# Patient Record
Sex: FEMALE | Race: WHITE | NOT HISPANIC OR LATINO | Employment: OTHER | URBAN - METROPOLITAN AREA
[De-identification: names, ages, dates, MRNs, and addresses within clinical notes are randomized per-mention and may not be internally consistent; named-entity substitution may affect disease eponyms.]

---

## 2017-04-07 ENCOUNTER — TELEPHONE (OUTPATIENT)
Dept: VASCULAR LAB | Facility: MEDICAL CENTER | Age: 62
End: 2017-04-07

## 2017-05-15 ENCOUNTER — TELEPHONE (OUTPATIENT)
Dept: VASCULAR LAB | Facility: MEDICAL CENTER | Age: 62
End: 2017-05-15

## 2017-05-16 NOTE — TELEPHONE ENCOUNTER
Patient overdue for vascular care followup visit.  We have been unable to reach patient by phone despite multiple attempts and patient has not returned our calls  Pending any further contact from patient will discontinue from vascular care.  Followup with PCP    Michael J. Bloch, MD  Vascular Care    Cc:  R Co

## 2018-03-08 ENCOUNTER — OFFICE VISIT (OUTPATIENT)
Dept: VASCULAR LAB | Facility: MEDICAL CENTER | Age: 63
End: 2018-03-08
Attending: INTERNAL MEDICINE
Payer: COMMERCIAL

## 2018-03-08 VITALS
DIASTOLIC BLOOD PRESSURE: 87 MMHG | WEIGHT: 251 LBS | HEART RATE: 95 BPM | BODY MASS INDEX: 41.82 KG/M2 | HEIGHT: 65 IN | SYSTOLIC BLOOD PRESSURE: 159 MMHG

## 2018-03-08 DIAGNOSIS — E78.5 DYSLIPIDEMIA: ICD-10-CM

## 2018-03-08 DIAGNOSIS — E11.9 TYPE 2 DIABETES MELLITUS WITHOUT COMPLICATION, WITHOUT LONG-TERM CURRENT USE OF INSULIN (HCC): ICD-10-CM

## 2018-03-08 DIAGNOSIS — I10 ESSENTIAL HYPERTENSION: ICD-10-CM

## 2018-03-08 PROCEDURE — 99213 OFFICE O/P EST LOW 20 MIN: CPT | Performed by: INTERNAL MEDICINE

## 2018-03-08 RX ORDER — METOPROLOL SUCCINATE 50 MG/1
50 TABLET, EXTENDED RELEASE ORAL DAILY
Qty: 30 TAB | Refills: 11 | Status: SHIPPED | OUTPATIENT
Start: 2018-03-08 | End: 2019-03-26 | Stop reason: SDUPTHER

## 2018-03-08 RX ORDER — MAGNESIUM OXIDE 400 MG/1
400 TABLET ORAL DAILY
COMMUNITY
End: 2019-11-19

## 2018-03-08 RX ORDER — IRBESARTAN AND HYDROCHLOROTHIAZIDE 300; 12.5 MG/1; MG/1
1 TABLET, FILM COATED ORAL DAILY
Qty: 30 TAB | Refills: 11 | Status: SHIPPED | OUTPATIENT
Start: 2018-03-08 | End: 2019-02-27 | Stop reason: SDUPTHER

## 2018-03-08 RX ORDER — ATORVASTATIN CALCIUM 40 MG/1
40 TABLET, FILM COATED ORAL DAILY
Qty: 30 TAB | Refills: 11 | Status: SHIPPED | OUTPATIENT
Start: 2018-03-08 | End: 2018-04-30

## 2018-03-08 RX ORDER — M-VIT,TX,IRON,MINS/CALC/FOLIC 27MG-0.4MG
1 TABLET ORAL DAILY
COMMUNITY
End: 2019-11-19

## 2018-03-08 ASSESSMENT — ENCOUNTER SYMPTOMS
COUGH: 0
DEPRESSION: 0
FOCAL WEAKNESS: 0
WEIGHT LOSS: 1
CLAUDICATION: 0
MYALGIAS: 0
SHORTNESS OF BREATH: 1
HEADACHES: 1
DIZZINESS: 0
SPEECH CHANGE: 0
BRUISES/BLEEDS EASILY: 0
LOSS OF CONSCIOUSNESS: 0
PALPITATIONS: 0
SENSORY CHANGE: 0
NERVOUS/ANXIOUS: 0

## 2018-03-08 NOTE — PATIENT INSTRUCTIONS
Start or Restart  - Irbestartan 300/12.5 mg once daily  - Metformin 500 mg 2x daily  - Atorvastatin 40 mg daily  - Metoprolol ER 50 mg daily    Continue aspirin.    Start home BP monitoring    Blood work one week before follow up visit.    Follow Up:  April 30 at 840a    Michael Bloch, MD  [phone]

## 2018-03-08 NOTE — PROGRESS NOTES
"  Follow Up VASCULAR VISIT  Subjective:   Shahana Nj is a 60 y.o. female who presents today 3-8-18 for   Chief Complaint   Patient presents with   • Amb Vascular Reason For Visit     HPI:  Patient here for f/u of htn, dyslipidemia and diabetes  No angina  Still not checking BP at home  Off metformin  Not checking fs regularly  No new tia or cva symptoms  On cpap.  No hypoglycemic episodes  No myalgias on statin  On asa   No bleeding  No recent blood work  Has been out of most of her medications    Social History   Substance Use Topics   • Smoking status: Never Smoker   • Smokeless tobacco: Never Used   • Alcohol use No     Allergies   Allergen Reactions   • Pcn [Penicillins] Swelling   • Tape Rash     rash     DIET AND EXERCISE:  Weight Change: down about 20 pounds   Diet: decent diabetic diet - simplified  Exercise: intermittent exercise - moderate    Review of Systems   Constitutional: Positive for weight loss. Negative for malaise/fatigue.   Respiratory: Positive for shortness of breath. Negative for cough.    Cardiovascular: Negative for chest pain, palpitations, claudication and leg swelling.   Musculoskeletal: Positive for joint pain. Negative for myalgias.   Neurological: Positive for headaches. Negative for dizziness, sensory change, speech change, focal weakness and loss of consciousness.   Endo/Heme/Allergies: Does not bruise/bleed easily.   Psychiatric/Behavioral: Negative for depression. The patient is not nervous/anxious.       Objective:     Vitals:    03/08/18 1149 03/08/18 1206   BP: 157/76 159/87   Pulse: 84 95   Weight: 113.9 kg (251 lb)    Height: 1.651 m (5' 5\")       Body mass index is 41.77 kg/m².  Physical Exam   Constitutional: She is oriented to person, place, and time. No distress.   Cardiovascular: Normal rate, regular rhythm, normal heart sounds and intact distal pulses.    No murmur heard.  Pulmonary/Chest: Effort normal and breath sounds normal. No respiratory distress. She has no " wheezes. She has no rales.   Musculoskeletal: She exhibits edema.   Tr edema bilat   Neurological: She is alert and oriented to person, place, and time. No cranial nerve deficit. Coordination normal.   Skin: She is not diaphoretic.   Psychiatric: She has a normal mood and affect.   Vitals reviewed.    DATA REVIEW:    Blood work 1-4-16:  BNP 8.5; d-dimer 0.27    Echocardiogram 1-13-16:    Normal EF 60%  No noted regional wall motion abnormalities  Trace MR, AI, TR    Blood work from 6-21-16:  Glucose 60, gfr 58, , nonHDL 126, urine for ma normal, a1c-5.7, tsh 2.2                   Medical Decision Making:  Today's Assessment / Status / Plan:     1. Type 2 diabetes mellitus without complication, without long-term current use of insulin (CMS-HCC)  metFORMIN (GLUCOPHAGE) 500 MG Tab    HEMOGLOBIN A1C    MICROALBUMIN CREAT RATIO URINE   2. Essential hypertension  irbesartan-hydrochlorothiazide (AVALIDE) 300-12.5 MG per tablet    metoprolol SR (TOPROL XL) 50 MG TABLET SR 24 HR    COMP METABOLIC PANEL    MICROALBUMIN CREAT RATIO URINE   3. Dyslipidemia  atorvastatin (LIPITOR) 40 MG Tab    COMP METABOLIC PANEL    LIPID PROFILE    TSH     Patient Type: Primary Prevention    Etiology of Established CVD if Present: Possible silent MI, normal echo    Lipid Management: Qualifies for Statin Therapy Based on 2013 ACC/AHA Guidelines: yes  Calculated 10-Year Risk of ASCVD: N/A  Currently on Statin: Yes  Patient with indication for high intensity statin  Goal LDL <70 and nonHDL <100  Plan:  - restart atorvastatin 40 mg daily  - Intensify TLC per below  - Check fasting lipid panel prior to next visit    Blood Pressure Management:  Acc/aha goal <130/80  BP under poor control at least in office  Off most of her meds  No home BP readings available.  Leg swelling improved since amlodipine d/c'd   Plan:  - restart metoprolol  - change to irbesartan 300/12.5  - Try and obtain a home blood pressure monitor  - Consider abpm in future  -  consider addition of peripheral alpha blocker or spironolactone if not controlled in future    Glycemic Status: Diabetic  Unknown level of control  Plan:  - Restart metformin  - restart FS  - Recheck A1c prior to next visit  - Yearly flu shot, yearly eye exam, and good foot hygiene    Anti-Platelet/Anti-Coagulant Tx: probably indicated given possible h/o silent MI  - continue aspirin 81 mg daily    Smoking: continue complete avoidance      Physical Activity: discussed some short burst exercise daily    Weight Management and Nutrition: Dietary plan was discussed with patient at this visit including diabetic diet - including continued slow steady weight loss    Other:     1.  AALIYAH - continue CPAP.  Otherwise defer to pulmonary    2.  Possible CAD with possible h/o silent MI by patient report.  Echo without wall motion abnormalities.   Medical management as above.     Instructed to follow-up with PCP for remainder of adult medical needs: yes  We will partner with other providers in the management of established vascular disease and cardiometabolic risk factors.    Studies to Be Obtained: None  Labs to Be Obtained: As above prior to next visit    Follow up in: 4 weeks    Michael J Bloch, M.D.    Cc:    Yodry Woody

## 2018-04-23 ENCOUNTER — TELEPHONE (OUTPATIENT)
Dept: VASCULAR LAB | Facility: MEDICAL CENTER | Age: 63
End: 2018-04-23

## 2018-04-23 ENCOUNTER — HOSPITAL ENCOUNTER (OUTPATIENT)
Dept: LAB | Facility: MEDICAL CENTER | Age: 63
End: 2018-04-23
Attending: INTERNAL MEDICINE
Payer: COMMERCIAL

## 2018-04-23 DIAGNOSIS — I10 ESSENTIAL HYPERTENSION: ICD-10-CM

## 2018-04-23 DIAGNOSIS — E78.5 DYSLIPIDEMIA: ICD-10-CM

## 2018-04-23 DIAGNOSIS — E11.9 TYPE 2 DIABETES MELLITUS WITHOUT COMPLICATION, WITHOUT LONG-TERM CURRENT USE OF INSULIN (HCC): ICD-10-CM

## 2018-04-23 LAB
ALBUMIN SERPL BCP-MCNC: 3.8 G/DL (ref 3.2–4.9)
ALBUMIN/GLOB SERPL: 1 G/DL
ALP SERPL-CCNC: 181 U/L (ref 30–99)
ALT SERPL-CCNC: 148 U/L (ref 2–50)
ANION GAP SERPL CALC-SCNC: 9 MMOL/L (ref 0–11.9)
AST SERPL-CCNC: 136 U/L (ref 12–45)
BILIRUB SERPL-MCNC: 0.6 MG/DL (ref 0.1–1.5)
BUN SERPL-MCNC: 28 MG/DL (ref 8–22)
CALCIUM SERPL-MCNC: 10.6 MG/DL (ref 8.5–10.5)
CHLORIDE SERPL-SCNC: 104 MMOL/L (ref 96–112)
CHOLEST SERPL-MCNC: 132 MG/DL (ref 100–199)
CO2 SERPL-SCNC: 22 MMOL/L (ref 20–33)
CREAT SERPL-MCNC: 0.88 MG/DL (ref 0.5–1.4)
CREAT UR-MCNC: 101.8 MG/DL
EST. AVERAGE GLUCOSE BLD GHB EST-MCNC: 151 MG/DL
GLOBULIN SER CALC-MCNC: 3.8 G/DL (ref 1.9–3.5)
GLUCOSE SERPL-MCNC: 132 MG/DL (ref 65–99)
HBA1C MFR BLD: 6.9 % (ref 0–5.6)
HDLC SERPL-MCNC: 39 MG/DL
LDLC SERPL CALC-MCNC: 73 MG/DL
MICROALBUMIN UR-MCNC: 1.4 MG/DL
MICROALBUMIN/CREAT UR: 14 MG/G (ref 0–30)
POTASSIUM SERPL-SCNC: 4.2 MMOL/L (ref 3.6–5.5)
PROT SERPL-MCNC: 7.6 G/DL (ref 6–8.2)
SODIUM SERPL-SCNC: 135 MMOL/L (ref 135–145)
TRIGL SERPL-MCNC: 100 MG/DL (ref 0–149)
TSH SERPL DL<=0.005 MIU/L-ACNC: 2.33 UIU/ML (ref 0.38–5.33)

## 2018-04-23 PROCEDURE — 82043 UR ALBUMIN QUANTITATIVE: CPT

## 2018-04-23 PROCEDURE — 82570 ASSAY OF URINE CREATININE: CPT

## 2018-04-23 PROCEDURE — 36415 COLL VENOUS BLD VENIPUNCTURE: CPT

## 2018-04-23 PROCEDURE — 80061 LIPID PANEL: CPT

## 2018-04-23 PROCEDURE — 83036 HEMOGLOBIN GLYCOSYLATED A1C: CPT

## 2018-04-23 PROCEDURE — 84443 ASSAY THYROID STIM HORMONE: CPT

## 2018-04-23 PROCEDURE — 80053 COMPREHEN METABOLIC PANEL: CPT

## 2018-04-24 ENCOUNTER — TELEPHONE (OUTPATIENT)
Dept: VASCULAR LAB | Facility: MEDICAL CENTER | Age: 63
End: 2018-04-24

## 2018-04-24 NOTE — TELEPHONE ENCOUNTER
Lab work reviewed. AST//148. LM on VM to stop the atorvastatin for now aqnd we will see her at her appt in the clinic. ANGELO Sutton.

## 2018-04-24 NOTE — TELEPHONE ENCOUNTER
LFTs elevated approx 3x ULN.  Will have patient stop statin.  MA to inform  Repeat CMP in 3 week.  May also want to get viral hepatitis panel and abdominal u/s  Will need to follow up with PCP for further management    Michael Bloch, MD  Vascular Care    Cc:  Yordy oWody

## 2018-04-24 NOTE — TELEPHONE ENCOUNTER
high LFTs - stop statin   Received: Yesterday   Message Contents   Michael J Bloch, M.D.  Tani Vasquez Ass't             Please call patient and let her know that her liver function tests are elevated.  She should stop her atorvastatin.     We will discuss further at her follow up visit.   Please make sure to document all attempts      Shahana called me back, but Mirella had already spoke to her. Patient is aware to stop Atorvastatin.

## 2018-04-24 NOTE — TELEPHONE ENCOUNTER
----- Message from Michael J Bloch, M.D. sent at 4/23/2018  6:49 PM PDT -----  Regarding: high LFTs - stop statin  Please call patient and let her know that her liver function tests are elevated.  She should stop her atorvastatin.    We will discuss further at her follow up visit.  Please make sure to document all attempts

## 2018-04-30 ENCOUNTER — OFFICE VISIT (OUTPATIENT)
Dept: VASCULAR LAB | Facility: MEDICAL CENTER | Age: 63
End: 2018-04-30
Attending: NURSE PRACTITIONER
Payer: COMMERCIAL

## 2018-04-30 VITALS
WEIGHT: 250 LBS | DIASTOLIC BLOOD PRESSURE: 78 MMHG | HEART RATE: 86 BPM | HEIGHT: 65 IN | BODY MASS INDEX: 41.65 KG/M2 | SYSTOLIC BLOOD PRESSURE: 155 MMHG

## 2018-04-30 DIAGNOSIS — H90.6 MIXED CONDUCTIVE AND SENSORINEURAL HEARING LOSS OF BOTH EARS: ICD-10-CM

## 2018-04-30 DIAGNOSIS — Z12.11 ENCOUNTER FOR SCREENING COLONOSCOPY FOR NON-HIGH-RISK PATIENT: ICD-10-CM

## 2018-04-30 DIAGNOSIS — E11.9 TYPE 2 DIABETES MELLITUS TREATED WITHOUT INSULIN (HCC): ICD-10-CM

## 2018-04-30 DIAGNOSIS — R03.0 ELEVATED BP WITHOUT DIAGNOSIS OF HYPERTENSION: ICD-10-CM

## 2018-04-30 PROBLEM — H91.93 BILATERAL HEARING LOSS: Status: ACTIVE | Noted: 2018-04-30

## 2018-04-30 PROBLEM — Q17.9 EAR ANOMALY: Status: ACTIVE | Noted: 2018-04-30

## 2018-04-30 PROCEDURE — 99212 OFFICE O/P EST SF 10 MIN: CPT | Performed by: NURSE PRACTITIONER

## 2018-04-30 PROCEDURE — 99214 OFFICE O/P EST MOD 30 MIN: CPT | Performed by: NURSE PRACTITIONER

## 2018-04-30 RX ORDER — SPIRONOLACTONE 25 MG/1
25 TABLET ORAL DAILY
Qty: 30 TAB | Refills: 3 | Status: SHIPPED | OUTPATIENT
Start: 2018-04-30 | End: 2018-08-29 | Stop reason: SDUPTHER

## 2018-04-30 ASSESSMENT — ENCOUNTER SYMPTOMS
LOSS OF CONSCIOUSNESS: 0
DEPRESSION: 0
BRUISES/BLEEDS EASILY: 0
SPEECH CHANGE: 0
NERVOUS/ANXIOUS: 0
MYALGIAS: 0
DIZZINESS: 0
COUGH: 0
CLAUDICATION: 0
FOCAL WEAKNESS: 0
SHORTNESS OF BREATH: 1
HEADACHES: 1
SENSORY CHANGE: 0
PALPITATIONS: 0
WEIGHT LOSS: 1

## 2018-04-30 NOTE — PROGRESS NOTES
"FOLLOW UP VASCULAR VISIT  Subjective:   Shahana Nj is a 60 y.o. female who presents today 4/30/18 for   Chief Complaint   Patient presents with   • Follow-Up     HPI:  Patient here for f/u of htn, dyslipidemia and diabetes  Has not gotten home BP monitor yet   FS running 118-150, no hypoglycemia episodes  No TIA or stroke type symptoms  Denies chest pain, lightheadedness, or palpitations   SOB with exertion only  Tolerating Metformin  Tolerating changes from last visit  No problems with CPAP mask  On Atorvastatin had elevated AST/ALT no symptoms of myalgia  Stopped Atorvastatin on 4/24  On ASA, no bleeding issues  Leg swelling minor Lt > Rt  Request ENT referral-Has had multiple surgeries on her ears and would like to have them evaluated again    Social History   Substance Use Topics   • Smoking status: Never Smoker   • Smokeless tobacco: Never Used   • Alcohol use No     Allergies   Allergen Reactions   • Pcn [Penicillins] Swelling   • Tape Rash     rash     DIET AND EXERCISE:  Weight Change: down about 20 pounds   Diet: decent diabetic diet - simplified  Exercise: intermittent exercise - moderate    Review of Systems   Constitutional: Positive for weight loss. Negative for malaise/fatigue.   Respiratory: Positive for shortness of breath. Negative for cough.    Cardiovascular: Positive for leg swelling. Negative for chest pain, palpitations and claudication.   Musculoskeletal: Positive for joint pain. Negative for myalgias.   Neurological: Positive for headaches. Negative for dizziness, sensory change, speech change, focal weakness and loss of consciousness.   Endo/Heme/Allergies: Does not bruise/bleed easily.   Psychiatric/Behavioral: Negative for depression. The patient is not nervous/anxious.       Objective:     Vitals:    04/30/18 0839 04/30/18 0845   BP: 157/79 155/78   Pulse: 87 86   Weight: 113.4 kg (250 lb)    Height: 1.651 m (5' 5\")       Body mass index is 41.6 kg/m².  Physical Exam   Constitutional: " She is oriented to person, place, and time. No distress.   Cardiovascular: Normal rate, normal heart sounds and intact distal pulses.    No murmur heard.  Occ irregular beat   Pulmonary/Chest: Effort normal and breath sounds normal. No respiratory distress. She has no wheezes. She has no rales.   Musculoskeletal: She exhibits edema.   Tr edema bilat Lt > rt   Neurological: She is alert and oriented to person, place, and time.   Skin: She is not diaphoretic.   Psychiatric: She has a normal mood and affect.   Vitals reviewed.    DATA REVIEW:    Blood work 1-4-16:  BNP 8.5; d-dimer 0.27    Echocardiogram 1-13-16:    Normal EF 60%  No noted regional wall motion abnormalities  Trace MR, AI, TR    Blood work from 6-21-16:  Glucose 60, gfr 58, , nonHDL 126, urine for ma normal, a1c-5.7, tsh 2.2  Lab Results   Component Value Date    CHOLSTRLTOT 132 04/23/2018    LDL 73 04/23/2018    HDL 39 (A) 04/23/2018    TRIGLYCERIDE 100 04/23/2018         Lab Results   Component Value Date    HBA1C 6.9 (H) 04/23/2018      Lab Results   Component Value Date    SODIUM 135 04/23/2018    POTASSIUM 4.2 04/23/2018    CHLORIDE 104 04/23/2018    CO2 22 04/23/2018    GLUCOSE 132 (H) 04/23/2018    BUN 28 (H) 04/23/2018    CREATININE 0.88 04/23/2018    IF[place] >60 04/23/2018    IFNOTAFR >60 04/23/2018          Medical Decision Making:  Today's Assessment / Status / Plan:     1. Type 2 diabetes mellitus treated without insulin (HCC)  COMP METABOLIC PANEL    metformin (GLUCOPHAGE) 1000 MG tablet   2. Elevated BP without diagnosis of hypertension  COMP METABOLIC PANEL    spironolactone (ALDACTONE) 25 MG Tab   3. Encounter for screening colonoscopy for non-high-risk patient  REFERRAL TO GASTROENTEROLOGY   4. Mixed conductive and sensorineural hearing loss of both ears       Patient Type: Primary Prevention    Etiology of Established CVD if Present: Possible silent MI, normal echo    Lipid Management: Qualifies for Statin Therapy Based on 2013  ACC/AHA Guidelines: yes  Calculated 10-Year Risk of ASCVD: N/A  Currently on Statin: Yes  Patient with indication for high intensity statin  Goal LDL <70 and nonHDL <100  Atorvastatin caused elevated /148  Plan:  - Stopped Atorvastatin on 4/24 due to elevated LFT  - CMP in 3 weeks  - Consider starting Rosuvastatin 20 mg after CMP results if LFT have come down  - Intensify TLC per below  - Check fasting lipid panel in 3 to 6 months    Blood Pressure Management:  Acc/aha goal <130/80  BP under poor control at least in office  Taking Irbesartan-HCTZ and metoprolol without problems  No home BP readings available.  Minor leg swelling trace Lt>Rt   Plan:  - Continue metoprolol  - Continue irbesartan-HCTZ 300/12.5  - Start Spironolactone 25 mg daily in AM   - Try and obtain a home blood pressure monitor  - Consider abpm in future  - consider addition of peripheral alpha blocker in future if still not in control    Glycemic Status: Diabetic  Most recent A1c 6.9% after one month of Metformin- Tolerating medication without GI symptoms  Plan:  - Increase metformin to 1,000 mg BID  - Continue FS call if hypoglycemia episodes  - Recheck A1c  in 3 months  - Yearly flu shot, yearly eye exam, and good foot hygiene    Anti-Platelet/Anti-Coagulant Tx: probably indicated given possible h/o silent MI  - continue aspirin 81 mg daily  - Report bleeding issues immediatly     Smoking: continue complete avoidance      Physical Activity: discussed some short burst exercise daily    Weight Management and Nutrition: Dietary plan was discussed with patient at this visit including diabetic diet - including continued slow steady weight loss    Other:     1.  AALIYAH - continue CPAP.  Otherwise defer to pulmonary    2.  Possible CAD with possible h/o silent MI by patient report.  Echo without wall motion abnormalities.   Medical management as above.     3. Due for colonoscopy- Referral sent to GI    4. Wanted an ENT referral due to having  several ear surgeries - Referral to ENT       Instructed to follow-up with PCP for remainder of adult medical needs: yes  We will partner with other providers in the management of established vascular disease and cardiometabolic risk factors.    Studies to Be Obtained: None  Labs to Be Obtained: CMP  in 3 weeks     Follow up in: 6 weeks    ANGELO Sutton.     Will need further workup for elevated LFTs they don't normalize off statin therapy. Can defer to PCP for this workup    Michael J. Bloch, MD  Vascular Care    Cc:    Yordy Woody

## 2018-05-23 ENCOUNTER — HOSPITAL ENCOUNTER (OUTPATIENT)
Dept: LAB | Facility: MEDICAL CENTER | Age: 63
End: 2018-05-23
Attending: NURSE PRACTITIONER
Payer: COMMERCIAL

## 2018-05-23 DIAGNOSIS — E11.9 TYPE 2 DIABETES MELLITUS TREATED WITHOUT INSULIN (HCC): ICD-10-CM

## 2018-05-23 DIAGNOSIS — R03.0 ELEVATED BP WITHOUT DIAGNOSIS OF HYPERTENSION: ICD-10-CM

## 2018-05-23 LAB
ALBUMIN SERPL BCP-MCNC: 4.2 G/DL (ref 3.2–4.9)
ALBUMIN/GLOB SERPL: 1.2 G/DL
ALP SERPL-CCNC: 88 U/L (ref 30–99)
ALT SERPL-CCNC: 51 U/L (ref 2–50)
ANION GAP SERPL CALC-SCNC: 10 MMOL/L (ref 0–11.9)
AST SERPL-CCNC: 34 U/L (ref 12–45)
BILIRUB SERPL-MCNC: 0.5 MG/DL (ref 0.1–1.5)
BUN SERPL-MCNC: 32 MG/DL (ref 8–22)
CALCIUM SERPL-MCNC: 10.8 MG/DL (ref 8.5–10.5)
CHLORIDE SERPL-SCNC: 104 MMOL/L (ref 96–112)
CO2 SERPL-SCNC: 23 MMOL/L (ref 20–33)
CREAT SERPL-MCNC: 0.97 MG/DL (ref 0.5–1.4)
GLOBULIN SER CALC-MCNC: 3.5 G/DL (ref 1.9–3.5)
GLUCOSE SERPL-MCNC: 101 MG/DL (ref 65–99)
POTASSIUM SERPL-SCNC: 4.7 MMOL/L (ref 3.6–5.5)
PROT SERPL-MCNC: 7.7 G/DL (ref 6–8.2)
SODIUM SERPL-SCNC: 137 MMOL/L (ref 135–145)

## 2018-05-23 PROCEDURE — 80053 COMPREHEN METABOLIC PANEL: CPT

## 2018-05-23 PROCEDURE — 36415 COLL VENOUS BLD VENIPUNCTURE: CPT

## 2018-06-04 ENCOUNTER — OFFICE VISIT (OUTPATIENT)
Dept: VASCULAR LAB | Facility: MEDICAL CENTER | Age: 63
End: 2018-06-04
Attending: INTERNAL MEDICINE
Payer: COMMERCIAL

## 2018-06-04 VITALS
SYSTOLIC BLOOD PRESSURE: 140 MMHG | BODY MASS INDEX: 41.45 KG/M2 | HEART RATE: 89 BPM | DIASTOLIC BLOOD PRESSURE: 64 MMHG | HEIGHT: 65 IN | WEIGHT: 248.8 LBS

## 2018-06-04 DIAGNOSIS — E78.5 ELEVATED FASTING LIPID PROFILE: ICD-10-CM

## 2018-06-04 DIAGNOSIS — E11.9 TYPE 2 DIABETES MELLITUS WITHOUT COMPLICATION, WITHOUT LONG-TERM CURRENT USE OF INSULIN (HCC): ICD-10-CM

## 2018-06-04 DIAGNOSIS — Z13.29 THYROID DISORDER SCREENING: ICD-10-CM

## 2018-06-04 DIAGNOSIS — R03.0 ELEVATED BP WITHOUT DIAGNOSIS OF HYPERTENSION: ICD-10-CM

## 2018-06-04 PROCEDURE — 99214 OFFICE O/P EST MOD 30 MIN: CPT | Performed by: NURSE PRACTITIONER

## 2018-06-04 PROCEDURE — 99212 OFFICE O/P EST SF 10 MIN: CPT | Performed by: NURSE PRACTITIONER

## 2018-06-04 RX ORDER — ROSUVASTATIN CALCIUM 20 MG/1
20 TABLET, COATED ORAL EVERY EVENING
Qty: 30 TAB | Refills: 11 | Status: SHIPPED | OUTPATIENT
Start: 2018-06-04 | End: 2018-07-18

## 2018-06-04 ASSESSMENT — ENCOUNTER SYMPTOMS
BLOOD IN STOOL: 0
CLAUDICATION: 0
DEPRESSION: 0
WEAKNESS: 0
DOUBLE VISION: 0
LOSS OF CONSCIOUSNESS: 0
PALPITATIONS: 0
BLURRED VISION: 0
WHEEZING: 0
HEADACHES: 0
FALLS: 0
BRUISES/BLEEDS EASILY: 0
SEIZURES: 0
SHORTNESS OF BREATH: 0
DIZZINESS: 0
WEIGHT LOSS: 0

## 2018-06-04 NOTE — PROGRESS NOTES
FOLLOW UP VASCULAR VISIT  Subjective:   Shahana Nj is a 60 y.o. female who presents today 06/05/18 for   No chief complaint on file.    HPI:  Patient here for f/u of htn, dyslipidemia and diabetes  Home 's/70-80's but did not bring in  -606's, better since increase metformin dose last visit  No Chest pain, lightheadedness or plapitations  Tolerating CPAP mask without problems  Off Atorvastatin since 4/25 LFT decreased on last test  On ASA, no bleeding   Leg swelling LT>RT  Denies TIA or stroke type symptoms    Social History   Substance Use Topics   • Smoking status: Never Smoker   • Smokeless tobacco: Never Used   • Alcohol use No     Allergies   Allergen Reactions   • Pcn [Penicillins] Swelling   • Tape Rash     rash     DIET AND EXERCISE:  Weight Change: down about 20 pounds   Diet: decent diabetic diet - simplified  Exercise: intermittent exercise - moderate    Review of Systems   Constitutional: Negative for malaise/fatigue and weight loss.   Eyes: Negative for blurred vision and double vision.   Respiratory: Negative for shortness of breath and wheezing.    Cardiovascular: Positive for leg swelling. Negative for chest pain, palpitations and claudication.   Gastrointestinal: Negative for blood in stool.   Genitourinary: Negative for hematuria.   Musculoskeletal: Positive for joint pain. Negative for falls.   Skin: Negative for rash.   Neurological: Negative for dizziness, seizures, loss of consciousness, weakness and headaches.   Endo/Heme/Allergies: Does not bruise/bleed easily.   Psychiatric/Behavioral: Negative for depression.      Objective:     There were no vitals filed for this visit.   There is no height or weight on file to calculate BMI.  Physical Exam   Constitutional: She is oriented to person, place, and time. She appears well-developed and well-nourished. No distress.   Cardiovascular: Normal rate, regular rhythm, normal heart sounds and intact distal pulses.  Exam reveals no  friction rub.    No murmur heard.  Pulmonary/Chest: No respiratory distress. She has no wheezes. She has no rales.   Musculoskeletal: She exhibits edema.   Neurological: She is alert and oriented to person, place, and time.   Skin: Skin is warm and dry. She is not diaphoretic.   Psychiatric: She has a normal mood and affect. Her behavior is normal.     DATA REVIEW:    Blood work 1-4-16:  BNP 8.5; d-dimer 0.27    Echocardiogram 1-13-16:    Normal EF 60%  No noted regional wall motion abnormalities  Trace MR, AI, TR    Blood work from 6-21-16:  Glucose 60, gfr 58, , nonHDL 126, urine for ma normal, a1c-5.7, tsh 2.2  Lab Results   Component Value Date    CHOLSTRLTOT 132 04/23/2018    LDL 73 04/23/2018    HDL 39 (A) 04/23/2018    TRIGLYCERIDE 100 04/23/2018         Lab Results   Component Value Date    HBA1C 6.9 (H) 04/23/2018      Lab Results   Component Value Date    SODIUM 137 05/23/2018    POTASSIUM 4.7 05/23/2018    CHLORIDE 104 05/23/2018    CO2 23 05/23/2018    GLUCOSE 101 (H) 05/23/2018    BUN 32 (H) 05/23/2018    CREATININE 0.97 05/23/2018    IF[place] >60 05/23/2018    IFNOTAFR 58 (A) 05/23/2018          Medical Decision Making:  Today's Assessment / Status / Plan:     No diagnosis found.  Patient Type: Primary Prevention    Etiology of Established CVD if Present: Possible silent MI, normal echo    Lipid Management: Qualifies for Statin Therapy Based on 2013 ACC/AHA Guidelines: yes  Calculated 10-Year Risk of ASCVD: N/A  Currently on Statin: Yes  Patient with indication for high intensity statin  Goal LDL <70 and nonHDL <100  Atorvastatin caused elevated /148  Plan:  - Trial of Crestor 20 mg  - CMP in 4 weeks  - Intensify TLC per below  - Check fasting lipid panel in 4 weeks    Blood Pressure Management:  Acc/aha goal <130/80  BP under poor control at least in office, but home readings in range  Taking Irbesartan-HCTZ and metoprolol without problems  Minor leg swelling trace Lt>Rt   Plan:  -  Continue metoprolol  - Continue irbesartan-HCTZ 300/12.5  -  Continue Spironolactone 25 mg daily in AM   - Try and obtain a home blood pressure monitor  - Consider abpm in future  - consider addition of peripheral alpha blocker in future if still not in control  -Screen for thyroid function before next visit    Glycemic Status: Diabetic  Most recent A1c 6.9% after one month of Metformin- Tolerating medication without GI symptoms  Plan:  - Continue metformin to 1,000 mg BID  - Continue FS call if hypoglycemia episodes  - Recheck A1c prior to next visit, monitor creatinine closely  - Yearly flu shot, yearly eye exam, and good foot hygiene    Anti-Platelet/Anti-Coagulant Tx: probably indicated given possible h/o silent MI  - continue aspirin 81 mg daily  - Report bleeding issues immediatly     Smoking: continue complete avoidance      Physical Activity: discussed some short burst exercise daily    Weight Management and Nutrition: Dietary plan was discussed with patient at this visit including diabetic diet - including continued slow steady weight loss    Other:     1.  AALIYAH - continue CPAP.  Otherwise defer to pulmonary    2.  Possible CAD with possible h/o silent MI by patient report.  Echo without wall motion abnormalities.   Medical management as above.     3. Due for colonoscopy- Referral sent to GI    4. Elevated LFT- Back to baseline -If LFT continue to be elevated will need further workup. Start trial of Crestor as above.     5. Mod decreased GFR-3a - Monitor GFR at next visit, Urine for MA, May need to decrease dose of Spironolactone with next visit. Will keep a close watch on kidney function    Instructed to follow-up with PCP for remainder of adult medical needs: yes  We will partner with other providers in the management of established vascular disease and cardiometabolic risk factors.    Studies to Be Obtained: None  Labs to Be Obtained: CMP, Lipids, A1c, TSH,  Urine for MA    Follow up in: 5 weeks    Mirella  ERASMO Hauser     Cc:    Yordy Woody

## 2018-07-11 ENCOUNTER — HOSPITAL ENCOUNTER (OUTPATIENT)
Dept: LAB | Facility: MEDICAL CENTER | Age: 63
End: 2018-07-11
Attending: NURSE PRACTITIONER
Payer: COMMERCIAL

## 2018-07-11 DIAGNOSIS — Z13.29 THYROID DISORDER SCREENING: ICD-10-CM

## 2018-07-11 DIAGNOSIS — R03.0 ELEVATED BP WITHOUT DIAGNOSIS OF HYPERTENSION: ICD-10-CM

## 2018-07-11 DIAGNOSIS — E78.5 ELEVATED FASTING LIPID PROFILE: ICD-10-CM

## 2018-07-11 DIAGNOSIS — E11.9 TYPE 2 DIABETES MELLITUS WITHOUT COMPLICATION, WITHOUT LONG-TERM CURRENT USE OF INSULIN (HCC): ICD-10-CM

## 2018-07-11 LAB
ALBUMIN SERPL BCP-MCNC: 4.6 G/DL (ref 3.2–4.9)
ALBUMIN/GLOB SERPL: 1.5 G/DL
ALP SERPL-CCNC: 73 U/L (ref 30–99)
ALT SERPL-CCNC: 23 U/L (ref 2–50)
ANION GAP SERPL CALC-SCNC: 10 MMOL/L (ref 0–11.9)
AST SERPL-CCNC: 18 U/L (ref 12–45)
BILIRUB SERPL-MCNC: 0.4 MG/DL (ref 0.1–1.5)
BUN SERPL-MCNC: 42 MG/DL (ref 8–22)
CALCIUM SERPL-MCNC: 11.5 MG/DL (ref 8.5–10.5)
CHLORIDE SERPL-SCNC: 103 MMOL/L (ref 96–112)
CHOLEST SERPL-MCNC: 151 MG/DL (ref 100–199)
CO2 SERPL-SCNC: 22 MMOL/L (ref 20–33)
CREAT SERPL-MCNC: 1.23 MG/DL (ref 0.5–1.4)
CREAT UR-MCNC: 71 MG/DL
EST. AVERAGE GLUCOSE BLD GHB EST-MCNC: 140 MG/DL
GLOBULIN SER CALC-MCNC: 3 G/DL (ref 1.9–3.5)
GLUCOSE SERPL-MCNC: 110 MG/DL (ref 65–99)
HBA1C MFR BLD: 6.5 % (ref 0–5.6)
HDLC SERPL-MCNC: 42 MG/DL
LDLC SERPL CALC-MCNC: 82 MG/DL
MICROALBUMIN UR-MCNC: <0.7 MG/DL
MICROALBUMIN/CREAT UR: NORMAL MG/G (ref 0–30)
POTASSIUM SERPL-SCNC: 4.7 MMOL/L (ref 3.6–5.5)
PROT SERPL-MCNC: 7.6 G/DL (ref 6–8.2)
SODIUM SERPL-SCNC: 135 MMOL/L (ref 135–145)
TRIGL SERPL-MCNC: 135 MG/DL (ref 0–149)
TSH SERPL DL<=0.005 MIU/L-ACNC: 2.53 UIU/ML (ref 0.38–5.33)

## 2018-07-11 PROCEDURE — 80061 LIPID PANEL: CPT

## 2018-07-11 PROCEDURE — 83036 HEMOGLOBIN GLYCOSYLATED A1C: CPT

## 2018-07-11 PROCEDURE — 82043 UR ALBUMIN QUANTITATIVE: CPT

## 2018-07-11 PROCEDURE — 80053 COMPREHEN METABOLIC PANEL: CPT

## 2018-07-11 PROCEDURE — 84443 ASSAY THYROID STIM HORMONE: CPT

## 2018-07-11 PROCEDURE — 36415 COLL VENOUS BLD VENIPUNCTURE: CPT

## 2018-07-11 PROCEDURE — 82570 ASSAY OF URINE CREATININE: CPT

## 2018-07-18 ENCOUNTER — OFFICE VISIT (OUTPATIENT)
Dept: VASCULAR LAB | Facility: MEDICAL CENTER | Age: 63
End: 2018-07-18
Attending: INTERNAL MEDICINE
Payer: COMMERCIAL

## 2018-07-18 VITALS
WEIGHT: 252.7 LBS | DIASTOLIC BLOOD PRESSURE: 68 MMHG | HEIGHT: 65 IN | BODY MASS INDEX: 42.1 KG/M2 | HEART RATE: 84 BPM | SYSTOLIC BLOOD PRESSURE: 141 MMHG

## 2018-07-18 DIAGNOSIS — E78.5 ELEVATED FASTING LIPID PROFILE: ICD-10-CM

## 2018-07-18 DIAGNOSIS — R03.0 ELEVATED BP WITHOUT DIAGNOSIS OF HYPERTENSION: ICD-10-CM

## 2018-07-18 PROCEDURE — 99212 OFFICE O/P EST SF 10 MIN: CPT | Performed by: NURSE PRACTITIONER

## 2018-07-18 PROCEDURE — 99213 OFFICE O/P EST LOW 20 MIN: CPT | Performed by: NURSE PRACTITIONER

## 2018-07-18 RX ORDER — ROSUVASTATIN CALCIUM 40 MG/1
40 TABLET, COATED ORAL DAILY
Qty: 30 TAB | Refills: 3 | Status: SHIPPED | OUTPATIENT
Start: 2018-07-18 | End: 2018-11-12 | Stop reason: SDUPTHER

## 2018-07-18 ASSESSMENT — ENCOUNTER SYMPTOMS
BRUISES/BLEEDS EASILY: 0
DIZZINESS: 0
SHORTNESS OF BREATH: 0
CLAUDICATION: 0
WEIGHT LOSS: 0
BLURRED VISION: 0
COUGH: 0
DEPRESSION: 0
WHEEZING: 0
SEIZURES: 0
PALPITATIONS: 0
FEVER: 0
HEADACHES: 1
BLOOD IN STOOL: 0

## 2018-07-18 NOTE — PROGRESS NOTES
"FOLLOW UP VASCULAR VISIT  Subjective:   Shahana Nj is a 63 y.o. female who presents today 07/18/18 for   Chief Complaint   Patient presents with   • Follow-Up     HPI:  Patient here for f/u of htn, dyslipidemia and diabetes  Not taking Home bp  FS-110-102  Chest pain, lightheadedness or plapitations  Tolerating CPAP mask without problems  On Crestor, no increase in LFT   On ASA, no bleeding issues  Leg swelling LT>RT  Denies TIA or stroke type symptoms    Social History   Substance Use Topics   • Smoking status: Never Smoker   • Smokeless tobacco: Never Used   • Alcohol use No     Allergies   Allergen Reactions   • Pcn [Penicillins] Swelling   • Tape Rash     rash     DIET AND EXERCISE:  Weight Change: down about 20 pounds   Diet: decent diabetic diet - simplified  Exercise: intermittent exercise - moderate    Review of Systems   Constitutional: Negative for fever and weight loss.   HENT: Negative for nosebleeds.    Eyes: Negative for blurred vision.   Respiratory: Negative for cough, shortness of breath and wheezing.    Cardiovascular: Positive for leg swelling. Negative for chest pain, palpitations and claudication.   Gastrointestinal: Negative for blood in stool.   Genitourinary: Negative for hematuria.   Musculoskeletal: Positive for joint pain.   Neurological: Positive for headaches. Negative for dizziness and seizures.   Endo/Heme/Allergies: Does not bruise/bleed easily.   Psychiatric/Behavioral: Negative for depression.      Objective:     Vitals:    07/18/18 1002 07/18/18 1009   BP: 149/73 141/68   Pulse: 89 84   Weight: 114.6 kg (252 lb 11.2 oz)    Height: 1.651 m (5' 5\")       Body mass index is 42.05 kg/m².  Physical Exam   Constitutional: She is oriented to person, place, and time. She appears well-developed and well-nourished. No distress.   Cardiovascular: Normal rate, regular rhythm and intact distal pulses.  Exam reveals no friction rub.    No murmur heard.  Pulmonary/Chest: No respiratory " distress. She has no wheezes. She has no rales.   Musculoskeletal: She exhibits edema.   Lt>RT   Neurological: She is alert and oriented to person, place, and time.   Skin: Skin is warm and dry. She is not diaphoretic.   Psychiatric: She has a normal mood and affect. Her behavior is normal.     DATA REVIEW:    Blood work 1-4-16:  BNP 8.5; d-dimer 0.27    Echocardiogram 1-13-16:    Normal EF 60%  No noted regional wall motion abnormalities  Trace MR, AI, TR    Blood work from 6-21-16:  Glucose 60, gfr 58, , nonHDL 126, urine for ma normal, a1c-5.7, tsh 2.2  Lab Results   Component Value Date    CHOLSTRLTOT 151 07/11/2018    LDL 82 07/11/2018    HDL 42 07/11/2018    TRIGLYCERIDE 135 07/11/2018         Lab Results   Component Value Date    HBA1C 6.5 (H) 07/11/2018      Lab Results   Component Value Date    SODIUM 135 07/11/2018    POTASSIUM 4.7 07/11/2018    CHLORIDE 103 07/11/2018    CO2 22 07/11/2018    GLUCOSE 110 (H) 07/11/2018    BUN 42 (H) 07/11/2018    CREATININE 1.23 07/11/2018    IFAFRICA 53 (A) 07/11/2018    IFNOTAFR 44 (A) 07/11/2018          Medical Decision Making:  Today's Assessment / Status / Plan:     1. Elevated BP without diagnosis of hypertension     2. Elevated fasting lipid profile  rosuvastatin (CRESTOR) 40 MG tablet     Patient Type: Primary Prevention    Etiology of Established CVD if Present: Possible silent MI, normal echo    Lipid Management: Qualifies for Statin Therapy Based on 2013 ACC/AHA Guidelines: yes  Calculated 10-Year Risk of ASCVD: N/A  Currently on Statin: Yes  Patient with indication for high intensity statin  Goal LDL <70 and nonHDL <100. Above goal with most recent lab work.   Atorvastatin caused elevated /148  Plan:  - Increase Crestor to 40 mg  - CMP in 4 weeks  - Intensify TLC per below  - Check fasting lipid panel and LFT in 6 weeks    Blood Pressure Management:  Acc/aha goal <130/80  BP under poor control at least in office, but no home readings, was in  range on prior home readings  Taking Irbesartan-HCTZ and metoprolol without problems  Minor leg swelling trace Lt>Rt  Thyroid diease ruled out with most recent lab work    Plan:  - Continue metoprolol  - Continue irbesartan-HCTZ 300/12.5  -  Decrease Spironolactone to 12.5 mg daily in AM, due to drop in GFR   - Try and obtain a home blood pressure monitor at least 3 to 4 per week.  - Consider abpm in future  - consider addition of peripheral alpha blocker in future if still not in control at home opr start with next visit even if no readings.     Glycemic Status: Diabetic  Most recent A1c 6.5% improving with increase in  Metformin- Increase in creatinine will need to monitor closely -Tolerating medication without GI symptoms  Plan:  - Continue metformin to 1,000 mg BID  - Continue FS call if hypoglycemia episodes  - Recheck A1c in about 3 months  - Monitor creatinine closely  - Yearly flu shot, yearly eye exam, and good foot hygiene    Anti-Platelet/Anti-Coagulant Tx: probably indicated given possible h/o silent MI  - continue aspirin 81 mg daily  - Report bleeding issues immediatly     Smoking: continue complete avoidance      Physical Activity: discussed some short burst exercise daily    Weight Management and Nutrition: Dietary plan was discussed with patient at this visit including diabetic diet - including continued slow steady weight loss    Other:     1.  AALIYAH - continue CPAP.  Otherwise defer to pulmonary    2.  Possible CAD with possible h/o silent MI by patient report.  Echo without wall motion abnormalities.   Medical management as above.     3. Elevated LFT- Back to baseline -If LFT increase in the future will need further workup.  Increase Crestor as above.     4. Mod decreased GFR-3a - Monitor GFR at next visit. Decrease dose of Spironolactone as above. Will keep a close watch on kidney function    Instructed to follow-up with PCP for remainder of adult medical needs: yes  We will partner with other  providers in the management of established vascular disease and cardiometabolic risk factors.    Studies to Be Obtained: None  Labs to Be Obtained: CMP, Lipids,     Follow up in: 5-6 weeks    ANGELO Sutton.     Cc:    Yordy Woody

## 2018-08-27 ENCOUNTER — TELEPHONE (OUTPATIENT)
Dept: VASCULAR LAB | Facility: MEDICAL CENTER | Age: 63
End: 2018-08-27

## 2018-08-27 DIAGNOSIS — E11.9 TYPE 2 DIABETES MELLITUS WITHOUT COMPLICATION, WITHOUT LONG-TERM CURRENT USE OF INSULIN (HCC): ICD-10-CM

## 2018-08-27 DIAGNOSIS — E78.5 ELEVATED FASTING LIPID PROFILE: ICD-10-CM

## 2018-08-27 DIAGNOSIS — R03.0 ELEVATED BP WITHOUT DIAGNOSIS OF HYPERTENSION: ICD-10-CM

## 2018-08-28 ENCOUNTER — HOSPITAL ENCOUNTER (OUTPATIENT)
Dept: LAB | Facility: MEDICAL CENTER | Age: 63
End: 2018-08-28
Attending: NURSE PRACTITIONER
Payer: COMMERCIAL

## 2018-08-28 DIAGNOSIS — E11.9 TYPE 2 DIABETES MELLITUS WITHOUT COMPLICATION, WITHOUT LONG-TERM CURRENT USE OF INSULIN (HCC): ICD-10-CM

## 2018-08-28 DIAGNOSIS — E78.5 ELEVATED FASTING LIPID PROFILE: ICD-10-CM

## 2018-08-28 DIAGNOSIS — R03.0 ELEVATED BP WITHOUT DIAGNOSIS OF HYPERTENSION: ICD-10-CM

## 2018-08-28 LAB
ALBUMIN SERPL BCP-MCNC: 4.3 G/DL (ref 3.2–4.9)
ALBUMIN/GLOB SERPL: 1.5 G/DL
ALP SERPL-CCNC: 74 U/L (ref 30–99)
ALT SERPL-CCNC: 22 U/L (ref 2–50)
ANION GAP SERPL CALC-SCNC: 11 MMOL/L (ref 0–11.9)
AST SERPL-CCNC: 20 U/L (ref 12–45)
BILIRUB SERPL-MCNC: 0.5 MG/DL (ref 0.1–1.5)
BUN SERPL-MCNC: 28 MG/DL (ref 8–22)
CALCIUM SERPL-MCNC: 10.9 MG/DL (ref 8.5–10.5)
CHLORIDE SERPL-SCNC: 102 MMOL/L (ref 96–112)
CHOLEST SERPL-MCNC: 133 MG/DL (ref 100–199)
CO2 SERPL-SCNC: 23 MMOL/L (ref 20–33)
CREAT SERPL-MCNC: 1.27 MG/DL (ref 0.5–1.4)
GLOBULIN SER CALC-MCNC: 2.9 G/DL (ref 1.9–3.5)
GLUCOSE SERPL-MCNC: 118 MG/DL (ref 65–99)
HDLC SERPL-MCNC: 40 MG/DL
LDLC SERPL CALC-MCNC: 73 MG/DL
POTASSIUM SERPL-SCNC: 4.4 MMOL/L (ref 3.6–5.5)
PROT SERPL-MCNC: 7.2 G/DL (ref 6–8.2)
SODIUM SERPL-SCNC: 136 MMOL/L (ref 135–145)
TRIGL SERPL-MCNC: 102 MG/DL (ref 0–149)

## 2018-08-28 PROCEDURE — 80053 COMPREHEN METABOLIC PANEL: CPT

## 2018-08-28 PROCEDURE — 80061 LIPID PANEL: CPT

## 2018-08-28 PROCEDURE — 36415 COLL VENOUS BLD VENIPUNCTURE: CPT

## 2018-08-29 ENCOUNTER — OFFICE VISIT (OUTPATIENT)
Dept: VASCULAR LAB | Facility: MEDICAL CENTER | Age: 63
End: 2018-08-29
Attending: NURSE PRACTITIONER
Payer: COMMERCIAL

## 2018-08-29 VITALS
WEIGHT: 255 LBS | HEART RATE: 85 BPM | DIASTOLIC BLOOD PRESSURE: 70 MMHG | HEIGHT: 65 IN | SYSTOLIC BLOOD PRESSURE: 126 MMHG | BODY MASS INDEX: 42.49 KG/M2

## 2018-08-29 DIAGNOSIS — E78.5 ELEVATED FASTING LIPID PROFILE: ICD-10-CM

## 2018-08-29 DIAGNOSIS — R03.0 ELEVATED BP WITHOUT DIAGNOSIS OF HYPERTENSION: ICD-10-CM

## 2018-08-29 DIAGNOSIS — E11.9 TYPE 2 DIABETES MELLITUS TREATED WITHOUT INSULIN (HCC): ICD-10-CM

## 2018-08-29 DIAGNOSIS — E11.9 TYPE 2 DIABETES MELLITUS WITHOUT COMPLICATION, WITHOUT LONG-TERM CURRENT USE OF INSULIN (HCC): ICD-10-CM

## 2018-08-29 DIAGNOSIS — Z13.29 THYROID DISORDER SCREENING: ICD-10-CM

## 2018-08-29 DIAGNOSIS — I10 ESSENTIAL HYPERTENSION: ICD-10-CM

## 2018-08-29 PROCEDURE — 99212 OFFICE O/P EST SF 10 MIN: CPT | Performed by: NURSE PRACTITIONER

## 2018-08-29 PROCEDURE — 99214 OFFICE O/P EST MOD 30 MIN: CPT | Performed by: NURSE PRACTITIONER

## 2018-08-29 RX ORDER — SPIRONOLACTONE 25 MG/1
25 TABLET ORAL DAILY
Qty: 90 TAB | Refills: 3 | Status: SHIPPED | OUTPATIENT
Start: 2018-08-29 | End: 2020-03-09 | Stop reason: SDUPTHER

## 2018-08-29 ASSESSMENT — ENCOUNTER SYMPTOMS
CLAUDICATION: 0
BRUISES/BLEEDS EASILY: 0
SEIZURES: 0
WHEEZING: 0
HEADACHES: 0
SHORTNESS OF BREATH: 0
BLOOD IN STOOL: 0
WEAKNESS: 0
DEPRESSION: 0
DIZZINESS: 1
COUGH: 0
LOSS OF CONSCIOUSNESS: 0
FALLS: 0
PALPITATIONS: 0

## 2018-08-29 NOTE — PROGRESS NOTES
"FOLLOW UP VASCULAR VISIT  Subjective:   Shahana Nj is a 63 y.o. female who presents today 08/29/18 for   Chief Complaint   Patient presents with   • Follow-Up     HPI:  Patient here for f/u of htn, dyslipidemia and diabetes  Not taking Home bp  FS-105-120  Using CPAP mask  On Crestor without myalgias  On ASA no bleeding issues  Leg swelling continued alayna  Denies SOB, CP, Palpitations  Dizziness with rising quickly no falls  Minor fatigue due to  being ill for a year      Social History   Substance Use Topics   • Smoking status: Never Smoker   • Smokeless tobacco: Never Used   • Alcohol use No     Allergies   Allergen Reactions   • Pcn [Penicillins] Swelling   • Tape Rash     rash     DIET AND EXERCISE:  Weight Change: down about 20 pounds   Diet: decent diabetic diet - simplified  Exercise: intermittent exercise - moderate    Review of Systems   Constitutional: Positive for malaise/fatigue.   Respiratory: Negative for cough, shortness of breath and wheezing.    Cardiovascular: Positive for leg swelling. Negative for chest pain, palpitations and claudication.   Gastrointestinal: Negative for blood in stool.   Genitourinary: Negative for hematuria.   Musculoskeletal: Positive for joint pain. Negative for falls.   Neurological: Positive for dizziness. Negative for seizures, loss of consciousness, weakness and headaches.   Endo/Heme/Allergies: Does not bruise/bleed easily.   Psychiatric/Behavioral: Negative for depression.      Objective:     Vitals:    08/29/18 0925 08/29/18 0933   BP: 144/68 126/70   Pulse: 88 85   Weight: 115.7 kg (255 lb)    Height: 1.651 m (5' 5\")       Body mass index is 42.43 kg/m².  Physical Exam   Constitutional: She is oriented to person, place, and time. She appears well-developed and well-nourished. No distress.   Cardiovascular: Normal rate, regular rhythm and normal heart sounds.  Exam reveals no gallop and no friction rub.    No murmur heard.  Pulmonary/Chest: Effort normal and " breath sounds normal. No respiratory distress. She has no wheezes. She has no rales.   Musculoskeletal: She exhibits edema.   Neurological: She is alert and oriented to person, place, and time.   Skin: Skin is warm and dry. She is not diaphoretic.   Psychiatric: She has a normal mood and affect. Her behavior is normal.     DATA REVIEW:    Blood work 1-4-16:  BNP 8.5; d-dimer 0.27    Echocardiogram 1-13-16:    Normal EF 60%  No noted regional wall motion abnormalities  Trace MR, AI, TR    Blood work from 6-21-16:  Glucose 60, gfr 58, , nonHDL 126, urine for ma normal, a1c-5.7, tsh 2.2  Lab Results   Component Value Date    CHOLSTRLTOT 133 08/28/2018    LDL 73 08/28/2018    HDL 40 08/28/2018    TRIGLYCERIDE 102 08/28/2018         Lab Results   Component Value Date    HBA1C 6.5 (H) 07/11/2018      Lab Results   Component Value Date    SODIUM 136 08/28/2018    POTASSIUM 4.4 08/28/2018    CHLORIDE 102 08/28/2018    CO2 23 08/28/2018    GLUCOSE 118 (H) 08/28/2018    BUN 28 (H) 08/28/2018    CREATININE 1.27 08/28/2018    IFAFRICA 51 (A) 08/28/2018    IFNOTAFR 42 (A) 08/28/2018          Medical Decision Making:  Today's Assessment / Status / Plan:     1. Elevated fasting lipid profile  LIPID PANEL   2. Thyroid disorder screening     3. Type 2 diabetes mellitus without complication, without long-term current use of insulin (HCC)  COMP METABOLIC PANEL    HEMOGLOBIN A1C   4. Elevated BP without diagnosis of hypertension  spironolactone (ALDACTONE) 25 MG Tab    COMP METABOLIC PANEL   5. Essential hypertension  CBC WITH DIFFERENTIAL   6. Type 2 diabetes mellitus treated without insulin (HCC)  metformin (GLUCOPHAGE) 1000 MG tablet    CBC WITH DIFFERENTIAL     Patient Type: Primary Prevention    Etiology of Established CVD if Present: Possible silent MI, normal echo    Lipid Management: Qualifies for Statin Therapy Based on 2013 ACC/AHA Guidelines: yes  Calculated 10-Year Risk of ASCVD: N/A  Currently on Statin:  Yes  Patient with indication for high intensity statin  Goal LDL <70 and nonHDL <100. Above goal with most recent lab work.   Atorvastatin caused elevated /148  Plan:  - Continue Crestor 40 mg  - Intensify TLC per below  - Consider Zetia with next visit if not in range   -Ceck fasting lipid panel and LFT in 3 months    Blood Pressure Management:  Acc/aha goal <130/80  BP under good control at least in office, but no home readings  Taking Irbesartan-HCTZ and metoprolol without problems  Thyroid disease ruled out with most recent lab work    Plan:  - Continue metoprolol  - Continue irbesartan-HCTZ 300/12.5  - Change Spironolactone to 12.5 mg daily in AM, due to drop in GFR-Pt instructed last visit but did not do    - Check blood pressure at least 3 to 4 per week.  - Consider abpm in future  - consider addition of peripheral alpha blocker in future if still not in control at home or office.     Glycemic Status: Diabetic  Most recent A1c 6.5% improving with increase in  Metformin- Increase in creatinine will need to monitor closely -Tolerating medication without GI symptoms  Plan:  - Continue metformin to 1,000 mg BID  - Continue FS call if hypoglycemia episodes  - Recheck A1c in about 3 months  - Monitor creatinine closely  - Yearly flu shot, yearly eye exam, and good foot hygiene    Anti-Platelet/Anti-Coagulant Tx: probably indicated given possible h/o silent MI  - continue aspirin 81 mg daily  - Report bleeding issues immediatly     Smoking: continue complete avoidance      Physical Activity: discussed some short burst exercise daily    Weight Management and Nutrition: Dietary plan was discussed with patient at this visit including diabetic diet - including continued slow steady weight loss    Other:     1.  AALIYAH - continue CPAP.  Otherwise defer to pulmonary    2.  Possible CAD with possible h/o silent MI by patient report.  Echo without wall motion abnormalities.   Medical management as above.     3. Elevated  LFT- Back to baseline -If LFT increase in the future will need further workup. Continue Crestor as above.     4. Mod decreased GFR-3a - Monitor GFR at next visit. Continue Spironolactone as above. Will keep a close watch on kidney function    Instructed to follow-up with PCP for remainder of adult medical needs: yes  We will partner with other providers in the management of established vascular disease and cardiometabolic risk factors.    Studies to Be Obtained: None  Labs to Be Obtained: CMP, Lipids, CBC, A1c    Follow up in: 5-6 weeks    ANGELO Sutton.     Cc:    Yordy Woody

## 2018-11-27 ENCOUNTER — PATIENT OUTREACH (OUTPATIENT)
Dept: HEALTH INFORMATION MANAGEMENT | Facility: OTHER | Age: 63
End: 2018-11-27

## 2018-11-27 NOTE — PROGRESS NOTES
SCHEDULED PT WITH DR. MUELLER AS A NEW ACCESS TO YOU . PT WANTED TO DISCUSS ALL OF HER HEALTH MAINTENANCE AND RECIEVE THE VACCINES SHE IS DUE FOR.

## 2018-11-27 NOTE — PROGRESS NOTES
Outcome: Left Message    Please transfer to Patient Outreach Team at [phone] when patient returns call.    Web[employer] Checked & Epic Updated:  yes    [facility] Verified: yes    Attempt # 1

## 2018-12-13 ENCOUNTER — OFFICE VISIT (OUTPATIENT)
Dept: MEDICAL GROUP | Facility: PHYSICIAN GROUP | Age: 63
End: 2018-12-13
Payer: COMMERCIAL

## 2018-12-13 VITALS
DIASTOLIC BLOOD PRESSURE: 60 MMHG | TEMPERATURE: 97.4 F | OXYGEN SATURATION: 94 % | RESPIRATION RATE: 16 BRPM | BODY MASS INDEX: 40.98 KG/M2 | WEIGHT: 246 LBS | HEIGHT: 65 IN | SYSTOLIC BLOOD PRESSURE: 122 MMHG | HEART RATE: 78 BPM

## 2018-12-13 DIAGNOSIS — Z00.00 WELL ADULT EXAM: ICD-10-CM

## 2018-12-13 DIAGNOSIS — E78.2 MIXED HYPERLIPIDEMIA: ICD-10-CM

## 2018-12-13 DIAGNOSIS — I10 BENIGN ESSENTIAL HTN: ICD-10-CM

## 2018-12-13 DIAGNOSIS — G89.29 CHRONIC PAIN OF LEFT KNEE: ICD-10-CM

## 2018-12-13 DIAGNOSIS — N18.30 CRI (CHRONIC RENAL INSUFFICIENCY), STAGE 3 (MODERATE) (HCC): ICD-10-CM

## 2018-12-13 DIAGNOSIS — E11.9 TYPE 2 DIABETES MELLITUS WITHOUT COMPLICATION, WITHOUT LONG-TERM CURRENT USE OF INSULIN (HCC): ICD-10-CM

## 2018-12-13 DIAGNOSIS — Z12.31 ENCOUNTER FOR SCREENING MAMMOGRAM FOR HIGH-RISK PATIENT: ICD-10-CM

## 2018-12-13 DIAGNOSIS — M25.562 CHRONIC PAIN OF LEFT KNEE: ICD-10-CM

## 2018-12-13 PROBLEM — Z13.29 THYROID DISORDER SCREENING: Status: RESOLVED | Noted: 2018-06-04 | Resolved: 2018-12-13

## 2018-12-13 PROBLEM — E78.5 ELEVATED FASTING LIPID PROFILE: Status: RESOLVED | Noted: 2018-06-04 | Resolved: 2018-12-13

## 2018-12-13 PROBLEM — R03.0 ELEVATED BP WITHOUT DIAGNOSIS OF HYPERTENSION: Status: RESOLVED | Noted: 2018-04-30 | Resolved: 2018-12-13

## 2018-12-13 PROCEDURE — 99204 OFFICE O/P NEW MOD 45 MIN: CPT | Performed by: FAMILY MEDICINE

## 2018-12-13 ASSESSMENT — ENCOUNTER SYMPTOMS
DEPRESSION: 0
BRUISES/BLEEDS EASILY: 0
PSYCHIATRIC NEGATIVE: 1
CONSTITUTIONAL NEGATIVE: 1
DIABETIC ASSOCIATED SYMPTOMS: 0
GASTROINTESTINAL NEGATIVE: 1
HEARTBURN: 0
NAUSEA: 0
MYALGIAS: 0
CARDIOVASCULAR NEGATIVE: 1
ABDOMINAL PAIN: 0
EYES NEGATIVE: 1
DOUBLE VISION: 0
BLURRED VISION: 0
ARTHRALGIAS: 1
TINGLING: 0
CHILLS: 0
HEADACHES: 0
NEUROLOGICAL NEGATIVE: 1
ANOREXIA: 0
PALPITATIONS: 0
FEVER: 0
COUGH: 0
HEMOPTYSIS: 0
RESPIRATORY NEGATIVE: 1
DIZZINESS: 0

## 2018-12-13 ASSESSMENT — PATIENT HEALTH QUESTIONNAIRE - PHQ9: CLINICAL INTERPRETATION OF PHQ2 SCORE: 0

## 2018-12-13 NOTE — PROGRESS NOTES
Subjective:      Shahana Nj is a 63 y.o. female who presents with Arthritis (left knee); Otalgia; and Difficulty Breathing            1. Encounter for screening mammogram for high-risk patient    - [id number]; Future  - REFERRAL TO GASTROENTEROLOGY  - COMP METABOLIC PANEL; Future  - HEMOGLOBIN A1C; Future  - Lipid Profile; Future  - MICROALBUMIN CREAT RATIO URINE (LAB COLLECT); Future  - REFERRAL TO OPHTHALMOLOGY    2. Well adult exam    - [id number]; Future  - REFERRAL TO GASTROENTEROLOGY  - COMP METABOLIC PANEL; Future  - HEMOGLOBIN A1C; Future  - Lipid Profile; Future  - MICROALBUMIN CREAT RATIO URINE (LAB COLLECT); Future  - REFERRAL TO OPHTHALMOLOGY    3. Type 2 diabetes mellitus without complication, without long-term current use of insulin (HCC)  Currently treated for DM, taking meds and checking bs at home, trying to do DM diet.controlled    - [id number]; Future  - REFERRAL TO GASTROENTEROLOGY  - COMP METABOLIC PANEL; Future  - HEMOGLOBIN A1C; Future  - Lipid Profile; Future  - MICROALBUMIN CREAT RATIO URINE (LAB COLLECT); Future  - REFERRAL TO OPHTHALMOLOGY    4. Benign essential HTN  Currently treated for HTN, taking meds with no CP or sob, monitors bp at home periodically. controlled    - [id number]; Future  - REFERRAL TO GASTROENTEROLOGY  - COMP METABOLIC PANEL; Future  - HEMOGLOBIN A1C; Future  - Lipid Profile; Future  - MICROALBUMIN CREAT RATIO URINE (LAB COLLECT); Future  - REFERRAL TO OPHTHALMOLOGY    5. Mixed hyperlipidemia  Currently treated for HLD, taking meds with no new myalgias or joint pain, watching fats in diet  controlled    - [id number]; Future  - REFERRAL TO GASTROENTEROLOGY  - COMP METABOLIC PANEL; Future  - HEMOGLOBIN A1C; Future  - Lipid Profile; Future  - MICROALBUMIN CREAT RATIO URINE (LAB COLLECT); Future  - REFERRAL TO OPHTHALMOLOGY    6. Chronic pain of left knee  Pain in the left knee for several years, no  trauma, will get xrays for eval, due to  Cri cannot use high dose nsaids  - DX-KNEE COMPLETE 4+ LEFT; Future    7. CRI (chronic renal insufficiency), stage 3 (moderate) (HCC)  Patient is currently being followed for chronic renal insufficiency. They are avoiding nsaids and maintaining hydration and following renal diet. Taking all appropriate meds. No new change in urine frequency or volume.      Past Medical History:  No date: Dyslipidemia  No date: Essential hypertension  No date: AALIYAH (obstructive sleep apnea)  No date: Type 2 diabetes mellitus (HCC)  Past Surgical History:  No date: ABDOMINAL HYSTERECTOMY TOTAL  No date: EAR MIDDLE EXPLORATION  No date: KNEE ARTHROSCOPY; Right  Smoking status: Never Smoker                                                              Smokeless tobacco: Never Used                      Alcohol use: Yes           0.0 oz/week     Comment: occ    Review of patient's family history indicates:  Problem: Heart Disease      Relation: Mother       Age of Onset: 45       Comment: cabg  Problem: Heart Disease      Relation: Father       Age of Onset: (Not Specified)       Comment: cabg and valve replacement  Problem: Diabetes      Relation: Sister       Age of Onset: (Not Specified)   Problem: Diabetes      Relation: Unknown       Age of Onset: (Not Specified)       Current Outpatient Prescriptions: •  rosuvastatin (CRESTOR) 40 MG tablet, TAKE 1 TABLET BY MOUTH ONCE DAILY, Disp: 90 Tab, Rfl: 1•  spironolactone (ALDACTONE) 25 MG Tab, Take 1 Tab by mouth every day. (Patient taking differently: Take 25 mg by mouth every day. 1/2 per day), Disp: 90 Tab, Rfl: 3•  metformin (GLUCOPHAGE) 1000 MG tablet, Take 1 Tab by mouth 2 times a day, with meals., Disp: 180 Tab, Rfl: 3•  ascorbic acid (VITAMIN C) 500 MG/5ML syrup, Take  by mouth every day., Disp: , Rfl: •  Alpha-Lipoic Acid 100 MG Cap, Take  by mouth., Disp: , Rfl: •  irbesartan-hydrochlorothiazide (AVALIDE) 300-12.5 MG per tablet, Take 1 Tab by  mouth every day., Disp: 30 Tab, Rfl: 11•  metoprolol SR (TOPROL XL) 50 MG TABLET SR 24 HR, Take 1 Tab by mouth every day., Disp: 30 Tab, Rfl: 11•  aspirin (ASA) 81 MG Chew Tab chewable tablet, Take 1 Tab by mouth every day., Disp: 100 Tab, Rfl: 11•  omeprazole (PRILOSEC) 10 MG CAPSULE DELAYED RELEASE, Take 10 mg by mouth every day., Disp: , Rfl: •  vitamin D (CHOLECALCIFEROL) 1000 UNIT Tab, Take 1,000 Units by mouth every day., Disp: , Rfl: •  therapeutic multivitamin-minerals (THERAGRAN-M) Tab, Take 1 Tab by mouth every day., Disp: , Rfl: •  magnesium oxide (MAG-OX) 400 MG Tab, Take 400 mg by mouth every day., Disp: , Rfl: •  Chromium 1 MG Cap, Take  by mouth., Disp: , Rfl: •  ranitidine (ZANTAC) 300 MG tablet, Take 300 mg by mouth every day., Disp: , Rfl:     Patient was instructed on the use of medications, either prescriptions or OTC and informed on when the appropriate follow up time period should be. In addition, patient was also instructed that should any acute worsening occur that they should notify this clinic asap or call 911.          Knee Pain   This is a chronic problem. The current episode started more than 1 year ago. The problem occurs intermittently. The problem has been waxing and waning. Associated symptoms include arthralgias. Pertinent negatives include no abdominal pain, anorexia, chest pain, chills, coughing, fever, headaches, myalgias, nausea or rash. The symptoms are aggravated by bending, exertion, standing and walking. She has tried rest and acetaminophen for the symptoms. The treatment provided mild relief.   Diabetes Mellitus   She presents for her initial diabetic visit. She has type 2 diabetes mellitus. No MedicAlert identification noted. Her disease course has been stable. There are no hypoglycemic associated symptoms. Pertinent negatives for hypoglycemia include no dizziness or headaches. There are no diabetic associated symptoms. Pertinent negatives for diabetes include no blurred  "vision and no chest pain. There are no hypoglycemic complications. Symptoms are stable. There are no diabetic complications. Risk factors for coronary artery disease include obesity, dyslipidemia, diabetes mellitus and family history. Current diabetic treatment includes oral agent (monotherapy). She is compliant with treatment all of the time. Her weight is stable. She is following a diabetic diet. Meal planning includes avoidance of concentrated sweets. There is no change in her home blood glucose trend.       Review of Systems   Constitutional: Negative.  Negative for chills and fever.   HENT: Positive for ear pain. Negative for hearing loss.    Eyes: Negative.  Negative for blurred vision and double vision.   Respiratory: Negative.  Negative for cough and hemoptysis.    Cardiovascular: Negative.  Negative for chest pain and palpitations.   Gastrointestinal: Negative.  Negative for abdominal pain, anorexia, heartburn and nausea.   Genitourinary: Negative.  Negative for dysuria.   Musculoskeletal: Positive for arthralgias and joint pain. Negative for myalgias.   Skin: Negative.  Negative for rash.   Neurological: Negative.  Negative for dizziness, tingling and headaches.   Endo/Heme/Allergies: Negative.  Does not bruise/bleed easily.   Psychiatric/Behavioral: Negative.  Negative for depression and suicidal ideas.   All other systems reviewed and are negative.         Objective:     /60 (BP Location: Left arm, Patient Position: Sitting)   Pulse 78   Temp 36.3 °C (97.4 °F)   Resp 16   Ht 1.651 m (5' 5\")   Wt 111.6 kg (246 lb)   SpO2 94%   BMI 40.94 kg/m²      Physical Exam   Constitutional: She is oriented to person, place, and time. She appears well-developed and well-nourished. No distress.   HENT:   Head: Normocephalic and atraumatic.   Right Ear: External ear normal.   Left Ear: External ear normal.   Nose: Nose normal.   Mouth/Throat: Oropharynx is clear and moist. No oropharyngeal exudate.   Post " operative changes of both middle ears   Eyes: Pupils are equal, round, and reactive to light. Right eye exhibits no discharge. Left eye exhibits no discharge. No scleral icterus.   Neck: Normal range of motion. Neck supple. No JVD present. No tracheal deviation present. No thyromegaly present.   Cardiovascular: Normal rate, regular rhythm, normal heart sounds and intact distal pulses.  Exam reveals no gallop and no friction rub.    No murmur heard.  Pulmonary/Chest: Effort normal and breath sounds normal. No stridor. No respiratory distress. She has no wheezes. She has no rales. She exhibits no tenderness.   Abdominal: Soft. She exhibits no distension. There is no tenderness.   Musculoskeletal:        Left knee: Tenderness found. Lateral joint line and patellar tendon tenderness noted.   Lymphadenopathy:     She has no cervical adenopathy.   Neurological: She is alert and oriented to person, place, and time. No cranial nerve deficit.   Skin: She is not diaphoretic.   Psychiatric: She has a normal mood and affect. Her behavior is normal. Judgment and thought content normal.   Nursing note and vitals reviewed.              Assessment/Plan:     1. Encounter for screening mammogram for high-risk patient    - [id number]; Future  - REFERRAL TO GASTROENTEROLOGY  - COMP METABOLIC PANEL; Future  - HEMOGLOBIN A1C; Future  - Lipid Profile; Future  - MICROALBUMIN CREAT RATIO URINE (LAB COLLECT); Future  - REFERRAL TO OPHTHALMOLOGY    2. Well adult exam    - [id number]; Future  - REFERRAL TO GASTROENTEROLOGY  - COMP METABOLIC PANEL; Future  - HEMOGLOBIN A1C; Future  - Lipid Profile; Future  - MICROALBUMIN CREAT RATIO URINE (LAB COLLECT); Future  - REFERRAL TO OPHTHALMOLOGY    3. Type 2 diabetes mellitus without complication, without long-term current use of insulin ([facility])    - [id number]; Future  - REFERRAL TO GASTROENTEROLOGY  - COMP METABOLIC PANEL; Future  - HEMOGLOBIN A1C; Future  - Lipid  Profile; Future  - MICROALBUMIN CREAT RATIO URINE (LAB COLLECT); Future  - REFERRAL TO OPHTHALMOLOGY    4. Benign essential HTN    - [id number]; Future  - REFERRAL TO GASTROENTEROLOGY  - COMP METABOLIC PANEL; Future  - HEMOGLOBIN A1C; Future  - Lipid Profile; Future  - MICROALBUMIN CREAT RATIO URINE (LAB COLLECT); Future  - REFERRAL TO OPHTHALMOLOGY    5. Mixed hyperlipidemia    - [id number]; Future  - REFERRAL TO GASTROENTEROLOGY  - COMP METABOLIC PANEL; Future  - HEMOGLOBIN A1C; Future  - Lipid Profile; Future  - MICROALBUMIN CREAT RATIO URINE (LAB COLLECT); Future  - REFERRAL TO OPHTHALMOLOGY    6. Chronic pain of left knee    - DX-KNEE COMPLETE 4+ LEFT; Future    7. CRI (chronic renal insufficiency), stage 3 (moderate) (HCC)

## 2019-01-24 ENCOUNTER — TELEPHONE (OUTPATIENT)
Dept: VASCULAR LAB | Facility: MEDICAL CENTER | Age: 64
End: 2019-01-24

## 2019-02-07 ENCOUNTER — TELEPHONE (OUTPATIENT)
Dept: VASCULAR LAB | Facility: MEDICAL CENTER | Age: 64
End: 2019-02-07

## 2019-02-08 NOTE — TELEPHONE ENCOUNTER
Patient overdue for follow-up appt with vascular care.  Medical assistant has been unable to reach and reschedule  Multiple messages have been left  Await further patient contact.  Pending further contact, will defer all vascular care, including management of cardiac vascular risk factors, to PCP    Michael J. Bloch, MD  Vascular Care    Cc:  ZACH Arias

## 2019-03-26 DIAGNOSIS — I10 ESSENTIAL HYPERTENSION: ICD-10-CM

## 2019-03-28 RX ORDER — METOPROLOL SUCCINATE 50 MG/1
TABLET, EXTENDED RELEASE ORAL
Qty: 30 TAB | Refills: 11 | Status: SHIPPED | OUTPATIENT
Start: 2019-03-28 | End: 2020-03-09 | Stop reason: SDUPTHER

## 2019-08-30 ENCOUNTER — HOSPITAL ENCOUNTER (OUTPATIENT)
Dept: LAB | Facility: MEDICAL CENTER | Age: 64
End: 2019-08-30
Attending: FAMILY MEDICINE
Payer: MEDICARE

## 2019-08-30 DIAGNOSIS — E11.9 TYPE 2 DIABETES MELLITUS WITHOUT COMPLICATION, WITHOUT LONG-TERM CURRENT USE OF INSULIN (HCC): ICD-10-CM

## 2019-08-30 DIAGNOSIS — I10 BENIGN ESSENTIAL HTN: ICD-10-CM

## 2019-08-30 DIAGNOSIS — Z00.00 WELL ADULT EXAM: ICD-10-CM

## 2019-08-30 DIAGNOSIS — Z12.31 ENCOUNTER FOR SCREENING MAMMOGRAM FOR HIGH-RISK PATIENT: ICD-10-CM

## 2019-08-30 DIAGNOSIS — E78.2 MIXED HYPERLIPIDEMIA: ICD-10-CM

## 2019-08-30 LAB
ALBUMIN SERPL BCP-MCNC: 4.3 G/DL (ref 3.2–4.9)
ALBUMIN/GLOB SERPL: 1.6 G/DL
ALP SERPL-CCNC: 61 U/L (ref 30–99)
ALT SERPL-CCNC: 60 U/L (ref 2–50)
ANION GAP SERPL CALC-SCNC: 13 MMOL/L (ref 0–11.9)
AST SERPL-CCNC: 45 U/L (ref 12–45)
BILIRUB SERPL-MCNC: 0.6 MG/DL (ref 0.1–1.5)
BUN SERPL-MCNC: 31 MG/DL (ref 8–22)
CALCIUM SERPL-MCNC: 10.5 MG/DL (ref 8.5–10.5)
CHLORIDE SERPL-SCNC: 104 MMOL/L (ref 96–112)
CHOLEST SERPL-MCNC: 152 MG/DL (ref 100–199)
CO2 SERPL-SCNC: 21 MMOL/L (ref 20–33)
CREAT SERPL-MCNC: 1.11 MG/DL (ref 0.5–1.4)
CREAT UR-MCNC: 90.7 MG/DL
EST. AVERAGE GLUCOSE BLD GHB EST-MCNC: 123 MG/DL
FASTING STATUS PATIENT QL REPORTED: NORMAL
GLOBULIN SER CALC-MCNC: 2.7 G/DL (ref 1.9–3.5)
GLUCOSE SERPL-MCNC: 93 MG/DL (ref 65–99)
HBA1C MFR BLD: 5.9 % (ref 0–5.6)
HDLC SERPL-MCNC: 35 MG/DL
LDLC SERPL CALC-MCNC: 85 MG/DL
MICROALBUMIN UR-MCNC: <0.7 MG/DL
MICROALBUMIN/CREAT UR: NORMAL MG/G (ref 0–30)
POTASSIUM SERPL-SCNC: 4.2 MMOL/L (ref 3.6–5.5)
PROT SERPL-MCNC: 7 G/DL (ref 6–8.2)
SODIUM SERPL-SCNC: 138 MMOL/L (ref 135–145)
TRIGL SERPL-MCNC: 161 MG/DL (ref 0–149)

## 2019-08-30 PROCEDURE — 80061 LIPID PANEL: CPT

## 2019-08-30 PROCEDURE — 36415 COLL VENOUS BLD VENIPUNCTURE: CPT

## 2019-08-30 PROCEDURE — 82043 UR ALBUMIN QUANTITATIVE: CPT

## 2019-08-30 PROCEDURE — 80053 COMPREHEN METABOLIC PANEL: CPT

## 2019-08-30 PROCEDURE — 82570 ASSAY OF URINE CREATININE: CPT

## 2019-08-30 PROCEDURE — 83036 HEMOGLOBIN GLYCOSYLATED A1C: CPT | Mod: GA

## 2019-09-09 ENCOUNTER — OFFICE VISIT (OUTPATIENT)
Dept: MEDICAL GROUP | Facility: PHYSICIAN GROUP | Age: 64
End: 2019-09-09
Payer: MEDICARE

## 2019-09-09 VITALS
DIASTOLIC BLOOD PRESSURE: 72 MMHG | OXYGEN SATURATION: 96 % | HEART RATE: 86 BPM | RESPIRATION RATE: 20 BRPM | TEMPERATURE: 97 F | SYSTOLIC BLOOD PRESSURE: 110 MMHG | WEIGHT: 250.4 LBS | BODY MASS INDEX: 41.67 KG/M2

## 2019-09-09 DIAGNOSIS — M17.12 LOCALIZED OSTEOARTHRITIS OF LEFT KNEE: ICD-10-CM

## 2019-09-09 DIAGNOSIS — E78.5 ELEVATED FASTING LIPID PROFILE: ICD-10-CM

## 2019-09-09 DIAGNOSIS — N95.1 MENOPAUSAL STATE: ICD-10-CM

## 2019-09-09 DIAGNOSIS — Z12.12 SCREENING FOR COLORECTAL CANCER: ICD-10-CM

## 2019-09-09 DIAGNOSIS — Z12.11 SCREENING FOR COLORECTAL CANCER: ICD-10-CM

## 2019-09-09 DIAGNOSIS — E11.9 TYPE 2 DIABETES MELLITUS TREATED WITHOUT INSULIN (HCC): ICD-10-CM

## 2019-09-09 DIAGNOSIS — H91.93 BILATERAL HEARING LOSS, UNSPECIFIED HEARING LOSS TYPE: ICD-10-CM

## 2019-09-09 DIAGNOSIS — Z78.0 POSTMENOPAUSAL STATUS (AGE-RELATED) (NATURAL): ICD-10-CM

## 2019-09-09 PROCEDURE — 99214 OFFICE O/P EST MOD 30 MIN: CPT | Performed by: FAMILY MEDICINE

## 2019-09-09 RX ORDER — ROSUVASTATIN CALCIUM 40 MG/1
TABLET, COATED ORAL
Qty: 90 TAB | Refills: 1 | Status: SHIPPED | OUTPATIENT
Start: 2019-09-09 | End: 2020-03-09 | Stop reason: SDUPTHER

## 2019-09-09 ASSESSMENT — ENCOUNTER SYMPTOMS
EYES NEGATIVE: 1
CARDIOVASCULAR NEGATIVE: 1
PALPITATIONS: 0
FEVER: 0
DOUBLE VISION: 0
RESPIRATORY NEGATIVE: 1
PSYCHIATRIC NEGATIVE: 1
HEARTBURN: 0
BRUISES/BLEEDS EASILY: 0
COUGH: 0
DEPRESSION: 0
NEUROLOGICAL NEGATIVE: 1
DIZZINESS: 0
CONSTITUTIONAL NEGATIVE: 1
CHILLS: 0
MYALGIAS: 0
NAUSEA: 0
BLURRED VISION: 0
TINGLING: 0
HEADACHES: 0
HEMOPTYSIS: 0
GASTROINTESTINAL NEGATIVE: 1

## 2019-09-09 ASSESSMENT — PATIENT HEALTH QUESTIONNAIRE - PHQ9: CLINICAL INTERPRETATION OF PHQ2 SCORE: 0

## 2019-09-09 NOTE — PROGRESS NOTES
Subjective:      Shahana Nj is a 64 y.o. female who presents with Pain (knee pain); Other (lab review); and Other (hearing problems)            1. Type 2 diabetes mellitus treated without insulin (HCC)  Currently treated for DM, taking meds and checking bs at home, trying to do DM diet.controlled  a1c at target  - metformin (GLUCOPHAGE) 1000 MG tablet; Take 1 Tab by mouth 2 times a day, with meals.  Dispense: 180 Tab; Refill: 1    2. Elevated fasting lipid profile  Currently treated for HLD, taking meds with no new myalgias or joint pain, watching fats in diet  controlled    - rosuvastatin (CRESTOR) 40 MG tablet; TAKE 1 TABLET BY MOUTH ONCE DAILY  Dispense: 90 Tab; Refill: 1    3. Screening for colorectal cancer    - REFERRAL TO GI FOR COLONOSCOPY    4. Postmenopausal status (age-related) (natural)    - DS-BONE DENSITY STUDY (DEXA)    5. Menopausal state    - DS-BONE DENSITY STUDY (DEXA)    6. Bilateral hearing loss, unspecified hearing loss type  [place] for many years and many surgeries on inner ear would like eval for if hearing aides could help her  - REFERRAL TO AUDIOLOGY    7. Localized osteoarthritis of left knee  Pain with use, stiff in morning and better with day goes one, will send to ortho for possible synvisc. Unable to take nsaids due to cri  - REFERRAL TO ORTHOPEDICS    Past Medical History:  No date: Dyslipidemia  No date: Essential hypertension  No date: AALIYAH (obstructive sleep apnea)  No date: Type 2 diabetes mellitus (HCC)  Past Surgical History:  No date: ABDOMINAL HYSTERECTOMY TOTAL  No date: EAR MIDDLE EXPLORATION  No date: KNEE ARTHROSCOPY; Right  Social History    Tobacco Use      Smoking status: Never Smoker      Smokeless tobacco: Never Used    Alcohol use: Yes      Alcohol/week: 0.0 oz      Comment: occ    Drug use: No    Review of patient's family history indicates:  Problem: Heart Disease      Relation: Mother          Age of Onset: 45          Comment: cabg  Problem: Heart Disease       Relation: Father          Age of Onset: (Not Specified)          Comment: cabg and valve replacement  Problem: Diabetes      Relation: Sister          Age of Onset: (Not Specified)  Problem: Diabetes      Relation: Unknown          Age of Onset: (Not Specified)      Current Outpatient Medications: •  metformin (GLUCOPHAGE) 1000 MG tablet, Take 1 Tab by mouth 2 times a day, with meals., Disp: 180 Tab, Rfl: 1•  rosuvastatin (CRESTOR) 40 MG tablet, TAKE 1 TABLET BY MOUTH ONCE DAILY, Disp: 90 Tab, Rfl: 1•  metoprolol SR (TOPROL XL) 50 MG TABLET SR 24 HR, TAKE 1 TABLET BY MOUTH ONCE DAILY, Disp: 30 Tab, Rfl: 11•  irbesartan-hydrochlorothiazide (AVALIDE) 300-12.5 MG per tablet, TAKE 1 TABLET BY MOUTH ONCE DAILY, Disp: 90 Tab, Rfl: 3•  spironolactone (ALDACTONE) 25 MG Tab, Take 1 Tab by mouth every day. (Patient taking differently: Take 25 mg by mouth every day. 1/2 per day), Disp: 90 Tab, Rfl: 3•  therapeutic multivitamin-minerals (THERAGRAN-M) Tab, Take 1 Tab by mouth every day., Disp: , Rfl: •  magnesium oxide (MAG-OX) 400 MG Tab, Take 400 mg by mouth every day., Disp: , Rfl: •  ascorbic acid (VITAMIN C) 500 MG/5ML syrup, Take  by mouth every day., Disp: , Rfl: •  Alpha-Lipoic Acid 100 MG Cap, Take  by mouth., Disp: , Rfl: •  Chromium 1 MG Cap, Take  by mouth., Disp: , Rfl: •  aspirin (ASA) 81 MG Chew Tab chewable tablet, Take 1 Tab by mouth every day., Disp: 100 Tab, Rfl: 11•  ranitidine (ZANTAC) 300 MG tablet, Take 300 mg by mouth every day., Disp: , Rfl: •  omeprazole (PRILOSEC) 10 MG CAPSULE DELAYED RELEASE, Take 10 mg by mouth every day., Disp: , Rfl: •  vitamin D (CHOLECALCIFEROL) 1000 UNIT Tab, Take 1,000 Units by mouth every day., Disp: , Rfl:     Patient was instructed on the use of medications, either prescriptions or OTC and informed on when the appropriate follow up time period should be. In addition, patient was also instructed that should any acute worsening occur that they should notify this clinic  asap or call 911.          Review of Systems   Constitutional: Negative.  Negative for chills and fever.   HENT: Positive for hearing loss.    Eyes: Negative.  Negative for blurred vision and double vision.   Respiratory: Negative.  Negative for cough and hemoptysis.    Cardiovascular: Negative.  Negative for chest pain and palpitations.   Gastrointestinal: Negative.  Negative for heartburn and nausea.   Genitourinary: Negative.  Negative for dysuria.   Musculoskeletal: Positive for joint pain. Negative for myalgias.   Skin: Negative.  Negative for rash.   Neurological: Negative.  Negative for dizziness, tingling and headaches.   Endo/Heme/Allergies: Negative.  Does not bruise/bleed easily.   Psychiatric/Behavioral: Negative.  Negative for depression and suicidal ideas.   All other systems reviewed and are negative.         Objective:     /72   Pulse 86   Temp 36.1 °C (97 °F)   Resp 20   Wt 113.6 kg (250 lb 6.4 oz)   SpO2 96%   BMI 41.67 kg/m²      Physical Exam   Constitutional: She is oriented to person, place, and time. She appears well-developed and well-nourished. No distress.   HENT:   Head: Normocephalic and atraumatic.   Mouth/Throat: Oropharynx is clear and moist. No oropharyngeal exudate.   Eyes: Pupils are equal, round, and reactive to light.   Cardiovascular: Normal rate, regular rhythm, normal heart sounds and intact distal pulses. Exam reveals no gallop and no friction rub.   No murmur heard.  Pulmonary/Chest: Effort normal and breath sounds normal. No respiratory distress. She has no wheezes. She has no rales. She exhibits no tenderness.   Musculoskeletal:        Left knee: Tenderness found. Medial joint line and lateral joint line tenderness noted.   Neurological: She is alert and oriented to person, place, and time.   Skin: She is not diaphoretic.   Psychiatric: She has a normal mood and affect. Her behavior is normal. Judgment and thought content normal.   Nursing note and vitals  reviewed.              Assessment/Plan:     1. Typ 2 diabetes mellitus treated without insulin (HCC)    - metformin (GLUCOPHAGE) 1000 MG tablet; Take 1 Tab by mouth 2 times a day, with meals.  Dispense: 180 Tab; Refill: 1    2. Elevated fasting lipid profile    - rosuvastatin (CRESTOR) 40 MG tablet; TAKE 1 TABLET BY MOUTH ONCE DAILY  Dispense: 90 Tab; Refill: 1    3. Screening for colorectal cancer    - REFERRAL TO GI FOR COLONOSCOPY    4. Postmenopausal status (age-related) (natural)    - DS-BONE DENSITY STUDY (DEXA)    5. Menopausal state    - DS-BONE DENSITY STUDY (DEXA)    6. Bilateral hearing loss, unspecified hearing loss type    - REFERRAL TO AUDIOLOGY    7. Localized osteoarthritis of left knee    - REFERRAL TO ORTHOPEDICS

## 2019-09-23 ENCOUNTER — APPOINTMENT (OUTPATIENT)
Dept: URGENT CARE | Facility: CLINIC | Age: 64
End: 2019-09-23
Payer: OTHER MISCELLANEOUS

## 2019-09-23 ENCOUNTER — OFFICE VISIT (OUTPATIENT)
Dept: MEDICAL GROUP | Facility: PHYSICIAN GROUP | Age: 64
End: 2019-09-23
Payer: MEDICARE

## 2019-09-23 ENCOUNTER — HOSPITAL ENCOUNTER (OUTPATIENT)
Dept: LAB | Facility: MEDICAL CENTER | Age: 64
End: 2019-09-23
Attending: FAMILY MEDICINE
Payer: MEDICARE

## 2019-09-23 ENCOUNTER — APPOINTMENT (OUTPATIENT)
Dept: RADIOLOGY | Facility: IMAGING CENTER | Age: 64
End: 2019-09-23
Attending: FAMILY MEDICINE
Payer: MEDICARE

## 2019-09-23 VITALS
OXYGEN SATURATION: 97 % | DIASTOLIC BLOOD PRESSURE: 70 MMHG | RESPIRATION RATE: 20 BRPM | HEIGHT: 65 IN | HEART RATE: 82 BPM | SYSTOLIC BLOOD PRESSURE: 120 MMHG | TEMPERATURE: 97.4 F | WEIGHT: 252 LBS | BODY MASS INDEX: 41.99 KG/M2

## 2019-09-23 DIAGNOSIS — M17.12 LOCALIZED OSTEOARTHRITIS OF LEFT KNEE: ICD-10-CM

## 2019-09-23 DIAGNOSIS — E11.9 TYPE 2 DIABETES MELLITUS TREATED WITHOUT INSULIN (HCC): ICD-10-CM

## 2019-09-23 DIAGNOSIS — Z01.810 PREOP CARDIOVASCULAR EXAM: ICD-10-CM

## 2019-09-23 DIAGNOSIS — E66.01 MORBID OBESITY WITH BMI OF 40.0-44.9, ADULT (HCC): ICD-10-CM

## 2019-09-23 LAB
ALBUMIN SERPL BCP-MCNC: 4.6 G/DL (ref 3.2–4.9)
ALBUMIN/GLOB SERPL: 2.1 G/DL
ALP SERPL-CCNC: 72 U/L (ref 30–99)
ALT SERPL-CCNC: 56 U/L (ref 2–50)
ANION GAP SERPL CALC-SCNC: 12 MMOL/L (ref 0–11.9)
AST SERPL-CCNC: 38 U/L (ref 12–45)
BASOPHILS # BLD AUTO: 1.2 % (ref 0–1.8)
BASOPHILS # BLD: 0.09 K/UL (ref 0–0.12)
BILIRUB SERPL-MCNC: 0.5 MG/DL (ref 0.1–1.5)
BUN SERPL-MCNC: 33 MG/DL (ref 8–22)
CALCIUM SERPL-MCNC: 11.3 MG/DL (ref 8.5–10.5)
CHLORIDE SERPL-SCNC: 103 MMOL/L (ref 96–112)
CO2 SERPL-SCNC: 22 MMOL/L (ref 20–33)
CREAT SERPL-MCNC: 1.22 MG/DL (ref 0.5–1.4)
EOSINOPHIL # BLD AUTO: 0.43 K/UL (ref 0–0.51)
EOSINOPHIL NFR BLD: 5.6 % (ref 0–6.9)
ERYTHROCYTE [DISTWIDTH] IN BLOOD BY AUTOMATED COUNT: 46.2 FL (ref 35.9–50)
GLOBULIN SER CALC-MCNC: 2.2 G/DL (ref 1.9–3.5)
GLUCOSE SERPL-MCNC: 110 MG/DL (ref 65–99)
HCT VFR BLD AUTO: 47.8 % (ref 37–47)
HGB BLD-MCNC: 14.5 G/DL (ref 12–16)
IMM GRANULOCYTES # BLD AUTO: 0.02 K/UL (ref 0–0.11)
IMM GRANULOCYTES NFR BLD AUTO: 0.3 % (ref 0–0.9)
LYMPHOCYTES # BLD AUTO: 1.78 K/UL (ref 1–4.8)
LYMPHOCYTES NFR BLD: 23.2 % (ref 22–41)
MCH RBC QN AUTO: 24.7 PG (ref 27–33)
MCHC RBC AUTO-ENTMCNC: 30.3 G/DL (ref 33.6–35)
MCV RBC AUTO: 81.4 FL (ref 81.4–97.8)
MONOCYTES # BLD AUTO: 0.74 K/UL (ref 0–0.85)
MONOCYTES NFR BLD AUTO: 9.6 % (ref 0–13.4)
NEUTROPHILS # BLD AUTO: 4.62 K/UL (ref 2–7.15)
NEUTROPHILS NFR BLD: 60.1 % (ref 44–72)
NRBC # BLD AUTO: 0 K/UL
NRBC BLD-RTO: 0 /100 WBC
PLATELET # BLD AUTO: 224 K/UL (ref 164–446)
PMV BLD AUTO: 11.5 FL (ref 9–12.9)
POTASSIUM SERPL-SCNC: 4.3 MMOL/L (ref 3.6–5.5)
PROT SERPL-MCNC: 6.8 G/DL (ref 6–8.2)
RBC # BLD AUTO: 5.87 M/UL (ref 4.2–5.4)
SODIUM SERPL-SCNC: 137 MMOL/L (ref 135–145)
WBC # BLD AUTO: 7.7 K/UL (ref 4.8–10.8)

## 2019-09-23 PROCEDURE — 80053 COMPREHEN METABOLIC PANEL: CPT

## 2019-09-23 PROCEDURE — 99214 OFFICE O/P EST MOD 30 MIN: CPT | Performed by: FAMILY MEDICINE

## 2019-09-23 PROCEDURE — 81003 URINALYSIS AUTO W/O SCOPE: CPT

## 2019-09-23 PROCEDURE — 71046 X-RAY EXAM CHEST 2 VIEWS: CPT | Mod: TC,GZ | Performed by: PHYSICIAN ASSISTANT

## 2019-09-23 PROCEDURE — 36415 COLL VENOUS BLD VENIPUNCTURE: CPT

## 2019-09-23 PROCEDURE — 85025 COMPLETE CBC W/AUTO DIFF WBC: CPT

## 2019-09-23 ASSESSMENT — ENCOUNTER SYMPTOMS
TINGLING: 0
NAUSEA: 0
CARDIOVASCULAR NEGATIVE: 1
PALPITATIONS: 0
DIZZINESS: 0
BRUISES/BLEEDS EASILY: 0
HEADACHES: 0
COUGH: 0
DOUBLE VISION: 0
EYES NEGATIVE: 1
RESPIRATORY NEGATIVE: 1
DEPRESSION: 0
CONSTITUTIONAL NEGATIVE: 1
BLURRED VISION: 0
GASTROINTESTINAL NEGATIVE: 1
FEVER: 0
PSYCHIATRIC NEGATIVE: 1
CHILLS: 0
NEUROLOGICAL NEGATIVE: 1
HEARTBURN: 0
HEMOPTYSIS: 0
MYALGIAS: 0

## 2019-09-23 NOTE — PROGRESS NOTES
Subjective:      Shahana Nj is a 64 y.o. female who presents with Pre-op Exam (knee surgery)            1. Preop cardiovascular exam  Severe OA in left knee causing pain and requiring her to use a cane  Ortho would like for her to have replacement  No cp nor sob no prior anesthetic issues  Last surgery 10 years ago on ears  - Comp Metabolic Panel; Future  - CBC WITH DIFFERENTIAL; Future  - URINALYSIS; Future  - DX-CHEST-2 VIEWS; Future    2. Localized osteoarthritis of left knee    - Comp Metabolic Panel; Future  - CBC WITH DIFFERENTIAL; Future  - URINALYSIS; Future  - DX-CHEST-2 VIEWS; Future    3. Type 2 diabetes mellitus treated without insulin (HCC)  Currently treated for DM, taking meds and checking bs at home, trying to do DM diet.controlled    - Comp Metabolic Panel; Future  - CBC WITH DIFFERENTIAL; Future  - URINALYSIS; Future  - DX-CHEST-2 VIEWS; Future    4. Morbid obesity with BMI of 40.0-44.9, adult (HCC)  Would like to maybe get gastric sleeve once knee fixed, given her dm and oa this would be a good idea  - REFERRAL TO BARIATRIC SURGERY    Past Medical History:  No date: Dyslipidemia  No date: Essential hypertension  No date: AALIYAH (obstructive sleep apnea)  No date: Type 2 diabetes mellitus (HCC)  Past Surgical History:  No date: ABDOMINAL HYSTERECTOMY TOTAL  No date: EAR MIDDLE EXPLORATION  No date: KNEE ARTHROSCOPY; Right  Social History    Tobacco Use      Smoking status: Never Smoker      Smokeless tobacco: Never Used    Alcohol use: Yes      Alcohol/week: 0.0 oz      Comment: occ    Drug use: No    Review of patient's family history indicates:  Problem: Heart Disease      Relation: Mother          Age of Onset: 45          Comment: cabg  Problem: Heart Disease      Relation: Father          Age of Onset: (Not Specified)          Comment: cabg and valve replacement  Problem: Diabetes      Relation: Sister          Age of Onset: (Not Specified)  Problem: Diabetes      Relation: Unknown           Age of Onset: (Not Specified)      Current Outpatient Medications: •  metformin (GLUCOPHAGE) 1000 MG tablet, Take 1 Tab by mouth 2 times a day, with meals., Disp: 180 Tab, Rfl: 1•  rosuvastatin (CRESTOR) 40 MG tablet, TAKE 1 TABLET BY MOUTH ONCE DAILY, Disp: 90 Tab, Rfl: 1•  metoprolol SR (TOPROL XL) 50 MG TABLET SR 24 HR, TAKE 1 TABLET BY MOUTH ONCE DAILY, Disp: 30 Tab, Rfl: 11•  irbesartan-hydrochlorothiazide (AVALIDE) 300-12.5 MG per tablet, TAKE 1 TABLET BY MOUTH ONCE DAILY, Disp: 90 Tab, Rfl: 3•  spironolactone (ALDACTONE) 25 MG Tab, Take 1 Tab by mouth every day. (Patient taking differently: Take 25 mg by mouth every day. 1/2 per day), Disp: 90 Tab, Rfl: 3•  therapeutic multivitamin-minerals (THERAGRAN-M) Tab, Take 1 Tab by mouth every day., Disp: , Rfl: •  magnesium oxide (MAG-OX) 400 MG Tab, Take 400 mg by mouth every day., Disp: , Rfl: •  ascorbic acid (VITAMIN C) 500 MG/5ML syrup, Take  by mouth every day., Disp: , Rfl: •  Alpha-Lipoic Acid 100 MG Cap, Take  by mouth., Disp: , Rfl: •  Chromium 1 MG Cap, Take  by mouth., Disp: , Rfl: •  aspirin (ASA) 81 MG Chew Tab chewable tablet, Take 1 Tab by mouth every day., Disp: 100 Tab, Rfl: 11•  ranitidine (ZANTAC) 300 MG tablet, Take 300 mg by mouth every day., Disp: , Rfl: •  omeprazole (PRILOSEC) 10 MG CAPSULE DELAYED RELEASE, Take 10 mg by mouth every day., Disp: , Rfl: •  vitamin D (CHOLECALCIFEROL) 1000 UNIT Tab, Take 1,000 Units by mouth every day., Disp: , Rfl:     Patient was instructed on the use of medications, either prescriptions or OTC and informed on when the appropriate follow up time period should be. In addition, patient was also instructed that should any acute worsening occur that they should notify this clinic asap or call 911.          Review of Systems   Constitutional: Negative.  Negative for chills and fever.   HENT: Negative.  Negative for hearing loss.    Eyes: Negative.  Negative for blurred vision and double vision.   Respiratory:  "Negative.  Negative for cough and hemoptysis.    Cardiovascular: Negative.  Negative for chest pain and palpitations.   Gastrointestinal: Negative.  Negative for heartburn and nausea.   Genitourinary: Negative.  Negative for dysuria.   Musculoskeletal: Positive for joint pain. Negative for myalgias.   Skin: Negative.  Negative for rash.   Neurological: Negative.  Negative for dizziness, tingling and headaches.   Endo/Heme/Allergies: Negative.  Does not bruise/bleed easily.   Psychiatric/Behavioral: Negative.  Negative for depression and suicidal ideas.   All other systems reviewed and are negative.         Objective:     /70   Pulse 82   Temp 36.3 °C (97.4 °F)   Resp 20   Ht 1.651 m (5' 5\")   Wt 114.3 kg (252 lb)   SpO2 97%   BMI 41.93 kg/m²      Physical Exam   Constitutional: She is oriented to person, place, and time. She appears well-developed and well-nourished. No distress.   HENT:   Head: Normocephalic and atraumatic.   Mouth/Throat: Oropharynx is clear and moist. No oropharyngeal exudate.   Eyes: Pupils are equal, round, and reactive to light.   Cardiovascular: Normal rate, regular rhythm, normal heart sounds and intact distal pulses. Exam reveals no gallop and no friction rub.   No murmur heard.  Pulmonary/Chest: Effort normal and breath sounds normal. No respiratory distress. She has no wheezes. She has no rales. She exhibits no tenderness.   Neurological: She is alert and oriented to person, place, and time.   Skin: She is not diaphoretic.   Psychiatric: She has a normal mood and affect. Her behavior is normal. Judgment and thought content normal.   Nursing note and vitals reviewed.              Assessment/Plan:     1. Preop cardiovascular exam    - Comp Metabolic Panel; Future  - CBC WITH DIFFERENTIAL; Future  - URINALYSIS; Future  - DX-CHEST-2 VIEWS; Future    2. Localized osteoarthritis of left knee    - Comp Metabolic Panel; Future  - CBC WITH DIFFERENTIAL; Future  - URINALYSIS; Future  - " DX-CHEST-2 VIEWS; Future    3. Type 2 diabetes mellitus treated without insulin (HCC)    - Comp Metabolic Panel; Future  - CBC WITH DIFFERENTIAL; Future  - URINALYSIS; Future  - DX-CHEST-2 VIEWS; Future    4. Morbid obesity with BMI of 40.0-44.9, adult (HCC)  - REFERRAL TO BARIATRIC SURGERY

## 2019-09-24 LAB
APPEARANCE UR: CLEAR
BILIRUB UR QL STRIP.AUTO: NEGATIVE
COLOR UR: YELLOW
GLUCOSE UR STRIP.AUTO-MCNC: NEGATIVE MG/DL
KETONES UR STRIP.AUTO-MCNC: NEGATIVE MG/DL
LEUKOCYTE ESTERASE UR QL STRIP.AUTO: NEGATIVE
MICRO URNS: NORMAL
NITRITE UR QL STRIP.AUTO: NEGATIVE
PH UR STRIP.AUTO: 5.5 [PH] (ref 5–8)
PROT UR QL STRIP: NEGATIVE MG/DL
RBC UR QL AUTO: NEGATIVE
SP GR UR STRIP.AUTO: 1.01
UROBILINOGEN UR STRIP.AUTO-MCNC: 0.2 MG/DL

## 2019-09-30 ENCOUNTER — OFFICE VISIT (OUTPATIENT)
Dept: MEDICAL GROUP | Facility: PHYSICIAN GROUP | Age: 64
End: 2019-09-30
Payer: MEDICARE

## 2019-09-30 VITALS
OXYGEN SATURATION: 95 % | HEART RATE: 88 BPM | RESPIRATION RATE: 20 BRPM | TEMPERATURE: 97.3 F | DIASTOLIC BLOOD PRESSURE: 76 MMHG | HEIGHT: 65 IN | BODY MASS INDEX: 41.82 KG/M2 | WEIGHT: 251 LBS | SYSTOLIC BLOOD PRESSURE: 110 MMHG

## 2019-09-30 DIAGNOSIS — Z23 NEED FOR VACCINATION: ICD-10-CM

## 2019-09-30 DIAGNOSIS — E11.9 TYPE 2 DIABETES MELLITUS TREATED WITHOUT INSULIN (HCC): ICD-10-CM

## 2019-09-30 DIAGNOSIS — M17.12 LOCALIZED OSTEOARTHRITIS OF LEFT KNEE: ICD-10-CM

## 2019-09-30 DIAGNOSIS — I10 BENIGN ESSENTIAL HTN: ICD-10-CM

## 2019-09-30 DIAGNOSIS — Z01.810 PREOP CARDIOVASCULAR EXAM: ICD-10-CM

## 2019-09-30 PROCEDURE — G0008 ADMIN INFLUENZA VIRUS VAC: HCPCS | Performed by: FAMILY MEDICINE

## 2019-09-30 PROCEDURE — 99214 OFFICE O/P EST MOD 30 MIN: CPT | Mod: 25 | Performed by: FAMILY MEDICINE

## 2019-09-30 PROCEDURE — 90662 IIV NO PRSV INCREASED AG IM: CPT | Performed by: FAMILY MEDICINE

## 2019-09-30 PROCEDURE — 93000 ELECTROCARDIOGRAM COMPLETE: CPT | Performed by: FAMILY MEDICINE

## 2019-09-30 ASSESSMENT — ENCOUNTER SYMPTOMS
BRUISES/BLEEDS EASILY: 0
NAUSEA: 0
PSYCHIATRIC NEGATIVE: 1
DEPRESSION: 0
PALPITATIONS: 0
NEUROLOGICAL NEGATIVE: 1
DIZZINESS: 0
CONSTITUTIONAL NEGATIVE: 1
CHILLS: 0
MYALGIAS: 0
RESPIRATORY NEGATIVE: 1
DOUBLE VISION: 0
HEARTBURN: 0
BLURRED VISION: 0
FEVER: 0
EYES NEGATIVE: 1
TINGLING: 0
HEMOPTYSIS: 0
CARDIOVASCULAR NEGATIVE: 1
GASTROINTESTINAL NEGATIVE: 1
HEADACHES: 0
COUGH: 0

## 2019-09-30 NOTE — PROGRESS NOTES
Subjective:      Shahana Nj is a 64 y.o. female who presents with Follow-Up (lab review)            1. Need for vaccination    - Influenza Vaccine, High Dose (65+ Only)    2. Preop cardiovascular exam  Had labs and cxr and urinalysis all ok for surgery  Today ekg with no new findings will clear for surgery  - EKG - Clinic Performed  - NON SPECIFIED; Cleared for knee replacement  Dispense: 1 Each; Refill: 0    3. Type 2 diabetes mellitus treated without insulin (HCC)  Currently treated for DM, taking meds and checking bs at home, trying to do DM diet.controlled    - EKG - Clinic Performed    4. Benign essential HTN  Currently treated for HTN, taking meds with no CP or sob, monitors bp at home periodically. controlled    - EKG - Clinic Performed    5. Localized osteoarthritis of left knee  To get replacement    Past Medical History:  No date: Dyslipidemia  No date: Essential hypertension  No date: AALIYAH (obstructive sleep apnea)  No date: Type 2 diabetes mellitus (HCC)  Past Surgical History:  No date: ABDOMINAL HYSTERECTOMY TOTAL  No date: EAR MIDDLE EXPLORATION  No date: KNEE ARTHROSCOPY; Right  Social History    Tobacco Use      Smoking status: Never Smoker      Smokeless tobacco: Never Used    Alcohol use: Yes      Alcohol/week: 0.0 oz      Comment: occ    Drug use: No    Review of patient's family history indicates:  Problem: Heart Disease      Relation: Mother          Age of Onset: 45          Comment: cabg  Problem: Heart Disease      Relation: Father          Age of Onset: (Not Specified)          Comment: cabg and valve replacement  Problem: Diabetes      Relation: Sister          Age of Onset: (Not Specified)  Problem: Diabetes      Relation: Unknown          Age of Onset: (Not Specified)      Current Outpatient Medications: •  Zoster Vac Recomb Adjuvanted (SHINGRIX) 50 MCG/0.5ML Recon Susp, 0.5 mL by Intramuscular route Once for 1 dose., Disp: 0.5 mL, Rfl: 0•  NON SPECIFIED, Cleared for knee  replacement, Disp: 1 Each, Rfl: 0•  metformin (GLUCOPHAGE) 1000 MG tablet, Take 1 Tab by mouth 2 times a day, with meals., Disp: 180 Tab, Rfl: 1•  rosuvastatin (CRESTOR) 40 MG tablet, TAKE 1 TABLET BY MOUTH ONCE DAILY, Disp: 90 Tab, Rfl: 1•  metoprolol SR (TOPROL XL) 50 MG TABLET SR 24 HR, TAKE 1 TABLET BY MOUTH ONCE DAILY, Disp: 30 Tab, Rfl: 11•  irbesartan-hydrochlorothiazide (AVALIDE) 300-12.5 MG per tablet, TAKE 1 TABLET BY MOUTH ONCE DAILY, Disp: 90 Tab, Rfl: 3•  spironolactone (ALDACTONE) 25 MG Tab, Take 1 Tab by mouth every day. (Patient taking differently: Take 12.5 mg by mouth every day.), Disp: 90 Tab, Rfl: 3•  therapeutic multivitamin-minerals (THERAGRAN-M) Tab, Take 1 Tab by mouth every day., Disp: , Rfl: •  magnesium oxide (MAG-OX) 400 MG Tab, Take 400 mg by mouth every day., Disp: , Rfl: •  ascorbic acid (VITAMIN C) 500 MG/5ML syrup, Take  by mouth every day., Disp: , Rfl: •  Alpha-Lipoic Acid 100 MG Cap, Take  by mouth., Disp: , Rfl: •  Chromium 1 MG Cap, Take  by mouth., Disp: , Rfl: •  aspirin (ASA) 81 MG Chew Tab chewable tablet, Take 1 Tab by mouth every day., Disp: 100 Tab, Rfl: 11•  ranitidine (ZANTAC) 300 MG tablet, Take 300 mg by mouth every day., Disp: , Rfl: •  omeprazole (PRILOSEC) 10 MG CAPSULE DELAYED RELEASE, Take 10 mg by mouth every day., Disp: , Rfl: •  vitamin D (CHOLECALCIFEROL) 1000 UNIT Tab, Take 1,000 Units by mouth every day., Disp: , Rfl:     Patient was instructed on the use of medications, either prescriptions or OTC and informed on when the appropriate follow up time period should be. In addition, patient was also instructed that should any acute worsening occur that they should notify this clinic asap or call 911.          Review of Systems   Constitutional: Negative.  Negative for chills and fever.   HENT: Negative.  Negative for hearing loss.    Eyes: Negative.  Negative for blurred vision and double vision.   Respiratory: Negative.  Negative for cough and hemoptysis.   "  Cardiovascular: Negative.  Negative for chest pain and palpitations.   Gastrointestinal: Negative.  Negative for heartburn and nausea.   Genitourinary: Negative.  Negative for dysuria.   Musculoskeletal: Positive for joint pain. Negative for myalgias.   Skin: Negative.  Negative for rash.   Neurological: Negative.  Negative for dizziness, tingling and headaches.   Endo/Heme/Allergies: Negative.  Does not bruise/bleed easily.   Psychiatric/Behavioral: Negative.  Negative for depression and suicidal ideas.   All other systems reviewed and are negative.         Objective:     /76   Pulse 88   Temp 36.3 °C (97.3 °F)   Resp 20   Ht 1.657 m (5' 5.25\")   Wt 113.9 kg (251 lb)   SpO2 95%   BMI 41.45 kg/m²      Physical Exam   Constitutional: She is oriented to person, place, and time. She appears well-developed and well-nourished. No distress.   HENT:   Head: Normocephalic and atraumatic.   Mouth/Throat: Oropharynx is clear and moist. No oropharyngeal exudate.   Eyes: Pupils are equal, round, and reactive to light.   Cardiovascular: Normal rate, regular rhythm, normal heart sounds and intact distal pulses. Exam reveals no gallop and no friction rub.   No murmur heard.  Pulmonary/Chest: Effort normal and breath sounds normal. No respiratory distress. She has no wheezes. She has no rales. She exhibits no tenderness.   Musculoskeletal:        Left knee: Tenderness found. Medial joint line and lateral joint line tenderness noted.   Neurological: She is alert and oriented to person, place, and time.   Skin: She is not diaphoretic.   Psychiatric: She has a normal mood and affect. Her behavior is normal. Judgment and thought content normal.   Nursing note and vitals reviewed.              Assessment/Plan:     1. Need for vaccination    - Influenza Vaccine, High Dose (65+ Only)    2. Preop cardiovascular exam    - EKG - Clinic Performed  - NON SPECIFIED; Cleared for knee replacement  Dispense: 1 Each; Refill: 0    3. " Type 2 diabetes mellitus treated without insulin (HCC)    - EKG - Clinic Performed    4. Benign essential HTN    - EKG - Clinic Performed    5. Localized osteoarthritis of left knee

## 2019-11-19 DIAGNOSIS — Z01.812 PRE-OPERATIVE LABORATORY EXAMINATION: ICD-10-CM

## 2019-11-19 LAB
ANION GAP SERPL CALC-SCNC: 10 MMOL/L (ref 0–11.9)
APTT PPP: 31.1 SEC (ref 24.7–36)
BUN SERPL-MCNC: 28 MG/DL (ref 8–22)
CALCIUM SERPL-MCNC: 10.7 MG/DL (ref 8.5–10.5)
CHLORIDE SERPL-SCNC: 105 MMOL/L (ref 96–112)
CO2 SERPL-SCNC: 22 MMOL/L (ref 20–33)
CREAT SERPL-MCNC: 0.97 MG/DL (ref 0.5–1.4)
ERYTHROCYTE [DISTWIDTH] IN BLOOD BY AUTOMATED COUNT: 47.4 FL (ref 35.9–50)
GLUCOSE SERPL-MCNC: 111 MG/DL (ref 65–99)
HCT VFR BLD AUTO: 45.3 % (ref 37–47)
HGB BLD-MCNC: 14 G/DL (ref 12–16)
INR PPP: 0.97 (ref 0.87–1.13)
MCH RBC QN AUTO: 25.7 PG (ref 27–33)
MCHC RBC AUTO-ENTMCNC: 30.9 G/DL (ref 33.6–35)
MCV RBC AUTO: 83.3 FL (ref 81.4–97.8)
PLATELET # BLD AUTO: 233 K/UL (ref 164–446)
PMV BLD AUTO: 10.9 FL (ref 9–12.9)
POTASSIUM SERPL-SCNC: 4.3 MMOL/L (ref 3.6–5.5)
PROTHROMBIN TIME: 13.1 SEC (ref 12–14.6)
RBC # BLD AUTO: 5.44 M/UL (ref 4.2–5.4)
SCCMEC + MECA PNL NOSE NAA+PROBE: NEGATIVE
SCCMEC + MECA PNL NOSE NAA+PROBE: NEGATIVE
SODIUM SERPL-SCNC: 137 MMOL/L (ref 135–145)
WBC # BLD AUTO: 8.3 K/UL (ref 4.8–10.8)

## 2019-11-19 PROCEDURE — 85610 PROTHROMBIN TIME: CPT

## 2019-11-19 PROCEDURE — 36415 COLL VENOUS BLD VENIPUNCTURE: CPT

## 2019-11-19 PROCEDURE — 87641 MR-STAPH DNA AMP PROBE: CPT

## 2019-11-19 PROCEDURE — 85730 THROMBOPLASTIN TIME PARTIAL: CPT

## 2019-11-19 PROCEDURE — 87640 STAPH A DNA AMP PROBE: CPT | Mod: XU

## 2019-11-19 PROCEDURE — 80048 BASIC METABOLIC PNL TOTAL CA: CPT

## 2019-11-19 PROCEDURE — 85027 COMPLETE CBC AUTOMATED: CPT

## 2019-11-19 RX ORDER — CETIRIZINE HYDROCHLORIDE 10 MG/1
10 TABLET ORAL DAILY
COMMUNITY
End: 2021-05-11

## 2019-11-19 RX ORDER — ACETAMINOPHEN 500 MG
1000 TABLET ORAL EVERY 6 HOURS PRN
COMMUNITY

## 2019-11-19 RX ORDER — DOCUSATE SODIUM 100 MG/1
100 CAPSULE, LIQUID FILLED ORAL DAILY
COMMUNITY
End: 2021-08-11

## 2019-11-19 RX ORDER — OMEPRAZOLE 20 MG/1
20 CAPSULE, DELAYED RELEASE ORAL DAILY
COMMUNITY
End: 2024-02-20

## 2019-11-19 RX ORDER — ELECTROLYTES/DEXTROSE
1 SOLUTION, ORAL ORAL DAILY
Status: ON HOLD | COMMUNITY
End: 2019-12-03

## 2019-11-19 RX ORDER — ACETAMINOPHEN 160 MG
1 TABLET,DISINTEGRATING ORAL DAILY
Status: ON HOLD | COMMUNITY
End: 2019-12-03

## 2019-11-19 RX ORDER — AZELASTINE 1 MG/ML
2 SPRAY, METERED NASAL 2 TIMES DAILY
COMMUNITY
End: 2023-01-26

## 2019-11-19 SDOH — HEALTH STABILITY: MENTAL HEALTH: HOW MANY STANDARD DRINKS CONTAINING ALCOHOL DO YOU HAVE ON A TYPICAL DAY?: 1 OR 2

## 2019-11-19 SDOH — HEALTH STABILITY: MENTAL HEALTH: HOW OFTEN DO YOU HAVE 6 OR MORE DRINKS ON ONE OCCASION?: NEVER

## 2019-11-19 SDOH — HEALTH STABILITY: MENTAL HEALTH: HOW OFTEN DO YOU HAVE A DRINK CONTAINING ALCOHOL?: MONTHLY OR LESS

## 2019-11-19 NOTE — OR NURSING
"Pre-admit appointment completed. \"Preparing for your procedure\" sheet given to pt along with verbal and written instructions. Pt instructed to continue regularly prescribed medications through the day before surgery. Pt instructed to take the following medications the day of surgery with a sip of water, per anesthesia protocol; if surgery scheduled closed to noon then pt will take her lunch dose of metoprolol, and if needed-tylenol.    Pt to bring CPAP DOS.    Pt states her PCP gave clearance for surgery. Called Nicci at Dr Keen office and she will fax the clearance for surgery from PCP.    Dr Herrera notified via email of procedure due to BMI=41.24, AALIYAH, DM, Hx MI x2, and other co-morbidities.  "

## 2019-11-19 NOTE — OR NURSING
"TOTAL JOINT REPLACEMENT SURGERY   PreAdmit Appointment  Pre-Operative Education Note       1) Did you take a Total Joint Replacement Pre-Operative Education class?  Where?  Answer: not for herself, was not offered or instructed, however, pt had attended the class when her significant other needed hip replacement approx 6 months ago.     2) If you did not take a class, did you receive pre-op education in the form of a book or through an online class?    Answer: no    3) Have you had the same joint replacement procedure within the last 3 years?   Answer: see above    For patients who answered \"No\" to the above questions:     4) Was patient given information on [facility] Pre-Op Education class through the Pre-Op Education Class flyer or the Alternative Pre-op Education flyer?   Answer: yes    5) Was a [facility] Total Joint Replacement Patient Guide binder handed out?   Answer:yes    6) Did the patient refuse preoperative education?  Answer: no     DISCHARGE PLANNING NOTE - TOTAL JOINT    Procedure: TKA  Procedure Date: 12/3/19  Insurance:    Equipment currently available at home? Walker rollater  Steps into the home? 0  Steps within the home? 0  Toilet height? elevated  Type of shower? Walk in and has a shower chair  Who will be with you during your recovery? spouse  Is Outpatient Physical Therapy set up after surgery? No   Did you take the Total Joint Class and where? Yes  Planning same day discharge?Yes     Plan: Pt given home safety checklist and equipment resource guide. Reviewed with pt.  "

## 2019-12-03 ENCOUNTER — ANESTHESIA EVENT (OUTPATIENT)
Dept: SURGERY | Facility: MEDICAL CENTER | Age: 64
End: 2019-12-03
Payer: MEDICARE

## 2019-12-03 ENCOUNTER — ANESTHESIA (OUTPATIENT)
Dept: SURGERY | Facility: MEDICAL CENTER | Age: 64
End: 2019-12-03
Payer: MEDICARE

## 2019-12-03 ENCOUNTER — HOSPITAL ENCOUNTER (OUTPATIENT)
Facility: MEDICAL CENTER | Age: 64
End: 2019-12-04
Attending: ORTHOPAEDIC SURGERY | Admitting: ORTHOPAEDIC SURGERY
Payer: MEDICARE

## 2019-12-03 DIAGNOSIS — Z96.652 S/P TKR (TOTAL KNEE REPLACEMENT) USING CEMENT, LEFT: ICD-10-CM

## 2019-12-03 LAB
GLUCOSE BLD-MCNC: 110 MG/DL (ref 65–99)
GLUCOSE BLD-MCNC: 164 MG/DL (ref 65–99)
GLUCOSE BLD-MCNC: 195 MG/DL (ref 65–99)

## 2019-12-03 PROCEDURE — 96375 TX/PRO/DX INJ NEW DRUG ADDON: CPT

## 2019-12-03 PROCEDURE — 96365 THER/PROPH/DIAG IV INF INIT: CPT

## 2019-12-03 PROCEDURE — A9270 NON-COVERED ITEM OR SERVICE: HCPCS | Performed by: ANESTHESIOLOGY

## 2019-12-03 PROCEDURE — 160036 HCHG PACU - EA ADDL 30 MINS PHASE I: Performed by: ORTHOPAEDIC SURGERY

## 2019-12-03 PROCEDURE — 160022 HCHG BLOCK: Performed by: ORTHOPAEDIC SURGERY

## 2019-12-03 PROCEDURE — 502579 HCHG PACK, TOTAL KNEE: Performed by: ORTHOPAEDIC SURGERY

## 2019-12-03 PROCEDURE — 160048 HCHG OR STATISTICAL LEVEL 1-5: Performed by: ORTHOPAEDIC SURGERY

## 2019-12-03 PROCEDURE — 160002 HCHG RECOVERY MINUTES (STAT): Performed by: ORTHOPAEDIC SURGERY

## 2019-12-03 PROCEDURE — 500002 HCHG ADHESIVE, DERMABOND: Performed by: ORTHOPAEDIC SURGERY

## 2019-12-03 PROCEDURE — 160009 HCHG ANES TIME/MIN: Performed by: ORTHOPAEDIC SURGERY

## 2019-12-03 PROCEDURE — 94660 CPAP INITIATION&MGMT: CPT

## 2019-12-03 PROCEDURE — 700112 HCHG RX REV CODE 229: Performed by: ORTHOPAEDIC SURGERY

## 2019-12-03 PROCEDURE — L8699 PROSTHETIC IMPLANT NOS: HCPCS | Performed by: ORTHOPAEDIC SURGERY

## 2019-12-03 PROCEDURE — 700102 HCHG RX REV CODE 250 W/ 637 OVERRIDE(OP): Performed by: ORTHOPAEDIC SURGERY

## 2019-12-03 PROCEDURE — A9270 NON-COVERED ITEM OR SERVICE: HCPCS | Performed by: ORTHOPAEDIC SURGERY

## 2019-12-03 PROCEDURE — 160029 HCHG SURGERY MINUTES - 1ST 30 MINS LEVEL 4: Performed by: ORTHOPAEDIC SURGERY

## 2019-12-03 PROCEDURE — 502000 HCHG MISC OR IMPLANTS RC 0278: Performed by: ORTHOPAEDIC SURGERY

## 2019-12-03 PROCEDURE — G0378 HOSPITAL OBSERVATION PER HR: HCPCS

## 2019-12-03 PROCEDURE — 700105 HCHG RX REV CODE 258: Performed by: ORTHOPAEDIC SURGERY

## 2019-12-03 PROCEDURE — 160041 HCHG SURGERY MINUTES - EA ADDL 1 MIN LEVEL 4: Performed by: ORTHOPAEDIC SURGERY

## 2019-12-03 PROCEDURE — 700101 HCHG RX REV CODE 250: Performed by: ORTHOPAEDIC SURGERY

## 2019-12-03 PROCEDURE — 160035 HCHG PACU - 1ST 60 MINS PHASE I: Performed by: ORTHOPAEDIC SURGERY

## 2019-12-03 PROCEDURE — 94760 N-INVAS EAR/PLS OXIMETRY 1: CPT

## 2019-12-03 PROCEDURE — 700101 HCHG RX REV CODE 250: Performed by: ANESTHESIOLOGY

## 2019-12-03 PROCEDURE — 302135 SEQUENTIAL COMPRESSION MACHINE: Performed by: ORTHOPAEDIC SURGERY

## 2019-12-03 PROCEDURE — 82962 GLUCOSE BLOOD TEST: CPT

## 2019-12-03 PROCEDURE — 700111 HCHG RX REV CODE 636 W/ 250 OVERRIDE (IP): Performed by: ORTHOPAEDIC SURGERY

## 2019-12-03 PROCEDURE — 700102 HCHG RX REV CODE 250 W/ 637 OVERRIDE(OP): Performed by: ANESTHESIOLOGY

## 2019-12-03 PROCEDURE — 96372 THER/PROPH/DIAG INJ SC/IM: CPT

## 2019-12-03 PROCEDURE — 700111 HCHG RX REV CODE 636 W/ 250 OVERRIDE (IP): Performed by: ANESTHESIOLOGY

## 2019-12-03 DEVICE — BASE TIBIAL NONPOROUS JOURNEY II LEFT SIZE 3 (1EA): Type: IMPLANTABLE DEVICE | Site: KNEE | Status: FUNCTIONAL

## 2019-12-03 DEVICE — CEMENT ORTHOPEDIC HV US  (10/PK): Type: IMPLANTABLE DEVICE | Site: KNEE | Status: FUNCTIONAL

## 2019-12-03 DEVICE — IMPLANTABLE DEVICE: Type: IMPLANTABLE DEVICE | Site: KNEE | Status: FUNCTIONAL

## 2019-12-03 DEVICE — IMPLANT GII OVAL RESURFACING PAT 32MM (1EA): Type: IMPLANTABLE DEVICE | Site: KNEE | Status: FUNCTIONAL

## 2019-12-03 RX ORDER — MAGNESIUM SULFATE HEPTAHYDRATE 500 MG/ML
INJECTION, SOLUTION INTRAMUSCULAR; INTRAVENOUS PRN
Status: DISCONTINUED | OUTPATIENT
Start: 2019-12-03 | End: 2019-12-03 | Stop reason: SURG

## 2019-12-03 RX ORDER — PHENYLEPHRINE HCL IN 0.9% NACL 0.5 MG/5ML
SYRINGE (ML) INTRAVENOUS PRN
Status: DISCONTINUED | OUTPATIENT
Start: 2019-12-03 | End: 2019-12-03 | Stop reason: SURG

## 2019-12-03 RX ORDER — HALOPERIDOL 5 MG/ML
1 INJECTION INTRAMUSCULAR
Status: DISCONTINUED | OUTPATIENT
Start: 2019-12-03 | End: 2019-12-03 | Stop reason: HOSPADM

## 2019-12-03 RX ORDER — CHLORPROMAZINE HYDROCHLORIDE 25 MG/ML
25 INJECTION INTRAMUSCULAR EVERY 6 HOURS PRN
Status: DISCONTINUED | OUTPATIENT
Start: 2019-12-03 | End: 2019-12-04 | Stop reason: HOSPADM

## 2019-12-03 RX ORDER — SCOLOPAMINE TRANSDERMAL SYSTEM 1 MG/1
1 PATCH, EXTENDED RELEASE TRANSDERMAL
Status: DISCONTINUED | OUTPATIENT
Start: 2019-12-03 | End: 2019-12-04 | Stop reason: HOSPADM

## 2019-12-03 RX ORDER — SPIRONOLACTONE 25 MG/1
25 TABLET ORAL DAILY
Status: DISCONTINUED | OUTPATIENT
Start: 2019-12-03 | End: 2019-12-04 | Stop reason: HOSPADM

## 2019-12-03 RX ORDER — ENEMA 19; 7 G/133ML; G/133ML
1 ENEMA RECTAL
Status: DISCONTINUED | OUTPATIENT
Start: 2019-12-03 | End: 2019-12-04 | Stop reason: HOSPADM

## 2019-12-03 RX ORDER — LORAZEPAM 2 MG/ML
0.5 INJECTION INTRAMUSCULAR
Status: DISCONTINUED | OUTPATIENT
Start: 2019-12-03 | End: 2019-12-03 | Stop reason: HOSPADM

## 2019-12-03 RX ORDER — GABAPENTIN 300 MG/1
300 CAPSULE ORAL
Status: COMPLETED | OUTPATIENT
Start: 2019-12-03 | End: 2019-12-03

## 2019-12-03 RX ORDER — DIPHENHYDRAMINE HYDROCHLORIDE 50 MG/ML
25 INJECTION INTRAMUSCULAR; INTRAVENOUS EVERY 6 HOURS PRN
Status: DISCONTINUED | OUTPATIENT
Start: 2019-12-03 | End: 2019-12-04 | Stop reason: HOSPADM

## 2019-12-03 RX ORDER — HYDRALAZINE HYDROCHLORIDE 20 MG/ML
5 INJECTION INTRAMUSCULAR; INTRAVENOUS
Status: DISCONTINUED | OUTPATIENT
Start: 2019-12-03 | End: 2019-12-03 | Stop reason: HOSPADM

## 2019-12-03 RX ORDER — MELOXICAM 7.5 MG/1
7.5 TABLET ORAL DAILY
Qty: 30 TAB | Refills: 1 | Status: SHIPPED | OUTPATIENT
Start: 2019-12-03 | End: 2020-01-02

## 2019-12-03 RX ORDER — SODIUM CHLORIDE, SODIUM LACTATE, POTASSIUM CHLORIDE, CALCIUM CHLORIDE 600; 310; 30; 20 MG/100ML; MG/100ML; MG/100ML; MG/100ML
INJECTION, SOLUTION INTRAVENOUS CONTINUOUS
Status: DISCONTINUED | OUTPATIENT
Start: 2019-12-03 | End: 2019-12-03 | Stop reason: HOSPADM

## 2019-12-03 RX ORDER — IRBESARTAN AND HYDROCHLOROTHIAZIDE 300; 12.5 MG/1; MG/1
1 TABLET, FILM COATED ORAL DAILY
Status: DISCONTINUED | OUTPATIENT
Start: 2019-12-03 | End: 2019-12-03

## 2019-12-03 RX ORDER — HYDROCHLOROTHIAZIDE 12.5 MG/1
12.5 CAPSULE, GELATIN COATED ORAL
Status: DISCONTINUED | OUTPATIENT
Start: 2019-12-03 | End: 2019-12-04 | Stop reason: HOSPADM

## 2019-12-03 RX ORDER — OXYCODONE HYDROCHLORIDE 5 MG/1
5-10 TABLET ORAL EVERY 4 HOURS PRN
Qty: 42 TAB | Refills: 0 | Status: SHIPPED | OUTPATIENT
Start: 2019-12-03 | End: 2019-12-10

## 2019-12-03 RX ORDER — HYDROMORPHONE HYDROCHLORIDE 1 MG/ML
0.4 INJECTION, SOLUTION INTRAMUSCULAR; INTRAVENOUS; SUBCUTANEOUS
Status: DISCONTINUED | OUTPATIENT
Start: 2019-12-03 | End: 2019-12-03 | Stop reason: HOSPADM

## 2019-12-03 RX ORDER — OXYCODONE HCL 5 MG/5 ML
5 SOLUTION, ORAL ORAL
Status: DISCONTINUED | OUTPATIENT
Start: 2019-12-03 | End: 2019-12-03 | Stop reason: HOSPADM

## 2019-12-03 RX ORDER — CEFAZOLIN SODIUM 1 G/3ML
INJECTION, POWDER, FOR SOLUTION INTRAMUSCULAR; INTRAVENOUS PRN
Status: DISCONTINUED | OUTPATIENT
Start: 2019-12-03 | End: 2019-12-03 | Stop reason: SURG

## 2019-12-03 RX ORDER — TRAMADOL HYDROCHLORIDE 50 MG/1
50 TABLET ORAL EVERY 4 HOURS PRN
Status: DISCONTINUED | OUTPATIENT
Start: 2019-12-03 | End: 2019-12-04 | Stop reason: HOSPADM

## 2019-12-03 RX ORDER — HYDROMORPHONE HYDROCHLORIDE 1 MG/ML
0.5 INJECTION, SOLUTION INTRAMUSCULAR; INTRAVENOUS; SUBCUTANEOUS
Status: DISCONTINUED | OUTPATIENT
Start: 2019-12-03 | End: 2019-12-03 | Stop reason: HOSPADM

## 2019-12-03 RX ORDER — DIPHENHYDRAMINE HCL 25 MG
25 TABLET ORAL NIGHTLY PRN
Status: DISCONTINUED | OUTPATIENT
Start: 2019-12-04 | End: 2019-12-04 | Stop reason: HOSPADM

## 2019-12-03 RX ORDER — POLYETHYLENE GLYCOL 3350 17 G/17G
1 POWDER, FOR SOLUTION ORAL 2 TIMES DAILY PRN
Status: DISCONTINUED | OUTPATIENT
Start: 2019-12-03 | End: 2019-12-04 | Stop reason: HOSPADM

## 2019-12-03 RX ORDER — TRAMADOL HYDROCHLORIDE 50 MG/1
50-100 TABLET ORAL EVERY 6 HOURS PRN
Qty: 80 TAB | Refills: 0 | Status: SHIPPED | OUTPATIENT
Start: 2019-12-03 | End: 2019-12-13

## 2019-12-03 RX ORDER — IRBESARTAN 150 MG/1
300 TABLET ORAL
Status: DISCONTINUED | OUTPATIENT
Start: 2019-12-03 | End: 2019-12-04 | Stop reason: HOSPADM

## 2019-12-03 RX ORDER — OMEPRAZOLE 20 MG/1
20 CAPSULE, DELAYED RELEASE ORAL DAILY
Status: DISCONTINUED | OUTPATIENT
Start: 2019-12-04 | End: 2019-12-04 | Stop reason: HOSPADM

## 2019-12-03 RX ORDER — AZELASTINE 1 MG/ML
2 SPRAY, METERED NASAL 2 TIMES DAILY
Status: DISCONTINUED | OUTPATIENT
Start: 2019-12-03 | End: 2019-12-04 | Stop reason: HOSPADM

## 2019-12-03 RX ORDER — EPINEPHRINE 1 MG/ML(1)
AMPUL (ML) INJECTION
Status: DISCONTINUED | OUTPATIENT
Start: 2019-12-03 | End: 2019-12-03 | Stop reason: HOSPADM

## 2019-12-03 RX ORDER — OXYCODONE HCL 5 MG/5 ML
10 SOLUTION, ORAL ORAL
Status: DISCONTINUED | OUTPATIENT
Start: 2019-12-03 | End: 2019-12-03 | Stop reason: HOSPADM

## 2019-12-03 RX ORDER — ROPIVACAINE HYDROCHLORIDE 5 MG/ML
INJECTION, SOLUTION EPIDURAL; INFILTRATION; PERINEURAL
Status: DISCONTINUED | OUTPATIENT
Start: 2019-12-03 | End: 2019-12-03 | Stop reason: HOSPADM

## 2019-12-03 RX ORDER — OXYCODONE HYDROCHLORIDE 5 MG/1
5 TABLET ORAL
Status: DISCONTINUED | OUTPATIENT
Start: 2019-12-03 | End: 2019-12-04 | Stop reason: HOSPADM

## 2019-12-03 RX ORDER — LIDOCAINE HYDROCHLORIDE 20 MG/ML
INJECTION, SOLUTION EPIDURAL; INFILTRATION; INTRACAUDAL; PERINEURAL PRN
Status: DISCONTINUED | OUTPATIENT
Start: 2019-12-03 | End: 2019-12-03 | Stop reason: SURG

## 2019-12-03 RX ORDER — HYDROMORPHONE HYDROCHLORIDE 1 MG/ML
0.5 INJECTION, SOLUTION INTRAMUSCULAR; INTRAVENOUS; SUBCUTANEOUS
Status: DISCONTINUED | OUTPATIENT
Start: 2019-12-03 | End: 2019-12-04 | Stop reason: HOSPADM

## 2019-12-03 RX ORDER — BISACODYL 10 MG
10 SUPPOSITORY, RECTAL RECTAL
Status: DISCONTINUED | OUTPATIENT
Start: 2019-12-03 | End: 2019-12-04 | Stop reason: HOSPADM

## 2019-12-03 RX ORDER — CELECOXIB 200 MG/1
200 CAPSULE ORAL 2 TIMES DAILY
Status: DISCONTINUED | OUTPATIENT
Start: 2019-12-04 | End: 2019-12-04 | Stop reason: HOSPADM

## 2019-12-03 RX ORDER — DIPHENHYDRAMINE HYDROCHLORIDE 50 MG/ML
12.5 INJECTION INTRAMUSCULAR; INTRAVENOUS
Status: DISCONTINUED | OUTPATIENT
Start: 2019-12-03 | End: 2019-12-03 | Stop reason: HOSPADM

## 2019-12-03 RX ORDER — KETOROLAC TROMETHAMINE 30 MG/ML
INJECTION, SOLUTION INTRAMUSCULAR; INTRAVENOUS
Status: DISCONTINUED | OUTPATIENT
Start: 2019-12-03 | End: 2019-12-03 | Stop reason: HOSPADM

## 2019-12-03 RX ORDER — SODIUM CHLORIDE, SODIUM LACTATE, POTASSIUM CHLORIDE, CALCIUM CHLORIDE 600; 310; 30; 20 MG/100ML; MG/100ML; MG/100ML; MG/100ML
INJECTION, SOLUTION INTRAVENOUS CONTINUOUS
Status: ACTIVE | OUTPATIENT
Start: 2019-12-03 | End: 2019-12-03

## 2019-12-03 RX ORDER — METOPROLOL SUCCINATE 25 MG/1
50 TABLET, EXTENDED RELEASE ORAL
Status: DISCONTINUED | OUTPATIENT
Start: 2019-12-03 | End: 2019-12-04 | Stop reason: HOSPADM

## 2019-12-03 RX ORDER — MIDAZOLAM HYDROCHLORIDE 1 MG/ML
INJECTION INTRAMUSCULAR; INTRAVENOUS PRN
Status: DISCONTINUED | OUTPATIENT
Start: 2019-12-03 | End: 2019-12-03 | Stop reason: HOSPADM

## 2019-12-03 RX ORDER — ONDANSETRON 2 MG/ML
4 INJECTION INTRAMUSCULAR; INTRAVENOUS EVERY 4 HOURS PRN
Status: DISCONTINUED | OUTPATIENT
Start: 2019-12-03 | End: 2019-12-04 | Stop reason: HOSPADM

## 2019-12-03 RX ORDER — MEPERIDINE HYDROCHLORIDE 25 MG/ML
12.5 INJECTION INTRAMUSCULAR; INTRAVENOUS; SUBCUTANEOUS
Status: DISCONTINUED | OUTPATIENT
Start: 2019-12-03 | End: 2019-12-03 | Stop reason: HOSPADM

## 2019-12-03 RX ORDER — ONDANSETRON 2 MG/ML
INJECTION INTRAMUSCULAR; INTRAVENOUS PRN
Status: DISCONTINUED | OUTPATIENT
Start: 2019-12-03 | End: 2019-12-03 | Stop reason: SURG

## 2019-12-03 RX ORDER — DOCUSATE SODIUM 100 MG/1
100 CAPSULE, LIQUID FILLED ORAL 2 TIMES DAILY
Status: DISCONTINUED | OUTPATIENT
Start: 2019-12-03 | End: 2019-12-04 | Stop reason: HOSPADM

## 2019-12-03 RX ORDER — ROSUVASTATIN CALCIUM 10 MG/1
40 TABLET, COATED ORAL EVERY EVENING
Status: DISCONTINUED | OUTPATIENT
Start: 2019-12-03 | End: 2019-12-04 | Stop reason: HOSPADM

## 2019-12-03 RX ORDER — TRANEXAMIC ACID 100 MG/ML
INJECTION, SOLUTION INTRAVENOUS PRN
Status: DISCONTINUED | OUTPATIENT
Start: 2019-12-03 | End: 2019-12-03 | Stop reason: SURG

## 2019-12-03 RX ORDER — KETAMINE HYDROCHLORIDE 50 MG/ML
INJECTION, SOLUTION INTRAMUSCULAR; INTRAVENOUS PRN
Status: DISCONTINUED | OUTPATIENT
Start: 2019-12-03 | End: 2019-12-03 | Stop reason: SURG

## 2019-12-03 RX ORDER — ACETAMINOPHEN 500 MG
1000 TABLET ORAL
Status: COMPLETED | OUTPATIENT
Start: 2019-12-03 | End: 2019-12-03

## 2019-12-03 RX ORDER — DIPHENHYDRAMINE HCL 25 MG
25 TABLET ORAL EVERY 6 HOURS PRN
Status: DISCONTINUED | OUTPATIENT
Start: 2019-12-03 | End: 2019-12-04 | Stop reason: HOSPADM

## 2019-12-03 RX ORDER — KETOROLAC TROMETHAMINE 30 MG/ML
15 INJECTION, SOLUTION INTRAMUSCULAR; INTRAVENOUS EVERY 6 HOURS
Status: DISCONTINUED | OUTPATIENT
Start: 2019-12-03 | End: 2019-12-04 | Stop reason: HOSPADM

## 2019-12-03 RX ORDER — CELECOXIB 200 MG/1
200 CAPSULE ORAL
Status: COMPLETED | OUTPATIENT
Start: 2019-12-03 | End: 2019-12-03

## 2019-12-03 RX ORDER — CETIRIZINE HYDROCHLORIDE 10 MG/1
10 TABLET ORAL DAILY
Status: DISCONTINUED | OUTPATIENT
Start: 2019-12-03 | End: 2019-12-04 | Stop reason: HOSPADM

## 2019-12-03 RX ORDER — ACETAMINOPHEN 500 MG
1000 TABLET ORAL EVERY 6 HOURS
Status: DISCONTINUED | OUTPATIENT
Start: 2019-12-03 | End: 2019-12-04 | Stop reason: HOSPADM

## 2019-12-03 RX ORDER — DEXAMETHASONE SODIUM PHOSPHATE 4 MG/ML
INJECTION, SOLUTION INTRA-ARTICULAR; INTRALESIONAL; INTRAMUSCULAR; INTRAVENOUS; SOFT TISSUE PRN
Status: DISCONTINUED | OUTPATIENT
Start: 2019-12-03 | End: 2019-12-03 | Stop reason: SURG

## 2019-12-03 RX ORDER — VANCOMYCIN HYDROCHLORIDE 1 G/20ML
INJECTION, POWDER, LYOPHILIZED, FOR SOLUTION INTRAVENOUS
Status: COMPLETED | OUTPATIENT
Start: 2019-12-03 | End: 2019-12-03

## 2019-12-03 RX ORDER — AMOXICILLIN 250 MG
1 CAPSULE ORAL
Status: DISCONTINUED | OUTPATIENT
Start: 2019-12-03 | End: 2019-12-04 | Stop reason: HOSPADM

## 2019-12-03 RX ORDER — ONDANSETRON 2 MG/ML
4 INJECTION INTRAMUSCULAR; INTRAVENOUS
Status: DISCONTINUED | OUTPATIENT
Start: 2019-12-03 | End: 2019-12-03 | Stop reason: HOSPADM

## 2019-12-03 RX ORDER — HALOPERIDOL 5 MG/ML
1 INJECTION INTRAMUSCULAR EVERY 6 HOURS PRN
Status: DISCONTINUED | OUTPATIENT
Start: 2019-12-03 | End: 2019-12-04 | Stop reason: HOSPADM

## 2019-12-03 RX ORDER — OXYCODONE HYDROCHLORIDE 10 MG/1
10 TABLET ORAL
Status: DISCONTINUED | OUTPATIENT
Start: 2019-12-03 | End: 2019-12-04 | Stop reason: HOSPADM

## 2019-12-03 RX ORDER — HYDROMORPHONE HYDROCHLORIDE 2 MG/ML
INJECTION, SOLUTION INTRAMUSCULAR; INTRAVENOUS; SUBCUTANEOUS PRN
Status: DISCONTINUED | OUTPATIENT
Start: 2019-12-03 | End: 2019-12-03 | Stop reason: SURG

## 2019-12-03 RX ORDER — BUPIVACAINE HYDROCHLORIDE 2.5 MG/ML
INJECTION, SOLUTION EPIDURAL; INFILTRATION; INTRACAUDAL PRN
Status: DISCONTINUED | OUTPATIENT
Start: 2019-12-03 | End: 2019-12-03 | Stop reason: SURG

## 2019-12-03 RX ORDER — CHLORPROMAZINE HYDROCHLORIDE 25 MG/1
25 TABLET, FILM COATED ORAL EVERY 6 HOURS PRN
Status: DISCONTINUED | OUTPATIENT
Start: 2019-12-03 | End: 2019-12-04 | Stop reason: HOSPADM

## 2019-12-03 RX ORDER — HYDROMORPHONE HYDROCHLORIDE 1 MG/ML
0.2 INJECTION, SOLUTION INTRAMUSCULAR; INTRAVENOUS; SUBCUTANEOUS
Status: DISCONTINUED | OUTPATIENT
Start: 2019-12-03 | End: 2019-12-03 | Stop reason: HOSPADM

## 2019-12-03 RX ORDER — AMOXICILLIN 250 MG
1 CAPSULE ORAL NIGHTLY
Status: DISCONTINUED | OUTPATIENT
Start: 2019-12-03 | End: 2019-12-04 | Stop reason: HOSPADM

## 2019-12-03 RX ORDER — SUCCINYLCHOLINE CHLORIDE 20 MG/ML
INJECTION INTRAMUSCULAR; INTRAVENOUS PRN
Status: DISCONTINUED | OUTPATIENT
Start: 2019-12-03 | End: 2019-12-03 | Stop reason: SURG

## 2019-12-03 RX ADMIN — ACETAMINOPHEN 1000 MG: 500 TABLET, FILM COATED ORAL at 07:25

## 2019-12-03 RX ADMIN — LIDOCAINE HYDROCHLORIDE 80 MG: 20 INJECTION, SOLUTION EPIDURAL; INFILTRATION; INTRACAUDAL; PERINEURAL at 09:07

## 2019-12-03 RX ADMIN — Medication 200 MCG: at 09:25

## 2019-12-03 RX ADMIN — HYDROMORPHONE HYDROCHLORIDE 0.5 MG: 2 INJECTION, SOLUTION INTRAMUSCULAR; INTRAVENOUS; SUBCUTANEOUS at 09:22

## 2019-12-03 RX ADMIN — DEXAMETHASONE SODIUM PHOSPHATE 2 MG: 4 INJECTION, SOLUTION INTRAMUSCULAR; INTRAVENOUS at 08:47

## 2019-12-03 RX ADMIN — SODIUM CHLORIDE, POTASSIUM CHLORIDE, SODIUM LACTATE AND CALCIUM CHLORIDE: 600; 310; 30; 20 INJECTION, SOLUTION INTRAVENOUS at 15:12

## 2019-12-03 RX ADMIN — TRANEXAMIC ACID 2000 MG: 100 INJECTION, SOLUTION INTRAVENOUS at 11:06

## 2019-12-03 RX ADMIN — MINERAL OIL, PETROLATUM 1 APPLICATION: 425; 568 OINTMENT OPHTHALMIC at 09:24

## 2019-12-03 RX ADMIN — CEFAZOLIN 2 G: 10 INJECTION, POWDER, FOR SOLUTION INTRAVENOUS; PARENTERAL at 17:08

## 2019-12-03 RX ADMIN — Medication 200 MCG: at 09:57

## 2019-12-03 RX ADMIN — SODIUM CHLORIDE, POTASSIUM CHLORIDE, SODIUM LACTATE AND CALCIUM CHLORIDE: 600; 310; 30; 20 INJECTION, SOLUTION INTRAVENOUS at 07:25

## 2019-12-03 RX ADMIN — CEFAZOLIN 3 G: 1 INJECTION, POWDER, FOR SOLUTION INTRAVENOUS at 09:03

## 2019-12-03 RX ADMIN — INSULIN HUMAN 2 UNITS: 100 INJECTION, SOLUTION PARENTERAL at 17:15

## 2019-12-03 RX ADMIN — METOPROLOL SUCCINATE 50 MG: 25 TABLET, EXTENDED RELEASE ORAL at 20:43

## 2019-12-03 RX ADMIN — BUPIVACAINE HYDROCHLORIDE 30 ML: 2.5 INJECTION, SOLUTION EPIDURAL; INFILTRATION; INTRACAUDAL; PERINEURAL at 08:47

## 2019-12-03 RX ADMIN — ONDANSETRON 4 MG: 2 INJECTION INTRAMUSCULAR; INTRAVENOUS at 10:06

## 2019-12-03 RX ADMIN — METFORMIN HYDROCHLORIDE 1000 MG: 500 TABLET ORAL at 17:07

## 2019-12-03 RX ADMIN — CELECOXIB 200 MG: 200 CAPSULE ORAL at 07:25

## 2019-12-03 RX ADMIN — INSULIN HUMAN 2 UNITS: 100 INJECTION, SOLUTION PARENTERAL at 20:47

## 2019-12-03 RX ADMIN — PROPOFOL 150 MG: 10 INJECTION, EMULSION INTRAVENOUS at 09:07

## 2019-12-03 RX ADMIN — KETAMINE HYDROCHLORIDE 100 MG: 50 INJECTION, SOLUTION, CONCENTRATE INTRAMUSCULAR; INTRAVENOUS at 09:07

## 2019-12-03 RX ADMIN — SODIUM CHLORIDE, POTASSIUM CHLORIDE, SODIUM LACTATE AND CALCIUM CHLORIDE: 600; 310; 30; 20 INJECTION, SOLUTION INTRAVENOUS at 09:52

## 2019-12-03 RX ADMIN — TRANEXAMIC ACID 1000 MG: 100 INJECTION, SOLUTION INTRAVENOUS at 09:07

## 2019-12-03 RX ADMIN — ASPIRIN 81 MG: 81 TABLET, COATED ORAL at 22:22

## 2019-12-03 RX ADMIN — ACETAMINOPHEN 1000 MG: 500 TABLET, FILM COATED ORAL at 15:12

## 2019-12-03 RX ADMIN — DEXAMETHASONE SODIUM PHOSPHATE 4 MG: 4 INJECTION, SOLUTION INTRAMUSCULAR; INTRAVENOUS at 09:14

## 2019-12-03 RX ADMIN — SENNOSIDES AND DOCUSATE SODIUM 1 TABLET: 8.6; 5 TABLET ORAL at 20:43

## 2019-12-03 RX ADMIN — Medication 200 MCG: at 10:04

## 2019-12-03 RX ADMIN — Medication 100 MCG: at 09:44

## 2019-12-03 RX ADMIN — ROSUVASTATIN CALCIUM 40 MG: 10 TABLET, FILM COATED ORAL at 17:07

## 2019-12-03 RX ADMIN — SUCCINYLCHOLINE CHLORIDE 160 MG: 20 INJECTION, SOLUTION INTRAMUSCULAR; INTRAVENOUS at 09:07

## 2019-12-03 RX ADMIN — KETOROLAC TROMETHAMINE 15 MG: 30 INJECTION, SOLUTION INTRAMUSCULAR at 17:08

## 2019-12-03 RX ADMIN — MAGNESIUM SULFATE HEPTAHYDRATE 2 G: 500 INJECTION, SOLUTION INTRAMUSCULAR; INTRAVENOUS at 09:15

## 2019-12-03 RX ADMIN — Medication 200 MCG: at 10:13

## 2019-12-03 RX ADMIN — GABAPENTIN 300 MG: 300 CAPSULE ORAL at 07:25

## 2019-12-03 RX ADMIN — DOCUSATE SODIUM 100 MG: 100 CAPSULE, LIQUID FILLED ORAL at 17:07

## 2019-12-03 RX ADMIN — HYDROMORPHONE HYDROCHLORIDE 1 MG: 2 INJECTION, SOLUTION INTRAMUSCULAR; INTRAVENOUS; SUBCUTANEOUS at 09:07

## 2019-12-03 RX ADMIN — Medication 200 MCG: at 09:54

## 2019-12-03 RX ADMIN — Medication 100 MCG: at 09:35

## 2019-12-03 RX ADMIN — Medication 100 MCG: at 09:29

## 2019-12-03 RX ADMIN — MIDAZOLAM HYDROCHLORIDE 2 MG: 1 INJECTION, SOLUTION INTRAMUSCULAR; INTRAVENOUS at 08:45

## 2019-12-03 ASSESSMENT — PATIENT HEALTH QUESTIONNAIRE - PHQ9
1. LITTLE INTEREST OR PLEASURE IN DOING THINGS: NOT AT ALL
SUM OF ALL RESPONSES TO PHQ9 QUESTIONS 1 AND 2: 0
2. FEELING DOWN, DEPRESSED, IRRITABLE, OR HOPELESS: NOT AT ALL

## 2019-12-03 ASSESSMENT — LIFESTYLE VARIABLES
TOTAL SCORE: 0
HAVE PEOPLE ANNOYED YOU BY CRITICIZING YOUR DRINKING: NO
ALCOHOL_USE: NO
EVER_SMOKED: NEVER
TOTAL SCORE: 0
EVER FELT BAD OR GUILTY ABOUT YOUR DRINKING: NO
ON A TYPICAL DAY WHEN YOU DRINK ALCOHOL HOW MANY DRINKS DO YOU HAVE: 0
EVER HAD A DRINK FIRST THING IN THE MORNING TO STEADY YOUR NERVES TO GET RID OF A HANGOVER: NO
AVERAGE NUMBER OF DAYS PER WEEK YOU HAVE A DRINK CONTAINING ALCOHOL: 0
CONSUMPTION TOTAL: NEGATIVE
HAVE YOU EVER FELT YOU SHOULD CUT DOWN ON YOUR DRINKING: NO
TOTAL SCORE: 0
HOW MANY TIMES IN THE PAST YEAR HAVE YOU HAD 5 OR MORE DRINKS IN A DAY: 0

## 2019-12-03 ASSESSMENT — COGNITIVE AND FUNCTIONAL STATUS - GENERAL
DRESSING REGULAR LOWER BODY CLOTHING: A LITTLE
SUGGESTED CMS G CODE MODIFIER MOBILITY: CK
HELP NEEDED FOR BATHING: A LITTLE
EATING MEALS: A LITTLE
PERSONAL GROOMING: A LITTLE
MOVING TO AND FROM BED TO CHAIR: A LITTLE
MOBILITY SCORE: 18
DAILY ACTIVITIY SCORE: 18
TURNING FROM BACK TO SIDE WHILE IN FLAT BAD: A LITTLE
WALKING IN HOSPITAL ROOM: A LITTLE
SUGGESTED CMS G CODE MODIFIER DAILY ACTIVITY: CK
STANDING UP FROM CHAIR USING ARMS: A LITTLE
CLIMB 3 TO 5 STEPS WITH RAILING: A LITTLE
DRESSING REGULAR UPPER BODY CLOTHING: A LITTLE
MOVING FROM LYING ON BACK TO SITTING ON SIDE OF FLAT BED: A LITTLE
TOILETING: A LITTLE

## 2019-12-03 ASSESSMENT — PAIN SCALES - GENERAL: PAIN_LEVEL: 2

## 2019-12-03 NOTE — OR NURSING
1026To PACU from OR via bed, respirations spontaneous and non-labored. Pt snoring, has sleep apnea. Will set up CPAP. Icepack applied over c/d/i left surgical dressings. +2 pedal pulse to LLE and cap refill < 3 seconds. Plan to keep pt in PACU for full hour per [facility] protocol. Pt rouses to verbal stimuli.   1040 Pt sleeping. VSS  1100 VSS Pt states pain is tolerable and denies nausea   1125 Pt threw up at this time. Pt states she doesn't feel nauseas and emesis was sudden. Do not plan to medicate. VSS   1140 VSS. Pt sleeping  1200 VSS. Pt sleeping. Awaiting room for pt on GSU   1230 No change. VSS. Pt sleeping   1305 Pt meets criteria to transfer to room at this time. VSS. Report to PATRICIA Rangel. Pt states pain is tolerable and denies nausea. VSS

## 2019-12-03 NOTE — FLOWSHEET NOTE
12/03/19 1545   Events/Summary/Plan   Events/Summary/Plan IS done. S/U HCPAP   Education   Education Yes - Pt. / Family has been Instructed in use of Respiratory Equipment   Chest Exam   Respiration 18   Pulse 67   Oximetry   #Pulse Oximetry (Single Determination) Yes   Oxygen   Home O2 Use Prior To Admission? No   Pulse Oximetry 97 %   O2 (LPM) 4   O2 Daily Delivery Respiratory  OxyMask

## 2019-12-03 NOTE — ANESTHESIA PREPROCEDURE EVALUATION
Morbid obesity, BMI 41. Creatinine stable. MI over 10 yrs ago, recent stress test this year was negative for ischemia (per pt, no records on file). Denies: CHF/smoking/CVA. No issues with prior GA. Hx of difficulty intubations.      Relevant Problems   CARDIAC   (+) Benign essential HTN         (+) CRI (chronic renal insufficiency), stage 3 (moderate) (HCC)      ENDO   (+) Type 2 diabetes mellitus without complication, without long-term current use of insulin (HCC)       Physical Exam    Airway   Mallampati: III  TM distance: <3 FB  Neck ROM: full    Comments: Very anterior airway, small mouth, borderline cleft palate   Cardiovascular - normal exam  Rhythm: regular  Rate: normal  (-) murmur     Dental - normal exam         Pulmonary - normal exam  Breath sounds clear to auscultation     Abdominal    Neurological - normal exam               Anesthesia Plan    ASA 3   ASA physical status 3 criteria: morbid obesity - BMI greater than or equal to 40 and CAD/stents (> 3 months)    Plan - general and peripheral nerve block     Peripheral nerve block will be post-op pain control  Airway plan will be ETT        Induction: intravenous    Postoperative Plan: Postoperative administration of opioids is intended.    Pertinent diagnostic labs and testing reviewed    Informed Consent:    Anesthetic plan and risks discussed with patient.    Use of blood products discussed with: patient whom consented to blood products.

## 2019-12-03 NOTE — OP REPORT
Preop Diagnosis: Advanced left knee arthritis  Postop Diagnosis:  Same   Procedure: Left total knee arthroplasty  Surgeon: Dr. Ryan Keen MD  Assistant: Finn Tse MD  Anesthesia: Dr. Monsivais.  General + Adductor canal  Estimated Blood Loss: 50cc  Drains: None  Complications: None    Implants: [employer] Journey 2 BCS, size 3 femur, size 3 tibia, with an 11 mm insert and 32 oval patella.     Indications: She is a pleasant 64-year-old female who has had severe progressive knee pain that failed conservative management.  Her radiographs demonstrated severe bone-on-bone change with varus deformity.  She had not done well with conservative measures. We discussed the treatment options and she was indicated for knee replacement. We discussed the risk of bleeding, transfusion, pain, neurovascular injury, stiffness, fracture, infection, wound complication, loosening of the parts over time, blood clots, and medical complication.  He elected to proceed.    Pertinent Findings and Releases: There were severe full-thickness cartilage changes particularly in the medial patellofemoral compartments.  Relatively standard medial release resulted in reasonable balance medial and lateral.  Rebounds well with a size 3 femur.  The patella tracked centrally.  At the end of the case her knee easily came to full extension and flexed up to calf thigh impingement with good stability.    She was identified in the preoperative holding area and informed consent and site marking were confirmed.  An adductor canal block had been performed by Dr. Monsivais.  She was then brought back to the OR where anesthesia was performed.  She was positioned supine with all bony prominences well padded with a padded tourniquet on the thigh.  The extremity was prepped and draped in the usual sterile fashion. I performed a pre-procedure timeout to confirm we had the correct patient, side, site, procedure, and presence of necessary personal and  equipment.  I confirmed that antibiotics had been administered including Ancef as well as TXA.  The tourniquet was then inflated.    A midline incision was made medial to the tibial tubercle centered over patella taken down to the deep capsule of the knee. A medial parapatellar arthrotomy was performed.  The fat pad was released. The patella was everted and the knee was flexed. The remnants of the anterior horn of the medial and lateral meniscus were removed as well as the ACL and PCL from the intercondylar notch. All distal protruding osteophytes were removed. I then drilled and accessed the intramedullary canal of the femur, lavaged it and placed the intramedullary cutting guide. The distal femur was cut in 5 degrees of valgus. The cut was verified and attention was then turned to the tibia. The tibia was subluxed forward. It was cut using external medullary alignment perpendicular to the mechanical axis. The knee was then brought into full extension and the extension gap was assessed. Sequential releases were performed in extension to form a rectangular space, confirmed using a spacer block. The collaterals were intact and overall limb alignment was appropriate.  The knee was then flexed to 90 degrees.  The flexion space was tensed at 90 degrees with a tensiometer to set external rotation, a flexion gap equal to the extension gap, and to measure the AP size.  The cutting block was then pinned in place.  I checked to ensure that rotation looked appropriate based on Presley's line and transepicondylar line.  An carlos wing was used to make sure I wouldn't notch anteriorly.  Anterior, posterior, chamfer, and box cuts for the femur were then made. The knee was then again tensed at 90 degrees and the meniscal remnants and posterior osteophytes were removed.  The posterior capsule was injected with a local anesthetic cocktail.  The tibia was then subluxed forward, sized, and punched in the appropriate amount of  external rotation and mechanical alignment was verified using a drop katharine. Trials were placed, the knee was reduced with a trial insert, it was taken through a range of motion, and found to be stable in the varus/valgus plane 0-30 degrees of flexion and the AP plane at 90 degrees of flexion. Attention was then turned to the patella. A symmetric resection was performed to recreate native patella height and appropriately sized. All extraneous osteophyte and synovium were removed.  With a trial in place, the patella tracked centrally using the no thumbs technique. All trial components were removed. The real components were opened on the back table. The bone ends were copiously lavaged and dried. Two packs of cement were mixed and the real components were cemented into place. The knee was reduced with a trial insert in place and the cement was allowed to cure.     Once the cement cured, the tourniquet was released and meticulous hemostasis was obtained. All extraneous cement was removed from around the knee taking particular care to remove extraneous cement from the posterior aspect of the knee. The real insert was placed. Stability and patellar tracking were excellent. The knee was then copiously irrigated. The arthrotomy was closed with number 2 running barbed suture, and the wound was closed in layers.  An occlusive silver dressing was applied and the patient was turned over to anesthesia in stable condition without any apparent intraoperative complications.     Plan:  WBAT, PT/OT evaluation, Aspirin 81mg BID for 4 weeks for DVT prophylaxis, Leave dressing in place until followup.

## 2019-12-03 NOTE — ANESTHESIA QCDR
2019 [facility] Clinical Data Registry (for Quality Improvement)     Postoperative nausea/vomiting risk protocol (Adult = 18 yrs and Pediatric 3-17 yrs)- (430 and 463)  General inhalation anesthetic (NOT TIVA) with PONV risk factors: Yes  Provision of anti-emetic therapy with at least 2 different classes of agents: Yes   Patient DID NOT receive anti-emetic therapy and reason is documented in Medical Record:  N/A    Multimodal Pain Management- (AQI59)  Patient undergoing Elective Surgery (i.e. Outpatient, or ASC, or Prescheduled Surgery prior to Hospital Admission): Yes  Use of Multimodal Pain Management, two or more drugs and/or interventions, NOT including systemic opioids: Yes   Exception: Documented allergy to multiple classes of analgesics:  N/A    PACU assessment of acute postoperative pain prior to Anesthesia Care End- Applies to Patients Age = 18- (ABG7)  Initial PACU pain score is which of the following: < 7/10  Patient unable to report pain score: N/A    Post-anesthetic transfer of care checklist/protocol to PACU/ICU- (426 and 427)  Upon conclusion of case, patient transferred to which of the following locations: PACU/Non-ICU  Use of transfer checklist/protocol: Yes  Exclusion: Service Performed in Patient Hospital Room (and thus did not require transfer): N/A    PACU Reintubation- (AQI31)  General anesthesia requiring endotracheal intubation (ETT) along with subsequent extubation in OR or PACU: Yes  Required reintubation in the PACU:    Extubation was a planned trial documented in the medical record prior to removal of the original airway device:      Unplanned admission to ICU related to anesthesia service up through end of PACU care- (MD51)  Unplanned admission to ICU (not initially anticipated at anesthesia start time): No

## 2019-12-03 NOTE — ANESTHESIA POSTPROCEDURE EVALUATION
Patient: Shahana Nj    Procedure Summary     Date:  12/03/19 Room / Location:   OR 06 / SURGERY [facility]    Anesthesia Start:  0903 Anesthesia Stop:  1029    Procedure:  ARTHROPLASTY, KNEE, TOTAL (Left Knee) Diagnosis:  (PRIMARY LOCALIZED OSTEOARTHRITIS OF LEFT KNEE)    Surgeon:  Ryan Keen M.D. Responsible Provider:  Duran Monsivais M.D.    Anesthesia Type:  general, peripheral nerve block ASA Status:  3          Final Anesthesia Type: general, peripheral nerve block  Last vitals  BP   Blood Pressure : 139/60    Temp   36.4 °C (97.5 °F)    Pulse   Pulse: 88   Resp   12    SpO2   92 %      Anesthesia Post Evaluation    Patient location during evaluation: PACU  Patient participation: complete - patient participated  Level of consciousness: awake and alert  Pain score: 2    Airway patency: patent  Anesthetic complications: no  Cardiovascular status: hemodynamically stable  Respiratory status: acceptable  Hydration status: euvolemic    PONV: none

## 2019-12-03 NOTE — PROGRESS NOTES
"Pt from PACU. Awake. Slight nausea, but states \"it's not bad right now.VSS, Spouse at bedside.   "

## 2019-12-03 NOTE — ANESTHESIA PROCEDURE NOTES
Peripheral Block  Date/Time: 12/3/2019 8:45 AM  Performed by: Duran Monsivais M.D.  Authorized by: Duran Monsivais M.D.     Patient Location:  Pre-op  Start Time:  12/3/2019 8:45 AM  End Time:  12/3/2019 8:48 AM  Reason for Block: at surgeon's request and post-op pain management    patient identified, IV checked, site marked, risks and benefits discussed, surgical consent, monitors and equipment checked, pre-op evaluation and timeout performed    Patient Position:  Supine  Prep: ChloraPrep    Monitoring:  Heart rate, continuous pulse ox and cardiac monitor  Block Region:  Lower Extremity  Lower Extremity - Block Type:  Selective FEMORAL nerve block at the Adductor Canal    Laterality:  Left  Procedures: ultrasound guided  Image captured, interpreted and electronically stored.  Local Infiltration:  Lidocaine  Strength:  1 %  Dose:  3 ml  Block Type:  Single-shot  Needle Length:  100mm  Needle Gauge:  21 G  Needle Localization:  Ultrasound guidance  Injection Assessment:  Negative aspiration for heme, no paresthesia on injection, incremental injection and local visualized surrounding nerve on ultrasound  Evidence of intravascular injection: No     US Guided Selective Femoral Nerve Block at Adductor Canal:   US probe placed at mid-thigh level on externally rotated leg and femur identified.  Probe directed medially until Sartorius Muscle (SM), Femoral Artery (FA) and Saphenous Nerve (SN) identified in Adductor Canal (AC).  Needle inserted anterolateral to probe in an in plane approach into a subsartorial perivascular perineural position.  After negative aspiration LA injected with ease and visualized spreading within the AC.

## 2019-12-03 NOTE — ANESTHESIA TIME REPORT
Anesthesia Start and Stop Event Times     Date Time Event    12/3/2019 0838 Ready for Procedure     0903 Anesthesia Start     1029 Anesthesia Stop        Responsible Staff  12/03/19    Name Role Begin End    Duran Monsivais M.D. Anesth 0903 1029        Preop Diagnosis (Free Text):  Pre-op Diagnosis     PRIMARY LOCALIZED OSTEOARTHRITIS OF LEFT KNEE        Preop Diagnosis (Codes):    Post op Diagnosis  Osteoarthritis of left knee      Premium Reason  Non-Premium    Comments:

## 2019-12-03 NOTE — CARE PLAN
Problem: Pain Management  Goal: Pain level will decrease to patient's comfort goal  Outcome: PROGRESSING AS EXPECTED     Problem: Fluid Volume:  Goal: Will maintain balanced intake and output  Outcome: PROGRESSING AS EXPECTED     Problem: Communication  Goal: The ability to communicate needs accurately and effectively will improve  Outcome: PROGRESSING AS EXPECTED

## 2019-12-03 NOTE — DISCHARGE PLANNING
Hospital Care Management Discharge Planning       Anticipated Discharge Disposition:   · TBD     Action:   ·  Met patient at bedside to discuss DME choice for a walker. Patient stating she already has a walker at home and does not need another one. Patient stating that she has a four wheeled walker that she prefers to use so she can sit down if she gets tired.      Barriers to Discharge:   · Medical clearance     Plan:   · Continue to provide support services and assistance with discharge planning as needed.

## 2019-12-03 NOTE — ANESTHESIA PROCEDURE NOTES
Airway  Date/Time: 12/3/2019 9:09 AM  Performed by: Duran Monsivais M.D.  Authorized by: Duran Monsivais M.D.     Location:  OR  Urgency:  Elective  Difficult Airway: Yes     Patient has small mouth, narrow/arching palate and very anterior airway with short TM distance. Poor view during one attempt with Elam 2 blade. Easy intubation with glidescope and 6.5 ETT. Easy mask ventilation both with and without oral airway.  Indications for Airway Management:  Anesthesia  Spontaneous Ventilation: absent    Sedation Level:  Deep  Preoxygenated: Yes    Patient Position:  Sniffing  Mask Difficulty Assessment:  1 - vent by mask  Final Airway Type:  Endotracheal airway  Final Endotracheal Airway:  ETT  Cuffed: Yes    Technique Used for Successful ETT Placement:  Video laryngoscopy  Insertion Site:  Oral  Blade Type:  Rui  Laryngoscope Blade/Videolaryngoscope Blade Size:  3  ETT Size (mm):  6.5  Measured from:  Lips  ETT to Lips (cm):  22  Placement Verified by: auscultation and capnometry    Cormack-Lehane Classification:  Grade I - full view of glottis  Number of Attempts at Approach:  1  Ventilation Between Attempts:  BVM  Number of Other Approaches Attempted:  1  Unsuccessful Approach(es) for ETT:  Direct laryngoscopy   See note above

## 2019-12-04 VITALS
WEIGHT: 256.39 LBS | SYSTOLIC BLOOD PRESSURE: 129 MMHG | DIASTOLIC BLOOD PRESSURE: 56 MMHG | BODY MASS INDEX: 41.21 KG/M2 | HEIGHT: 66 IN | HEART RATE: 70 BPM | OXYGEN SATURATION: 94 % | RESPIRATION RATE: 18 BRPM | TEMPERATURE: 98.9 F

## 2019-12-04 LAB
ANION GAP SERPL CALC-SCNC: 15 MMOL/L (ref 0–11.9)
BUN SERPL-MCNC: 39 MG/DL (ref 8–22)
CALCIUM SERPL-MCNC: 10.1 MG/DL (ref 8.4–10.2)
CHLORIDE SERPL-SCNC: 104 MMOL/L (ref 96–112)
CO2 SERPL-SCNC: 18 MMOL/L (ref 20–33)
CREAT SERPL-MCNC: 1.39 MG/DL (ref 0.5–1.4)
GLUCOSE BLD-MCNC: 121 MG/DL (ref 65–99)
GLUCOSE SERPL-MCNC: 120 MG/DL (ref 65–99)
HCT VFR BLD AUTO: 37.2 % (ref 37–47)
HGB BLD-MCNC: 11.4 G/DL (ref 12–16)
POTASSIUM SERPL-SCNC: 4.8 MMOL/L (ref 3.6–5.5)
SODIUM SERPL-SCNC: 137 MMOL/L (ref 135–145)

## 2019-12-04 PROCEDURE — 97165 OT EVAL LOW COMPLEX 30 MIN: CPT

## 2019-12-04 PROCEDURE — 700102 HCHG RX REV CODE 250 W/ 637 OVERRIDE(OP): Performed by: ORTHOPAEDIC SURGERY

## 2019-12-04 PROCEDURE — 85014 HEMATOCRIT: CPT

## 2019-12-04 PROCEDURE — 82962 GLUCOSE BLOOD TEST: CPT

## 2019-12-04 PROCEDURE — 36415 COLL VENOUS BLD VENIPUNCTURE: CPT

## 2019-12-04 PROCEDURE — 700112 HCHG RX REV CODE 229: Performed by: ORTHOPAEDIC SURGERY

## 2019-12-04 PROCEDURE — 700111 HCHG RX REV CODE 636 W/ 250 OVERRIDE (IP): Performed by: ORTHOPAEDIC SURGERY

## 2019-12-04 PROCEDURE — A9270 NON-COVERED ITEM OR SERVICE: HCPCS | Performed by: ORTHOPAEDIC SURGERY

## 2019-12-04 PROCEDURE — 700105 HCHG RX REV CODE 258: Performed by: ORTHOPAEDIC SURGERY

## 2019-12-04 PROCEDURE — 97535 SELF CARE MNGMENT TRAINING: CPT

## 2019-12-04 PROCEDURE — 96376 TX/PRO/DX INJ SAME DRUG ADON: CPT

## 2019-12-04 PROCEDURE — 85018 HEMOGLOBIN: CPT

## 2019-12-04 PROCEDURE — 97110 THERAPEUTIC EXERCISES: CPT

## 2019-12-04 PROCEDURE — 97161 PT EVAL LOW COMPLEX 20 MIN: CPT

## 2019-12-04 PROCEDURE — G0378 HOSPITAL OBSERVATION PER HR: HCPCS

## 2019-12-04 PROCEDURE — 80048 BASIC METABOLIC PNL TOTAL CA: CPT

## 2019-12-04 RX ADMIN — CEFAZOLIN 2 G: 10 INJECTION, POWDER, FOR SOLUTION INTRAVENOUS; PARENTERAL at 00:59

## 2019-12-04 RX ADMIN — OMEPRAZOLE 20 MG: 20 CAPSULE, DELAYED RELEASE ORAL at 05:47

## 2019-12-04 RX ADMIN — ACETAMINOPHEN 1000 MG: 500 TABLET, FILM COATED ORAL at 00:56

## 2019-12-04 RX ADMIN — HYDROCHLOROTHIAZIDE 12.5 MG: 12.5 CAPSULE ORAL at 05:46

## 2019-12-04 RX ADMIN — IRBESARTAN 300 MG: 150 TABLET ORAL at 05:45

## 2019-12-04 RX ADMIN — METFORMIN HYDROCHLORIDE 1000 MG: 500 TABLET ORAL at 07:13

## 2019-12-04 RX ADMIN — SPIRONOLACTONE 25 MG: 25 TABLET ORAL at 05:47

## 2019-12-04 RX ADMIN — METOPROLOL SUCCINATE 50 MG: 25 TABLET, EXTENDED RELEASE ORAL at 05:46

## 2019-12-04 RX ADMIN — DOCUSATE SODIUM 100 MG: 100 CAPSULE, LIQUID FILLED ORAL at 05:45

## 2019-12-04 RX ADMIN — KETOROLAC TROMETHAMINE 15 MG: 30 INJECTION, SOLUTION INTRAMUSCULAR at 00:57

## 2019-12-04 RX ADMIN — CETIRIZINE HYDROCHLORIDE 10 MG: 10 TABLET, FILM COATED ORAL at 05:47

## 2019-12-04 RX ADMIN — OXYCODONE HYDROCHLORIDE 5 MG: 5 TABLET ORAL at 07:14

## 2019-12-04 RX ADMIN — ACETAMINOPHEN 1000 MG: 500 TABLET, FILM COATED ORAL at 05:45

## 2019-12-04 RX ADMIN — KETOROLAC TROMETHAMINE 15 MG: 30 INJECTION, SOLUTION INTRAMUSCULAR at 05:49

## 2019-12-04 ASSESSMENT — COGNITIVE AND FUNCTIONAL STATUS - GENERAL
DAILY ACTIVITIY SCORE: 19
TOILETING: A LITTLE
DRESSING REGULAR UPPER BODY CLOTHING: A LITTLE
HELP NEEDED FOR BATHING: A LITTLE
SUGGESTED CMS G CODE MODIFIER DAILY ACTIVITY: CK
PERSONAL GROOMING: A LITTLE
DRESSING REGULAR LOWER BODY CLOTHING: A LITTLE

## 2019-12-04 ASSESSMENT — GAIT ASSESSMENTS
ASSISTIVE DEVICE: FRONT WHEEL WALKER
DEVIATION: DECREASED HEEL STRIKE;DECREASED TOE OFF
GAIT LEVEL OF ASSIST: STAND BY ASSIST
DISTANCE (FEET): 150

## 2019-12-04 ASSESSMENT — ACTIVITIES OF DAILY LIVING (ADL): TOILETING: INDEPENDENT

## 2019-12-04 NOTE — DISCHARGE SUMMARY
Patient was admitted for a total knee arthroplasty.  There were no complications during the surgery. Did well post-operatively.         Recent Labs     12/04/19  0423   HEMOGLOBIN 11.4*   HEMATOCRIT 37.2       There are no active hospital problems to display for this patient.      Uneventful hospital course.     Medication List      START taking these medications      Instructions   meloxicam 7.5 MG Tabs  Commonly known as:  MOBIC   Take 1 Tab by mouth every day for 30 days.  Dose:  7.5 mg     oxyCODONE immediate-release 5 MG Tabs  Commonly known as:  ROXICODONE   Take 1-2 Tabs by mouth every four hours as needed for up to 7 days.  Dose:  5-10 mg     tramadol 50 MG Tabs  Commonly known as:  ULTRAM   Take 1-2 Tabs by mouth every 6 hours as needed for up to 10 days.  Dose:   mg        CHANGE how you take these medications      Instructions   spironolactone 25 MG Tabs  What changed:  how much to take  Commonly known as:  ALDACTONE   Take 1 Tab by mouth every day.  Dose:  25 mg        CONTINUE taking these medications      Instructions   acetaminophen 500 MG Tabs  Commonly known as:  TYLENOL   Take 1,000 mg by mouth every 6 hours as needed.  Dose:  1,000 mg     aspirin 81 MG Chew chewable tablet  Commonly known as:  ASA   Take 1 Tab by mouth every day.  Dose:  81 mg     azelastine 137 MCG/SPRAY nasal spray  Commonly known as:  ASTELIN   Spray 2 Sprays in nose 2 times a day.  Dose:  2 Spray     cetirizine 10 MG Tabs  Commonly known as:  ZYRTEC   Take 10 mg by mouth every day.  Dose:  10 mg     docusate sodium 100 MG Caps  Commonly known as:  COLACE   Take 100 mg by mouth every day.  Dose:  100 mg     irbesartan-hydrochlorothiazide 300-12.5 MG per tablet  Commonly known as:  AVALIDE   Doctor's comments:  Please consider 90 day supplies to promote better adherence  TAKE 1 TABLET BY MOUTH ONCE DAILY     metformin 1000 MG tablet  Commonly known as:  GLUCOPHAGE   Take 1 Tab by mouth 2 times a day, with meals.  Dose:   1,000 mg     metoprolol SR 50 MG Tb24  Commonly known as:  TOPROL XL   Doctor's comments:  Please consider 90 day supplies to promote better adherence  TAKE 1 TABLET BY MOUTH ONCE DAILY     NON SPECIFIED   Cleared for knee replacement     omeprazole 20 MG delayed-release capsule  Commonly known as:  PRILOSEC   Take 20 mg by mouth every day.  Dose:  20 mg     rosuvastatin 40 MG tablet  Commonly known as:  CRESTOR   Doctor's comments:  Needs follow up appt for further refills  TAKE 1 TABLET BY MOUTH ONCE DAILY              Patient will be discharged home and follow up with Dr. Keen in 2 weeks, for which the patient already has scheduled. [facility] office phone number is [phone].  Patient to begin Physical Therapy as currently scheduled.  Encourage ROM of the knee.     Instructions:  -Leave the bandage in place until your followup appointment in 2 weeks.  -Call if there is drainage beneath the bandage  -Use ice and elevation frequently to help with pain and swelling control  -Put as much weight as comfortable on the operative leg.  Use a walker to assist with balance.  -Take your blood clot prevention medication for 4 weeks after surgery.

## 2019-12-04 NOTE — DIETARY
NUTRITION SERVICES: BMI - Pt with BMI >40 (=Body mass index is 41.38 kg/m².), morbid obesity. Weight loss counseling not appropriate in acute care setting. RECOMMEND - Referral to outpatient nutrition services for weight management after D/C.

## 2019-12-04 NOTE — THERAPY
"Physical Therapy Evaluation completed.   Bed Mobility:  Supine to Sit: Minimal Assist  Transfers: Sit to Stand: Stand by Assist  Gait: Level Of Assist: Stand by Assist with Front-Wheel Walker       Plan of Care: Patient with no further skilled PT needs in the acute care setting at this time  Discharge Recommendations: Equipment: Front-Wheel Walker. Post-acute therapy Discharge to home with outpatient PT for additional skilled therapy services.    Pt is a 65 yo female s/p L TKA POD#1. Pt is steady ambulating with FWW, able to ambulate safely. HEP reviewed with pt. Pt has supportive spouse at home, plans to f/u with out pt next week. No further acute PT needs, DC PT.    See \"Rehab Therapy-Acute\" Patient Summary Report for complete documentation.     "

## 2019-12-04 NOTE — PROGRESS NOTES
0655:  Received report from Charley/RN at bedside.  Assumed patient care.  Patient is A&Ox4, resting comfortably in be.  Patient on RA, CPAP with sleep.  No signs of SOB/respiratory distress.  Reports 3/10 pain, medicated per MAR.  Assessment completed, labs noted, VSS.  Surgical dressing to L knee in place CDI, +CMS, + pulse, able to wiggle toes and dorsi/plantar flex.  Polar ice & SCDs on.  Educated patient regarding the importance to call for assistance. Fall precautions in place. Bed locked & at lowest position. Call light and personal belongings within reach. Continue to monitor.     0908:  Patient up with PT, will need walker for home, spoke with SW    1038:  Discharge instructions discussed with patient and , all questions answered, IV removed, patient discharged in wheelchair with CNA and  to home.

## 2019-12-04 NOTE — DISCHARGE INSTRUCTIONS
Instructions:  -Leave the bandage in place until your followup appointment in 2 weeks.  -Call if there is drainage beneath the bandage  -Use ice and elevation frequently to help with pain and swelling control  -Put as much weight as comfortable on the operative leg.  Use a walker to assist with balance.  -Take your blood clot prevention medication for 4 weeks after surgery.      Discharge Instructions    Discharged to home by car with relative. Discharged via wheelchair, hospital escort: Yes.  Special equipment needed: Walker    Be sure to schedule a follow-up appointment with your primary care doctor or any specialists as instructed.     Discharge Plan:   Diet Plan: Discussed  Activity Level: Discussed  Confirmed Follow up Appointment: Appointment Scheduled  Confirmed Symptoms Management: Discussed  Medication Reconciliation Updated: Yes  Influenza Vaccine Indication: Patient Refuses    I understand that a diet low in cholesterol, fat, and sodium is recommended for good health. Unless I have been given specific instructions below for another diet, I accept this instruction as my diet prescription.   Other diet: Regular    Special Instructions: Discharge instructions for the Orthopedic Patient    Follow up with Primary Care Physician within 2 weeks of discharge to home, regarding:  Review of medications and diagnostic testing.  Surveillance for medical complications.  Workup and treatment of osteoporosis, if appropriate.     -Is this a Joint Replacement patient? Yes Total Joint Knee Replacement Discharge Instructions    Pain  - The goal is to slowly wean off the prescription pain medicine.  - Ice can be used for pain control.  20 minutes at a time is recommended, and never directly against your skin or incision.  - Most patients are off the pain pills by 3 weeks; others may require a low level of pain medications for many months.  If your pain continues to be severe, follow up with your physician.  Infection    Knee  joint infections; occur in fewer than 2% of patients. The most common causes of infection following total knee replacement surgery are from bacteria that enter the bloodstream during dental procedures, urinary tract infections, or skin infections. These bacteria can lodge around your knee replacement and cause an infection.  - Keep the incision as clean and dry as possible.  - Always wash your hands before touching your incision.  - Skin infections tend to develop around 7-10 days after surgery; most can be treated with oral antibiotics.  - Dental Care should be delayed for 3 months after surgery, your surgeon recommends taking a dose of antibiotics 1 hour prior to any dental procedure. After 2 years, most surgeons recommend antibiotics only before an extensive procedure.  Ask your surgeon what he recommends.  - Signs and symptoms of infection can include:  low grade fever, redness, pain, swelling and drainage from your incision.  Notify your surgeon immediately if you develop any of these symptoms.  Other instructions  - Bowel habits - constipation is extremely common and is caused by a combination of anesthesia, lack of mobility and pain medicine.  Use stool softeners or laxatives if necessary. It is important not to ignore this problem, as bowel obstructions can be a serious complication after joint replacement surgery.  - Mood/Energy Level - Many patients experience a lack of energy and endurance for up to 2-3 months after surgery.  Some may also feel down and can even become depressed.  This is likely due to the postoperative anemia, change in activity level, lack of sleep, pain medicine and just the emotional reaction to the surgery itself that is a big disruption in a person’s life.  This usually passes.  If symptoms persist, follow up with your primary physician.  - Returning to work - Your surgeon will give you more specific instructions. Depending on the type of activities you perform, it may be 6 to 8  weeks before you return to work.   Generally, if you work a sedentary job requiring little standing or walking, most patients may return within 2-6 weeks.  Manual labor jobs involving walking, lifting and standing may take longer. Your surgeon’s office can provide a release to part-time or light duty work early on in your recovery and progress you to full duty as able.    - Driving - If your left knee was replaced and you have an automatic transmission, you may be able to begin driving in a week or so, provided you are no longer taking narcotic pain medication. If your right knee was replaced, avoid driving for 6 to 8 weeks. Remember that your reflexes may not be as sharp as before your surgery. Ask your surgeon for specific instructions.   - Avoiding falls - A fall during the first few weeks after surgery can damage your new knee and may result in a need for further surgery.   throw rugs and tack down loose carpeting.  Be aware of floor hazards such as pets, small objects or uneven surfaces.    - [employer] Metal Detectors - The sensitivity of metal detectors varies and it is likely that your prosthesis will cause an alarm.  Inform the  of your artificial joint.  Diet  - Resume your normal diet as tolerated.  - It is important to achieve a healthy nutritional status by eating a well balanced diet on a regular basis.  - Your physician may recommend that you take iron and vitamin supplements.   - Continue to drink plenty of fluids.  Shower/Bathing  - You may shower as soon as you get home from the hospital unless otherwise instructed.  - Keep your incision out of water.  To keep the incision dry when showering, cover it with a plastic bag or plastic wrap.  - Pat incision dry if it gets wet.  Don’t rub.  - Do not submerge in a bath until staples are out and the incision is completely healed. (Approximately 6-8 weeks)  Dressing Change:  Procedure (if recommended by your physician)  - Wash  hands.  - Open all new dressing change materials.  - Remove old dressing and discard.  - Inspect incision for redness, increase in clear drainage, yellow/green drainage, odor and surrounding skin hot to touch.  -  ABD (large gauze) pad or “island dressing” by one corner and lay over the incision.  Be careful not to touch the inside of the dressing that will lay over the incision.  - Secure in place as instructed (Ace wrap or tape).    Swelling/Bruising    - Swelling can last from 3-6 months.  - Elevate your leg higher than your heart while reclining.   The first week you are home you should elevate your leg an equal amount of time, as you are active.    - Anti-inflammatory pills can be taken once you have stopped the blood thinners.  - The swelling is usually worse after you go home since you are upright for longer periods of time.  - Bruising is common and can involve the entire leg including the thigh, calf and even foot. Bruising often does not appear until after you arrive home and it can be quite dramatic- purple, black, and green.  The bruising you can see is not usually concerning and will subside without any treatment.      Blood Clot Prevention  Blood clots in the legs and the less common, but frightening, clots that travel to the lungs are a real focus of our preventative. Most patients are at standard risk for them, but those patients who are at higher risk include people who have had previous clots, a family history of clotting, smoking, diabetes, obesity, advanced age, use of estrogen and a sedentary lifestyle.    - Signs of blood clots in legs - Swelling in thigh, calf or ankle that does not go down with elevation.  Pain, heat and tenderness in calf, back of calf or groin area.  NOTE: blood clots can occur in either leg.  - You have been receiving anticoagulant therapy (blood thinners) in the hospital and you may be instructed to continue at home depending on your risk factors.  - Your risk for  developing a clot continues for up to 2-3 months after surgery.  You should avoid prolonged sitting and dehydration during that time (long air trips and car trips).  If you do take a trip during this time, please get up and move around every 1- 1.5 hours.  - If you are prescribed blood-thinning medication for home, follow instructions as directed. (Handouts provided if applicable).      Activity  Once home, you should continue to stay active. The key is to remember not to overdo it! While you can expect some good days and some bad days, you should notice a gradual improvement and a gradual increase in your endurance over the next 6 to 12 months. Exercise is a critical component of home care, particularly during the first few weeks after surgery.     - Normal activities of daily living You should be able to resume most within 3 to 6 weeks following surgery. Some pain with activity and at night is common for several weeks after surgery  -  Physical Therapy Exercises - Continue to do the exercises prescribed for at least two months after surgery. Riding a stationary bicycle can help maintain muscle tone and keep your knee flexible. Try to achieve the maximum degree of bending and extension possible. (handout provided by Therapist).  - Sexual Activity -. Your surgeon can tell you when it safe to resume sexual activity.    - Sleeping Positions - You can safely sleep on your back, on either side, or on your stomach.   - Other Activities - Walk as much as you like, but remember that walking is no substitute for the exercises your doctor and physical therapist will prescribe. Lower impact activities are preferred.  If you have specific questions, consult your Surgeon.    When to Call the Doctor   Call the physician if:   - Fever over 100.5? F  - Increased pain, drainage, redness, odor or heat around the incision area  - Shaking chills  - Increased knee pain with activity and rest  - Increased pain in calf, tenderness or  redness above or below the knee  - Increased swelling of calf, ankle, foot  - Sudden increased shortness of breath, sudden onset of chest pain, localized chest pain with coughing  - Incision opening  Or, if there are any questions or concerns about medications or care.       -Is this patient being discharged with medication to prevent blood clots?  Yes, Aspirin Aspirin, ASA oral tablets  What is this medicine?  ASPIRIN (AS pir in) is a pain reliever. It is used to treat mild pain and fever. This medicine is also used as directed by a doctor to prevent and to treat heart attacks, to prevent strokes, and to treat arthritis or inflammation.  This medicine may be used for other purposes; ask your health care provider or pharmacist if you have questions.  COMMON BRAND NAME(S): Aspir-Low, Aspir-Luiza, Aspirtab, Real Intent Advanced Aspirin, Yadira Aspirin, Yadira Aspirin Extra Strength, Yadira Aspirin Plus, Yadira Extra Strength, Yadira Extra Strength Plus, Real Intent Genuine Aspirin, Real Intent Womens Aspirin, Bufferin, Bufferin Extra Strength, Bufferin Low Dose  What should I tell my health care provider before I take this medicine?  They need to know if you have any of these conditions:  -anemia  -asthma  -bleeding problems  -child with chickenpox, the flu, or other viral infection  -diabetes  -gout  -if you frequently drink alcohol containing drinks  -kidney disease  -liver disease  -low level of vitamin K  -lupus  -smoke tobacco  -stomach ulcers or other problems  -an unusual or allergic reaction to aspirin, tartrazine dye, other medicines, dyes, or preservatives  -pregnant or trying to get pregnant  -breast-feeding  How should I use this medicine?  Take this medicine by mouth with a glass of water. Follow the directions on the package or prescription label. You can take this medicine with or without food. If it upsets your stomach, take it with food. Do not take your medicine more often than directed.  Talk to your pediatrician regarding  the use of this medicine in children. While this drug may be prescribed for children as young as 12 years of age for selected conditions, precautions do apply. Children and teenagers should not use this medicine to treat chicken pox or flu symptoms unless directed by a doctor.  Patients over 65 years old may have a stronger reaction and need a smaller dose.  Overdosage: If you think you have taken too much of this medicine contact a poison control center or emergency room at once.  NOTE: This medicine is only for you. Do not share this medicine with others.  What if I miss a dose?  If you are taking this medicine on a regular schedule and miss a dose, take it as soon as you can. If it is almost time for your next dose, take only that dose. Do not take double or extra doses.  What may interact with this medicine?  Do not take this medicine with any of the following medications:  -cidofovir  -ketorolac  -probenecid  This medicine may also interact with the following medications:  -alcohol  -alendronate  -bismuth subsalicylate  -flavocoxid  -herbal supplements like feverfew, garlic, amarilis, ginkgo biloba, horse chestnut  -medicines for diabetes or glaucoma like acetazolamide, methazolamide  -medicines for gout  -medicines that treat or prevent blood clots like enoxaparin, heparin, ticlopidine, warfarin  -other aspirin and aspirin-like medicines  -NSAIDs, medicines for pain and inflammation, like ibuprofen or naproxen  -pemetrexed  -sulfinpyrazone  -varicella live vaccine  This list may not describe all possible interactions. Give your health care provider a list of all the medicines, herbs, non-prescription drugs, or dietary supplements you use. Also tell them if you smoke, drink alcohol, or use illegal drugs. Some items may interact with your medicine.  What should I watch for while using this medicine?  If you are treating yourself for pain, tell your doctor or health care professional if the pain lasts more than 10  days, if it gets worse, or if there is a new or different kind of pain. Tell your doctor if you see redness or swelling. Also, check with your doctor if you have a fever that lasts for more than 3 days. Only take this medicine to prevent heart attacks or blood clotting if prescribed by your doctor or health care professional.  Do not take aspirin or aspirin-like medicines with this medicine. Too much aspirin can be dangerous. Always read the labels carefully.  This medicine can irritate your stomach or cause bleeding problems. Do not smoke cigarettes or drink alcohol while taking this medicine. Do not lie down for 30 minutes after taking this medicine to prevent irritation to your throat.  If you are scheduled for any medical or dental procedure, tell your healthcare provider that you are taking this medicine. You may need to stop taking this medicine before the procedure.  This medicine may be used to treat migraines. If you take migraine medicines for 10 or more days a month, your migraines may get worse. Keep a diary of headache days and medicine use. Contact your healthcare professional if your migraine attacks occur more frequently.  What side effects may I notice from receiving this medicine?  Side effects that you should report to your doctor or health care professional as soon as possible:  -allergic reactions like skin rash, itching or hives, swelling of the face, lips, or tongue  -breathing problems  -changes in hearing, ringing in the ears  -confusion  -general ill feeling or flu-like symptoms  -pain on swallowing  -redness, blistering, peeling or loosening of the skin, including inside the mouth or nose  -signs and symptoms of bleeding such as bloody or black, tarry stools; red or dark-brown urine; spitting up blood or brown material that looks like coffee grounds; red spots on the skin; unusual bruising or bleeding from the eye, gums, or nose  -trouble passing urine or change in the amount of  urine  -unusually weak or tired  -yellowing of the eyes or skin  Side effects that usually do not require medical attention (report to your doctor or health care professional if they continue or are bothersome):  -diarrhea or constipation  -headache  -nausea, vomiting  -stomach gas, heartburn  This list may not describe all possible side effects. Call your doctor for medical advice about side effects. You may report side effects to FDA at 8-255-HMH-5852.  Where should I keep my medicine?  Keep out of the reach of children.  Store at room temperature between 15 and 30 degrees C (59 and 86 degrees F). Protect from heat and moisture. Do not use this medicine if it has a strong vinegar smell. Throw away any unused medicine after the expiration date.  NOTE: This sheet is a summary. It may not cover all possible information. If you have questions about this medicine, talk to your doctor, pharmacist, or health care provider.  © 2018 [employer]/Gold Standard (2014-08-19 11:30:31)      · Is patient discharged on Warfarin / Coumadin?   No     Depression / Suicide Risk    As you are discharged from this [facility] facility, it is important to learn how to keep safe from harming yourself.    Recognize the warning signs:  · Abrupt changes in personality, positive or negative- including increase in energy   · Giving away possessions  · Change in eating patterns- significant weight changes-  positive or negative  · Change in sleeping patterns- unable to sleep or sleeping all the time   · Unwillingness or inability to communicate  · Depression  · Unusual sadness, discouragement and loneliness  · Talk of wanting to die  · Neglect of personal appearance   · Rebelliousness- reckless behavior  · Withdrawal from people/activities they love  · Confusion- inability to concentrate     If you or a loved one observes any of these behaviors or has concerns about self-harm, here's what you can do:  · Talk about it- your feelings and reasons  for harming yourself  · Remove any means that you might use to hurt yourself (examples: pills, rope, extension cords, firearm)  · Get professional help from the community (Mental Health, Substance Abuse, psychological counseling)  · Do not be alone:Call your Safe Contact- someone whom you trust who will be there for you.  · Call your local CRISIS HOTLINE 167-2563 or 416-787-3784  · Call your local Children's Mobile Crisis Response Team [place] (673) 963-9500 or www.[employer] [place]  · Call the toll free National Suicide Prevention Hotlines   · National Suicide Prevention Lifeline 561-727-CPIN (6038)  · National Hope Line Network 800-SUICIDE (366-9314)

## 2019-12-04 NOTE — CARE PLAN
Problem: Pain Management  Goal: Pain level will decrease to patient's comfort goal  Outcome: PROGRESSING AS EXPECTED  Intervention: Follow pain managment plan developed in collaboration with patient and Interdisciplinary Team  Note:   Patient did not require any pain medications on the unit until this morning after surgery.  Given medication per MAR.  Will continue to monitor pain.     Problem: Safety  Goal: Will remain free from falls  Outcome: PROGRESSING AS EXPECTED  Note:   Patient educated and understands the fall precautions in place to prevent falls.  Bed alarm is on, IV pole closest to bathroom, treaded slipper socks on, and bedrails closest to bathroom down.  Patient also educated and understands the use of the call button for any assistance with mobility.

## 2019-12-04 NOTE — DISCHARGE PLANNING
Received Choice form at [phone]  Agency/Facility Name: [facility]  Referral sent per Choice form @ [phone]

## 2019-12-04 NOTE — THERAPY
"Occupational Therapy Evaluation completed.   Functional Status:  65 yo female admit for L TKA.  Pt lives with spouse currently in a hotel room while waiting for their home to be completed.  For the next couple days they will be in a different hotel room that has a walk in shower stall and grab bars.  Pt ablt to dress LB with Judi, UB setup.  Judi sit to stand (harder as pain increased) and Supervised walking to BR with FWW,  Toileting supervised, toilet transfers Judi.  Pt appears to be tolerating simple ADL's- limited by pain.  Pt will have assist from spouse at home.  No further OT needs.  Educated pt and spouse on role of OT and Total Knee Replacement Protocol.  Reviewed application of precautions and use of Adaptive Equipment for dressing, bathing, toileting, grooming, kitchen activities and general functional mobility in the home. Reviewed the use of reacher, well as shower chair and raised toilet seat to assist with ADL's.  Reviewed tub/ shower transfers. Provided pt with handout and suggestions on where to obtain needed items. Educated on covering incision/dressing with extra plastic wrap and waterproof tape to ensure that incision does not get wet.  Educated on around the clock pain management strategies so that pt does not wake up in a pain crisis.  Pt verbalized understanding of all education provided.    Plan of Care: Patient with no further skilled OT needs in the acute care setting at this time  Discharge Recommendations:  Equipment: No Equipment Needed. Post-acute therapy Discharge to home with outpatient or home health for additional skilled therapy services.    See \"Rehab Therapy-Acute\" Patient Summary Report for complete documentation.    "

## 2019-12-04 NOTE — PROGRESS NOTES
" Subjective:      Patient reports doing well. Patient denies chest pain, calf pain, shortness of breath.  Pain is currently under control. Patient is ambulating well with the use of an assistive device.    Objective:    /63   Pulse 71   Temp 36.6 °C (97.9 °F) (Oral)   Resp 18   Ht 1.676 m (5' 6\")   Wt 116.3 kg (256 lb 6.3 oz)   SpO2 94%     Recent Labs     12/04/19  0423   HEMOGLOBIN 11.4*   HEMATOCRIT 37.2             Intake/Output Summary (Last 24 hours) at 12/4/2019 0555  Last data filed at 12/3/2019 2232  Gross per 24 hour   Intake 2390 ml   Output 525 ml   Net 1865 ml       Comfortable, no distress  Neurologically and vascularly intact with palpable pedal pulses bilaterally.  Dressing C/D/I      Assessment:    Doing well s/p total knee arthroplasty.    Plan:      Diet as tolerated  Mobilize today - WBAT  OT/PT  DVT ppx - ASA BID + Sequential Compression Devices  Plan to discharge home  Follow-up with Dr. Keen' office approximately 2 weeks post-op.    "

## 2019-12-04 NOTE — CARE PLAN
Problem: Pain Management  Goal: Pain level will decrease to patient's comfort goal  Outcome: PROGRESSING AS EXPECTED     Problem: Communication  Goal: The ability to communicate needs accurately and effectively will improve  Outcome: PROGRESSING AS EXPECTED     Problem: Safety  Goal: Will remain free from falls  Outcome: PROGRESSING AS EXPECTED     Problem: Knowledge Deficit  Goal: Knowledge of the prescribed therapeutic regimen will improve  Outcome: PROGRESSING AS EXPECTED

## 2019-12-04 NOTE — FLOWSHEET NOTE
12/03/19 2050   Events/Summary/Plan   Events/Summary/Plan CPA set up and at bedside   General Vent Information   Pulse Oximetry 96 %   Pulse 86   CPAP/BiPAP [facility] [employer]   Nocturnal CPAP or BiPAP CPAP   #System Evaluation Yes   Home Unit Used? Yes   Equipment Inspected for Cleanliness, Operation, and Safety? Yes   Settings (If Known) 12   Home Mask Used? Yes

## 2019-12-04 NOTE — FLOWSHEET NOTE
12/04/19 0311   Events/Summary/Plan   Events/Summary/Plan cpap on   General Vent Information   Pulse Oximetry 94 %   Pulse 71   CPAP/BiPAP [facility] [employer]   Nocturnal CPAP or BiPAP CPAP   Home Unit Used? Yes   Settings (If Known) 12   Home Mask Used? Yes

## 2020-03-09 ENCOUNTER — OFFICE VISIT (OUTPATIENT)
Dept: MEDICAL GROUP | Facility: PHYSICIAN GROUP | Age: 65
End: 2020-03-09
Payer: MEDICARE

## 2020-03-09 VITALS
SYSTOLIC BLOOD PRESSURE: 132 MMHG | WEIGHT: 246.2 LBS | RESPIRATION RATE: 16 BRPM | TEMPERATURE: 97.4 F | HEART RATE: 90 BPM | HEIGHT: 66 IN | DIASTOLIC BLOOD PRESSURE: 78 MMHG | OXYGEN SATURATION: 92 % | BODY MASS INDEX: 39.57 KG/M2

## 2020-03-09 DIAGNOSIS — N18.30 CRI (CHRONIC RENAL INSUFFICIENCY), STAGE 3 (MODERATE): ICD-10-CM

## 2020-03-09 DIAGNOSIS — E11.9 TYPE 2 DIABETES MELLITUS TREATED WITHOUT INSULIN (HCC): ICD-10-CM

## 2020-03-09 DIAGNOSIS — I10 ESSENTIAL HYPERTENSION: ICD-10-CM

## 2020-03-09 DIAGNOSIS — Z12.31 ENCOUNTER FOR SCREENING MAMMOGRAM FOR BREAST CANCER: ICD-10-CM

## 2020-03-09 PROCEDURE — 99214 OFFICE O/P EST MOD 30 MIN: CPT | Performed by: FAMILY MEDICINE

## 2020-03-09 RX ORDER — IRBESARTAN AND HYDROCHLOROTHIAZIDE 300; 12.5 MG/1; MG/1
TABLET, FILM COATED ORAL
Qty: 90 TAB | Refills: 3 | Status: SHIPPED | OUTPATIENT
Start: 2020-03-09 | End: 2021-03-01

## 2020-03-09 RX ORDER — ROSUVASTATIN CALCIUM 40 MG/1
TABLET, COATED ORAL
Qty: 90 TAB | Refills: 1 | Status: SHIPPED | OUTPATIENT
Start: 2020-03-09 | End: 2020-09-03

## 2020-03-09 RX ORDER — METOPROLOL SUCCINATE 50 MG/1
TABLET, EXTENDED RELEASE ORAL
Qty: 90 TAB | Refills: 1 | Status: SHIPPED | OUTPATIENT
Start: 2020-03-09 | End: 2020-12-02

## 2020-03-09 RX ORDER — SPIRONOLACTONE 25 MG/1
12.5 TABLET ORAL DAILY
Qty: 45 TAB | Refills: 3 | Status: SHIPPED | OUTPATIENT
Start: 2020-03-09 | End: 2021-03-01

## 2020-03-09 ASSESSMENT — ENCOUNTER SYMPTOMS
COUGH: 0
CHILLS: 0
DEPRESSION: 0
NAUSEA: 0
DIZZINESS: 0
RESPIRATORY NEGATIVE: 1
TINGLING: 0
DOUBLE VISION: 0
HEADACHES: 0
EYES NEGATIVE: 1
HEARTBURN: 0
NEUROLOGICAL NEGATIVE: 1
BLURRED VISION: 0
CARDIOVASCULAR NEGATIVE: 1
PSYCHIATRIC NEGATIVE: 1
MYALGIAS: 0
CONSTITUTIONAL NEGATIVE: 1
MUSCULOSKELETAL NEGATIVE: 1
GASTROINTESTINAL NEGATIVE: 1
FEVER: 0
HEMOPTYSIS: 0
PALPITATIONS: 0
BRUISES/BLEEDS EASILY: 0

## 2020-03-09 ASSESSMENT — FIBROSIS 4 INDEX: FIB4 SCORE: 1.42

## 2020-03-09 ASSESSMENT — PATIENT HEALTH QUESTIONNAIRE - PHQ9: CLINICAL INTERPRETATION OF PHQ2 SCORE: 0

## 2020-03-09 NOTE — PROGRESS NOTES
"Subjective:      Shahana Nj is a 64 y.o. female who presents with Follow-Up (med refills)            1. Type 2 diabetes mellitus treated without insulin (HCC)  Currently treated for DM, taking meds and checking bs at home, trying to do DM diet.controlled    - rosuvastatin (CRESTOR) 40 MG tablet; TAKE 1 TABLET BY MOUTH ONCE DAILY  Dispense: 90 Tab; Refill: 1  - metformin (GLUCOPHAGE) 1000 MG tablet; Take 1 Tab by mouth 2 times a day, with meals.  Dispense: 180 Tab; Refill: 1  - Comp Metabolic Panel; Future  - HEMOGLOBIN A1C; Future  - Lipid Profile; Future  - MICROALBUMIN CREAT RATIO URINE (LAB COLLECT); Future    2. CRI (chronic renal insufficiency), stage 3 (moderate) (HCC)  Patient is currently being followed for chronic renal insufficiency. They are avoiding nsaids and maintaining hydration and following renal diet. Taking all appropriate meds. No new change in urine frequency or volume.      3. Essential hypertension  Currently treated for HTN, taking meds with no CP or sob, monitors bp at home periodically. controlled    - spironolactone (ALDACTONE) 25 MG Tab; Take 0.5 Tabs by mouth every day.  Dispense: 45 Tab; Refill: 3  - irbesartan-hydrochlorothiazide (AVALIDE) 300-12.5 MG per tablet; TAKE 1 TABLET BY MOUTH ONCE DAILY  Dispense: 90 Tab; Refill: 3  - metoprolol SR (TOPROL XL) 50 MG TABLET SR 24 HR; TAKE 1 TABLET BY MOUTH ONCE DAILY  Dispense: 90 Tab; Refill: 1    4. Encounter for screening mammogram for breast cancer    - MA-SCREENING MAMMO BILAT W/CAD; Future    Past Medical History:  No date: Arthritis      Comment:  Osteo knees  No date: Dyslipidemia  No date: Essential hypertension  No date: GERD (gastroesophageal reflux disease)  No date: High cholesterol  11/19/2019: Left knee pain  No date: Myocardial infarct (HCC)      Comment:  Hx of 2 and states \"very Mild\". Last was approx 2014. No               longer follows with cardiologist.  No date: AALIYAH (obstructive sleep apnea)      Comment:  " CPAP  No date: Snoring  No date: Type 2 diabetes mellitus (HCC)      Comment:  11/19/19-Avg AM glucose=100.  Past Surgical History:  12/3/2019: PB TOTAL KNEE ARTHROPLASTY; Left      Comment:  Procedure: ARTHROPLASTY, KNEE, TOTAL;  Surgeon: Ryan Keen M.D.;  Location: SURGERY [facility];                 Service: Orthopedics  09/2019: COLONOSCOPY  2004: ABDOMINAL HYSTERECTOMY TOTAL  1974: KNEE ARTHROSCOPY; Right  1970: RHINOPLASTY  early 2000's: BUNIONECTOMY; Left  1960's: DENTAL EXTRACTION(S)      Comment:  wisdom teeth  Infant to 2001: MYRINGOTOMY; Bilateral      Comment:  13 ear surgeries  1980's: OTHER SURGICAL PROCEDURE; Right      Comment:  fluid drained off of bone air pockets behind ear bone  as child: TONSILLECTOMY AND ADENOIDECTOMY  Social History    Tobacco Use      Smoking status: Never Smoker      Smokeless tobacco: Never Used    Alcohol use: Yes      Frequency: Monthly or less      Drinks per session: 1 or 2      Binge frequency: Never      Comment: 6 times per year    Drug use: No      Comment: tried marijuana at age 17    Review of patient's family history indicates:  Problem: Heart Disease      Relation: Mother          Age of Onset: 45          Comment: cabg  Problem: Heart Disease      Relation: Father          Age of Onset: (Not Specified)          Comment: cabg and valve replacement  Problem: Diabetes      Relation: Sister          Age of Onset: (Not Specified)  Problem: Diabetes      Relation: Other          Age of Onset: (Not Specified)      Current Outpatient Medications: •  rosuvastatin (CRESTOR) 40 MG tablet, TAKE 1 TABLET BY MOUTH ONCE DAILY, Disp: 90 Tab, Rfl: 1•  spironolactone (ALDACTONE) 25 MG Tab, Take 0.5 Tabs by mouth every day., Disp: 45 Tab, Rfl: 3•  irbesartan-hydrochlorothiazide (AVALIDE) 300-12.5 MG per tablet, TAKE 1 TABLET BY MOUTH ONCE DAILY, Disp: 90 Tab, Rfl: 3•  metformin (GLUCOPHAGE) 1000 MG tablet, Take 1 Tab by mouth 2 times a day, with meals.,  Disp: 180 Tab, Rfl: 1•  metoprolol SR (TOPROL XL) 50 MG TABLET SR 24 HR, TAKE 1 TABLET BY MOUTH ONCE DAILY, Disp: 90 Tab, Rfl: 1•  omeprazole (PRILOSEC) 20 MG delayed-release capsule, Take 20 mg by mouth every day., Disp: , Rfl: •  azelastine (ASTELIN) 137 MCG/SPRAY nasal spray, Spray 2 Sprays in nose 2 times a day., Disp: , Rfl: •  docusate sodium (COLACE) 100 MG Cap, Take 100 mg by mouth every day., Disp: , Rfl: •  cetirizine (ZYRTEC) 10 MG Tab, Take 10 mg by mouth every day., Disp: , Rfl: •  acetaminophen (TYLENOL) 500 MG Tab, Take 1,000 mg by mouth every 6 hours as needed., Disp: , Rfl: •  aspirin (ASA) 81 MG Chew Tab chewable tablet, Take 1 Tab by mouth every day., Disp: 100 Tab, Rfl: 11•  NON SPECIFIED, Cleared for knee replacement, Disp: 1 Each, Rfl: 0    Patient was instructed on the use of medications, either prescriptions or OTC and informed on when the appropriate follow up time period should be. In addition, patient was also instructed that should any acute worsening occur that they should notify this clinic asap or call 911.          Review of Systems   Constitutional: Negative.  Negative for chills and fever.   HENT: Negative.  Negative for hearing loss.    Eyes: Negative.  Negative for blurred vision and double vision.   Respiratory: Negative.  Negative for cough and hemoptysis.    Cardiovascular: Negative.  Negative for chest pain and palpitations.   Gastrointestinal: Negative.  Negative for heartburn and nausea.   Genitourinary: Negative.  Negative for dysuria.   Musculoskeletal: Negative.  Negative for myalgias.   Skin: Negative.  Negative for rash.   Neurological: Negative.  Negative for dizziness, tingling and headaches.   Endo/Heme/Allergies: Negative.  Does not bruise/bleed easily.   Psychiatric/Behavioral: Negative.  Negative for depression and suicidal ideas.   All other systems reviewed and are negative.         Objective:     /78 (BP Location: Left arm, Patient Position: Sitting)    "Pulse 90   Temp 36.3 °C (97.4 °F) (Tympanic)   Resp 16   Ht 1.676 m (5' 6\")   Wt 111.7 kg (246 lb 3.2 oz)   SpO2 92%   BMI 39.74 kg/m²      Physical Exam  Vitals signs and nursing note reviewed.   Constitutional:       General: She is not in acute distress.     Appearance: She is well-developed. She is not diaphoretic.   HENT:      Head: Normocephalic and atraumatic.      Mouth/Throat:      Pharynx: No oropharyngeal exudate.   Eyes:      Pupils: Pupils are equal, round, and reactive to light.   Cardiovascular:      Rate and Rhythm: Normal rate and regular rhythm.      Heart sounds: Normal heart sounds. No murmur. No friction rub. No gallop.    Pulmonary:      Effort: Pulmonary effort is normal. No respiratory distress.      Breath sounds: Normal breath sounds. No wheezing or rales.   Chest:      Chest wall: No tenderness.   Neurological:      Mental Status: She is alert and oriented to person, place, and time.   Psychiatric:         Behavior: Behavior normal.         Thought Content: Thought content normal.         Judgment: Judgment normal.                 Assessment/Plan:       1. Type 2 diabetes mellitus treated without insulin ([facility])    - rosuvastatin (CRESTOR) 40 MG tablet; TAKE 1 TABLET BY MOUTH ONCE DAILY  Dispense: 90 Tab; Refill: 1  - metformin (GLUCOPHAGE) 1000 MG tablet; Take 1 Tab by mouth 2 times a day, with meals.  Dispense: 180 Tab; Refill: 1  - Comp Metabolic Panel; Future  - HEMOGLOBIN A1C; Future  - Lipid Profile; Future  - MICROALBUMIN CREAT RATIO URINE (LAB COLLECT); Future    2. CRI (chronic renal insufficiency), stage 3 (moderate) ([facility])      3. Essential hypertension    - spironolactone (ALDACTONE) 25 MG Tab; Take 0.5 Tabs by mouth every day.  Dispense: 45 Tab; Refill: 3  - irbesartan-hydrochlorothiazide (AVALIDE) 300-12.5 MG per tablet; TAKE 1 TABLET BY MOUTH ONCE DAILY  Dispense: 90 Tab; Refill: 3  - metoprolol SR (TOPROL XL) 50 MG TABLET SR 24 HR; TAKE 1 TABLET BY MOUTH ONCE DAILY  " Dispense: 90 Tab; Refill: 1    4. Encounter for screening mammogram for breast cancer    - MA-SCREENING MAMMO BILAT W/CAD; Future

## 2020-06-02 ENCOUNTER — HOSPITAL ENCOUNTER (OUTPATIENT)
Dept: LAB | Facility: MEDICAL CENTER | Age: 65
End: 2020-06-02
Attending: FAMILY MEDICINE
Payer: OTHER MISCELLANEOUS

## 2020-06-02 DIAGNOSIS — E11.9 TYPE 2 DIABETES MELLITUS TREATED WITHOUT INSULIN (HCC): ICD-10-CM

## 2020-06-02 LAB
ALBUMIN SERPL BCP-MCNC: 4.7 G/DL (ref 3.2–4.9)
ALBUMIN/GLOB SERPL: 1.9 G/DL
ALP SERPL-CCNC: 83 U/L (ref 30–99)
ALT SERPL-CCNC: 29 U/L (ref 2–50)
ANION GAP SERPL CALC-SCNC: 13 MMOL/L (ref 7–16)
AST SERPL-CCNC: 24 U/L (ref 12–45)
BILIRUB SERPL-MCNC: 0.5 MG/DL (ref 0.1–1.5)
BUN SERPL-MCNC: 33 MG/DL (ref 8–22)
CALCIUM SERPL-MCNC: 10.9 MG/DL (ref 8.5–10.5)
CHLORIDE SERPL-SCNC: 98 MMOL/L (ref 96–112)
CHOLEST SERPL-MCNC: 114 MG/DL (ref 100–199)
CO2 SERPL-SCNC: 25 MMOL/L (ref 20–33)
CREAT SERPL-MCNC: 1.02 MG/DL (ref 0.5–1.4)
FASTING STATUS PATIENT QL REPORTED: NORMAL
GLOBULIN SER CALC-MCNC: 2.5 G/DL (ref 1.9–3.5)
GLUCOSE SERPL-MCNC: 110 MG/DL (ref 65–99)
HDLC SERPL-MCNC: 39 MG/DL
LDLC SERPL CALC-MCNC: 55 MG/DL
POTASSIUM SERPL-SCNC: 5 MMOL/L (ref 3.6–5.5)
PROT SERPL-MCNC: 7.2 G/DL (ref 6–8.2)
SODIUM SERPL-SCNC: 136 MMOL/L (ref 135–145)
TRIGL SERPL-MCNC: 102 MG/DL (ref 0–149)

## 2020-06-02 PROCEDURE — 80053 COMPREHEN METABOLIC PANEL: CPT

## 2020-06-02 PROCEDURE — 82570 ASSAY OF URINE CREATININE: CPT

## 2020-06-02 PROCEDURE — 36415 COLL VENOUS BLD VENIPUNCTURE: CPT

## 2020-06-02 PROCEDURE — 83036 HEMOGLOBIN GLYCOSYLATED A1C: CPT | Mod: GA

## 2020-06-02 PROCEDURE — 80061 LIPID PANEL: CPT

## 2020-06-02 PROCEDURE — 82043 UR ALBUMIN QUANTITATIVE: CPT

## 2020-06-03 LAB
CREAT UR-MCNC: 94.17 MG/DL
EST. AVERAGE GLUCOSE BLD GHB EST-MCNC: 131 MG/DL
HBA1C MFR BLD: 6.2 % (ref 0–5.6)
MICROALBUMIN UR-MCNC: 1.8 MG/DL
MICROALBUMIN/CREAT UR: 19 MG/G (ref 0–30)

## 2020-06-08 ENCOUNTER — OFFICE VISIT (OUTPATIENT)
Dept: MEDICAL GROUP | Facility: PHYSICIAN GROUP | Age: 65
End: 2020-06-08

## 2020-06-08 VITALS
DIASTOLIC BLOOD PRESSURE: 80 MMHG | BODY MASS INDEX: 39.98 KG/M2 | WEIGHT: 248.8 LBS | HEIGHT: 66 IN | SYSTOLIC BLOOD PRESSURE: 132 MMHG | TEMPERATURE: 98.7 F | HEART RATE: 100 BPM | OXYGEN SATURATION: 94 % | RESPIRATION RATE: 16 BRPM

## 2020-06-08 DIAGNOSIS — Z12.31 ENCOUNTER FOR SCREENING MAMMOGRAM FOR BREAST CANCER: ICD-10-CM

## 2020-06-08 DIAGNOSIS — I10 BENIGN ESSENTIAL HTN: ICD-10-CM

## 2020-06-08 DIAGNOSIS — E11.9 TYPE 2 DIABETES MELLITUS TREATED WITHOUT INSULIN (HCC): ICD-10-CM

## 2020-06-08 DIAGNOSIS — N18.1 CRI (CHRONIC RENAL INSUFFICIENCY), STAGE 1: ICD-10-CM

## 2020-06-08 PROBLEM — N18.30 CRI (CHRONIC RENAL INSUFFICIENCY), STAGE 3 (MODERATE): Status: RESOLVED | Noted: 2018-12-13 | Resolved: 2020-06-08

## 2020-06-08 PROCEDURE — 99214 OFFICE O/P EST MOD 30 MIN: CPT | Performed by: FAMILY MEDICINE

## 2020-06-08 RX ORDER — LORATADINE PSEUDOEPHEDRINE SULFATE 10; 240 MG/1; MG/1
1 TABLET, EXTENDED RELEASE ORAL DAILY
COMMUNITY
End: 2023-12-21

## 2020-06-08 ASSESSMENT — ENCOUNTER SYMPTOMS
DOUBLE VISION: 0
BLURRED VISION: 0
CARDIOVASCULAR NEGATIVE: 1
RESPIRATORY NEGATIVE: 1
CONSTITUTIONAL NEGATIVE: 1
DIZZINESS: 0
GASTROINTESTINAL NEGATIVE: 1
FEVER: 0
NEUROLOGICAL NEGATIVE: 1
HEADACHES: 0
COUGH: 0
EYES NEGATIVE: 1
MYALGIAS: 0
TINGLING: 0
DEPRESSION: 0
PALPITATIONS: 0
HEARTBURN: 0
NAUSEA: 0
HEMOPTYSIS: 0
PSYCHIATRIC NEGATIVE: 1
CHILLS: 0
MUSCULOSKELETAL NEGATIVE: 1
BRUISES/BLEEDS EASILY: 0

## 2020-06-08 ASSESSMENT — FIBROSIS 4 INDEX: FIB4 SCORE: 1.26

## 2020-06-08 NOTE — PROGRESS NOTES
"Subjective:      Shahana Nj is a 65 y.o. female who presents with Follow-Up (labs)            1. Encounter for screening mammogram for breast cancer    - MA-SCREENING MAMMO BILAT W/CAD; Future    2. Type 2 diabetes mellitus treated without insulin (HCC)  Currently treated for DM, taking meds and checking bs at home, trying to do DM diet.controlled      3. Benign essential HTN  Currently treated for HTN, taking meds with no CP or sob, monitors bp at home periodically. controlled      4. CRI (chronic renal insufficiency), stage 1  Much improved  Patient is currently being followed for chronic renal insufficiency. They are avoiding nsaids and maintaining hydration and following renal diet. Taking all appropriate meds. No new change in urine frequency or volume.      Past Medical History:  No date: Arthritis      Comment:  Osteo knees  No date: Dyslipidemia  No date: Essential hypertension  No date: GERD (gastroesophageal reflux disease)  No date: High cholesterol  11/19/2019: Left knee pain  No date: Myocardial infarct (HCC)      Comment:  Hx of 2 and states \"very Mild\". Last was approx 2014. No               longer follows with cardiologist.  No date: AALIYAH (obstructive sleep apnea)      Comment:  CPAP  No date: Snoring  No date: Type 2 diabetes mellitus (HCC)      Comment:  11/19/19-Avg AM glucose=100.  Past Surgical History:  12/3/2019: PB TOTAL KNEE ARTHROPLASTY; Left      Comment:  Procedure: ARTHROPLASTY, KNEE, TOTAL;  Surgeon: Ryan Keen M.D.;  Location: SURGERY [facility];                 Service: Orthopedics  09/2019: COLONOSCOPY  2004: ABDOMINAL HYSTERECTOMY TOTAL  1974: KNEE ARTHROSCOPY; Right  1970: RHINOPLASTY  early 2000's: BUNIONECTOMY; Left  1960's: DENTAL EXTRACTION(S)      Comment:  wisdom teeth  Infant to 2001: MYRINGOTOMY; Bilateral      Comment:  13 ear surgeries  1980's: OTHER SURGICAL PROCEDURE; Right      Comment:  fluid drained off of bone air pockets behind ear " bone  as child: TONSILLECTOMY AND ADENOIDECTOMY  Social History    Tobacco Use      Smoking status: Never Smoker      Smokeless tobacco: Never Used    Alcohol use: Yes      Frequency: Monthly or less      Drinks per session: 1 or 2      Binge frequency: Never      Comment: 6 times per year    Drug use: No      Comment: tried marijuana at age 17    Review of patient's family history indicates:  Problem: Heart Disease      Relation: Mother          Age of Onset: 45          Comment: cabg  Problem: Heart Disease      Relation: Father          Age of Onset: (Not Specified)          Comment: cabg and valve replacement  Problem: Diabetes      Relation: Sister          Age of Onset: (Not Specified)  Problem: Diabetes      Relation: Other          Age of Onset: (Not Specified)      Current Outpatient Medications: •  loratadine-pseudoephedrine (CLARITIN-D 24 HOUR)  MG per tablet, Take 1 Tab by mouth every day., Disp: , Rfl: •  rosuvastatin (CRESTOR) 40 MG tablet, TAKE 1 TABLET BY MOUTH ONCE DAILY, Disp: 90 Tab, Rfl: 1•  spironolactone (ALDACTONE) 25 MG Tab, Take 0.5 Tabs by mouth every day., Disp: 45 Tab, Rfl: 3•  irbesartan-hydrochlorothiazide (AVALIDE) 300-12.5 MG per tablet, TAKE 1 TABLET BY MOUTH ONCE DAILY, Disp: 90 Tab, Rfl: 3•  metformin (GLUCOPHAGE) 1000 MG tablet, Take 1 Tab by mouth 2 times a day, with meals., Disp: 180 Tab, Rfl: 1•  metoprolol SR (TOPROL XL) 50 MG TABLET SR 24 HR, TAKE 1 TABLET BY MOUTH ONCE DAILY, Disp: 90 Tab, Rfl: 1•  omeprazole (PRILOSEC) 20 MG delayed-release capsule, Take 20 mg by mouth every day., Disp: , Rfl: •  azelastine (ASTELIN) 137 MCG/SPRAY nasal spray, Spray 2 Sprays in nose 2 times a day., Disp: , Rfl: •  docusate sodium (COLACE) 100 MG Cap, Take 100 mg by mouth every day., Disp: , Rfl: •  cetirizine (ZYRTEC) 10 MG Tab, Take 10 mg by mouth every day., Disp: , Rfl: •  acetaminophen (TYLENOL) 500 MG Tab, Take 1,000 mg by mouth every 6 hours as needed., Disp: , Rfl: •  aspirin  "(ASA) 81 MG Chew Tab chewable tablet, Take 1 Tab by mouth every day., Disp: 100 Tab, Rfl: 11•  NON SPECIFIED, Cleared for knee replacement, Disp: 1 Each, Rfl: 0    Patient was instructed on the use of medications, either prescriptions or OTC and informed on when the appropriate follow up time period should be. In addition, patient was also instructed that should any acute worsening occur that they should notify this clinic asap or call 911.          Review of Systems   Constitutional: Negative.  Negative for chills and fever.   HENT: Negative.  Negative for hearing loss.    Eyes: Negative.  Negative for blurred vision and double vision.   Respiratory: Negative.  Negative for cough and hemoptysis.    Cardiovascular: Negative.  Negative for chest pain and palpitations.   Gastrointestinal: Negative.  Negative for heartburn and nausea.   Genitourinary: Negative.  Negative for dysuria.   Musculoskeletal: Negative.  Negative for myalgias.   Skin: Negative.  Negative for rash.   Neurological: Negative.  Negative for dizziness, tingling and headaches.   Endo/Heme/Allergies: Negative.  Does not bruise/bleed easily.   Psychiatric/Behavioral: Negative.  Negative for depression and suicidal ideas.   All other systems reviewed and are negative.         Objective:     BP (!) 132/8 (BP Location: Left arm, Patient Position: Sitting)   Pulse 100   Temp 37.1 °C (98.7 °F) (Tympanic)   Resp 16   Ht 1.676 m (5' 6\")   Wt 112.9 kg (248 lb 12.8 oz)   SpO2 94%   BMI 40.16 kg/m²      Physical Exam  Vitals signs and nursing note reviewed.   Constitutional:       General: She is not in acute distress.     Appearance: She is well-developed. She is not diaphoretic.   HENT:      Head: Normocephalic and atraumatic.      Mouth/Throat:      Pharynx: No oropharyngeal exudate.   Eyes:      Pupils: Pupils are equal, round, and reactive to light.   Cardiovascular:      Rate and Rhythm: Normal rate and regular rhythm.      Heart sounds: Normal " heart sounds. No murmur. No friction rub. No gallop.    Pulmonary:      Effort: Pulmonary effort is normal. No respiratory distress.      Breath sounds: Normal breath sounds. No wheezing or rales.   Chest:      Chest wall: No tenderness.   Neurological:      Mental Status: She is alert and oriented to person, place, and time.   Psychiatric:         Behavior: Behavior normal.         Thought Content: Thought content normal.         Judgment: Judgment normal.                 Assessment/Plan:       1. Encounter for screening mammogram for breast cancer    - MA-SCREENING MAMMO BILAT W/CAD; Future    2. Type 2 diabetes mellitus treated without insulin (HCC)      3. Benign essential HTN      4. CRI (chronic renal insufficiency), stage 1

## 2020-09-03 DIAGNOSIS — E11.9 TYPE 2 DIABETES MELLITUS TREATED WITHOUT INSULIN (HCC): ICD-10-CM

## 2020-09-03 RX ORDER — ROSUVASTATIN CALCIUM 40 MG/1
TABLET, COATED ORAL
Qty: 90 TAB | Refills: 0 | Status: SHIPPED | OUTPATIENT
Start: 2020-09-03 | End: 2020-12-02

## 2020-10-13 ENCOUNTER — OFFICE VISIT (OUTPATIENT)
Dept: MEDICAL GROUP | Facility: PHYSICIAN GROUP | Age: 65
End: 2020-10-13
Payer: OTHER MISCELLANEOUS

## 2020-10-13 VITALS
TEMPERATURE: 97.3 F | HEIGHT: 66 IN | RESPIRATION RATE: 16 BRPM | OXYGEN SATURATION: 90 % | DIASTOLIC BLOOD PRESSURE: 80 MMHG | BODY MASS INDEX: 40.82 KG/M2 | WEIGHT: 254 LBS | HEART RATE: 100 BPM | SYSTOLIC BLOOD PRESSURE: 144 MMHG

## 2020-10-13 DIAGNOSIS — E11.9 TYPE 2 DIABETES MELLITUS TREATED WITHOUT INSULIN (HCC): ICD-10-CM

## 2020-10-13 DIAGNOSIS — E66.01 MORBID OBESITY WITH BMI OF 40.0-44.9, ADULT (HCC): ICD-10-CM

## 2020-10-13 DIAGNOSIS — I10 BENIGN ESSENTIAL HTN: ICD-10-CM

## 2020-10-13 PROCEDURE — 99214 OFFICE O/P EST MOD 30 MIN: CPT | Performed by: FAMILY MEDICINE

## 2020-10-13 ASSESSMENT — ENCOUNTER SYMPTOMS
CARDIOVASCULAR NEGATIVE: 1
BRUISES/BLEEDS EASILY: 0
CONSTITUTIONAL NEGATIVE: 1
HEARTBURN: 0
NAUSEA: 0
MYALGIAS: 0
DIZZINESS: 0
PSYCHIATRIC NEGATIVE: 1
HEMOPTYSIS: 0
EYES NEGATIVE: 1
NEUROLOGICAL NEGATIVE: 1
RESPIRATORY NEGATIVE: 1
CHILLS: 0
DEPRESSION: 0
MUSCULOSKELETAL NEGATIVE: 1
TINGLING: 0
FEVER: 0
PALPITATIONS: 0
DOUBLE VISION: 0
GASTROINTESTINAL NEGATIVE: 1
COUGH: 0
BLURRED VISION: 0
HEADACHES: 0

## 2020-10-13 ASSESSMENT — FIBROSIS 4 INDEX: FIB4 SCORE: 1.26

## 2020-10-13 NOTE — PROGRESS NOTES
"Subjective:      Shahana Nj is a 65 y.o. female who presents with Requesting Labs, Ankle Pain, and Otalgia            1. Type 2 diabetes mellitus treated without insulin ([facility])  Currently treated for DM, taking meds and checking bs at home, trying to do DM diet.controlled    - Comp Metabolic Panel; Future  - HEMOGLOBIN A1C; Future  - Lipid Profile; Future  - MICROALBUMIN CREAT RATIO URINE (LAB COLLECT); Future    2. Benign essential HTN  Currently treated for HTN, taking meds with no CP or sob, monitors bp at home periodically. controlled      3. Morbid obesity with BMI of 40.0-44.9, adult ([facility])  Patient has a diagnosis of obesity. They were counseled today on increasing exercise and  extensively counseled on a low carb diet       Past Medical History:  No date: Arthritis      Comment:  Osteo knees  No date: Dyslipidemia  No date: Essential hypertension  No date: GERD (gastroesophageal reflux disease)  No date: High cholesterol  11/19/2019: Left knee pain  No date: Myocardial infarct ([facility])      Comment:  Hx of 2 and states \"very Mild\". Last was approx 2014. No               longer follows with cardiologist.  No date: AALIYAH (obstructive sleep apnea)      Comment:  CPAP  No date: Snoring  No date: Type 2 diabetes mellitus ([facility])      Comment:  11/19/19-Avg AM glucose=100.  Past Surgical History:  12/3/2019: PB TOTAL KNEE ARTHROPLASTY; Left      Comment:  Procedure: ARTHROPLASTY, KNEE, TOTAL;  Surgeon: Ryan Keen M.D.;  Location: SURGERY [facility];                 Service: Orthopedics  09/2019: COLONOSCOPY  2004: ABDOMINAL HYSTERECTOMY TOTAL  1974: KNEE ARTHROSCOPY; Right  1970: RHINOPLASTY  early 2000's: BUNIONECTOMY; Left  1960's: DENTAL EXTRACTION(S)      Comment:  wisdom teeth  Infant to 2001: MYRINGOTOMY; Bilateral      Comment:  13 ear surgeries  1980's: OTHER SURGICAL PROCEDURE; Right      Comment:  fluid drained off of bone air pockets behind ear bone  as child: TONSILLECTOMY AND " ADENOIDECTOMY  Social History    Tobacco Use      Smoking status: Never Smoker      Smokeless tobacco: Never Used    Alcohol use: Yes      Frequency: Monthly or less      Drinks per session: 1 or 2      Binge frequency: Never      Comment: 6 times per year    Drug use: No      Comment: tried marijuana at age 17    Review of patient's family history indicates:  Problem: Heart Disease      Relation: Mother          Age of Onset: 45          Comment: cabg  Problem: Heart Disease      Relation: Father          Age of Onset: (Not Specified)          Comment: cabg and valve replacement  Problem: Diabetes      Relation: Sister          Age of Onset: (Not Specified)  Problem: Diabetes      Relation: Other          Age of Onset: (Not Specified)      Current Outpatient Medications: •  rosuvastatin (CRESTOR) 40 MG tablet, TAKE 1 TABLET BY MOUTH ONCE DAILY . APPOINTMENT REQUIRED FOR FUTURE REFILLS, Disp: 90 Tab, Rfl: 0•  metformin (GLUCOPHAGE) 1000 MG tablet, TAKE 1 TABLET BY MOUTH TWICE DAILY WITH MEALS, Disp: 180 Tab, Rfl: 0•  loratadine-pseudoephedrine (CLARITIN-D 24 HOUR)  MG per tablet, Take 1 Tab by mouth every day., Disp: , Rfl: •  spironolactone (ALDACTONE) 25 MG Tab, Take 0.5 Tabs by mouth every day., Disp: 45 Tab, Rfl: 3•  irbesartan-hydrochlorothiazide (AVALIDE) 300-12.5 MG per tablet, TAKE 1 TABLET BY MOUTH ONCE DAILY, Disp: 90 Tab, Rfl: 3•  metoprolol SR (TOPROL XL) 50 MG TABLET SR 24 HR, TAKE 1 TABLET BY MOUTH ONCE DAILY, Disp: 90 Tab, Rfl: 1•  omeprazole (PRILOSEC) 20 MG delayed-release capsule, Take 20 mg by mouth every day., Disp: , Rfl: •  azelastine (ASTELIN) 137 MCG/SPRAY nasal spray, Spray 2 Sprays in nose 2 times a day., Disp: , Rfl: •  docusate sodium (COLACE) 100 MG Cap, Take 100 mg by mouth every day., Disp: , Rfl: •  cetirizine (ZYRTEC) 10 MG Tab, Take 10 mg by mouth every day., Disp: , Rfl: •  acetaminophen (TYLENOL) 500 MG Tab, Take 1,000 mg by mouth every 6 hours as needed., Disp: , Rfl: •   "NON SPECIFIED, Cleared for knee replacement, Disp: 1 Each, Rfl: 0•  aspirin (ASA) 81 MG Chew Tab chewable tablet, Take 1 Tab by mouth every day., Disp: 100 Tab, Rfl: 11    Patient was instructed on the use of medications, either prescriptions or OTC and informed on when the appropriate follow up time period should be. In addition, patient was also instructed that should any acute worsening occur that they should notify this clinic asap or call 911.          Review of Systems   Constitutional: Negative.  Negative for chills and fever.   HENT: Negative.  Negative for hearing loss.    Eyes: Negative.  Negative for blurred vision and double vision.   Respiratory: Negative.  Negative for cough and hemoptysis.    Cardiovascular: Negative.  Negative for chest pain and palpitations.   Gastrointestinal: Negative.  Negative for heartburn and nausea.   Genitourinary: Negative.  Negative for dysuria.   Musculoskeletal: Negative.  Negative for myalgias.   Skin: Negative.  Negative for rash.   Neurological: Negative.  Negative for dizziness, tingling and headaches.   Endo/Heme/Allergies: Negative.  Does not bruise/bleed easily.   Psychiatric/Behavioral: Negative.  Negative for depression and suicidal ideas.   All other systems reviewed and are negative.         Objective:     /80 (BP Location: Left arm, Patient Position: Sitting, BP Cuff Size: Large adult)   Pulse 100   Temp 36.3 °C (97.3 °F) (Temporal)   Resp 16   Ht 1.676 m (5' 6\")   Wt 115.2 kg (254 lb)   SpO2 90%   BMI 41.00 kg/m²      Physical Exam  Vitals signs and nursing note reviewed.   Constitutional:       General: She is not in acute distress.     Appearance: She is well-developed. She is not diaphoretic.   HENT:      Head: Normocephalic and atraumatic.      Mouth/Throat:      Pharynx: No oropharyngeal exudate.   Eyes:      Pupils: Pupils are equal, round, and reactive to light.   Cardiovascular:      Rate and Rhythm: Normal rate and regular rhythm.      " Heart sounds: Normal heart sounds. No murmur. No friction rub. No gallop.    Pulmonary:      Effort: Pulmonary effort is normal. No respiratory distress.      Breath sounds: Normal breath sounds. No wheezing or rales.   Chest:      Chest wall: No tenderness.   Neurological:      Mental Status: She is alert and oriented to person, place, and time.   Psychiatric:         Behavior: Behavior normal.         Thought Content: Thought content normal.         Judgment: Judgment normal.                 Assessment/Plan:        1. Type 2 diabetes mellitus treated without insulin ([facility])    - Comp Metabolic Panel; Future  - HEMOGLOBIN A1C; Future  - Lipid Profile; Future  - MICROALBUMIN CREAT RATIO URINE (LAB COLLECT); Future    2. Benign essential HTN      3. Morbid obesity with BMI of 40.0-44.9, adult ([facility])

## 2020-11-17 ENCOUNTER — HOSPITAL ENCOUNTER (OUTPATIENT)
Dept: LAB | Facility: MEDICAL CENTER | Age: 65
End: 2020-11-17
Attending: FAMILY MEDICINE
Payer: OTHER MISCELLANEOUS

## 2020-11-17 DIAGNOSIS — E11.9 TYPE 2 DIABETES MELLITUS TREATED WITHOUT INSULIN (HCC): ICD-10-CM

## 2020-11-17 LAB
ALBUMIN SERPL BCP-MCNC: 4.4 G/DL (ref 3.2–4.9)
ALBUMIN/GLOB SERPL: 1.8 G/DL
ALP SERPL-CCNC: 87 U/L (ref 30–99)
ALT SERPL-CCNC: 42 U/L (ref 2–50)
ANION GAP SERPL CALC-SCNC: 11 MMOL/L (ref 7–16)
AST SERPL-CCNC: 41 U/L (ref 12–45)
BILIRUB SERPL-MCNC: 0.6 MG/DL (ref 0.1–1.5)
BUN SERPL-MCNC: 32 MG/DL (ref 8–22)
CALCIUM SERPL-MCNC: 11.3 MG/DL (ref 8.5–10.5)
CHLORIDE SERPL-SCNC: 102 MMOL/L (ref 96–112)
CHOLEST SERPL-MCNC: 124 MG/DL (ref 100–199)
CO2 SERPL-SCNC: 24 MMOL/L (ref 20–33)
CREAT SERPL-MCNC: 0.98 MG/DL (ref 0.5–1.4)
CREAT UR-MCNC: 62.92 MG/DL
EST. AVERAGE GLUCOSE BLD GHB EST-MCNC: 148 MG/DL
GLOBULIN SER CALC-MCNC: 2.4 G/DL (ref 1.9–3.5)
GLUCOSE SERPL-MCNC: 118 MG/DL (ref 65–99)
HBA1C MFR BLD: 6.8 % (ref 0–5.6)
HDLC SERPL-MCNC: 45 MG/DL
LDLC SERPL CALC-MCNC: 55 MG/DL
MICROALBUMIN UR-MCNC: 3.1 MG/DL
MICROALBUMIN/CREAT UR: 49 MG/G (ref 0–30)
POTASSIUM SERPL-SCNC: 4.8 MMOL/L (ref 3.6–5.5)
PROT SERPL-MCNC: 6.8 G/DL (ref 6–8.2)
SODIUM SERPL-SCNC: 137 MMOL/L (ref 135–145)
TRIGL SERPL-MCNC: 122 MG/DL (ref 0–149)

## 2020-11-17 PROCEDURE — 36415 COLL VENOUS BLD VENIPUNCTURE: CPT

## 2020-11-17 PROCEDURE — 82043 UR ALBUMIN QUANTITATIVE: CPT

## 2020-11-17 PROCEDURE — 80053 COMPREHEN METABOLIC PANEL: CPT

## 2020-11-17 PROCEDURE — 82570 ASSAY OF URINE CREATININE: CPT

## 2020-11-17 PROCEDURE — 83036 HEMOGLOBIN GLYCOSYLATED A1C: CPT | Mod: GA

## 2020-11-17 PROCEDURE — 80061 LIPID PANEL: CPT

## 2020-11-19 ENCOUNTER — TELEPHONE (OUTPATIENT)
Dept: MEDICAL GROUP | Facility: PHYSICIAN GROUP | Age: 65
End: 2020-11-19

## 2020-11-20 NOTE — TELEPHONE ENCOUNTER
Phone Number Called: [phone] (home)       Call outcome: Left detailed message for patient. Informed to call back with any additional questions.    Message: Labs look fine. If patient wants to discuss them further or has questions they need to make an apt

## 2020-12-02 DIAGNOSIS — E11.9 TYPE 2 DIABETES MELLITUS TREATED WITHOUT INSULIN (HCC): ICD-10-CM

## 2020-12-02 DIAGNOSIS — I10 ESSENTIAL HYPERTENSION: ICD-10-CM

## 2020-12-02 RX ORDER — ROSUVASTATIN CALCIUM 40 MG/1
TABLET, COATED ORAL
Qty: 90 TAB | Refills: 0 | Status: SHIPPED | OUTPATIENT
Start: 2020-12-02 | End: 2021-03-01

## 2020-12-02 RX ORDER — METOPROLOL SUCCINATE 50 MG/1
TABLET, EXTENDED RELEASE ORAL
Qty: 90 TAB | Refills: 0 | Status: SHIPPED | OUTPATIENT
Start: 2020-12-02 | End: 2021-03-01

## 2021-03-01 DIAGNOSIS — I10 ESSENTIAL HYPERTENSION: ICD-10-CM

## 2021-03-01 DIAGNOSIS — E11.9 TYPE 2 DIABETES MELLITUS TREATED WITHOUT INSULIN (HCC): ICD-10-CM

## 2021-03-01 RX ORDER — METOPROLOL SUCCINATE 50 MG/1
TABLET, EXTENDED RELEASE ORAL
Qty: 90 TABLET | Refills: 0 | Status: SHIPPED | OUTPATIENT
Start: 2021-03-01 | End: 2021-06-23

## 2021-03-01 RX ORDER — SPIRONOLACTONE 25 MG/1
TABLET ORAL
Qty: 45 TABLET | Refills: 0 | Status: SHIPPED | OUTPATIENT
Start: 2021-03-01 | End: 2021-08-30

## 2021-03-01 RX ORDER — ROSUVASTATIN CALCIUM 40 MG/1
TABLET, COATED ORAL
Qty: 90 TABLET | Refills: 0 | Status: SHIPPED | OUTPATIENT
Start: 2021-03-01 | End: 2021-05-28

## 2021-03-01 RX ORDER — IRBESARTAN AND HYDROCHLOROTHIAZIDE 300; 12.5 MG/1; MG/1
TABLET, FILM COATED ORAL
Qty: 90 TABLET | Refills: 0 | Status: SHIPPED | OUTPATIENT
Start: 2021-03-01 | End: 2021-06-16

## 2021-04-30 ENCOUNTER — OFFICE VISIT (OUTPATIENT)
Dept: URGENT CARE | Facility: CLINIC | Age: 66
End: 2021-04-30

## 2021-04-30 ENCOUNTER — NURSE TRIAGE (OUTPATIENT)
Dept: HEALTH INFORMATION MANAGEMENT | Facility: OTHER | Age: 66
End: 2021-04-30

## 2021-04-30 VITALS
TEMPERATURE: 97.3 F | WEIGHT: 261 LBS | HEIGHT: 66 IN | DIASTOLIC BLOOD PRESSURE: 63 MMHG | RESPIRATION RATE: 20 BRPM | SYSTOLIC BLOOD PRESSURE: 136 MMHG | BODY MASS INDEX: 41.95 KG/M2 | OXYGEN SATURATION: 95 % | HEART RATE: 100 BPM

## 2021-04-30 DIAGNOSIS — R42 VERTIGO: ICD-10-CM

## 2021-04-30 DIAGNOSIS — H93.13 TINNITUS OF BOTH EARS: ICD-10-CM

## 2021-04-30 PROCEDURE — 99213 OFFICE O/P EST LOW 20 MIN: CPT | Performed by: NURSE PRACTITIONER

## 2021-04-30 ASSESSMENT — ENCOUNTER SYMPTOMS
NAUSEA: 1
DIZZINESS: 1
FEVER: 0
MYALGIAS: 0
HEADACHES: 0
CHILLS: 0
VOMITING: 0

## 2021-04-30 ASSESSMENT — FIBROSIS 4 INDEX: FIB4 SCORE: 1.82

## 2021-04-30 NOTE — TELEPHONE ENCOUNTER
"Has a cough, periodic, not constant, lots of pollen in air, has allergies.  Cough started w/current season, about 3 weeks ago.  No known covid exposure.  Had both vaccines, last one was late March.      Reason for Disposition  • Hearing loss in one or both ears and sudden onset and present now    Additional Information  • Negative: Followed an ear injury  • Negative: Hearing loss is main symptom  • Negative: Patient sounds very sick or weak to the triager  • Negative: Taking aspirin and dosage sounds high (i.e., > 1500 mg/day)    Answer Assessment - Initial Assessment Questions  1. DESCRIPTION: \"Describe the sound you are hearing.\" (e.g., hissing, humming, pounding, ringing)      Ringing in both ear, L>R  2. LOCATION: \"One or both ears?\" If one, ask: \"Which ear?\"      both  3. SEVERITY: \"How bad is it?\"     - MILD - doesn't interfere with normal activities, only can hear in a quiet room     - MODERATE-SEVERE (Bothersome): interferes with work, school, sleep, or other activities       severe  4. ONSET: \"When did this begin?\" \"Did it start suddenly or come on gradually?\"      Monday evening  5. PATTERN: \"Does this come and go, or has it been constant since it started?\"      constant  6. HEARING LOSS: \"Is your hearing decreased?\" (e.g., normal, decreased)        Hearing aids help, right ear hearing has decreased  7. OTHER SYMPTOMS: \"Do you have any other symptoms?\" (e.g., dizziness, earache)      Dizziness, chills, warm when got up, hot flashes this morning, very warm, better now  8. PREGNANCY: \"Is there any chance you are pregnant?\" \"When was your last menstrual period?\"      NA    Protocols used: TINNITUS-A-[place]    "

## 2021-04-30 NOTE — TELEPHONE ENCOUNTER
Regarding: Clear for office visit / next course of action   ----- Message from Hayde Cardona sent at 4/30/2021  8:13 AM PDT -----  Ringing in ears, balance is off, crackling in left ear, hot flashes, and a cough. Pt would like to see pcp today if possible

## 2021-04-30 NOTE — PROGRESS NOTES
Subjective:      Shahana Nj is a 66 y.o. female who presents with Ringing in Ear (X 5 days) and Loss Of Balance (X 2 days)            HPI   New problem.  66-year-old female who presents with ringing in bilateral ears x5 days.  She states the left ear is worse than the right.  She is also experienced some dizziness after she woke up this morning.  She notes that this is worse when she moves her head rapidly.  She has a long history of ear issues to include bilateral ruptured tympanic membranes.  She denies any pain, no hearing loss, headache.  She did experience some nausea this morning with the dizziness.  She is not taking anything for this.  She does have seasonal allergies for which she takes daily Claritin-D.    Pcn [penicillins] and Tape  Current Outpatient Medications on File Prior to Visit   Medication Sig Dispense Refill   • spironolactone (ALDACTONE) 25 MG Tab Take 1/2 (one-half) tablet by mouth once daily 45 tablet 0   • rosuvastatin (CRESTOR) 40 MG tablet TAKE 1 TABLET BY MOUTH ONCE DAILY . APPOINTMENT REQUIRED FOR FUTURE REFILLS 90 tablet 0   • metoprolol SR (TOPROL XL) 50 MG TABLET SR 24 HR Take 1 tablet by mouth once daily 90 tablet 0   • metformin (GLUCOPHAGE) 1000 MG tablet TAKE 1 TABLET BY MOUTH TWICE DAILY WITH MEALS 180 tablet 0   • irbesartan-hydrochlorothiazide (AVALIDE) 300-12.5 MG per tablet Take 1 tablet by mouth once daily 90 tablet 0   • loratadine-pseudoephedrine (CLARITIN-D 24 HOUR)  MG per tablet Take 1 Tab by mouth every day.     • omeprazole (PRILOSEC) 20 MG delayed-release capsule Take 20 mg by mouth every day.     • azelastine (ASTELIN) 137 MCG/SPRAY nasal spray [place] 2 Sprays in nose 2 times a day.     • docusate sodium (COLACE) 100 MG Cap Take 100 mg by mouth every day.     • cetirizine (ZYRTEC) 10 MG Tab Take 10 mg by mouth every day.     • acetaminophen (TYLENOL) 500 MG Tab Take 1,000 mg by mouth every 6 hours as needed.     • NON SPECIFIED Cleared for knee  replacement 1 Each 0   • aspirin (ASA) 81 MG Chew Tab chewable tablet Take 1 Tab by mouth every day. 100 Tab 11     No current facility-administered medications on file prior to visit.     Social History     Socioeconomic History   • Marital status: Single     Spouse name: Not on file   • Number of children: Not on file   • Years of education: Not on file   • Highest education level: Not on file   Occupational History   • Not on file   Tobacco Use   • Smoking status: Never Smoker   • Smokeless tobacco: Never Used   Substance and Sexual Activity   • Alcohol use: Yes     Comment: 6 times per year   • Drug use: No     Comment: tried marijuana at age 17   • Sexual activity: Yes     Partners: Male   Other Topics Concern   • Not on file   Social History Narrative   • Not on file     Social Determinants of Health     Financial Resource Strain:    • Difficulty of Paying Living Expenses:    Food Insecurity:    • Worried About Running Out of Food in the Last Year:    • Ran Out of Food in the Last Year:    Transportation Needs:    • Lack of Transportation (Medical):    • Lack of Transportation (Non-Medical):    Physical Activity:    • Days of Exercise per Week:    • Minutes of Exercise per Session:    Stress:    • Feeling of Stress :    Social Connections:    • Frequency of Communication with Friends and Family:    • Frequency of Social Gatherings with Friends and Family:    • Attends Denominational Services:    • Active Member of Clubs or Organizations:    • Attends Club or Organization Meetings:    • Marital Status:    Intimate Partner Violence:    • Fear of Current or Ex-Partner:    • Emotionally Abused:    • Physically Abused:    • Sexually Abused:      Breast Cancer-related family history is not on file.      Review of Systems   Constitutional: Negative for chills, fever and malaise/fatigue.   HENT: Positive for tinnitus. Negative for ear discharge, ear pain and hearing loss.    Gastrointestinal: Positive for nausea. Negative  "for vomiting.   Musculoskeletal: Negative for myalgias.   Neurological: Positive for dizziness. Negative for headaches.          Objective:     /63 (BP Location: Left arm, Patient Position: Sitting, BP Cuff Size: Adult long)   Pulse 100   Temp 36.3 °C (97.3 °F) (Temporal)   Resp 20   Ht 1.676 m (5' 6\")   Wt 118 kg (261 lb)   SpO2 95%   BMI 42.13 kg/m²      Physical Exam  Constitutional:       Appearance: Normal appearance.   HENT:      Right Ear: There is no impacted cerumen.      Left Ear: There is no impacted cerumen.      Ears:      Comments: Bilateral TMs noted to have perforations.  She has no erythema or bulging of these membranes.  Cardiovascular:      Rate and Rhythm: Normal rate and regular rhythm.      Heart sounds: No murmur.   Pulmonary:      Effort: Pulmonary effort is normal.      Breath sounds: Normal breath sounds.   Musculoskeletal:         General: Normal range of motion.      Cervical back: Normal range of motion and neck supple.   Skin:     General: Skin is warm and dry.   Neurological:      General: No focal deficit present.      Mental Status: She is alert.   Psychiatric:         Mood and Affect: Mood normal.                 Assessment/Plan:         1. Vertigo     2. Tinnitus of both ears       Patient with new onset of vertigo this morning that is mild in nature.  She also has complaints of tinnitus in both ears.  I have advised her to add Flonase as well as meclizine as directed.  She is advised to just take the meclizine at nighttime for now due to sedation indications and the fact that she is already taking Claritin-D during the day.  She is to follow-up with ENT in 2 weeks time.  She is advised that if any new problems arise that are concerning to her to follow-up here in the urgent care.  "

## 2021-05-11 ENCOUNTER — OFFICE VISIT (OUTPATIENT)
Dept: MEDICAL GROUP | Facility: PHYSICIAN GROUP | Age: 66
End: 2021-05-11

## 2021-05-11 VITALS
RESPIRATION RATE: 20 BRPM | BODY MASS INDEX: 41.5 KG/M2 | DIASTOLIC BLOOD PRESSURE: 78 MMHG | WEIGHT: 258.2 LBS | TEMPERATURE: 96.7 F | SYSTOLIC BLOOD PRESSURE: 120 MMHG | HEIGHT: 66 IN | OXYGEN SATURATION: 96 % | HEART RATE: 90 BPM

## 2021-05-11 DIAGNOSIS — H65.91 MEE (MIDDLE EAR EFFUSION), RIGHT: ICD-10-CM

## 2021-05-11 DIAGNOSIS — E11.9 TYPE 2 DIABETES MELLITUS TREATED WITHOUT INSULIN (HCC): ICD-10-CM

## 2021-05-11 DIAGNOSIS — E66.01 MORBID OBESITY WITH BMI OF 40.0-44.9, ADULT (HCC): ICD-10-CM

## 2021-05-11 DIAGNOSIS — I10 BENIGN ESSENTIAL HTN: ICD-10-CM

## 2021-05-11 PROCEDURE — 99214 OFFICE O/P EST MOD 30 MIN: CPT | Performed by: FAMILY MEDICINE

## 2021-05-11 RX ORDER — MECLIZINE HYDROCHLORIDE 25 MG/1
25 TABLET ORAL
Qty: 30 TABLET | Refills: 1 | Status: SHIPPED | OUTPATIENT
Start: 2021-05-11 | End: 2021-08-11

## 2021-05-11 ASSESSMENT — ENCOUNTER SYMPTOMS
NAUSEA: 0
MYALGIAS: 0
GASTROINTESTINAL NEGATIVE: 1
FEVER: 0
MUSCULOSKELETAL NEGATIVE: 1
CONSTITUTIONAL NEGATIVE: 1
PSYCHIATRIC NEGATIVE: 1
PALPITATIONS: 0
DOUBLE VISION: 0
EYES NEGATIVE: 1
DEPRESSION: 0
HEADACHES: 0
DIZZINESS: 0
NEUROLOGICAL NEGATIVE: 1
HEARTBURN: 0
COUGH: 0
BLURRED VISION: 0
CARDIOVASCULAR NEGATIVE: 1
RESPIRATORY NEGATIVE: 1
TINGLING: 0
HEMOPTYSIS: 0
BRUISES/BLEEDS EASILY: 0
CHILLS: 0

## 2021-05-11 ASSESSMENT — PATIENT HEALTH QUESTIONNAIRE - PHQ9: CLINICAL INTERPRETATION OF PHQ2 SCORE: 0

## 2021-05-11 ASSESSMENT — FIBROSIS 4 INDEX: FIB4 SCORE: 1.82

## 2021-05-11 NOTE — PROGRESS NOTES
"Subjective:      Shahana Nj is a 66 y.o. female who presents with Ear Fullness (ringing in ears x10 days)            1. INGRID (middle ear effusion), right  Ringing in the right ear for several weeks, has hearing aides and scheduled for audiology recheck next week, on exam INGRID on the right present, past surgical changes in the left unchanged  - meclizine (ANTIVERT) 25 MG Tab; Take 1 tablet by mouth at bedtime as needed (ear fullness).  Dispense: 30 tablet; Refill: 1    2. Type 2 diabetes mellitus treated without insulin (HCC)  Currently treated for DM, taking meds and checking bs at home, trying to do DM diet.controlled    - Comp Metabolic Panel; Future  - Hemoglobin A1c; Future  - Lipid Profile; Future  - Microalbumin Creat Ratio Urine - Lab Collect; Future    3. Benign essential HTN  Currently treated for HTN, taking meds with no CP or sob, monitors bp at home periodically. controlled      4. Morbid obesity with BMI of 40.0-44.9, adult ([facility])      Past Medical History:  No date: Arthritis      Comment:  Osteo knees  No date: Dyslipidemia  No date: Essential hypertension  No date: GERD (gastroesophageal reflux disease)  No date: High cholesterol  11/19/2019: Left knee pain  No date: Myocardial infarct ([facility])      Comment:  Hx of 2 and states \"very Mild\". Last was approx 2014. No               longer follows with cardiologist.  No date: AALIYAH (obstructive sleep apnea)      Comment:  CPAP  No date: Snoring  No date: Type 2 diabetes mellitus ([facility])      Comment:  11/19/19-Avg AM glucose=100.  Past Surgical History:  12/3/2019: PB TOTAL KNEE ARTHROPLASTY; Left      Comment:  Procedure: ARTHROPLASTY, KNEE, TOTAL;  Surgeon: Ryan Keen M.D.;  Location: SURGERY [facility];                 Service: Orthopedics  09/2019: COLONOSCOPY  2004: ABDOMINAL HYSTERECTOMY TOTAL  1974: KNEE ARTHROSCOPY; Right  1970: RHINOPLASTY  early 2000's: BUNIONECTOMY; Left  1960's: DENTAL EXTRACTION(S)      Comment:  " wisdom teeth  Infant to 2001: MYRINGOTOMY; Bilateral      Comment:  13 ear surgeries  1980's: OTHER SURGICAL PROCEDURE; Right      Comment:  fluid drained off of bone air pockets behind ear bone  as child: TONSILLECTOMY AND ADENOIDECTOMY  Social History    Tobacco Use      Smoking status: Never Smoker      Smokeless tobacco: Never Used    Alcohol use: Yes      Comment: 6 times per year    Drug use: No      Comment: tried marijuana at age 17    Review of patient's family history indicates:  Problem: Heart Disease      Relation: Mother          Age of Onset: 45          Comment: cabg  Problem: Heart Disease      Relation: Father          Age of Onset: (Not Specified)          Comment: cabg and valve replacement  Problem: Diabetes      Relation: Sister          Age of Onset: (Not Specified)  Problem: Diabetes      Relation: Other          Age of Onset: (Not Specified)      Current Outpatient Medications: •  meclizine (ANTIVERT) 25 MG Tab, Take 1 tablet by mouth at bedtime as needed (ear fullness)., Disp: 30 tablet, Rfl: 1•  spironolactone (ALDACTONE) 25 MG Tab, Take 1/2 (one-half) tablet by mouth once daily, Disp: 45 tablet, Rfl: 0•  rosuvastatin (CRESTOR) 40 MG tablet, TAKE 1 TABLET BY MOUTH ONCE DAILY . APPOINTMENT REQUIRED FOR FUTURE REFILLS, Disp: 90 tablet, Rfl: 0•  metoprolol SR (TOPROL XL) 50 MG TABLET SR 24 HR, Take 1 tablet by mouth once daily, Disp: 90 tablet, Rfl: 0•  metformin (GLUCOPHAGE) 1000 MG tablet, TAKE 1 TABLET BY MOUTH TWICE DAILY WITH MEALS, Disp: 180 tablet, Rfl: 0•  irbesartan-hydrochlorothiazide (AVALIDE) 300-12.5 MG per tablet, Take 1 tablet by mouth once daily, Disp: 90 tablet, Rfl: 0•  loratadine-pseudoephedrine (CLARITIN-D 24 HOUR)  MG per tablet, Take 1 Tab by mouth every day., Disp: , Rfl: •  omeprazole (PRILOSEC) 20 MG delayed-release capsule, Take 20 mg by mouth every day., Disp: , Rfl: •  azelastine (ASTELIN) 137 MCG/SPRAY nasal spray, Spray 2 Sprays in nose 2 times a day., Disp:  , Rfl: •  docusate sodium (COLACE) 100 MG Cap, Take 100 mg by mouth every day., Disp: , Rfl: •  acetaminophen (TYLENOL) 500 MG Tab, Take 1,000 mg by mouth every 6 hours as needed., Disp: , Rfl: •  aspirin (ASA) 81 MG Chew Tab chewable tablet, Take 1 Tab by mouth every day., Disp: 100 Tab, Rfl: 11    Patient was instructed on the use of medications, either prescriptions or OTC and informed on when the appropriate follow up time period should be. In addition, patient was also instructed that should any acute worsening occur that they should notify this clinic asap or call 911.          Review of Systems   Constitutional: Negative.  Negative for chills and fever.   HENT: Positive for hearing loss and tinnitus.    Eyes: Negative.  Negative for blurred vision and double vision.   Respiratory: Negative.  Negative for cough and hemoptysis.    Cardiovascular: Negative.  Negative for chest pain and palpitations.   Gastrointestinal: Negative.  Negative for heartburn and nausea.   Genitourinary: Negative.  Negative for dysuria.   Musculoskeletal: Negative.  Negative for myalgias.   Skin: Negative.  Negative for rash.   Neurological: Negative.  Negative for dizziness, tingling and headaches.   Endo/Heme/Allergies: Negative.  Does not bruise/bleed easily.   Psychiatric/Behavioral: Negative.  Negative for depression and suicidal ideas.   All other systems reviewed and are negative.         Objective:     Vitals:    05/11/21 0857   BP: 120/78   Pulse: 90   Resp: 20   Temp: 35.9 °C (96.7 °F)   SpO2: 96%         Physical Exam  Vitals and nursing note reviewed.   Constitutional:       General: She is not in acute distress.     Appearance: She is well-developed. She is not diaphoretic.   HENT:      Head: Normocephalic and atraumatic.      Right Ear: A middle ear effusion is present.      Ears:      Comments: Left ear post surgical changes     Mouth/Throat:      Pharynx: No oropharyngeal exudate.   Eyes:      Pupils: Pupils are equal,  round, and reactive to light.   Cardiovascular:      Rate and Rhythm: Normal rate and regular rhythm.      Heart sounds: Normal heart sounds. No murmur. No friction rub. No gallop.    Pulmonary:      Effort: Pulmonary effort is normal. No respiratory distress.      Breath sounds: Normal breath sounds. No wheezing or rales.   Chest:      Chest wall: No tenderness.   Neurological:      Mental Status: She is alert and oriented to person, place, and time.   Psychiatric:         Behavior: Behavior normal.         Thought Content: Thought content normal.         Judgment: Judgment normal.                 Assessment/Plan:        1. INGRID (middle ear effusion), right    - meclizine (ANTIVERT) 25 MG Tab; Take 1 tablet by mouth at bedtime as needed (ear fullness).  Dispense: 30 tablet; Refill: 1    2. Type 2 diabetes mellitus treated without insulin ([facility])    - Comp Metabolic Panel; Future  - Hemoglobin A1c; Future  - Lipid Profile; Future  - Microalbumin Creat Ratio Urine - Lab Collect; Future    3. Benign essential HTN      4. Morbid obesity with BMI of 40.0-44.9, adult ([facility])

## 2021-05-28 DIAGNOSIS — E11.9 TYPE 2 DIABETES MELLITUS TREATED WITHOUT INSULIN (HCC): ICD-10-CM

## 2021-05-28 RX ORDER — ROSUVASTATIN CALCIUM 40 MG/1
TABLET, COATED ORAL
Qty: 90 TABLET | Refills: 0 | Status: SHIPPED | OUTPATIENT
Start: 2021-05-28 | End: 2021-08-11

## 2021-06-16 DIAGNOSIS — I10 ESSENTIAL HYPERTENSION: ICD-10-CM

## 2021-06-16 RX ORDER — IRBESARTAN AND HYDROCHLOROTHIAZIDE 300; 12.5 MG/1; MG/1
TABLET, FILM COATED ORAL
Qty: 90 TABLET | Refills: 0 | Status: SHIPPED | OUTPATIENT
Start: 2021-06-16 | End: 2021-08-30

## 2021-06-23 DIAGNOSIS — I10 ESSENTIAL HYPERTENSION: ICD-10-CM

## 2021-06-24 RX ORDER — METOPROLOL SUCCINATE 50 MG/1
TABLET, EXTENDED RELEASE ORAL
Qty: 90 TABLET | Refills: 0 | Status: SHIPPED | OUTPATIENT
Start: 2021-06-24 | End: 2021-09-22

## 2021-07-02 ENCOUNTER — PATIENT OUTREACH (OUTPATIENT)
Dept: HEALTH INFORMATION MANAGEMENT | Facility: OTHER | Age: 66
End: 2021-07-02

## 2021-07-02 NOTE — NON-PROVIDER
Outcome:  Comprehensive Health Assessment  Member will call back when she has her date book available.     Attempt # 1     Introduced SCP  program shared contact information by phone and will mail. Member will call back to schedule Comprehensive Health Assessment along with new physical address for Epic.

## 2021-07-14 ENCOUNTER — TELEPHONE (OUTPATIENT)
Dept: MEDICAL GROUP | Facility: PHYSICIAN GROUP | Age: 66
End: 2021-07-14

## 2021-07-14 DIAGNOSIS — G47.30 SLEEP APNEA, UNSPECIFIED TYPE: ICD-10-CM

## 2021-07-14 NOTE — TELEPHONE ENCOUNTER
Patient needs a new CPAP machine in order to get one she needs a referral for the [facility]. Thank you

## 2021-07-14 NOTE — PROGRESS NOTES
Dr. Arias,    I have pended a referral to pulmonary, for a new CPAP machine for Shahana.    Thanks,    Tiffany

## 2021-07-20 ENCOUNTER — TELEPHONE (OUTPATIENT)
Dept: MEDICAL GROUP | Facility: PHYSICIAN GROUP | Age: 66
End: 2021-07-20

## 2021-07-20 DIAGNOSIS — N64.89 BREAST ASYMMETRY: ICD-10-CM

## 2021-07-20 NOTE — TELEPHONE ENCOUNTER
[facility] called and stated that pt is due for her 6 month diagnostic mammo with possible US to follow. Order has been pended. They can be reached at [phone] to call when order is placed.

## 2021-07-30 ENCOUNTER — HOSPITAL ENCOUNTER (OUTPATIENT)
Dept: LAB | Facility: MEDICAL CENTER | Age: 66
End: 2021-07-30
Attending: FAMILY MEDICINE
Payer: MEDICARE

## 2021-07-30 DIAGNOSIS — E11.9 TYPE 2 DIABETES MELLITUS TREATED WITHOUT INSULIN (HCC): ICD-10-CM

## 2021-07-30 LAB
ALBUMIN SERPL BCP-MCNC: 4.4 G/DL (ref 3.2–4.9)
ALBUMIN/GLOB SERPL: 1.5 G/DL
ALP SERPL-CCNC: 91 U/L (ref 30–99)
ALT SERPL-CCNC: 49 U/L (ref 2–50)
ANION GAP SERPL CALC-SCNC: 14 MMOL/L (ref 7–16)
AST SERPL-CCNC: 50 U/L (ref 12–45)
BILIRUB SERPL-MCNC: 0.6 MG/DL (ref 0.1–1.5)
BUN SERPL-MCNC: 36 MG/DL (ref 8–22)
CALCIUM SERPL-MCNC: 11.3 MG/DL (ref 8.5–10.5)
CHLORIDE SERPL-SCNC: 100 MMOL/L (ref 96–112)
CHOLEST SERPL-MCNC: 130 MG/DL (ref 100–199)
CO2 SERPL-SCNC: 21 MMOL/L (ref 20–33)
CREAT SERPL-MCNC: 1.26 MG/DL (ref 0.5–1.4)
CREAT UR-MCNC: 112.95 MG/DL
EST. AVERAGE GLUCOSE BLD GHB EST-MCNC: 151 MG/DL
FASTING STATUS PATIENT QL REPORTED: NORMAL
GLOBULIN SER CALC-MCNC: 2.9 G/DL (ref 1.9–3.5)
GLUCOSE SERPL-MCNC: 142 MG/DL (ref 65–99)
HBA1C MFR BLD: 6.9 % (ref 4–5.6)
HDLC SERPL-MCNC: 37 MG/DL
LDLC SERPL CALC-MCNC: 66 MG/DL
MICROALBUMIN UR-MCNC: 7 MG/DL
MICROALBUMIN/CREAT UR: 62 MG/G (ref 0–30)
POTASSIUM SERPL-SCNC: 4.4 MMOL/L (ref 3.6–5.5)
PROT SERPL-MCNC: 7.3 G/DL (ref 6–8.2)
SODIUM SERPL-SCNC: 135 MMOL/L (ref 135–145)
TRIGL SERPL-MCNC: 133 MG/DL (ref 0–149)

## 2021-07-30 PROCEDURE — 83036 HEMOGLOBIN GLYCOSYLATED A1C: CPT

## 2021-07-30 PROCEDURE — 80053 COMPREHEN METABOLIC PANEL: CPT

## 2021-07-30 PROCEDURE — 36415 COLL VENOUS BLD VENIPUNCTURE: CPT

## 2021-07-30 PROCEDURE — 82043 UR ALBUMIN QUANTITATIVE: CPT

## 2021-07-30 PROCEDURE — 80061 LIPID PANEL: CPT

## 2021-07-30 PROCEDURE — 82570 ASSAY OF URINE CREATININE: CPT

## 2021-08-11 ENCOUNTER — OFFICE VISIT (OUTPATIENT)
Dept: MEDICAL GROUP | Facility: PHYSICIAN GROUP | Age: 66
End: 2021-08-11
Payer: MEDICARE

## 2021-08-11 VITALS
HEART RATE: 121 BPM | DIASTOLIC BLOOD PRESSURE: 86 MMHG | WEIGHT: 261.3 LBS | HEIGHT: 66 IN | OXYGEN SATURATION: 93 % | SYSTOLIC BLOOD PRESSURE: 128 MMHG | RESPIRATION RATE: 20 BRPM | TEMPERATURE: 96.8 F | BODY MASS INDEX: 41.99 KG/M2

## 2021-08-11 DIAGNOSIS — E11.9 TYPE 2 DIABETES MELLITUS WITHOUT COMPLICATION, WITHOUT LONG-TERM CURRENT USE OF INSULIN (HCC): ICD-10-CM

## 2021-08-11 DIAGNOSIS — Z23 NEED FOR VACCINATION: ICD-10-CM

## 2021-08-11 DIAGNOSIS — I10 BENIGN ESSENTIAL HTN: ICD-10-CM

## 2021-08-11 DIAGNOSIS — N18.30 CRI (CHRONIC RENAL INSUFFICIENCY), STAGE 3 (MODERATE): ICD-10-CM

## 2021-08-11 DIAGNOSIS — Z78.0 POST-MENOPAUSAL: ICD-10-CM

## 2021-08-11 DIAGNOSIS — E78.2 MIXED HYPERLIPIDEMIA: ICD-10-CM

## 2021-08-11 PROCEDURE — 99214 OFFICE O/P EST MOD 30 MIN: CPT | Mod: 25 | Performed by: FAMILY MEDICINE

## 2021-08-11 PROCEDURE — 90750 HZV VACC RECOMBINANT IM: CPT | Performed by: FAMILY MEDICINE

## 2021-08-11 PROCEDURE — 90471 IMMUNIZATION ADMIN: CPT | Performed by: FAMILY MEDICINE

## 2021-08-11 ASSESSMENT — ENCOUNTER SYMPTOMS
MUSCULOSKELETAL NEGATIVE: 1
DIZZINESS: 0
FEVER: 0
HEADACHES: 0
DOUBLE VISION: 0
NEUROLOGICAL NEGATIVE: 1
RESPIRATORY NEGATIVE: 1
MYALGIAS: 0
PSYCHIATRIC NEGATIVE: 1
HEARTBURN: 0
TINGLING: 0
HEMOPTYSIS: 0
EYES NEGATIVE: 1
CHILLS: 0
COUGH: 0
CONSTITUTIONAL NEGATIVE: 1
GASTROINTESTINAL NEGATIVE: 1
BLURRED VISION: 0
PALPITATIONS: 0
BRUISES/BLEEDS EASILY: 0
CARDIOVASCULAR NEGATIVE: 1
DEPRESSION: 0
NAUSEA: 0

## 2021-08-11 ASSESSMENT — FIBROSIS 4 INDEX: FIB4 SCORE: 2.05

## 2021-08-11 NOTE — PROGRESS NOTES
"Subjective:      Shahana Nj is a 66 y.o. female who presents with Lab Results            1. Type 2 diabetes mellitus without complication, without long-term current use of insulin (HCC)  Currently treated for DM, taking meds and checking bs at home, trying to do DM diet.controlled  a1c 6.9      2. Need for vaccination    - Shingles Vaccine (Shingrix)    3. Post-menopausal    - DS-BONE DENSITY STUDY (DEXA)    4. Mixed hyperlipidemia  ldl at target    5. Benign essential HTN  Currently treated for HTN, taking meds with no CP or sob, monitors bp at home periodically. controlled      6. CRI (chronic renal insufficiency), stage 3 (moderate) (HCC)  Decrease in gfr  - REFERRAL TO NEPHROLOGY    Past Medical History:  No date: Arthritis      Comment:  Osteo knees  No date: Dyslipidemia  No date: Essential hypertension  No date: GERD (gastroesophageal reflux disease)  No date: High cholesterol  11/19/2019: Left knee pain  No date: Myocardial infarct (HCC)      Comment:  Hx of 2 and states \"very Mild\". Last was approx 2014. No               longer follows with cardiologist.  No date: AALIYAH (obstructive sleep apnea)      Comment:  CPAP  No date: Snoring  No date: Type 2 diabetes mellitus (HCC)      Comment:  11/19/19-Avg AM glucose=100.  Past Surgical History:  12/3/2019: PB TOTAL KNEE ARTHROPLASTY; Left      Comment:  Procedure: ARTHROPLASTY, KNEE, TOTAL;  Surgeon: Ryan Keen M.D.;  Location: SURGERY [facility];                 Service: Orthopedics  09/2019: COLONOSCOPY  2004: ABDOMINAL HYSTERECTOMY TOTAL  1974: KNEE ARTHROSCOPY; Right  1970: RHINOPLASTY  early 2000's: BUNIONECTOMY; Left  1960's: DENTAL EXTRACTION(S)      Comment:  wisdom teeth  Infant to 2001: MYRINGOTOMY; Bilateral      Comment:  13 ear surgeries  1980's: OTHER SURGICAL PROCEDURE; Right      Comment:  fluid drained off of bone air pockets behind ear bone  as child: TONSILLECTOMY AND ADENOIDECTOMY  Social History    Tobacco " Use      Smoking status: Never Smoker      Smokeless tobacco: Never Used    Vaping Use      Vaping Use: Never used    Alcohol use: Yes      Comment: 6 times per year    Drug use: No      Comment: tried marijuana at age 17    Review of patient's family history indicates:  Problem: Heart Disease      Relation: Mother          Age of Onset: 45          Comment: cabg  Problem: Heart Disease      Relation: Father          Age of Onset: (Not Specified)          Comment: cabg and valve replacement  Problem: Diabetes      Relation: Sister          Age of Onset: (Not Specified)  Problem: Diabetes      Relation: Other          Age of Onset: (Not Specified)      Current Outpatient Medications: •  Vitamins-Lipotropics (LIPO FLAVONOID PLUS) Tab, Take  by mouth., Disp: , Rfl:  •  Misc Natural Products (HEALTHY LIVER PO), Take  by mouth., Disp: , Rfl: •  metoprolol SR (TOPROL XL) 50 MG TABLET SR 24 HR, Take 1 tablet by mouth once daily, Disp: 90 tablet, Rfl: 0•  irbesartan-hydrochlorothiazide (AVALIDE) 300-12.5 MG per tablet, Take 1 tablet by mouth once daily, Disp: 90 tablet, Rfl: 0•  metformin (GLUCOPHAGE) 1000 MG tablet, TAKE 1 TABLET BY MOUTH TWICE DAILY WITH MEALS, Disp: 180 tablet, Rfl: 0•  spironolactone (ALDACTONE) 25 MG Tab, Take 1/2 (one-half) tablet by mouth once daily, Disp: 45 tablet, Rfl: 0•  loratadine-pseudoephedrine (CLARITIN-D 24 HOUR)  MG per tablet, Take 1 Tab by mouth every day., Disp: , Rfl: •  omeprazole (PRILOSEC) 20 MG delayed-release capsule, Take 20 mg by mouth every day., Disp: , Rfl: •  azelastine (ASTELIN) 137 MCG/SPRAY nasal spray, Spray 2 Sprays in nose 2 times a day., Disp: , Rfl: •  acetaminophen (TYLENOL) 500 MG Tab, Take 1,000 mg by mouth every 6 hours as needed., Disp: , Rfl: •  aspirin (ASA) 81 MG Chew Tab chewable tablet, Take 1 Tab by mouth every day., Disp: 100 Tab, Rfl: 11    Patient was instructed on the use of medications, either prescriptions or OTC and informed on when the  "appropriate follow up time period should be. In addition, patient was also instructed that should any acute worsening occur that they should notify this clinic asap or call 911.          Review of Systems   Constitutional: Negative.  Negative for chills and fever.   HENT: Negative.  Negative for hearing loss.    Eyes: Negative.  Negative for blurred vision and double vision.   Respiratory: Negative.  Negative for cough and hemoptysis.    Cardiovascular: Negative.  Negative for chest pain and palpitations.   Gastrointestinal: Negative.  Negative for heartburn and nausea.   Genitourinary: Negative.  Negative for dysuria.   Musculoskeletal: Negative.  Negative for myalgias.   Skin: Negative.  Negative for rash.   Neurological: Negative.  Negative for dizziness, tingling and headaches.   Endo/Heme/Allergies: Negative.  Does not bruise/bleed easily.   Psychiatric/Behavioral: Negative.  Negative for depression and suicidal ideas.   All other systems reviewed and are negative.         Objective:     /86 (BP Location: Left arm, Patient Position: Sitting, BP Cuff Size: Large adult)   Pulse (!) 121   Temp 36 °C (96.8 °F) (Temporal)   Resp 20   Ht 1.676 m (5' 6\")   Wt 119 kg (261 lb 4.8 oz)   SpO2 93%   BMI 42.17 kg/m²      Physical Exam  Vitals and nursing note reviewed.   Constitutional:       General: She is not in acute distress.     Appearance: She is well-developed. She is not diaphoretic.   HENT:      Head: Normocephalic and atraumatic.      Mouth/Throat:      Pharynx: No oropharyngeal exudate.   Eyes:      Pupils: Pupils are equal, round, and reactive to light.   Cardiovascular:      Rate and Rhythm: Normal rate and regular rhythm.      Heart sounds: Normal heart sounds. No murmur heard.   No friction rub. No gallop.    Pulmonary:      Effort: Pulmonary effort is normal. No respiratory distress.      Breath sounds: Normal breath sounds. No wheezing or rales.   Chest:      Chest wall: No tenderness. "   Skin:     Comments: Feet clean and dry with good sensation on monofilament exam today     Neurological:      Mental Status: She is alert and oriented to person, place, and time.   Psychiatric:         Behavior: Behavior normal.         Thought Content: Thought content normal.         Judgment: Judgment normal.                        Assessment/Plan:        1. Type 2 diabetes mellitus without complication, without long-term current use of insulin ([facility])      2. Need for vaccination    - Shingles Vaccine (Shingrix)    3. Post-menopausal    - DS-BONE DENSITY STUDY (DEXA)    4. Mixed hyperlipidemia      5. Benign essential HTN      6. CRI (chronic renal insufficiency), stage 3 (moderate) ([facility])  - REFERRAL TO NEPHROLOGY

## 2021-08-26 ENCOUNTER — HOSPITAL ENCOUNTER (OUTPATIENT)
Dept: RADIOLOGY | Facility: MEDICAL CENTER | Age: 66
End: 2021-08-26
Attending: FAMILY MEDICINE
Payer: MEDICARE

## 2021-08-26 ENCOUNTER — TELEPHONE (OUTPATIENT)
Dept: MEDICAL GROUP | Facility: PHYSICIAN GROUP | Age: 66
End: 2021-08-26

## 2021-08-26 PROCEDURE — 77080 DXA BONE DENSITY AXIAL: CPT

## 2021-08-26 NOTE — TELEPHONE ENCOUNTER
Phone Number Called: [phone] (home)       Call outcome: Spoke to patient regarding message below.    Message: Called to inform patient Normal bone density

## 2021-08-30 DIAGNOSIS — E11.9 TYPE 2 DIABETES MELLITUS TREATED WITHOUT INSULIN (HCC): ICD-10-CM

## 2021-08-30 DIAGNOSIS — I10 ESSENTIAL HYPERTENSION: ICD-10-CM

## 2021-08-30 RX ORDER — SPIRONOLACTONE 25 MG/1
TABLET ORAL
Qty: 45 TABLET | Refills: 0 | Status: SHIPPED | OUTPATIENT
Start: 2021-08-30 | End: 2021-11-29

## 2021-08-30 RX ORDER — IRBESARTAN AND HYDROCHLOROTHIAZIDE 300; 12.5 MG/1; MG/1
TABLET, FILM COATED ORAL
Qty: 90 TABLET | Refills: 0 | Status: SHIPPED | OUTPATIENT
Start: 2021-08-30 | End: 2021-12-14

## 2021-08-30 RX ORDER — ROSUVASTATIN CALCIUM 40 MG/1
TABLET, COATED ORAL
Qty: 90 TABLET | Refills: 0 | Status: SHIPPED | OUTPATIENT
Start: 2021-08-30 | End: 2021-12-01

## 2021-09-01 ENCOUNTER — OFFICE VISIT (OUTPATIENT)
Dept: NEPHROLOGY | Facility: MEDICAL CENTER | Age: 66
End: 2021-09-01
Payer: MEDICARE

## 2021-09-01 VITALS
WEIGHT: 262 LBS | TEMPERATURE: 97.4 F | HEART RATE: 102 BPM | BODY MASS INDEX: 42.11 KG/M2 | SYSTOLIC BLOOD PRESSURE: 124 MMHG | OXYGEN SATURATION: 97 % | DIASTOLIC BLOOD PRESSURE: 74 MMHG | RESPIRATION RATE: 20 BRPM | HEIGHT: 66 IN

## 2021-09-01 DIAGNOSIS — R80.9 TYPE 2 DIABETES MELLITUS WITH MICROALBUMINURIA, WITHOUT LONG-TERM CURRENT USE OF INSULIN (HCC): ICD-10-CM

## 2021-09-01 DIAGNOSIS — E83.52 HYPERCALCEMIA: ICD-10-CM

## 2021-09-01 DIAGNOSIS — N17.9 AKI (ACUTE KIDNEY INJURY) (HCC): ICD-10-CM

## 2021-09-01 DIAGNOSIS — I10 BENIGN ESSENTIAL HTN: ICD-10-CM

## 2021-09-01 DIAGNOSIS — E11.29 TYPE 2 DIABETES MELLITUS WITH MICROALBUMINURIA, WITHOUT LONG-TERM CURRENT USE OF INSULIN (HCC): ICD-10-CM

## 2021-09-01 PROCEDURE — 99204 OFFICE O/P NEW MOD 45 MIN: CPT | Performed by: INTERNAL MEDICINE

## 2021-09-01 ASSESSMENT — ENCOUNTER SYMPTOMS
HYPERTENSION: 1
NAUSEA: 0
FEVER: 0
CHILLS: 0
COUGH: 0
SHORTNESS OF BREATH: 0
VOMITING: 0

## 2021-09-01 ASSESSMENT — FIBROSIS 4 INDEX: FIB4 SCORE: 2.05

## 2021-09-01 NOTE — PROGRESS NOTES
"Subjective     Shahana Nj is a 66 y.o. female who presents with Hypertension and Chronic Kidney Disease            Hypertension  This is a chronic problem. The current episode started more than 1 year ago. The problem is unchanged. The problem is controlled. Pertinent negatives include no chest pain, malaise/fatigue, peripheral edema or shortness of breath. Risk factors for coronary artery disease include diabetes mellitus, obesity and post-menopausal state. Past treatments include angiotensin blockers and diuretics. The current treatment provides significant improvement. There are no compliance problems.  Hypertensive end-organ damage includes kidney disease. Identifiable causes of hypertension include chronic renal disease.   Chronic Kidney Disease  This is a chronic problem. The current episode started more than 1 year ago. The problem occurs constantly. The problem has been waxing and waning. Pertinent negatives include no chest pain, chills, coughing, fever, nausea, urinary symptoms or vomiting.       Review of Systems   Constitutional: Negative for chills, fever and malaise/fatigue.   Respiratory: Negative for cough and shortness of breath.    Cardiovascular: Negative for chest pain and leg swelling.   Gastrointestinal: Negative for nausea and vomiting.   Genitourinary: Negative for dysuria, frequency and urgency.   All other systems reviewed and are negative.             Objective     /74 (BP Location: Right arm, Patient Position: Sitting)   Pulse (!) 102   Temp 36.3 °C (97.4 °F) (Temporal)   Resp 20   Ht 1.676 m (5' 6\")   Wt 119 kg (262 lb)   SpO2 97%   BMI 42.29 kg/m²      Physical Exam  Vitals and nursing note reviewed.   Constitutional:       General: She is awake. She is not in acute distress.     Appearance: She is well-developed. She is not ill-appearing or diaphoretic.   HENT:      Head: Normocephalic and atraumatic.      Right Ear: External ear normal.      Left Ear: External " "ear normal.      Nose: Nose normal. No rhinorrhea.      Mouth/Throat:      Pharynx: No oropharyngeal exudate or posterior oropharyngeal erythema.   Eyes:      General: No scleral icterus.        Right eye: No discharge.         Left eye: No discharge.      Conjunctiva/sclera: Conjunctivae normal.   Neck:      Vascular: No carotid bruit.   Cardiovascular:      Rate and Rhythm: Normal rate and regular rhythm.      Heart sounds: No murmur heard.     Pulmonary:      Effort: Pulmonary effort is normal. No respiratory distress.      Breath sounds: Normal breath sounds.   Abdominal:      General: Abdomen is flat. There is no distension.      Palpations: Abdomen is soft. There is no mass.   Musculoskeletal:         General: No tenderness.      Cervical back: No rigidity. No muscular tenderness.      Right lower leg: No edema.      Left lower leg: No edema.   Skin:     General: Skin is warm and dry.      Coloration: Skin is not jaundiced.   Neurological:      General: No focal deficit present.      Mental Status: She is alert and oriented to person, place, and time. Mental status is at baseline.   Psychiatric:         Mood and Affect: Mood normal.         Behavior: Behavior normal.         Thought Content: Thought content normal.       Past Medical History:   Diagnosis Date   • Arthritis     Osteo knees   • Dyslipidemia    • Essential hypertension    • GERD (gastroesophageal reflux disease)    • High cholesterol    • Left knee pain 11/19/2019   • Myocardial infarct (HCC)     Hx of 2 and states \"very Mild\". Last was approx 2014. No longer follows with cardiologist.   • AALIYAH (obstructive sleep apnea)     CPAP   • Snoring    • Type 2 diabetes mellitus (HCC)     11/19/19-Avg AM glucose=100.     Social History     Socioeconomic History   • Marital status: Single     Spouse name: Not on file   • Number of children: Not on file   • Years of education: Not on file   • Highest education level: Not on file   Occupational History   • " Not on file   Tobacco Use   • Smoking status: Never Smoker   • Smokeless tobacco: Never Used   Vaping Use   • Vaping Use: Never used   Substance and Sexual Activity   • Alcohol use: Yes     Comment: 6 times per year   • Drug use: No     Comment: tried marijuana at age 17   • Sexual activity: Yes     Partners: Male   Other Topics Concern   • Not on file   Social History Narrative   • Not on file     Social Determinants of Health     Financial Resource Strain:    • Difficulty of Paying Living Expenses:    Food Insecurity:    • Worried About Running Out of Food in the Last Year:    • Ran Out of Food in the Last Year:    Transportation Needs:    • Lack of Transportation (Medical):    • Lack of Transportation (Non-Medical):    Physical Activity:    • Days of Exercise per Week:    • Minutes of Exercise per Session:    Stress:    • Feeling of Stress :    Social Connections:    • Frequency of Communication with Friends and Family:    • Frequency of Social Gatherings with Friends and Family:    • Attends Latter day Services:    • Active Member of Clubs or Organizations:    • Attends Club or Organization Meetings:    • Marital Status:    Intimate Partner Violence:    • Fear of Current or Ex-Partner:    • Emotionally Abused:    • Physically Abused:    • Sexually Abused:      Family History   Problem Relation Age of Onset   • Heart Disease Mother 45        cabg   • Heart Disease Father         cabg and valve replacement   • Diabetes Sister    • Diabetes Other      Recent Labs     11/17/20  0814 07/30/21  0702   ALBUMIN 4.4 4.4   HDL 45 37*   TRIGLYCERIDE 122 133   SODIUM 137 135   POTASSIUM 4.8 4.4   CHLORIDE 102 100   CO2 24 21   BUN 32* 36*   CREATININE 0.98 1.26                             Assessment & Plan        1. TEODORO (acute kidney injury) (HCC)  Etiology is most likely secondary to hypercalcemia  There is also possibility of diabetic nephropathy  Recheck labs after   stopping calcium supplement  Renal dose on  medication  Avoid nephrotoxins like NSAIDs  No acute need for dialysis  2. Benign essential HTN  Controlled  Continue same medication regimen  Continue low-sodium diet      3. Hypercalcemia  Possible iatrogenic from calcium supplement  Recheck labs after discontinuing calcium supplement  Check intact PTH  Check SPEP    4. Type 2 diabetes mellitus with microalbuminuria, without long-term current use of insulin (HCC)  Continue to optimize diabetes control for hemoglobin A1c below 7%

## 2021-09-22 ENCOUNTER — HOSPITAL ENCOUNTER (OUTPATIENT)
Dept: LAB | Facility: MEDICAL CENTER | Age: 66
End: 2021-09-22
Attending: INTERNAL MEDICINE
Payer: MEDICARE

## 2021-09-22 DIAGNOSIS — E83.52 HYPERCALCEMIA: ICD-10-CM

## 2021-09-22 DIAGNOSIS — I10 BENIGN ESSENTIAL HTN: ICD-10-CM

## 2021-09-22 DIAGNOSIS — N17.9 AKI (ACUTE KIDNEY INJURY) (HCC): ICD-10-CM

## 2021-09-22 DIAGNOSIS — I10 ESSENTIAL HYPERTENSION: ICD-10-CM

## 2021-09-22 LAB
ANION GAP SERPL CALC-SCNC: 13 MMOL/L (ref 7–16)
BUN SERPL-MCNC: 23 MG/DL (ref 8–22)
CALCIUM SERPL-MCNC: 11.4 MG/DL (ref 8.5–10.5)
CHLORIDE SERPL-SCNC: 104 MMOL/L (ref 96–112)
CO2 SERPL-SCNC: 20 MMOL/L (ref 20–33)
CREAT SERPL-MCNC: 1.05 MG/DL (ref 0.5–1.4)
ERYTHROCYTE [DISTWIDTH] IN BLOOD BY AUTOMATED COUNT: 49.1 FL (ref 35.9–50)
GLUCOSE SERPL-MCNC: 172 MG/DL (ref 65–99)
HCT VFR BLD AUTO: 46.9 % (ref 37–47)
HGB BLD-MCNC: 14.1 G/DL (ref 12–16)
MCH RBC QN AUTO: 24.6 PG (ref 27–33)
MCHC RBC AUTO-ENTMCNC: 30.1 G/DL (ref 33.6–35)
MCV RBC AUTO: 81.7 FL (ref 81.4–97.8)
PLATELET # BLD AUTO: 239 K/UL (ref 164–446)
PMV BLD AUTO: 10.8 FL (ref 9–12.9)
POTASSIUM SERPL-SCNC: 4.2 MMOL/L (ref 3.6–5.5)
PTH-INTACT SERPL-MCNC: 56.7 PG/ML (ref 14–72)
RBC # BLD AUTO: 5.74 M/UL (ref 4.2–5.4)
SODIUM SERPL-SCNC: 137 MMOL/L (ref 135–145)
WBC # BLD AUTO: 8.7 K/UL (ref 4.8–10.8)

## 2021-09-22 PROCEDURE — 86334 IMMUNOFIX E-PHORESIS SERUM: CPT

## 2021-09-22 PROCEDURE — 82784 ASSAY IGA/IGD/IGG/IGM EACH: CPT

## 2021-09-22 PROCEDURE — 82043 UR ALBUMIN QUANTITATIVE: CPT

## 2021-09-22 PROCEDURE — 85027 COMPLETE CBC AUTOMATED: CPT

## 2021-09-22 PROCEDURE — 80048 BASIC METABOLIC PNL TOTAL CA: CPT

## 2021-09-22 PROCEDURE — 84165 PROTEIN E-PHORESIS SERUM: CPT

## 2021-09-22 PROCEDURE — 83970 ASSAY OF PARATHORMONE: CPT

## 2021-09-22 PROCEDURE — 36415 COLL VENOUS BLD VENIPUNCTURE: CPT

## 2021-09-22 PROCEDURE — 84155 ASSAY OF PROTEIN SERUM: CPT

## 2021-09-22 PROCEDURE — 82570 ASSAY OF URINE CREATININE: CPT

## 2021-09-22 RX ORDER — METOPROLOL SUCCINATE 50 MG/1
TABLET, EXTENDED RELEASE ORAL
Qty: 90 TABLET | Refills: 3 | Status: SHIPPED | OUTPATIENT
Start: 2021-09-22 | End: 2021-12-20 | Stop reason: SDUPTHER

## 2021-09-23 LAB
CREAT UR-MCNC: 33.29 MG/DL
MICROALBUMIN UR-MCNC: 5.5 MG/DL
MICROALBUMIN/CREAT UR: 165 MG/G (ref 0–30)

## 2021-09-26 LAB
ALBUMIN SERPL ELPH-MCNC: 3.79 G/DL (ref 3.75–5.01)
ALPHA1 GLOB SERPL ELPH-MCNC: 0.35 G/DL (ref 0.19–0.46)
ALPHA2 GLOB SERPL ELPH-MCNC: 1.14 G/DL (ref 0.48–1.05)
B-GLOBULIN SERPL ELPH-MCNC: 0.88 G/DL (ref 0.48–1.1)
GAMMA GLOB SERPL ELPH-MCNC: 0.54 G/DL (ref 0.62–1.51)
IGA SERPL-MCNC: 104 MG/DL (ref 68–408)
IGG SERPL-MCNC: 515 MG/DL (ref 768–1632)
IGM SERPL-MCNC: 63 MG/DL (ref 35–263)
INTERPRETATION SERPL IFE-IMP: ABNORMAL
MONOCLON BAND OBS SERPL: ABNORMAL
PATHOLOGY STUDY: ABNORMAL
PROT SERPL-MCNC: 6.7 G/DL (ref 6.3–8.2)

## 2021-09-29 ENCOUNTER — OFFICE VISIT (OUTPATIENT)
Dept: NEPHROLOGY | Facility: MEDICAL CENTER | Age: 66
End: 2021-09-29
Payer: MEDICARE

## 2021-09-29 VITALS
WEIGHT: 262 LBS | TEMPERATURE: 98.8 F | DIASTOLIC BLOOD PRESSURE: 74 MMHG | RESPIRATION RATE: 20 BRPM | HEART RATE: 84 BPM | SYSTOLIC BLOOD PRESSURE: 130 MMHG | OXYGEN SATURATION: 94 % | BODY MASS INDEX: 42.11 KG/M2 | HEIGHT: 66 IN

## 2021-09-29 DIAGNOSIS — E83.52 HYPERCALCEMIA: ICD-10-CM

## 2021-09-29 DIAGNOSIS — E11.29 TYPE 2 DIABETES MELLITUS WITH MICROALBUMINURIA, WITHOUT LONG-TERM CURRENT USE OF INSULIN (HCC): ICD-10-CM

## 2021-09-29 DIAGNOSIS — R80.9 TYPE 2 DIABETES MELLITUS WITH MICROALBUMINURIA, WITHOUT LONG-TERM CURRENT USE OF INSULIN (HCC): ICD-10-CM

## 2021-09-29 DIAGNOSIS — N17.9 AKI (ACUTE KIDNEY INJURY) (HCC): ICD-10-CM

## 2021-09-29 DIAGNOSIS — N18.9 CHRONIC KIDNEY DISEASE, UNSPECIFIED CKD STAGE: ICD-10-CM

## 2021-09-29 PROCEDURE — 99214 OFFICE O/P EST MOD 30 MIN: CPT | Performed by: INTERNAL MEDICINE

## 2021-09-29 ASSESSMENT — ENCOUNTER SYMPTOMS
HYPERTENSION: 1
CHILLS: 0
SHORTNESS OF BREATH: 0
FEVER: 0
NAUSEA: 0
COUGH: 0
VOMITING: 0

## 2021-09-29 ASSESSMENT — FIBROSIS 4 INDEX: FIB4 SCORE: 2

## 2021-09-29 NOTE — PROGRESS NOTES
"Subjective     Shahana Nj is a 66 y.o. female who presents with Chronic Kidney Disease and Hypertension            Patient has a history of TEODORO secondary to hypercalcemia, creatinine has improved.  Calcium still elevated, her PTH is still relatively elevated considering her hypercalcemia    Chronic Kidney Disease  This is a chronic problem. The current episode started more than 1 year ago. The problem occurs constantly. The problem has been gradually improving. Pertinent negatives include no chest pain, chills, coughing, fever, nausea, urinary symptoms or vomiting.   Hypertension  This is a chronic problem. The current episode started more than 1 year ago. The problem is unchanged. The problem is controlled. Pertinent negatives include no chest pain, malaise/fatigue, peripheral edema or shortness of breath. Risk factors for coronary artery disease include obesity and post-menopausal state. Past treatments include beta blockers and angiotensin blockers. The current treatment provides significant improvement. There are no compliance problems.  Hypertensive end-organ damage includes kidney disease. Identifiable causes of hypertension include chronic renal disease.       Review of Systems   Constitutional: Negative for chills, fever and malaise/fatigue.   Respiratory: Negative for cough and shortness of breath.    Cardiovascular: Negative for chest pain and leg swelling.   Gastrointestinal: Negative for nausea and vomiting.   Genitourinary: Negative for dysuria, frequency and urgency.              Objective     /74 (BP Location: Right arm, Patient Position: Sitting)   Pulse 84   Temp 37.1 °C (98.8 °F) (Temporal)   Resp 20   Ht 1.676 m (5' 6\")   Wt 119 kg (262 lb)   SpO2 94%   BMI 42.29 kg/m²      Physical Exam  Vitals and nursing note reviewed.   Constitutional:       General: She is not in acute distress.     Appearance: Normal appearance. She is well-developed. She is not diaphoretic.   HENT:    " "  Head: Normocephalic and atraumatic.      Right Ear: External ear normal.      Left Ear: External ear normal.      Nose: Nose normal.   Eyes:      General: No scleral icterus.        Right eye: No discharge.         Left eye: No discharge.      Conjunctiva/sclera: Conjunctivae normal.   Cardiovascular:      Rate and Rhythm: Normal rate and regular rhythm.      Heart sounds: No murmur heard.     Pulmonary:      Effort: Pulmonary effort is normal. No respiratory distress.      Breath sounds: Normal breath sounds.   Musculoskeletal:         General: No tenderness.      Right lower leg: No edema.      Left lower leg: No edema.   Skin:     General: Skin is warm and dry.      Findings: No erythema.   Neurological:      General: No focal deficit present.      Mental Status: She is alert and oriented to person, place, and time.      Cranial Nerves: No cranial nerve deficit.   Psychiatric:         Mood and Affect: Mood normal.         Behavior: Behavior normal.       Past Medical History:   Diagnosis Date   • Arthritis     Osteo knees   • Dyslipidemia    • Essential hypertension    • GERD (gastroesophageal reflux disease)    • High cholesterol    • Left knee pain 11/19/2019   • Myocardial infarct (HCC)     Hx of 2 and states \"very Mild\". Last was approx 2014. No longer follows with cardiologist.   • AALIYAH (obstructive sleep apnea)     CPAP   • Snoring    • Type 2 diabetes mellitus (HCC)     11/19/19-Avg AM glucose=100.     Social History     Socioeconomic History   • Marital status: Single     Spouse name: Not on file   • Number of children: Not on file   • Years of education: Not on file   • Highest education level: Not on file   Occupational History   • Not on file   Tobacco Use   • Smoking status: Never Smoker   • Smokeless tobacco: Never Used   Vaping Use   • Vaping Use: Never used   Substance and Sexual Activity   • Alcohol use: Yes     Comment: 6 times per year   • Drug use: No     Comment: tried marijuana at age 17   • " Sexual activity: Yes     Partners: Male   Other Topics Concern   • Not on file   Social History Narrative   • Not on file     Social Determinants of Health     Financial Resource Strain:    • Difficulty of Paying Living Expenses:    Food Insecurity:    • Worried About Running Out of Food in the Last Year:    • Ran Out of Food in the Last Year:    Transportation Needs:    • Lack of Transportation (Medical):    • Lack of Transportation (Non-Medical):    Physical Activity:    • Days of Exercise per Week:    • Minutes of Exercise per Session:    Stress:    • Feeling of Stress :    Social Connections:    • Frequency of Communication with Friends and Family:    • Frequency of Social Gatherings with Friends and Family:    • Attends Bahai Services:    • Active Member of Clubs or Organizations:    • Attends Club or Organization Meetings:    • Marital Status:    Intimate Partner Violence:    • Fear of Current or Ex-Partner:    • Emotionally Abused:    • Physically Abused:    • Sexually Abused:      Family History   Problem Relation Age of Onset   • Heart Disease Mother 45        cabg   • Heart Disease Father         cabg and valve replacement   • Diabetes Sister    • Diabetes Other      Recent Labs     11/17/20  0814 07/30/21  0702 09/22/21  1048   ALBUMIN 4.4 4.4 3.79   HDL 45 37*  --    TRIGLYCERIDE 122 133  --    SODIUM 137 135 137   POTASSIUM 4.8 4.4 4.2   CHLORIDE 102 100 104   CO2 24 21 20   BUN 32* 36* 23*   CREATININE 0.98 1.26 1.05     Improved                      Assessment & Plan        1. TEODORO (acute kidney injury) (HCC)  Improved  No uremic symptoms  No acute need for dialysis    2. Chronic kidney disease, unspecified CKD stage  Avoid nephrotoxins  Renal dose on medication  Recheck labs    3. Hypercalcemia  No clear etiology  The relatively high PTH suggest hyperparathyroidism  Check sestamibi scan    4. Type 2 diabetes mellitus with microalbuminuria, without long-term current use of insulin (HCC)

## 2021-10-08 ENCOUNTER — OFFICE VISIT (OUTPATIENT)
Dept: SLEEP MEDICINE | Facility: MEDICAL CENTER | Age: 66
End: 2021-10-08
Payer: MEDICARE

## 2021-10-08 VITALS
WEIGHT: 260 LBS | DIASTOLIC BLOOD PRESSURE: 80 MMHG | SYSTOLIC BLOOD PRESSURE: 140 MMHG | BODY MASS INDEX: 41.78 KG/M2 | HEART RATE: 122 BPM | OXYGEN SATURATION: 95 % | HEIGHT: 66 IN | RESPIRATION RATE: 16 BRPM

## 2021-10-08 DIAGNOSIS — I10 PRIMARY HYPERTENSION: ICD-10-CM

## 2021-10-08 DIAGNOSIS — G47.33 OSA (OBSTRUCTIVE SLEEP APNEA): ICD-10-CM

## 2021-10-08 PROCEDURE — 99204 OFFICE O/P NEW MOD 45 MIN: CPT | Performed by: PREVENTIVE MEDICINE

## 2021-10-08 ASSESSMENT — FIBROSIS 4 INDEX: FIB4 SCORE: 2

## 2021-10-08 NOTE — PROGRESS NOTES
"CC: \"I need a new machine\"        HPI:  Shahana Nj is a 66 y.o.female  kindly referred by Jeanmarie Arias M.D.This patient has a past medical history of essential hypertension, right sided hearing loss with tinnitus, dyslipidemia, type 2 diabetes mellitus without complication and without long-term current use of insulin.  She had her first sleep study done approximately 20 years ago in [facility] she started Pap therapy at that time and has been using it consistently since then.  Her second sleep study was done here in [place] at [facility] this facility is closed.  It was done approximately 7 years ago by Dr. Armstrong.  She has had the same machine since that study was completed 7 years ago.    She is a very compliant user she brought in the machine and her SD card today.  The results are as follows:    COMPLIANCE DATA: ResMed  Machine type: Air sense 10 Elite CPAP  Date range: 9/8/2021 through 10/7/2021  AHI: 2.2  TIME USED:6 hours and 43 minutes  PRESSURE SETTINGS: CPAP at 13 cmH2O        Patient Active Problem List    Diagnosis Date Noted   • Mixed hyperlipidemia 12/13/2018   • Benign essential HTN 12/13/2018   • Type 2 diabetes mellitus without complication, without long-term current use of insulin (HCC) 06/04/2018   • Bilateral hearing loss 04/30/2018       Past Medical History:   Diagnosis Date   • Arthritis     Osteo knees   • Dyslipidemia    • Essential hypertension    • GERD (gastroesophageal reflux disease)    • High cholesterol    • Left knee pain 11/19/2019   • Myocardial infarct (HCC)     Hx of 2 and states \"very Mild\". Last was approx 2014. No longer follows with cardiologist.   • AALIYAH (obstructive sleep apnea)     CPAP   • Snoring    • Type 2 diabetes mellitus (HCC)     11/19/19-Avg AM glucose=100.        Past Surgical History:   Procedure Laterality Date   • PB TOTAL KNEE ARTHROPLASTY Left 12/3/2019    Procedure: ARTHROPLASTY, KNEE, TOTAL;  Surgeon: Ryan Keen M.D.;  " Location: SURGERY [facility];  Service: Orthopedics   • COLONOSCOPY  09/2019   • ABDOMINAL HYSTERECTOMY TOTAL  2004   • KNEE ARTHROSCOPY Right 1974   • RHINOPLASTY  1970   • BUNIONECTOMY Left early 2000's   • DENTAL EXTRACTION(S)  1960's    wisdom teeth   • MYRINGOTOMY Bilateral Infant to 2001    13 ear surgeries   • OTHER SURGICAL PROCEDURE Right 1980's    fluid drained off of bone air pockets behind ear bone   • TONSILLECTOMY AND ADENOIDECTOMY  as child       Family History   Problem Relation Age of Onset   • Heart Disease Mother 45        cabg   • Heart Disease Father         cabg and valve replacement   • Diabetes Sister    • Diabetes Other        Social History     Socioeconomic History   • Marital status: Single     Spouse name: Not on file   • Number of children: Not on file   • Years of education: Not on file   • Highest education level: Not on file   Occupational History   • Not on file   Tobacco Use   • Smoking status: Never Smoker   • Smokeless tobacco: Never Used   Vaping Use   • Vaping Use: Never used   Substance and Sexual Activity   • Alcohol use: Yes     Comment: 6 times per year   • Drug use: No     Comment: tried marijuana at age 17   • Sexual activity: Yes     Partners: Male   Other Topics Concern   • Not on file   Social History Narrative   • Not on file     Social Determinants of Health     Financial Resource Strain:    • Difficulty of Paying Living Expenses:    Food Insecurity:    • Worried About Running Out of Food in the Last Year:    • Ran Out of Food in the Last Year:    Transportation Needs:    • Lack of Transportation (Medical):    • Lack of Transportation (Non-Medical):    Physical Activity:    • Days of Exercise per Week:    • Minutes of Exercise per Session:    Stress:    • Feeling of Stress :    Social Connections:    • Frequency of Communication with Friends and Family:    • Frequency of Social Gatherings with Friends and Family:    • Attends Sikhism Services:    • Active  "Member of Clubs or Organizations:    • Attends Club or Organization Meetings:    • Marital Status:    Intimate Partner Violence:    • Fear of Current or Ex-Partner:    • Emotionally Abused:    • Physically Abused:    • Sexually Abused:        Current Outpatient Medications   Medication Sig Dispense Refill   • irbesartan-hydrochlorothiazide (AVALIDE) 300-12.5 MG per tablet Take 1 tablet by mouth once daily 90 Tablet 0   • metformin (GLUCOPHAGE) 1000 MG tablet TAKE 1 TABLET BY MOUTH TWICE DAILY WITH MEALS 180 Tablet 0   • metoprolol SR (TOPROL XL) 50 MG TABLET SR 24 HR Take 1 tablet by mouth once daily 90 Tablet 3   • spironolactone (ALDACTONE) 25 MG Tab Take 1/2 (one-half) tablet by mouth once daily 45 Tablet 0   • rosuvastatin (CRESTOR) 40 MG tablet TAKE 1 TABLET BY MOUTH ONCE DAILY . APPOINTMENT REQUIRED FOR FUTURE REFILLS (Patient not taking: Reported on 9/1/2021) 90 Tablet 0   • Vitamins-Lipotropics (LIPO FLAVONOID PLUS) Tab Take  by mouth.     • Misc Natural Products (HEALTHY LIVER PO) Take  by mouth.     • loratadine-pseudoephedrine (CLARITIN-D 24 HOUR)  MG per tablet Take 1 Tab by mouth every day. (Patient not taking: Reported on 10/8/2021)     • omeprazole (PRILOSEC) 20 MG delayed-release capsule Take 20 mg by mouth every day.     • azelastine (ASTELIN) 137 MCG/SPRAY nasal spray Hume 2 Sprays in nose 2 times a day. (Patient not taking: Reported on 9/29/2021)     • acetaminophen (TYLENOL) 500 MG Tab Take 1,000 mg by mouth every 6 hours as needed. (Patient not taking: Reported on 10/8/2021)     • aspirin (ASA) 81 MG Chew Tab chewable tablet Take 1 Tab by mouth every day. (Patient not taking: Reported on 10/8/2021) 100 Tab 11     No current facility-administered medications for this visit.    \"CURRENT RX\"    ALLERGIES: Pcn [penicillins] and Tape    ROS    Constitutional: Denies  weight loss, fatigue  Cardiovascular: Denies chest pain, tightness, palpitations, swelling in legs/feet, difficulty breathing " "when lying down but gets better when sitting up.   Respiratory: Endorses shortness of breath during the day with light exertion  Sleep: per HPI  Gastrointestinal: Denies  difficulty swallowing, heartburn.  Musculoskeletal: Some painful joints, sore muscles, back pain.        PHYSICAL EXAM    NECK CIRCUMFERENCE:  17.5  in  /80 (BP Location: Left arm, Patient Position: Sitting, BP Cuff Size: Large adult)   Pulse (!) 122   Resp 16   Ht 1.676 m (5' 6\")   Wt 118 kg (260 lb)   SpO2 95%   BMI 41.97 kg/m²   Appearance: Well-nourished, looks stated age, no acute distress  Eyes:   EOMI  ENMT: Double uvula, Mallampati 3  Neck: Supple, trachea midline, no masses  Respiratory effort:  No intercostal retractions or use of accessory muscles  Lung auscultation:  No wheezes rhonchi rubs or rales  Cardiac: No murmurs, rubs, or gallops; regular rhythm, mild tachycardia 102 no edema  Musculoskeletal:  Grossly normal; gait and station normal; digits and nails normal  Skin:  No cyanosis  Neurologic: oriented to person, time, place, and purpose; judgement intact  Psychiatric:  No depression, anxiety, agitation      Medical Decision Making       The medical record was reviewed in its entirety including the referral notes, records from primary care, consultants notes, hospital records, lab, imaging, microbiology, immunology, and immunizations.  Care gaps identified and reviewed with the patient.    Diagnostic and titration nocturnal polysomnogram's, home sleep apnea tests, continuous nocturnal oximetry results, multiple sleep latency tests, and compliance reports reviewed.    ASSESSMENT:    1. AALIYAH (obstructive sleep apnea)    - DME CPAP: Will ask CPAP and more for her most recent sleep study done at [facility] 7 years ago    2. Primary hypertension    - DME CPAP  3.  Tachycardia at 102, decreased due to resting from 122 at this visit    4.  Chronic dry mouth: Recommend XyliMelts and a mask change to a air fit F 30 " I    PLAN:      A ResMed APAP machine will be ordered    Minimum pressure of 13 with a maximum pressure of 17 cm of water and heated humidification with data available on cloud.. Access to this patient's compliance data and usage data will allow for a further empiric titration. A careful mask fit is required.  Mask per patient's choice.      The risks of untreated sleep apnea were discussed with the patient at length. Patients with AALIYAH are at increased risk of cardiovascular disease including coronary artery disease, systemic arterial hypertension, pulmonary arterial hypertension, cardiac arrhythmias, and stroke. AALIYAH patients have an increased risk of motor vehicle accidents, type 2 diabetes, chronic kidney disease, and non-alcoholic liver disease. The patient was advised to avoid driving a motor vehicle when drowsy.      Have advised the patient to follow up with the appropriate healthcare practitioners for all other medical problems and issues.      Return to clinic 9 weeks for first compliance check patient will use CPAP and more as DME.

## 2021-10-26 DIAGNOSIS — E21.3 HYPERPARATHYROIDISM (HCC): ICD-10-CM

## 2021-10-28 ENCOUNTER — HOSPITAL ENCOUNTER (OUTPATIENT)
Dept: LAB | Facility: MEDICAL CENTER | Age: 66
End: 2021-10-28
Attending: INTERNAL MEDICINE
Payer: MEDICARE

## 2021-10-28 DIAGNOSIS — E83.52 HYPERCALCEMIA: ICD-10-CM

## 2021-10-28 DIAGNOSIS — N18.9 CHRONIC KIDNEY DISEASE, UNSPECIFIED CKD STAGE: ICD-10-CM

## 2021-10-28 DIAGNOSIS — N17.9 AKI (ACUTE KIDNEY INJURY) (HCC): ICD-10-CM

## 2021-10-28 LAB
ANION GAP SERPL CALC-SCNC: 16 MMOL/L (ref 7–16)
BUN SERPL-MCNC: 25 MG/DL (ref 8–22)
CALCIUM SERPL-MCNC: 11.1 MG/DL (ref 8.5–10.5)
CHLORIDE SERPL-SCNC: 104 MMOL/L (ref 96–112)
CO2 SERPL-SCNC: 19 MMOL/L (ref 20–33)
CREAT SERPL-MCNC: 1.25 MG/DL (ref 0.5–1.4)
ERYTHROCYTE [DISTWIDTH] IN BLOOD BY AUTOMATED COUNT: 50.6 FL (ref 35.9–50)
FASTING STATUS PATIENT QL REPORTED: NORMAL
GLUCOSE SERPL-MCNC: 140 MG/DL (ref 65–99)
HCT VFR BLD AUTO: 46.2 % (ref 37–47)
HGB BLD-MCNC: 13.9 G/DL (ref 12–16)
MCH RBC QN AUTO: 24.6 PG (ref 27–33)
MCHC RBC AUTO-ENTMCNC: 30.1 G/DL (ref 33.6–35)
MCV RBC AUTO: 81.8 FL (ref 81.4–97.8)
PLATELET # BLD AUTO: 236 K/UL (ref 164–446)
PMV BLD AUTO: 11 FL (ref 9–12.9)
POTASSIUM SERPL-SCNC: 4.3 MMOL/L (ref 3.6–5.5)
RBC # BLD AUTO: 5.65 M/UL (ref 4.2–5.4)
SODIUM SERPL-SCNC: 139 MMOL/L (ref 135–145)
WBC # BLD AUTO: 7.4 K/UL (ref 4.8–10.8)

## 2021-10-28 PROCEDURE — 85027 COMPLETE CBC AUTOMATED: CPT

## 2021-10-28 PROCEDURE — 36415 COLL VENOUS BLD VENIPUNCTURE: CPT

## 2021-10-28 PROCEDURE — 80048 BASIC METABOLIC PNL TOTAL CA: CPT

## 2021-11-26 DIAGNOSIS — I10 ESSENTIAL HYPERTENSION: ICD-10-CM

## 2021-11-26 DIAGNOSIS — E11.9 TYPE 2 DIABETES MELLITUS TREATED WITHOUT INSULIN (HCC): ICD-10-CM

## 2021-11-29 RX ORDER — SPIRONOLACTONE 25 MG/1
TABLET ORAL
Qty: 45 TABLET | Refills: 0 | Status: SHIPPED | OUTPATIENT
Start: 2021-11-29 | End: 2022-03-01

## 2021-11-30 ENCOUNTER — OFFICE VISIT (OUTPATIENT)
Dept: MEDICAL GROUP | Facility: PHYSICIAN GROUP | Age: 66
End: 2021-11-30
Payer: MEDICARE

## 2021-11-30 VITALS
DIASTOLIC BLOOD PRESSURE: 82 MMHG | RESPIRATION RATE: 20 BRPM | SYSTOLIC BLOOD PRESSURE: 126 MMHG | OXYGEN SATURATION: 95 % | WEIGHT: 263 LBS | TEMPERATURE: 97.1 F | HEART RATE: 125 BPM | HEIGHT: 66 IN | BODY MASS INDEX: 42.27 KG/M2

## 2021-11-30 DIAGNOSIS — E11.9 TYPE 2 DIABETES MELLITUS WITHOUT COMPLICATION, WITHOUT LONG-TERM CURRENT USE OF INSULIN (HCC): ICD-10-CM

## 2021-11-30 DIAGNOSIS — I10 BENIGN ESSENTIAL HTN: ICD-10-CM

## 2021-11-30 DIAGNOSIS — Z23 NEED FOR VACCINATION: ICD-10-CM

## 2021-11-30 LAB
HBA1C MFR BLD: 7.3 % (ref 0–5.6)
INT CON NEG: NEGATIVE
INT CON POS: POSITIVE

## 2021-11-30 PROCEDURE — 83036 HEMOGLOBIN GLYCOSYLATED A1C: CPT | Performed by: FAMILY MEDICINE

## 2021-11-30 PROCEDURE — 90471 IMMUNIZATION ADMIN: CPT | Performed by: FAMILY MEDICINE

## 2021-11-30 PROCEDURE — 90750 HZV VACC RECOMBINANT IM: CPT | Performed by: FAMILY MEDICINE

## 2021-11-30 PROCEDURE — 99214 OFFICE O/P EST MOD 30 MIN: CPT | Mod: 25 | Performed by: FAMILY MEDICINE

## 2021-11-30 ASSESSMENT — ENCOUNTER SYMPTOMS
TINGLING: 0
RESPIRATORY NEGATIVE: 1
BLURRED VISION: 0
CARDIOVASCULAR NEGATIVE: 1
PALPITATIONS: 0
HEMOPTYSIS: 0
DIZZINESS: 0
MUSCULOSKELETAL NEGATIVE: 1
PSYCHIATRIC NEGATIVE: 1
CONSTITUTIONAL NEGATIVE: 1
NAUSEA: 0
HEADACHES: 0
EYES NEGATIVE: 1
DOUBLE VISION: 0
CHILLS: 0
GASTROINTESTINAL NEGATIVE: 1
MYALGIAS: 0
COUGH: 0
BRUISES/BLEEDS EASILY: 0
DEPRESSION: 0
NEUROLOGICAL NEGATIVE: 1
FEVER: 0
HEARTBURN: 0

## 2021-11-30 ASSESSMENT — FIBROSIS 4 INDEX: FIB4 SCORE: 2.03

## 2021-11-30 NOTE — PROGRESS NOTES
"Subjective     Shahana Nj is a 66 y.o. female who presents with No chief complaint on file.            1. Type 2 diabetes mellitus without complication, without long-term current use of insulin (HCC)  Currently treated for DM, taking meds and checking bs at home, trying to do DM diet.controlled  Much improved at 7.3     POCT Hemoglobin A1C   Referral to Ophthalmology    2. Benign essential HTN  Currently treated for HTN, taking meds with no CP or sob, monitors bp at home periodically. controlled      3. Need for vaccination  The patient's current medical issue is well controlled on the current therapy with no new symptoms or worsening     Shingles Vaccine (Shingrix)    Past Medical History:  No date: Arthritis      Comment:  Osteo knees  No date: Dyslipidemia  No date: Essential hypertension  No date: GERD (gastroesophageal reflux disease)  No date: High cholesterol  11/19/2019: Left knee pain  No date: Myocardial infarct (HCC)      Comment:  Hx of 2 and states \"very Mild\". Last was approx 2014. No               longer follows with cardiologist.  No date: AALIYAH (obstructive sleep apnea)      Comment:  CPAP  No date: Snoring  No date: Type 2 diabetes mellitus (HCC)      Comment:  11/19/19-Avg AM glucose=100.  Past Surgical History:  12/3/2019: PB TOTAL KNEE ARTHROPLASTY; Left      Comment:  Procedure: ARTHROPLASTY, KNEE, TOTAL;  Surgeon: Ryan Keen M.D.;  Location: SURGERY [facility];                 Service: Orthopedics  09/2019: COLONOSCOPY  2004: ABDOMINAL HYSTERECTOMY TOTAL  1974: KNEE ARTHROSCOPY; Right  1970: RHINOPLASTY  early 2000's: BUNIONECTOMY; Left  1960's: DENTAL EXTRACTION(S)      Comment:  wisdom teeth  Infant to 2001: MYRINGOTOMY; Bilateral      Comment:  13 ear surgeries  1980's: OTHER SURGICAL PROCEDURE; Right      Comment:  fluid drained off of bone air pockets behind ear bone  as child: TONSILLECTOMY AND ADENOIDECTOMY  Social History    Tobacco Use      Smoking " status: Never Smoker      Smokeless tobacco: Never Used    Vaping Use      Vaping Use: Never used    Alcohol use: Yes      Comment: 6 times per year    Drug use: No      Comment: tried marijuana at age 17    Review of patient's family history indicates:  Problem: Heart Disease      Relation: Mother          Age of Onset: 45          Comment: cabg  Problem: Heart Disease      Relation: Father          Age of Onset: (Not Specified)          Comment: cabg and valve replacement  Problem: Diabetes      Relation: Sister          Age of Onset: (Not Specified)  Problem: Diabetes      Relation: Other          Age of Onset: (Not Specified)      Current Outpatient Medications: •  spironolactone (ALDACTONE) 25 MG Tab, Take 1/2 (one-half) tablet by mouth once daily, Disp: 45 Tablet, Rfl: 0•  metformin (GLUCOPHAGE) 1000 MG tablet, TAKE 1 TABLET BY MOUTH TWICE DAILY WITH MEALS, Disp: 180 Tablet, Rfl: 0•  metoprolol SR (TOPROL XL) 50 MG TABLET SR 24 HR, Take 1 tablet by mouth once daily, Disp: 90 Tablet, Rfl: 3•  irbesartan-hydrochlorothiazide (AVALIDE) 300-12.5 MG per tablet, Take 1 tablet by mouth once daily, Disp: 90 Tablet, Rfl: 0•  rosuvastatin (CRESTOR) 40 MG tablet, TAKE 1 TABLET BY MOUTH ONCE DAILY . APPOINTMENT REQUIRED FOR FUTURE REFILLS, Disp: 90 Tablet, Rfl: 0•  Vitamins-Lipotropics (LIPO FLAVONOID PLUS) Tab, Take  by mouth., Disp: , Rfl:  •  Misc Natural Products (HEALTHY LIVER PO), Take  by mouth., Disp: , Rfl: •  loratadine-pseudoephedrine (CLARITIN-D 24 HOUR)  MG per tablet, Take 1 Tablet by mouth every day., Disp: , Rfl: •  omeprazole (PRILOSEC) 20 MG delayed-release capsule, Take 20 mg by mouth every day., Disp: , Rfl: •  azelastine (ASTELIN) 137 MCG/SPRAY nasal spray, Administer 2 Sprays into affected nostril(S) 2 times a day., Disp: , Rfl: •  acetaminophen (TYLENOL) 500 MG Tab, Take 1,000 mg by mouth every 6 hours as needed., Disp: , Rfl: •  aspirin (ASA) 81 MG Chew Tab chewable tablet, Take 1 Tab by mouth  "every day., Disp: 100 Tab, Rfl: 11    Patient was instructed on the use of medications, either prescriptions or OTC and informed on when the appropriate follow up time period should be. In addition, patient was also instructed that should any acute worsening occur that they should notify this clinic asap or call 911.          Review of Systems   Constitutional: Negative.  Negative for chills and fever.   HENT: Negative.  Negative for hearing loss.    Eyes: Negative.  Negative for blurred vision and double vision.   Respiratory: Negative.  Negative for cough and hemoptysis.    Cardiovascular: Negative.  Negative for chest pain and palpitations.   Gastrointestinal: Negative.  Negative for heartburn and nausea.   Genitourinary: Negative.  Negative for dysuria.   Musculoskeletal: Negative.  Negative for myalgias.   Skin: Negative.  Negative for rash.   Neurological: Negative.  Negative for dizziness, tingling and headaches.   Endo/Heme/Allergies: Negative.  Does not bruise/bleed easily.   Psychiatric/Behavioral: Negative.  Negative for depression and suicidal ideas.   All other systems reviewed and are negative.             Objective     /82 (BP Location: Left arm, Patient Position: Sitting, BP Cuff Size: Large adult)   Pulse (!) 125   Temp 36.2 °C (97.1 °F) (Temporal)   Resp 20   Ht 1.676 m (5' 6\")   Wt 119 kg (263 lb)   SpO2 95%   Breastfeeding No   BMI 42.45 kg/m²      Physical Exam  Vitals and nursing note reviewed.   Constitutional:       General: She is not in acute distress.     Appearance: She is well-developed. She is not diaphoretic.   HENT:      Head: Normocephalic and atraumatic.      Mouth/Throat:      Pharynx: No oropharyngeal exudate.   Eyes:      Pupils: Pupils are equal, round, and reactive to light.   Cardiovascular:      Rate and Rhythm: Normal rate and regular rhythm.      Heart sounds: Normal heart sounds. No murmur heard.  No friction rub. No gallop.    Pulmonary:      Effort: " Pulmonary effort is normal. No respiratory distress.      Breath sounds: Normal breath sounds. No wheezing or rales.   Chest:      Chest wall: No tenderness.   Neurological:      Mental Status: She is alert and oriented to person, place, and time.   Psychiatric:         Behavior: Behavior normal.         Thought Content: Thought content normal.         Judgment: Judgment normal.                             Assessment & Plan        1. Type 2 diabetes mellitus without complication, without long-term current use of insulin (HCC)    - POCT Hemoglobin A1C  - Referral to Ophthalmology    2. Benign essential HTN      3. Need for vaccination  - Shingles Vaccine (Shingrix)

## 2021-12-01 DIAGNOSIS — E11.9 TYPE 2 DIABETES MELLITUS TREATED WITHOUT INSULIN (HCC): ICD-10-CM

## 2021-12-01 RX ORDER — ROSUVASTATIN CALCIUM 40 MG/1
TABLET, COATED ORAL
Qty: 90 TABLET | Refills: 0 | Status: SHIPPED | OUTPATIENT
Start: 2021-12-01 | End: 2022-03-08

## 2021-12-13 DIAGNOSIS — I10 ESSENTIAL HYPERTENSION: ICD-10-CM

## 2021-12-14 RX ORDER — IRBESARTAN AND HYDROCHLOROTHIAZIDE 300; 12.5 MG/1; MG/1
TABLET, FILM COATED ORAL
Qty: 90 TABLET | Refills: 0 | Status: SHIPPED | OUTPATIENT
Start: 2021-12-14 | End: 2021-12-20 | Stop reason: SDUPTHER

## 2021-12-16 ENCOUNTER — OFFICE VISIT (OUTPATIENT)
Dept: SLEEP MEDICINE | Facility: MEDICAL CENTER | Age: 66
End: 2021-12-16
Payer: MEDICARE

## 2021-12-16 VITALS
BODY MASS INDEX: 41.78 KG/M2 | SYSTOLIC BLOOD PRESSURE: 130 MMHG | DIASTOLIC BLOOD PRESSURE: 70 MMHG | RESPIRATION RATE: 16 BRPM | WEIGHT: 260 LBS | HEART RATE: 94 BPM | OXYGEN SATURATION: 99 % | HEIGHT: 66 IN

## 2021-12-16 DIAGNOSIS — G47.33 OSA ON CPAP: ICD-10-CM

## 2021-12-16 DIAGNOSIS — G47.34 NOCTURNAL OXYGEN DESATURATION: ICD-10-CM

## 2021-12-16 PROCEDURE — 99214 OFFICE O/P EST MOD 30 MIN: CPT | Performed by: PREVENTIVE MEDICINE

## 2021-12-16 ASSESSMENT — FIBROSIS 4 INDEX: FIB4 SCORE: 2.03

## 2021-12-16 NOTE — PROGRESS NOTES
"CC: \"I guess I can get that new machine now that I have a different DME, although I would prefer to stay with CPAP and more.\"        HPI:  Shahana Nj is a 66 y.o.female  kindly referred by Jeanmarie Arias M.D.This patient has a past medical history of essential hypertension, right sided hearing loss with tinnitus, dyslipidemia, type 2 diabetes mellitus without complication and without long-term current use of insulin.  She had her first sleep study done approximately 20 years ago in [facility] she started Pap therapy at that time and has been using it consistently since then.  Her second sleep study was done here in [place] at [facility] however this facility is closed.  It was done approximately 7 years ago by Dr. Armstrong.  She has had the same machine since that study was completed 7 years ago.     She is a very compliant user she brought in the machine and her SD card today.  The results are as follows:    COMPLIANCE DATA: 100%  Machine type: ResMed air sense 10 Elite  Date range: November 16, 2021 through 12/15/2021   AHI: 1.5  TIME USED: 5 hours and 37 minutes  PRESSURE SETTINGS: Set as CPAP 13 cm water pressure EPR full-time EPR level 2  LEAK: Minimal      SLEEP STUDY RESULTS (faxed from CPAP and more in October 2021)  Location: [facility] [place]  Type of study: Split-night  Date: January 6, 2016  Diagnostic .9  Oxygen kanchan diagnostic: 81.0% with 48.7 minutes under an oxygen saturation of 90  Treatment AHI: 6.2  Treatment oxygen kanchan 82.0% minutes spent with O2 less than 90% 69.5 minutes  Titration from CPAP of 8 up to 14 and patient did best on 13 cmH2O    Patient reports that she was never told that her oxygen was low on her study done in 2016.    Patient Active Problem List    Diagnosis Date Noted   • Mixed hyperlipidemia 12/13/2018   • Benign essential HTN 12/13/2018   • Type 2 diabetes mellitus without complication, without long-term current use of insulin " "([facility]) 06/04/2018   • Bilateral hearing loss 04/30/2018       Past Medical History:   Diagnosis Date   • Arthritis     Osteo knees   • Dyslipidemia    • Essential hypertension    • GERD (gastroesophageal reflux disease)    • High cholesterol    • Left knee pain 11/19/2019   • Myocardial infarct ([facility])     Hx of 2 and states \"very Mild\". Last was approx 2014. No longer follows with cardiologist.   • AALIYAH (obstructive sleep apnea)     CPAP   • Snoring    • Type 2 diabetes mellitus ([facility])     11/19/19-Avg AM glucose=100.        Past Surgical History:   Procedure Laterality Date   • PB TOTAL KNEE ARTHROPLASTY Left 12/3/2019    Procedure: ARTHROPLASTY, KNEE, TOTAL;  Surgeon: Ryan Keen M.D.;  Location: SURGERY [facility];  Service: Orthopedics   • COLONOSCOPY  09/2019   • ABDOMINAL HYSTERECTOMY TOTAL  2004   • KNEE ARTHROSCOPY Right 1974   • RHINOPLASTY  1970   • BUNIONECTOMY Left early 2000's   • DENTAL EXTRACTION(S)  1960's    wisdom teeth   • MYRINGOTOMY Bilateral Infant to 2001    13 ear surgeries   • OTHER SURGICAL PROCEDURE Right 1980's    fluid drained off of bone air pockets behind ear bone   • TONSILLECTOMY AND ADENOIDECTOMY  as child       Family History   Problem Relation Age of Onset   • Heart Disease Mother 45        cabg   • Heart Disease Father         cabg and valve replacement   • Diabetes Sister    • Diabetes Other        Social History     Socioeconomic History   • Marital status: Single     Spouse name: Not on file   • Number of children: Not on file   • Years of education: Not on file   • Highest education level: Not on file   Occupational History   • Not on file   Tobacco Use   • Smoking status: Never Smoker   • Smokeless tobacco: Never Used   Vaping Use   • Vaping Use: Never used   Substance and Sexual Activity   • Alcohol use: Yes     Comment: 6 times per year   • Drug use: No     Comment: tried marijuana at age 17   • Sexual activity: Yes     Partners: Male   Other Topics Concern   • Not on " file   Social History Narrative   • Not on file     Social Determinants of Health     Financial Resource Strain:    • Difficulty of Paying Living Expenses: Not on file   Food Insecurity:    • Worried About Running Out of Food in the Last Year: Not on file   • Ran Out of Food in the Last Year: Not on file   Transportation Needs:    • Lack of Transportation (Medical): Not on file   • Lack of Transportation (Non-Medical): Not on file   Physical Activity:    • Days of Exercise per Week: Not on file   • Minutes of Exercise per Session: Not on file   Stress:    • Feeling of Stress : Not on file   Social Connections:    • Frequency of Communication with Friends and Family: Not on file   • Frequency of Social Gatherings with Friends and Family: Not on file   • Attends Anglican Services: Not on file   • Active Member of Clubs or Organizations: Not on file   • Attends Club or Organization Meetings: Not on file   • Marital Status: Not on file   Intimate Partner Violence:    • Fear of Current or Ex-Partner: Not on file   • Emotionally Abused: Not on file   • Physically Abused: Not on file   • Sexually Abused: Not on file   Housing Stability:    • Unable to Pay for Housing in the Last Year: Not on file   • Number of Places Lived in the Last Year: Not on file   • Unstable Housing in the Last Year: Not on file       Current Outpatient Medications   Medication Sig Dispense Refill   • irbesartan-hydrochlorothiazide (AVALIDE) 300-12.5 MG per tablet Take 1 tablet by mouth once daily 90 Tablet 0   • rosuvastatin (CRESTOR) 40 MG tablet TAKE 1 TABLET BY MOUTH ONCE DAILY APPOINTMENT  REQUIRED  FOR  FUTURE  REFILLS 90 Tablet 0   • spironolactone (ALDACTONE) 25 MG Tab Take 1/2 (one-half) tablet by mouth once daily 45 Tablet 0   • metformin (GLUCOPHAGE) 1000 MG tablet TAKE 1 TABLET BY MOUTH TWICE DAILY WITH MEALS 180 Tablet 0   • metoprolol SR (TOPROL XL) 50 MG TABLET SR 24 HR Take 1 tablet by mouth once daily 90 Tablet 3   •  "Vitamins-Lipotropics (LIPO FLAVONOID PLUS) Tab Take  by mouth.     • Misc Natural Products (HEALTHY LIVER PO) Take  by mouth.     • loratadine-pseudoephedrine (CLARITIN-D 24 HOUR)  MG per tablet Take 1 Tablet by mouth every day.     • omeprazole (PRILOSEC) 20 MG delayed-release capsule Take 20 mg by mouth every day.     • azelastine (ASTELIN) 137 MCG/SPRAY nasal spray Administer 2 Sprays into affected nostril(S) 2 times a day.     • acetaminophen (TYLENOL) 500 MG Tab Take 1,000 mg by mouth every 6 hours as needed.     • aspirin (ASA) 81 MG Chew Tab chewable tablet Take 1 Tab by mouth every day. 100 Tab 11     No current facility-administered medications for this visit.    \"CURRENT RX\"    ALLERGIES: Pcn [penicillins] and Tape    ROS    Constitutional: Denies  weight loss, fatigue  Cardiovascular: Denies chest pain, tightness, palpitations, swelling in legs/feet, difficulty breathing when lying down but gets better when sitting up.   Respiratory: Denies shortness of breath during the day  Sleep: per HPI  Gastrointestinal: Denies  difficulty swallowing, heartburn.  Musculoskeletal: Denies painful joints, sore muscles, back pain.        PHYSICAL EXAM    NECK CIRCUMFERENCE:   /70 (BP Location: Left arm, Patient Position: Sitting, BP Cuff Size: Adult) Comment (BP Location): left wrist  Pulse 94   Resp 16   Ht 1.676 m (5' 6\")   Wt 118 kg (260 lb)   SpO2 99%   BMI 41.97 kg/m²   Appearance: Well-nourished, looks stated age, no acute distress  Eyes:   EOMI  ENMT: masked  Neck: Supple, trachea midline, no masses  Respiratory effort:  No intercostal retractions or use of accessory muscles  Lung auscultation:  No wheezes rhonchi rubs or rales  Cardiac: No murmurs, rubs, or gallops; regular rhythm, normal rate; no edema  Musculoskeletal:  Grossly normal; gait and station normal; digits and nails normal  Skin:  No cyanosis  Neurologic: oriented to person, time, place, and purpose; judgement intact  Psychiatric:  " No depression, anxiety, agitation      Medical Decision Making       The medical record was reviewed in its entirety including the referral notes, records from primary care, consultants notes, hospital records, lab, imaging, microbiology, immunology, and immunizations.  Care gaps identified and reviewed with the patient.    Diagnostic and titration nocturnal polysomnogram's, home sleep apnea tests, continuous nocturnal oximetry results, multiple sleep latency tests, and compliance reports reviewed.    ASSESSMENT/PLAN:    1. AALIYAH on CPAP    - Overnight Oximetry; Future  -Order for new APAP machine [facility]    2. Nocturnal oxygen desaturation    - Overnight Oximetry; Future      A ResMed APAP machine will be ordered    Minimum pressure of 13 with a maximum pressure of 17 cm of water and heated humidification with data available on cloud.. Access to this patient's compliance data and usage data will allow for a further empiric titration. A careful mask fit is required.  Mask per patient's choice.  (Order written at last appointment in October 2021)    Once patient is compliant on her new APAP therapy and overnight oximetry will be done (while she is using her Pap therapy) to confirm or to determine if her oxygen saturation at night is adequate.      Has been advised to continue the current Pap Therapy.  Also advised to clean equipment frequently, and get new mask and supplies as allowed by insurance and DME.  Patient was advised to NEVER use  OZONE containing cleaning systems such as So Clean.    The risks of untreated sleep apnea were discussed with the patient at length. Patients with AALIYAH are at increased risk of cardiovascular disease including coronary artery disease, systemic arterial hypertension, pulmonary arterial hypertension, cardiac arrhythmias, and stroke. AALIYAH patients have an increased risk of motor vehicle accidents, type 2 diabetes, chronic kidney disease, and non-alcoholic  liver disease. The patient was advised to avoid driving a motor vehicle when drowsy.      Have advised the patient to follow up with the appropriate healthcare practitioners for all other medical problems and issues.      RTC 10 weeks for first compliance check

## 2021-12-20 DIAGNOSIS — I10 ESSENTIAL HYPERTENSION: ICD-10-CM

## 2021-12-20 RX ORDER — METOPROLOL SUCCINATE 50 MG/1
50 TABLET, EXTENDED RELEASE ORAL DAILY
Qty: 100 TABLET | Refills: 3 | Status: SHIPPED | OUTPATIENT
Start: 2021-12-20 | End: 2022-07-29

## 2021-12-20 RX ORDER — IRBESARTAN AND HYDROCHLOROTHIAZIDE 300; 12.5 MG/1; MG/1
1 TABLET, FILM COATED ORAL DAILY
Qty: 100 TABLET | Refills: 3 | Status: SHIPPED | OUTPATIENT
Start: 2021-12-20 | End: 2022-06-06 | Stop reason: SDUPTHER

## 2022-01-18 ENCOUNTER — TELEPHONE (OUTPATIENT)
Dept: HEALTH INFORMATION MANAGEMENT | Facility: OTHER | Age: 67
End: 2022-01-18

## 2022-01-19 ENCOUNTER — TELEPHONE (OUTPATIENT)
Dept: NEPHROLOGY | Facility: MEDICAL CENTER | Age: 67
End: 2022-01-19

## 2022-01-19 DIAGNOSIS — N18.9 CHRONIC KIDNEY DISEASE, UNSPECIFIED CKD STAGE: ICD-10-CM

## 2022-01-20 ENCOUNTER — HOSPITAL ENCOUNTER (OUTPATIENT)
Dept: LAB | Facility: MEDICAL CENTER | Age: 67
End: 2022-01-20
Attending: INTERNAL MEDICINE
Payer: MEDICARE

## 2022-01-20 ENCOUNTER — TELEPHONE (OUTPATIENT)
Dept: SLEEP MEDICINE | Facility: MEDICAL CENTER | Age: 67
End: 2022-01-20

## 2022-01-20 DIAGNOSIS — N18.9 CHRONIC KIDNEY DISEASE, UNSPECIFIED CKD STAGE: ICD-10-CM

## 2022-01-20 PROCEDURE — 80048 BASIC METABOLIC PNL TOTAL CA: CPT

## 2022-01-20 PROCEDURE — 85027 COMPLETE CBC AUTOMATED: CPT

## 2022-01-20 PROCEDURE — 36415 COLL VENOUS BLD VENIPUNCTURE: CPT

## 2022-01-20 NOTE — TELEPHONE ENCOUNTER
Patient came into office to inform us that she should be receiving PAP soon and wanted to see if she should keep appointments as is.     Informed pt to keep appointments but if she does not get her new PAP by 02/04/22 to r/s OPO as Dr. Aguilar would like pt to complete OPO on her NEW CPAP.     Patient states she understands.

## 2022-01-21 LAB
ANION GAP SERPL CALC-SCNC: 14 MMOL/L (ref 7–16)
BUN SERPL-MCNC: 31 MG/DL (ref 8–22)
CALCIUM SERPL-MCNC: 11.2 MG/DL (ref 8.5–10.5)
CHLORIDE SERPL-SCNC: 100 MMOL/L (ref 96–112)
CO2 SERPL-SCNC: 22 MMOL/L (ref 20–33)
CREAT SERPL-MCNC: 1.21 MG/DL (ref 0.5–1.4)
ERYTHROCYTE [DISTWIDTH] IN BLOOD BY AUTOMATED COUNT: 47.4 FL (ref 35.9–50)
GLUCOSE SERPL-MCNC: 90 MG/DL (ref 65–99)
HCT VFR BLD AUTO: 47.2 % (ref 37–47)
HGB BLD-MCNC: 14.2 G/DL (ref 12–16)
MCH RBC QN AUTO: 24.1 PG (ref 27–33)
MCHC RBC AUTO-ENTMCNC: 30.1 G/DL (ref 33.6–35)
MCV RBC AUTO: 80 FL (ref 81.4–97.8)
PLATELET # BLD AUTO: 265 K/UL (ref 164–446)
PMV BLD AUTO: 11 FL (ref 9–12.9)
POTASSIUM SERPL-SCNC: 3.9 MMOL/L (ref 3.6–5.5)
RBC # BLD AUTO: 5.9 M/UL (ref 4.2–5.4)
SODIUM SERPL-SCNC: 136 MMOL/L (ref 135–145)
WBC # BLD AUTO: 9.1 K/UL (ref 4.8–10.8)

## 2022-01-25 ENCOUNTER — OFFICE VISIT (OUTPATIENT)
Dept: NEPHROLOGY | Facility: MEDICAL CENTER | Age: 67
End: 2022-01-25
Payer: MEDICARE

## 2022-01-25 VITALS
TEMPERATURE: 97.4 F | HEIGHT: 66 IN | DIASTOLIC BLOOD PRESSURE: 70 MMHG | OXYGEN SATURATION: 92 % | RESPIRATION RATE: 16 BRPM | WEIGHT: 260 LBS | SYSTOLIC BLOOD PRESSURE: 118 MMHG | BODY MASS INDEX: 41.78 KG/M2 | HEART RATE: 109 BPM

## 2022-01-25 DIAGNOSIS — E11.9 TYPE 2 DIABETES MELLITUS WITHOUT COMPLICATION, WITHOUT LONG-TERM CURRENT USE OF INSULIN (HCC): ICD-10-CM

## 2022-01-25 DIAGNOSIS — N18.32 STAGE 3B CHRONIC KIDNEY DISEASE: ICD-10-CM

## 2022-01-25 DIAGNOSIS — I10 PRIMARY HYPERTENSION: ICD-10-CM

## 2022-01-25 PROCEDURE — 99214 OFFICE O/P EST MOD 30 MIN: CPT | Performed by: INTERNAL MEDICINE

## 2022-01-25 ASSESSMENT — ENCOUNTER SYMPTOMS
VOMITING: 0
HYPERTENSION: 1
CHILLS: 0
COUGH: 0
NAUSEA: 0
SHORTNESS OF BREATH: 0
FEVER: 0

## 2022-01-25 ASSESSMENT — FIBROSIS 4 INDEX: FIB4 SCORE: 1.81

## 2022-01-25 NOTE — PROGRESS NOTES
"Subjective     Shahana Nj is a 66 y.o. female who presents with Hypertension and Chronic Kidney Disease            Hypertension  This is a chronic problem. The current episode started more than 1 year ago. The problem is unchanged. The problem is controlled. Pertinent negatives include no chest pain, malaise/fatigue, peripheral edema or shortness of breath. Risk factors for coronary artery disease include diabetes mellitus, obesity and post-menopausal state. Past treatments include angiotensin blockers and diuretics. The current treatment provides significant improvement. There are no compliance problems.  Hypertensive end-organ damage includes kidney disease. Identifiable causes of hypertension include chronic renal disease.   Chronic Kidney Disease  This is a chronic problem. The current episode started more than 1 year ago. The problem occurs constantly. Pertinent negatives include no chest pain, chills, coughing, fever, nausea, urinary symptoms or vomiting.       Review of Systems   Constitutional: Negative for chills, fever and malaise/fatigue.   Respiratory: Negative for cough and shortness of breath.    Cardiovascular: Negative for chest pain and leg swelling.   Gastrointestinal: Negative for nausea and vomiting.   Genitourinary: Negative for dysuria, frequency and urgency.              Objective     /70 (BP Location: Right arm, Patient Position: Sitting)   Pulse (!) 109   Temp 36.3 °C (97.4 °F) (Temporal)   Resp 16   Ht 1.676 m (5' 6\")   Wt 118 kg (260 lb)   SpO2 92%   BMI 41.97 kg/m²      Physical Exam  Vitals and nursing note reviewed.   Constitutional:       General: She is not in acute distress.     Appearance: Normal appearance. She is well-developed. She is not diaphoretic.   HENT:      Head: Normocephalic and atraumatic.      Right Ear: External ear normal.      Left Ear: External ear normal.      Nose: Nose normal.   Eyes:      General: No scleral icterus.        Right eye: No " "discharge.         Left eye: No discharge.      Conjunctiva/sclera: Conjunctivae normal.   Cardiovascular:      Rate and Rhythm: Normal rate and regular rhythm.      Heart sounds: No murmur heard.      Pulmonary:      Effort: Pulmonary effort is normal. No respiratory distress.      Breath sounds: Normal breath sounds.   Musculoskeletal:         General: No tenderness.      Right lower leg: No edema.      Left lower leg: No edema.   Skin:     General: Skin is warm and dry.      Findings: No erythema.   Neurological:      General: No focal deficit present.      Mental Status: She is alert and oriented to person, place, and time.      Cranial Nerves: No cranial nerve deficit.   Psychiatric:         Mood and Affect: Mood normal.         Behavior: Behavior normal.       Past Medical History:   Diagnosis Date   • Arthritis     Osteo knees   • Dyslipidemia    • Essential hypertension    • GERD (gastroesophageal reflux disease)    • High cholesterol    • Left knee pain 11/19/2019   • Myocardial infarct (HCC)     Hx of 2 and states \"very Mild\". Last was approx 2014. No longer follows with cardiologist.   • AALIYAH (obstructive sleep apnea)     CPAP   • Snoring    • Type 2 diabetes mellitus (HCC)     11/19/19-Avg AM glucose=100.     Social History     Socioeconomic History   • Marital status: Single     Spouse name: Not on file   • Number of children: Not on file   • Years of education: Not on file   • Highest education level: Not on file   Occupational History   • Not on file   Tobacco Use   • Smoking status: Never Smoker   • Smokeless tobacco: Never Used   Vaping Use   • Vaping Use: Never used   Substance and Sexual Activity   • Alcohol use: Yes     Comment: 6 times per year   • Drug use: No     Comment: tried marijuana at age 17   • Sexual activity: Yes     Partners: Male   Other Topics Concern   • Not on file   Social History Narrative   • Not on file     Social Determinants of Health     Financial Resource Strain:    • " Difficulty of Paying Living Expenses: Not on file   Food Insecurity:    • Worried About Running Out of Food in the Last Year: Not on file   • Ran Out of Food in the Last Year: Not on file   Transportation Needs:    • Lack of Transportation (Medical): Not on file   • Lack of Transportation (Non-Medical): Not on file   Physical Activity:    • Days of Exercise per Week: Not on file   • Minutes of Exercise per Session: Not on file   Stress:    • Feeling of Stress : Not on file   Social Connections:    • Frequency of Communication with Friends and Family: Not on file   • Frequency of Social Gatherings with Friends and Family: Not on file   • Attends Mu-ism Services: Not on file   • Active Member of Clubs or Organizations: Not on file   • Attends Club or Organization Meetings: Not on file   • Marital Status: Not on file   Intimate Partner Violence:    • Fear of Current or Ex-Partner: Not on file   • Emotionally Abused: Not on file   • Physically Abused: Not on file   • Sexually Abused: Not on file   Housing Stability:    • Unable to Pay for Housing in the Last Year: Not on file   • Number of Places Lived in the Last Year: Not on file   • Unstable Housing in the Last Year: Not on file     Family History   Problem Relation Age of Onset   • Heart Disease Mother 45        cabg   • Heart Disease Father         cabg and valve replacement   • Diabetes Sister    • Diabetes Other      Recent Labs     07/30/21  0702 07/30/21  0702 09/22/21  1048 10/28/21  0854 01/20/22  1215   ALBUMIN 4.4  --  3.79  --   --    HDL 37*  --   --   --   --    TRIGLYCERIDE 133  --   --   --   --    SODIUM 135   < > 137 139 136   POTASSIUM 4.4   < > 4.2 4.3 3.9   CHLORIDE 100   < > 104 104 100   CO2 21   < > 20 19* 22   BUN 36*   < > 23* 25* 31*   CREATININE 1.26   < > 1.05 1.25 1.21    < > = values in this interval not displayed.                             Assessment & Plan        1. Stage 3b chronic kidney disease (HCC)  Stable  No uremic  symptoms  Renal dose of medication  Avoid nephrotoxins  Continue same medication regimen      2. Primary hypertension  Controlled  Continue same medication regimen  Continue low-sodium diet  Patient was advised to check blood pressure at home regularly and call us with the results    3. Type 2 diabetes mellitus without complication, without long-term current use of insulin (HCC)  Continue to optimize diabetes control for hemoglobin A1c below 7%

## 2022-02-01 ENCOUNTER — OFFICE VISIT (OUTPATIENT)
Dept: MEDICAL GROUP | Facility: PHYSICIAN GROUP | Age: 67
End: 2022-02-01
Payer: MEDICARE

## 2022-02-01 VITALS
WEIGHT: 255 LBS | DIASTOLIC BLOOD PRESSURE: 84 MMHG | HEART RATE: 107 BPM | BODY MASS INDEX: 40.98 KG/M2 | OXYGEN SATURATION: 93 % | RESPIRATION RATE: 12 BRPM | HEIGHT: 66 IN | SYSTOLIC BLOOD PRESSURE: 128 MMHG | TEMPERATURE: 98.2 F

## 2022-02-01 DIAGNOSIS — Z79.2 NEED FOR PROPHYLACTIC ANTIBIOTIC: ICD-10-CM

## 2022-02-01 DIAGNOSIS — B88.8 INFESTATION BY BED BUG: ICD-10-CM

## 2022-02-01 PROCEDURE — 99214 OFFICE O/P EST MOD 30 MIN: CPT | Performed by: NURSE PRACTITIONER

## 2022-02-01 RX ORDER — AMOXICILLIN 500 MG/1
500 CAPSULE ORAL ONCE
Qty: 4 CAPSULE | Refills: 0 | Status: CANCELLED | OUTPATIENT
Start: 2022-02-01 | End: 2022-02-01

## 2022-02-01 RX ORDER — AZITHROMYCIN 500 MG/1
1000 TABLET, FILM COATED ORAL ONCE
Qty: 2 TABLET | Refills: 1 | Status: SHIPPED | OUTPATIENT
Start: 2022-02-01 | End: 2022-02-01

## 2022-02-01 ASSESSMENT — PATIENT HEALTH QUESTIONNAIRE - PHQ9: CLINICAL INTERPRETATION OF PHQ2 SCORE: 0

## 2022-02-01 ASSESSMENT — FIBROSIS 4 INDEX: FIB4 SCORE: 1.81

## 2022-02-01 NOTE — PROGRESS NOTES
Chief Complaint   Patient presents with   • Follow-Up     needs antiobiotics prior to cataract surgery        HISTORY OF PRESENT ILLNESS: Patient is a 66 y.o. female, established patient who presents today to discuss medical problems as listed below:    Need for prophylactic antibiotic  Patient is scheduled for cataract surgery starting February 8 on the right eye, followed by left eye week later.  Patient was told she needs a prophylactic antibiotic for surgery.  Patient was told she needs prophylactic antibiotic therapy to cover for bedbugs that she is currently experiencing.  Patient denies currently working on bedbug eradication at home.  No skin inflammation noted from bedbugs.  She is allergic to penicillin.      Patient Active Problem List    Diagnosis Date Noted   • Need for prophylactic antibiotic 02/01/2022   • Mixed hyperlipidemia 12/13/2018   • Benign essential HTN 12/13/2018   • Type 2 diabetes mellitus without complication, without long-term current use of insulin (HCC) 06/04/2018   • Bilateral hearing loss 04/30/2018        Allergies: Pcn [penicillins] and Tape    Current Outpatient Medications   Medication Sig Dispense Refill   • azithromycin (ZITHROMAX) 500 MG tablet Take 2 Tablets by mouth one time for 1 dose. 2 Tablet 1   • irbesartan-hydrochlorothiazide (AVALIDE) 300-12.5 MG per tablet Take 1 Tablet by mouth every day. 100 Tablet 3   • metoprolol SR (TOPROL XL) 50 MG TABLET SR 24 HR Take 1 Tablet by mouth every day. 100 Tablet 3   • rosuvastatin (CRESTOR) 40 MG tablet TAKE 1 TABLET BY MOUTH ONCE DAILY APPOINTMENT  REQUIRED  FOR  FUTURE  REFILLS 90 Tablet 0   • spironolactone (ALDACTONE) 25 MG Tab Take 1/2 (one-half) tablet by mouth once daily 45 Tablet 0   • metformin (GLUCOPHAGE) 1000 MG tablet TAKE 1 TABLET BY MOUTH TWICE DAILY WITH MEALS 180 Tablet 0   • Vitamins-Lipotropics (LIPO FLAVONOID PLUS) Tab Take  by mouth.     • Misc Natural Products (HEALTHY LIVER PO) Take  by mouth.     •  "loratadine-pseudoephedrine (CLARITIN-D 24 HOUR)  MG per tablet Take 1 Tablet by mouth every day.     • omeprazole (PRILOSEC) 20 MG delayed-release capsule Take 20 mg by mouth every day.     • azelastine (ASTELIN) 137 MCG/SPRAY nasal spray Administer 2 Sprays into affected nostril(S) 2 times a day.     • acetaminophen (TYLENOL) 500 MG Tab Take 1,000 mg by mouth every 6 hours as needed.     • aspirin (ASA) 81 MG Chew Tab chewable tablet Take 1 Tab by mouth every day. 100 Tab 11     No current facility-administered medications for this visit.       Social History     Tobacco Use   • Smoking status: Never Smoker   • Smokeless tobacco: Never Used   Vaping Use   • Vaping Use: Never used   Substance Use Topics   • Alcohol use: Yes     Comment: 6 times per year   • Drug use: No     Comment: tried marijuana at age 17     Social History     Social History Narrative   • Not on file       Family History   Problem Relation Age of Onset   • Heart Disease Mother 45        cabg   • Heart Disease Father         cabg and valve replacement   • Diabetes Sister    • Diabetes Other        Allergies, past medical history, past surgical history, family history, social history reviewed and updated.    Review of Systems:     - Constitutional: Negative for fever, chills, unexpected weight change, and fatigue/generalized weakness.     - Respiratory: Negative for cough, sputum production, chest congestion, dyspnea, wheezing, and crackles.      - Cardiovascular: Negative for chest pain, palpitations, orthopnea, and bilateral lower extremity edema.     - Psychiatric/Behavioral: Negative for depression, suicidal/homicidal ideation and memory loss.      All other systems reviewed and are negative    Exam:    /84 (BP Location: Left arm, Patient Position: Sitting, BP Cuff Size: Large adult)   Pulse (!) 107   Temp 36.8 °C (98.2 °F) (Temporal)   Resp 12   Ht 1.676 m (5' 6\")   Wt 116 kg (255 lb)   SpO2 93%   Breastfeeding No   BMI " 41.16 kg/m²  Body mass index is 41.16 kg/m².    Physical Exam:  Constitutional: Well-developed and well-nourished. Not diaphoretic. No distress.   Skin: Skin is warm and dry. No rash noted.  Cardiovascular: Regular rate and rhythm, S1 and S2 without murmur, rubs, or gallops.    Chest: Effort normal. Clear to auscultation throughout. No adventitious sounds.   Neurological: Alert and oriented x 3.   Psychiatric:  Behavior, mood, and affect are appropriate.  MA/nursing note and vitals reviewed.    Assessment/Plan:  1. Need for prophylactic antibiotic  Take  60 mins prior to procedure.   - azithromycin (ZITHROMAX) 500 MG tablet; Take 2 Tablets by mouth one time for 1 dose.  Dispense: 2 Tablet; Refill: 1     2. Infestation by bed bugs   improving. Educated on etiology. Continue eradication process. Pt consulted bed bug eradication specialist a wk ago. Pt stated she has it under control.     Discussed with patient possible alternative diagnoses, pt is to take all medications as prescribed. If symptoms persist FU w/PCP, if symptoms worsen go to emergency room. If experiencing any side effects from prescribed medications reports to the office immediately or go to emergency room.  Reviewed indication, dosage, usage and potential adverse effects of prescribed medications. Reviewed risks and benefits of treatment plan. Patient verbalizes understanding of all instruction and verbally agrees to plan.    No follow-ups on file. PRN

## 2022-02-08 ENCOUNTER — TELEPHONE (OUTPATIENT)
Dept: MEDICAL GROUP | Facility: PHYSICIAN GROUP | Age: 67
End: 2022-02-08

## 2022-02-08 ENCOUNTER — OFFICE VISIT (OUTPATIENT)
Dept: MEDICAL GROUP | Facility: PHYSICIAN GROUP | Age: 67
End: 2022-02-08
Payer: MEDICARE

## 2022-02-08 VITALS
WEIGHT: 255 LBS | TEMPERATURE: 97.2 F | OXYGEN SATURATION: 96 % | HEART RATE: 116 BPM | BODY MASS INDEX: 40.98 KG/M2 | RESPIRATION RATE: 12 BRPM | HEIGHT: 66 IN | DIASTOLIC BLOOD PRESSURE: 70 MMHG | SYSTOLIC BLOOD PRESSURE: 124 MMHG
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
## 2022-02-08 PROCEDURE — 99213 OFFICE O/P EST LOW 20 MIN: CPT | Performed by: STUDENT IN AN ORGANIZED HEALTH CARE EDUCATION/TRAINING PROGRAM

## 2022-02-08 ASSESSMENT — FIBROSIS 4 INDEX: FIB4 SCORE: 1.81

## 2022-02-08 NOTE — PROGRESS NOTES
"  HISTORY OF PRESENT ILLNESS: Shahana is a pleasant 66 y.o. female, established patient who presents today to discuss medical problems as listed below:        Current Outpatient Medications Ordered in Epic   Medication Sig Dispense Refill   • irbesartan-hydrochlorothiazide (AVALIDE) 300-12.5 MG per tablet Take 1 Tablet by mouth every day. 100 Tablet 3   • metoprolol SR (TOPROL XL) 50 MG TABLET SR 24 HR Take 1 Tablet by mouth every day. 100 Tablet 3   • rosuvastatin (CRESTOR) 40 MG tablet TAKE 1 TABLET BY MOUTH ONCE DAILY APPOINTMENT  REQUIRED  FOR  FUTURE  REFILLS 90 Tablet 0   • spironolactone (ALDACTONE) 25 MG Tab Take 1/2 (one-half) tablet by mouth once daily 45 Tablet 0   • metformin (GLUCOPHAGE) 1000 MG tablet TAKE 1 TABLET BY MOUTH TWICE DAILY WITH MEALS 180 Tablet 0   • Vitamins-Lipotropics (LIPO FLAVONOID PLUS) Tab Take  by mouth.     • Misc Natural Products (HEALTHY LIVER PO) Take  by mouth.     • loratadine-pseudoephedrine (CLARITIN-D 24 HOUR)  MG per tablet Take 1 Tablet by mouth every day.     • omeprazole (PRILOSEC) 20 MG delayed-release capsule Take 20 mg by mouth every day.     • azelastine (ASTELIN) 137 MCG/SPRAY nasal spray Administer 2 Sprays into affected nostril(S) 2 times a day.     • acetaminophen (TYLENOL) 500 MG Tab Take 1,000 mg by mouth every 6 hours as needed.     • aspirin (ASA) 81 MG Chew Tab chewable tablet Take 1 Tab by mouth every day. 100 Tab 11     No current [facility]-ordered facility-administered medications on file.       ROS     Past Medical History:   Diagnosis Date   • Arthritis     Osteo knees   • Dyslipidemia    • Essential hypertension    • GERD (gastroesophageal reflux disease)    • High cholesterol    • Left knee pain 11/19/2019   • Myocardial infarct (HCC)     Hx of 2 and states \"very Mild\". Last was approx 2014. No longer follows with cardiologist.   • AALIYAH (obstructive sleep apnea)     CPAP   • Snoring    • Type 2 diabetes mellitus (HCC)     11/19/19-Avg AM glucose=100. "     Past Surgical History:   Procedure Laterality Date   • PB TOTAL KNEE ARTHROPLASTY Left 12/3/2019    Procedure: ARTHROPLASTY, KNEE, TOTAL;  Surgeon: Ryan Keen M.D.;  Location: SURGERY [facility];  Service: Orthopedics   • COLONOSCOPY  09/2019   • ABDOMINAL HYSTERECTOMY TOTAL  2004   • KNEE ARTHROSCOPY Right 1974   • RHINOPLASTY  1970   • BUNIONECTOMY Left early 2000's   • DENTAL EXTRACTION(S)  1960's    wisdom teeth   • MYRINGOTOMY Bilateral Infant to 2001    13 ear surgeries   • OTHER SURGICAL PROCEDURE Right 1980's    fluid drained off of bone air pockets behind ear bone   • TONSILLECTOMY AND ADENOIDECTOMY  as child     Social History     Tobacco Use   • Smoking status: Never Smoker   • Smokeless tobacco: Never Used   Vaping Use   • Vaping Use: Never used   Substance Use Topics   • Alcohol use: Yes     Comment: 6 times per year   • Drug use: No     Comment: tried marijuana at age 17      Family History   Problem Relation Age of Onset   • Heart Disease Mother 45        cabg   • Heart Disease Father         cabg and valve replacement   • Diabetes Sister    • Diabetes Other      Current Outpatient Medications   Medication Sig Dispense Refill   • irbesartan-hydrochlorothiazide (AVALIDE) 300-12.5 MG per tablet Take 1 Tablet by mouth every day. 100 Tablet 3   • metoprolol SR (TOPROL XL) 50 MG TABLET SR 24 HR Take 1 Tablet by mouth every day. 100 Tablet 3   • rosuvastatin (CRESTOR) 40 MG tablet TAKE 1 TABLET BY MOUTH ONCE DAILY APPOINTMENT  REQUIRED  FOR  FUTURE  REFILLS 90 Tablet 0   • spironolactone (ALDACTONE) 25 MG Tab Take 1/2 (one-half) tablet by mouth once daily 45 Tablet 0   • metformin (GLUCOPHAGE) 1000 MG tablet TAKE 1 TABLET BY MOUTH TWICE DAILY WITH MEALS 180 Tablet 0   • Vitamins-Lipotropics (LIPO FLAVONOID PLUS) Tab Take  by mouth.     • Misc Natural Products (HEALTHY LIVER PO) Take  by mouth.     • loratadine-pseudoephedrine (CLARITIN-D 24 HOUR)  MG per tablet Take 1 Tablet by mouth  "every day.     • omeprazole (PRILOSEC) 20 MG delayed-release capsule Take 20 mg by mouth every day.     • azelastine (ASTELIN) 137 MCG/SPRAY nasal spray Administer 2 Sprays into affected nostril(S) 2 times a day.     • acetaminophen (TYLENOL) 500 MG Tab Take 1,000 mg by mouth every 6 hours as needed.     • aspirin (ASA) 81 MG Chew Tab chewable tablet Take 1 Tab by mouth every day. 100 Tab 11     No current facility-administered medications for this visit.       Allergies:  Allergies   Allergen Reactions   • Pcn [Penicillins] Swelling     All over swelling like a balloon when she was a child   • Tape Rash     Plastic and cloth tape is OK  *NO PAPER TAPE*       Allergies, past medical history, past surgical history, family history, social history reviewed and updated.    Objective:    /70   Pulse (!) 116   Temp 36.2 °C (97.2 °F) (Temporal)   Resp 12   Ht 1.676 m (5' 6\")   Wt 116 kg (255 lb)   SpO2 96%   BMI 41.16 kg/m²    Body mass index is 41.16 kg/m².    Physical Exam  Skin:     Comments: Small scatter slightly erythematous papules on bilateral forearms.          Assessment/Plan:    Problem List Items Addressed This Visit     Bedbug bite     Patient's bedbug bites seem to be resolving.  The ultimate resolution will come from finishing the past control on her house which will finish on Thursday.  I advised I can clear her after Thursday as long as she is not having any more new bites.  She will need to call in on Thursday to report that they have finished the de-bugging her house and I will leave a letter for her out front.  I also advised her to oil, wash in hot water all her clothing and bedding. Bits appear to be resolving                Return if symptoms worsen or fail to improve.   "

## 2022-02-08 NOTE — TELEPHONE ENCOUNTER
Patient called and said that the letter she got for surgery clearance needs to say in addition than the medication she got is addressing her rash and the bed bug issue was address. If letter could be fax to [phone] today ASAP will be appreciate it. So she can have her procedure done thank you

## 2022-02-08 NOTE — ASSESSMENT & PLAN NOTE
Patient's bedbug bites seem to be resolving.  The ultimate resolution will come from finishing the past control on her house which will finish on Thursday.  I advised I can clear her after Thursday as long as she is not having any more new bites.  She will need to call in on Thursday to report that they have finished the de-bugging her house and I will leave a letter for her out front.  I also advised her to oil, wash in hot water all her clothing and bedding. Bits appear to be resolving

## 2022-02-28 ENCOUNTER — OFFICE VISIT (OUTPATIENT)
Dept: MEDICAL GROUP | Facility: PHYSICIAN GROUP | Age: 67
End: 2022-02-28
Payer: MEDICARE

## 2022-02-28 VITALS
TEMPERATURE: 98.3 F | SYSTOLIC BLOOD PRESSURE: 122 MMHG | HEART RATE: 101 BPM | RESPIRATION RATE: 16 BRPM | BODY MASS INDEX: 41.78 KG/M2 | OXYGEN SATURATION: 97 % | HEIGHT: 66 IN | DIASTOLIC BLOOD PRESSURE: 74 MMHG | WEIGHT: 260 LBS

## 2022-02-28 DIAGNOSIS — Z23 NEED FOR VACCINATION: ICD-10-CM

## 2022-02-28 DIAGNOSIS — E11.9 TYPE 2 DIABETES MELLITUS WITHOUT COMPLICATION, WITHOUT LONG-TERM CURRENT USE OF INSULIN (HCC): ICD-10-CM

## 2022-02-28 DIAGNOSIS — E66.01 MORBID OBESITY WITH BMI OF 40.0-44.9, ADULT (HCC): ICD-10-CM

## 2022-02-28 DIAGNOSIS — L98.9 SKIN LESION: ICD-10-CM

## 2022-02-28 LAB
HBA1C MFR BLD: 7.4 % (ref 0–5.6)
INT CON NEG: NEGATIVE
INT CON POS: POSITIVE

## 2022-02-28 PROCEDURE — G0009 ADMIN PNEUMOCOCCAL VACCINE: HCPCS | Performed by: FAMILY MEDICINE

## 2022-02-28 PROCEDURE — 99214 OFFICE O/P EST MOD 30 MIN: CPT | Mod: 25 | Performed by: FAMILY MEDICINE

## 2022-02-28 PROCEDURE — 90732 PPSV23 VACC 2 YRS+ SUBQ/IM: CPT | Performed by: FAMILY MEDICINE

## 2022-02-28 PROCEDURE — 83036 HEMOGLOBIN GLYCOSYLATED A1C: CPT | Performed by: FAMILY MEDICINE

## 2022-02-28 RX ORDER — AZITHROMYCIN 500 MG/1
TABLET, FILM COATED ORAL
COMMUNITY
Start: 2022-02-01 | End: 2022-02-28

## 2022-02-28 ASSESSMENT — ENCOUNTER SYMPTOMS
HEADACHES: 0
MUSCULOSKELETAL NEGATIVE: 1
BLURRED VISION: 0
TINGLING: 0
CONSTITUTIONAL NEGATIVE: 1
NAUSEA: 0
DOUBLE VISION: 0
DIZZINESS: 0
FEVER: 0
MYALGIAS: 0
HEARTBURN: 0
COUGH: 0
EYES NEGATIVE: 1
GASTROINTESTINAL NEGATIVE: 1
PSYCHIATRIC NEGATIVE: 1
PALPITATIONS: 0
RESPIRATORY NEGATIVE: 1
NEUROLOGICAL NEGATIVE: 1
DEPRESSION: 0
HEMOPTYSIS: 0
CHILLS: 0
CARDIOVASCULAR NEGATIVE: 1
BRUISES/BLEEDS EASILY: 0

## 2022-02-28 ASSESSMENT — FIBROSIS 4 INDEX: FIB4 SCORE: 1.81

## 2022-02-28 NOTE — PROGRESS NOTES
"Subjective     Shahana Nj is a 66 y.o. female who presents with Follow-Up (DM fv/skin tag and mole concerns)            1. Type 2 diabetes mellitus without complication, without long-term current use of insulin ([facility])  a1c 7.4  Will work on diet and exercise and f/u in 3 mo poct   POCT Hemoglobin A1C   Referral to Ophthalmology    2. Morbid obesity with BMI of 40.0-44.9, adult ([facility])  Patient has a diagnosis of obesity. They were counseled today on increasing exercise and  extensively counseled on a low carb diet       3. Need for vaccination     PneumoVax PPV23 =>1yo    4. Skin lesion  Left neck irritation and new papules   Referral to Dermatology    Past Medical History:  No date: Arthritis      Comment:  Osteo knees  No date: Dyslipidemia  No date: Essential hypertension  No date: GERD (gastroesophageal reflux disease)  No date: High cholesterol  11/19/2019: Left knee pain  No date: Myocardial infarct ([facility])      Comment:  Hx of 2 and states \"very Mild\". Last was approx 2014. No               longer follows with cardiologist.  No date: AALIYAH (obstructive sleep apnea)      Comment:  CPAP  No date: Snoring  No date: Type 2 diabetes mellitus ([facility])      Comment:  11/19/19-Avg AM glucose=100.  Past Surgical History:  12/3/2019: PB TOTAL KNEE ARTHROPLASTY; Left      Comment:  Procedure: ARTHROPLASTY, KNEE, TOTAL;  Surgeon: Ryan Keen M.D.;  Location: SURGERY [facility];                 Service: Orthopedics  09/2019: COLONOSCOPY  2004: ABDOMINAL HYSTERECTOMY TOTAL  1974: KNEE ARTHROSCOPY; Right  1970: RHINOPLASTY  early 2000's: BUNIONECTOMY; Left  1960's: DENTAL EXTRACTION(S)      Comment:  wisdom teeth  Infant to 2001: MYRINGOTOMY; Bilateral      Comment:  13 ear surgeries  1980's: OTHER SURGICAL PROCEDURE; Right      Comment:  fluid drained off of bone air pockets behind ear bone  as child: TONSILLECTOMY AND ADENOIDECTOMY  Social History    Tobacco Use      Smoking status: Never Smoker    "   Smokeless tobacco: Never Used    Vaping Use      Vaping Use: Never used    Alcohol use: Yes      Comment: 6 times per year    Drug use: No      Comment: tried marijuana at age 17    Review of patient's family history indicates:  Problem: Heart Disease      Relation: Mother          Age of Onset: 45          Comment: cabg  Problem: Heart Disease      Relation: Father          Age of Onset: (Not Specified)          Comment: cabg and valve replacement  Problem: Diabetes      Relation: Sister          Age of Onset: (Not Specified)  Problem: Diabetes      Relation: Other          Age of Onset: (Not Specified)      Current Outpatient Medications: •  irbesartan-hydrochlorothiazide (AVALIDE) 300-12.5 MG per tablet, Take 1 Tablet by mouth every day., Disp: 100 Tablet, Rfl: 3•  metoprolol SR (TOPROL XL) 50 MG TABLET SR 24 HR, Take 1 Tablet by mouth every day., Disp: 100 Tablet, Rfl: 3•  rosuvastatin (CRESTOR) 40 MG tablet, TAKE 1 TABLET BY MOUTH ONCE DAILY APPOINTMENT  REQUIRED  FOR  FUTURE  REFILLS, Disp: 90 Tablet, Rfl: 0•  spironolactone (ALDACTONE) 25 MG Tab, Take 1/2 (one-half) tablet by mouth once daily, Disp: 45 Tablet, Rfl: 0•  metformin (GLUCOPHAGE) 1000 MG tablet, TAKE 1 TABLET BY MOUTH TWICE DAILY WITH MEALS, Disp: 180 Tablet, Rfl: 0•  Vitamins-Lipotropics (LIPO FLAVONOID PLUS) Tab, Take  by mouth., Disp: , Rfl:  •  Misc Natural Products (HEALTHY LIVER PO), Take  by mouth., Disp: , Rfl: •  loratadine-pseudoephedrine (CLARITIN-D 24 HOUR)  MG per tablet, Take 1 Tablet by mouth every day., Disp: , Rfl: •  omeprazole (PRILOSEC) 20 MG delayed-release capsule, Take 20 mg by mouth every day., Disp: , Rfl: •  azelastine (ASTELIN) 137 MCG/SPRAY nasal spray, Administer 2 Sprays into affected nostril(S) 2 times a day., Disp: , Rfl: •  acetaminophen (TYLENOL) 500 MG Tab, Take 1,000 mg by mouth every 6 hours as needed., Disp: , Rfl: •  aspirin (ASA) 81 MG Chew Tab chewable tablet, Take 1 Tab by mouth every day., Disp:  "100 Tab, Rfl: 11    Patient was instructed on the use of medications, either prescriptions or OTC and informed on when the appropriate follow up time period should be. In addition, patient was also instructed that should any acute worsening occur that they should notify this clinic asap or call 911.          Review of Systems   Constitutional: Negative.  Negative for chills and fever.   HENT: Negative.  Negative for hearing loss.    Eyes: Negative.  Negative for blurred vision and double vision.   Respiratory: Negative.  Negative for cough and hemoptysis.    Cardiovascular: Negative.  Negative for chest pain and palpitations.   Gastrointestinal: Negative.  Negative for heartburn and nausea.   Genitourinary: Negative.  Negative for dysuria.   Musculoskeletal: Negative.  Negative for myalgias.   Skin: Positive for rash.   Neurological: Negative.  Negative for dizziness, tingling and headaches.   Endo/Heme/Allergies: Negative.  Does not bruise/bleed easily.   Psychiatric/Behavioral: Negative.  Negative for depression and suicidal ideas.   All other systems reviewed and are negative.             Objective     /74 (BP Location: Left arm, Patient Position: Sitting, BP Cuff Size: Large adult)   Pulse (!) 101   Temp 36.8 °C (98.3 °F) (Temporal)   Resp 16   Ht 1.676 m (5' 6\")   Wt 118 kg (260 lb)   SpO2 97%   Breastfeeding No   BMI 41.97 kg/m²      Physical Exam  Vitals and nursing note reviewed.   Constitutional:       General: She is not in acute distress.     Appearance: She is well-developed. She is not diaphoretic.   HENT:      Head: Normocephalic and atraumatic.      Mouth/Throat:      Pharynx: No oropharyngeal exudate.   Eyes:      Pupils: Pupils are equal, round, and reactive to light.   Cardiovascular:      Rate and Rhythm: Normal rate and regular rhythm.      Heart sounds: Normal heart sounds. No murmur heard.    No friction rub. No gallop.   Pulmonary:      Effort: Pulmonary effort is normal. No " respiratory distress.      Breath sounds: Normal breath sounds. No wheezing or rales.   Chest:      Chest wall: No tenderness.   Skin:     Findings: Rash present. Rash is papular.          Neurological:      Mental Status: She is alert and oriented to person, place, and time.   Psychiatric:         Behavior: Behavior normal.         Thought Content: Thought content normal.         Judgment: Judgment normal.                             Assessment & Plan        1. Type 2 diabetes mellitus without complication, without long-term current use of insulin ([facility])    - POCT Hemoglobin A1C  - Referral to Ophthalmology    2. Morbid obesity with BMI of 40.0-44.9, adult ([facility])      3. Need for vaccination    - PneumoVax PPV23 =>2yo    4. Skin lesion    - Referral to Dermatology

## 2022-03-01 DIAGNOSIS — E11.9 TYPE 2 DIABETES MELLITUS TREATED WITHOUT INSULIN (HCC): ICD-10-CM

## 2022-03-01 DIAGNOSIS — I10 ESSENTIAL HYPERTENSION: ICD-10-CM

## 2022-03-01 RX ORDER — SPIRONOLACTONE 25 MG/1
TABLET ORAL
Qty: 45 TABLET | Refills: 3 | Status: SHIPPED | OUTPATIENT
Start: 2022-03-01

## 2022-03-02 DIAGNOSIS — Z13.5 SCREENING FOR DIABETIC RETINOPATHY: ICD-10-CM

## 2022-03-02 DIAGNOSIS — E11.9 TYPE 2 DIABETES MELLITUS WITHOUT COMPLICATION, WITHOUT LONG-TERM CURRENT USE OF INSULIN (HCC): ICD-10-CM

## 2022-03-07 DIAGNOSIS — E11.9 TYPE 2 DIABETES MELLITUS TREATED WITHOUT INSULIN (HCC): ICD-10-CM

## 2022-03-07 NOTE — TELEPHONE ENCOUNTER
Received request via: Pharmacy    Was the patient seen in the last year in this department? Yes    Does the patient have an active prescription (recently filled or refills available) for medication(s) requested? No     Follow up appt 6/6/2022

## 2022-03-08 RX ORDER — ROSUVASTATIN CALCIUM 40 MG/1
TABLET, COATED ORAL
Qty: 100 TABLET | Refills: 0 | Status: SHIPPED | OUTPATIENT
Start: 2022-03-08 | End: 2022-06-06 | Stop reason: SDUPTHER

## 2022-03-21 ENCOUNTER — HOME STUDY (OUTPATIENT)
Dept: SLEEP MEDICINE | Facility: MEDICAL CENTER | Age: 67
End: 2022-03-21
Attending: PREVENTIVE MEDICINE
Payer: MEDICARE

## 2022-03-21 DIAGNOSIS — G47.34 NOCTURNAL OXYGEN DESATURATION: ICD-10-CM

## 2022-03-21 DIAGNOSIS — G47.33 OSA ON CPAP: ICD-10-CM

## 2022-03-21 PROCEDURE — 94762 N-INVAS EAR/PLS OXIMTRY CONT: CPT | Performed by: PREVENTIVE MEDICINE

## 2022-04-01 ENCOUNTER — OFFICE VISIT (OUTPATIENT)
Dept: SLEEP MEDICINE | Facility: MEDICAL CENTER | Age: 67
End: 2022-04-01
Payer: MEDICARE

## 2022-04-01 VITALS
OXYGEN SATURATION: 95 % | BODY MASS INDEX: 41.78 KG/M2 | RESPIRATION RATE: 16 BRPM | HEIGHT: 66 IN | DIASTOLIC BLOOD PRESSURE: 54 MMHG | WEIGHT: 260 LBS | HEART RATE: 105 BPM | SYSTOLIC BLOOD PRESSURE: 124 MMHG

## 2022-04-01 DIAGNOSIS — G47.33 OSA ON CPAP: ICD-10-CM

## 2022-04-01 DIAGNOSIS — G47.34 NOCTURNAL OXYGEN DESATURATION: ICD-10-CM

## 2022-04-01 PROCEDURE — 99214 OFFICE O/P EST MOD 30 MIN: CPT | Performed by: PREVENTIVE MEDICINE

## 2022-04-01 ASSESSMENT — FIBROSIS 4 INDEX: FIB4 SCORE: 1.81

## 2022-04-01 NOTE — PROGRESS NOTES
"CC: 1st compliance k  OPO results    LAST SEEN:  12/16/2021   [place]   Order for PAP went in in 10/8/2021    HPI:  Shahana Nj is a 66 y.o.female for follow-up for her obstructive sleep apnea.     COMPLIANCE DATA: 100% compliant  Machine type: Air sense 11 AutoSet  Date range: 3-20 22 -- 3/31/2022  AHI: 1.4   TIME USED: 6 hours  PRESSURE SETTINGS: 13 is the minimum pressure 17 maximum pressure  LEAK: Minuscule  Maximum pressure used today 16.9 cm water pressure    This patient is using her PAP therapy consistently and she is benefiting from it tremendously.     The patient reports improved symptoms using positive airway pressure. Has experienced no significant problems with mask fit, mask leak, pressure tolerance, excessive airway dryness, nasal congestion, aerophagia, or chest pain.     Overnight oximetry was done using Pap therapy from 13 to 17 cm water pressure on March 21, 2022.  There was over 6 hours and 47 minutes of data to be analyzed.  Oxygen kanchan was 83% basal oxygen 90.6%.  Patient was under 88% for 8.5 minutes.    Past medical history: Essential hypertension, bilateral hearing loss, dyslipidemia, type 2 diabetes, and morbid obesity.    Patient Active Problem List    Diagnosis Date Noted   • Morbid obesity with BMI of 40.0-44.9, adult (Formerly [facility]) 02/28/2022   • Bedbug bite 02/08/2022   • Need for prophylactic antibiotic 02/01/2022   • Mixed hyperlipidemia 12/13/2018   • Benign essential HTN 12/13/2018   • Type 2 diabetes mellitus without complication, without long-term current use of insulin ([facility]) 06/04/2018   • Bilateral hearing loss 04/30/2018       Past Medical History:   Diagnosis Date   • Arthritis     Osteo knees   • Dyslipidemia    • Essential hypertension    • GERD (gastroesophageal reflux disease)    • High cholesterol    • Left knee pain 11/19/2019   • Myocardial infarct (HCC)     Hx of 2 and states \"very Mild\". Last was approx 2014. No longer follows with cardiologist.   • AALIYAH " (obstructive sleep apnea)     CPAP   • Snoring    • Type 2 diabetes mellitus (HCC)     11/19/19-Avg AM glucose=100.        Past Surgical History:   Procedure Laterality Date   • PB TOTAL KNEE ARTHROPLASTY Left 12/3/2019    Procedure: ARTHROPLASTY, KNEE, TOTAL;  Surgeon: Ryan Keen M.D.;  Location: SURGERY [facility];  Service: Orthopedics   • COLONOSCOPY  09/2019   • ABDOMINAL HYSTERECTOMY TOTAL  2004   • KNEE ARTHROSCOPY Right 1974   • RHINOPLASTY  1970   • BUNIONECTOMY Left early 2000's   • DENTAL EXTRACTION(S)  1960's    wisdom teeth   • MYRINGOTOMY Bilateral Infant to 2001    13 ear surgeries   • OTHER SURGICAL PROCEDURE Right 1980's    fluid drained off of bone air pockets behind ear bone   • TONSILLECTOMY AND ADENOIDECTOMY  as child       Family History   Problem Relation Age of Onset   • Heart Disease Mother 45        cabg   • Heart Disease Father         cabg and valve replacement   • Diabetes Sister    • Diabetes Other        Social History     Socioeconomic History   • Marital status: Single     Spouse name: Not on file   • Number of children: Not on file   • Years of education: Not on file   • Highest education level: Not on file   Occupational History   • Not on file   Tobacco Use   • Smoking status: Never Smoker   • Smokeless tobacco: Never Used   Vaping Use   • Vaping Use: Never used   Substance and Sexual Activity   • Alcohol use: Yes     Comment: 6 times per year   • Drug use: No     Comment: tried marijuana at age 17   • Sexual activity: Yes     Partners: Male   Other Topics Concern   • Not on file   Social History Narrative   • Not on file     Social Determinants of Health     Financial Resource Strain: Not on file   Food Insecurity: Not on file   Transportation Needs: Not on file   Physical Activity: Not on file   Stress: Not on file   Social Connections: Not on file   Intimate Partner Violence: Not on file   Housing Stability: Not on file       Current Outpatient Medications  "  Medication Sig Dispense Refill   • rosuvastatin (CRESTOR) 40 MG tablet TAKE 1 TABLET BY MOUTH ONCE DAILY . APPOINTMENT REQUIRED FOR FUTURE REFILLS 100 Tablet 0   • spironolactone (ALDACTONE) 25 MG Tab Take 1/2 (one-half) tablet by mouth once daily 45 Tablet 3   • metformin (GLUCOPHAGE) 1000 MG tablet TAKE 1 TABLET BY MOUTH TWICE DAILY WITH MEALS 200 Tablet 3   • irbesartan-hydrochlorothiazide (AVALIDE) 300-12.5 MG per tablet Take 1 Tablet by mouth every day. 100 Tablet 3   • metoprolol SR (TOPROL XL) 50 MG TABLET SR 24 HR Take 1 Tablet by mouth every day. 100 Tablet 3   • Vitamins-Lipotropics (LIPO FLAVONOID PLUS) Tab Take  by mouth.     • Misc Natural Products (HEALTHY LIVER PO) Take  by mouth.     • loratadine-pseudoephedrine (CLARITIN-D 24 HOUR)  MG per tablet Take 1 Tablet by mouth every day.     • omeprazole (PRILOSEC) 20 MG delayed-release capsule Take 20 mg by mouth every day.     • azelastine (ASTELIN) 137 MCG/SPRAY nasal spray Administer 2 Sprays into affected nostril(S) 2 times a day.     • acetaminophen (TYLENOL) 500 MG Tab Take 1,000 mg by mouth every 6 hours as needed.     • aspirin (ASA) 81 MG Chew Tab chewable tablet Take 1 Tab by mouth every day. 100 Tab 11     No current facility-administered medications for this visit.    \"CURRENT RX\"    ALLERGIES: Pcn [penicillins] and Tape    ROS    Constitutional: Denies  weight loss, fatigue  Cardiovascular: Denies chest pain, tightness, palpitations, swelling in legs/feet, difficulty breathing when lying down but gets better when sitting up.   Respiratory: Denies shortness of breath during the day  Sleep: per HPI  Gastrointestinal: Denies  difficulty swallowing, heartburn.  Musculoskeletal: Denies painful joints, sore muscles, back pain.        PHYSICAL EXAM       /54 (BP Location: Left arm, Patient Position: Sitting, BP Cuff Size: Large adult)   Pulse (!) 105   Resp 16   Ht 1.676 m (5' 6\")   Wt 118 kg (260 lb)   SpO2 95%   BMI 41.97 kg/m² "   Appearance: Well-nourished, looks stated age, no acute distress  Eyes:   EOMI  ENMT: masked  Neck: Supple, trachea midline, no masses  Respiratory effort:  No intercostal retractions or use of accessory muscles  Musculoskeletal:  Grossly normal; gait and station normal  Skin:  No cyanosis  Neurologic: oriented to person, time, place, and purpose; judgement intact  Psychiatric:  No depression, anxiety, agitation      Medical Decision Making       The medical record was reviewed in its entirety including the referral notes, records from primary care, consultants notes, hospital records, lab, imaging, microbiology, immunology, and immunizations.  Care gaps identified and reviewed with the patient.    Diagnostic and titration nocturnal polysomnogram's, home sleep apnea tests, continuous nocturnal oximetry results, multiple sleep latency tests, and compliance reports reviewed.    ASSESSMENT/PLAN:    1. AALIYAH on CPAP patient is 100% compliant and meets all insurance compliance requirements    - DME Mask and Supplies      2. Nocturnal oxygen desaturation.  This patient did experience 8.5 minutes under 88% oxygen saturation on the overnight oximetry study.  However rather than adding oxygen supplementation at this time the pressures will be changed on her Pap machine to eliminate the hypopneas.  At this time no supplemental oxygen will be ordered because that would require a in lab titration study and the patient is not interested in either the titration study nor in using oxygen at this time.  Additionally the need for oxygen is borderline and may well be taking care of through pressure adjustments.    - DME Other maximum pressure was changed from 17 to 19 cm water pressure.      Has been advised to continue the current Pap Therapy.  Also advised to clean equipment frequently, and get new mask and supplies as allowed by insurance and DME.  Patient was advised to NEVER use  OZONE containing cleaning systems such as So  Clean.    The risks of untreated sleep apnea were discussed with the patient at length. Patients with AALIYAH are at increased risk of cardiovascular disease including coronary artery disease, systemic arterial hypertension, pulmonary arterial hypertension, cardiac arrhythmias, and stroke. AALIYAH patients have an increased risk of motor vehicle accidents, type 2 diabetes, chronic kidney disease, and non-alcoholic liver disease. The patient was advised to avoid driving a motor vehicle when drowsy.      Have advised the patient to follow up with the appropriate healthcare practitioners for all other medical problems and issues.    Return to clinic in 1 year for annual visit.  Will repeat OPO at next visit.      My total time spent caring for the patient on the day of the encounter was 30 minutes. This includes time time spent on a thorough chart review including other physician notes, any type of sleep study, as well as critical labs and pulmonary and cardiac studies.  Additionally it includes discussions of good sleep hygiene, stimulus control and going over the need for consistency in terms of sleep preparation and practice.         Please note that this dictation was created using voice recognition software.  I have made every reasonable attempt to correct obvious errors, I expect that there are errors of grammar and possibly content that I did not discover before finalizing this note.

## 2022-04-08 NOTE — PROCEDURES
OXIMETRY while using PAP Therapy, NO supplemental oxygen.    Interpretation:      This overnight oximetry was recorded on 3-21-22 with the patient on APAP 13-17.  The analysis duration was  6 hrs and 47 mins total oximetric events occurred encompassing 29.5 minutes .  The average event duration was  39.3 seconds .  The saturations were less than 90% for 19% of the recording.  The kanchan saturation was 83% .  The patient spent 8.5  minutes with saturations < 88%.  Overall, this continuous nocturnal oximetry demonstrates mildly inadequate O2 while on current settings of positive airway pressure therapy.      Recommendation:  Pap pressures changes - made today min 13.6 and Max 19 cmH20.  Will repeat OPO in one month. Will need full night titration if  O2 supplementation is needed.    Clinical correlation is needed.

## 2022-04-12 ENCOUNTER — APPOINTMENT (RX ONLY)
Dept: URBAN - METROPOLITAN AREA CLINIC 31 | Facility: CLINIC | Age: 67
Setting detail: DERMATOLOGY
End: 2022-04-12

## 2022-04-12 DIAGNOSIS — D22 MELANOCYTIC NEVI: ICD-10-CM

## 2022-04-12 DIAGNOSIS — L81.4 OTHER MELANIN HYPERPIGMENTATION: ICD-10-CM

## 2022-04-12 DIAGNOSIS — L91.8 OTHER HYPERTROPHIC DISORDERS OF THE SKIN: ICD-10-CM

## 2022-04-12 DIAGNOSIS — Z71.89 OTHER SPECIFIED COUNSELING: ICD-10-CM

## 2022-04-12 DIAGNOSIS — D18.0 HEMANGIOMA: ICD-10-CM

## 2022-04-12 DIAGNOSIS — L82.1 OTHER SEBORRHEIC KERATOSIS: ICD-10-CM

## 2022-04-12 DIAGNOSIS — L259 CONTACT DERMATITIS AND OTHER ECZEMA, UNSPECIFIED CAUSE: ICD-10-CM

## 2022-04-12 DIAGNOSIS — I87.2 VENOUS INSUFFICIENCY (CHRONIC) (PERIPHERAL): ICD-10-CM

## 2022-04-12 PROBLEM — D22.5 MELANOCYTIC NEVI OF TRUNK: Status: ACTIVE | Noted: 2022-04-12

## 2022-04-12 PROBLEM — L23.89 ALLERGIC CONTACT DERMATITIS DUE TO OTHER AGENTS: Status: ACTIVE | Noted: 2022-04-12

## 2022-04-12 PROBLEM — D18.01 HEMANGIOMA OF SKIN AND SUBCUTANEOUS TISSUE: Status: ACTIVE | Noted: 2022-04-12

## 2022-04-12 PROCEDURE — 99203 OFFICE O/P NEW LOW 30 MIN: CPT

## 2022-04-12 PROCEDURE — ? PRESCRIPTION

## 2022-04-12 PROCEDURE — ? COUNSELING

## 2022-04-12 PROCEDURE — ? ADDITIONAL NOTES

## 2022-04-12 RX ORDER — TRIAMCINOLONE ACETONIDE 1 MG/G
CREAM TOPICAL BID
Qty: 80 | Refills: 0 | Status: ERX | COMMUNITY
Start: 2022-04-12

## 2022-04-12 RX ADMIN — TRIAMCINOLONE ACETONIDE: 1 CREAM TOPICAL at 00:00

## 2022-04-12 ASSESSMENT — LOCATION SIMPLE DESCRIPTION DERM
LOCATION SIMPLE: LEFT SHOULDER
LOCATION SIMPLE: LEFT FOREARM
LOCATION SIMPLE: LEFT POSTERIOR UPPER ARM
LOCATION SIMPLE: RIGHT UPPER BACK
LOCATION SIMPLE: RIGHT POSTERIOR UPPER ARM
LOCATION SIMPLE: POSTERIOR NECK
LOCATION SIMPLE: RIGHT SHOULDER
LOCATION SIMPLE: RIGHT PRETIBIAL REGION
LOCATION SIMPLE: RIGHT FOREARM
LOCATION SIMPLE: LEFT PRETIBIAL REGION

## 2022-04-12 ASSESSMENT — LOCATION DETAILED DESCRIPTION DERM
LOCATION DETAILED: LEFT POSTERIOR SHOULDER
LOCATION DETAILED: RIGHT POSTERIOR SHOULDER
LOCATION DETAILED: RIGHT SUPERIOR UPPER BACK
LOCATION DETAILED: LEFT PROXIMAL DORSAL FOREARM
LOCATION DETAILED: RIGHT INFERIOR UPPER BACK
LOCATION DETAILED: RIGHT PROXIMAL DORSAL FOREARM
LOCATION DETAILED: RIGHT MID-UPPER BACK
LOCATION DETAILED: LEFT ANTERIOR SHOULDER
LOCATION DETAILED: RIGHT ANTERIOR SHOULDER
LOCATION DETAILED: LEFT DISTAL PRETIBIAL REGION
LOCATION DETAILED: LEFT LATERAL TRAPEZIAL NECK
LOCATION DETAILED: RIGHT PROXIMAL POSTERIOR UPPER ARM
LOCATION DETAILED: LEFT PROXIMAL POSTERIOR UPPER ARM
LOCATION DETAILED: RIGHT DISTAL PRETIBIAL REGION

## 2022-04-12 ASSESSMENT — LOCATION ZONE DERM
LOCATION ZONE: ARM
LOCATION ZONE: TRUNK
LOCATION ZONE: NECK
LOCATION ZONE: LEG

## 2022-04-12 NOTE — PROCEDURE: ADDITIONAL NOTES
Render Risk Assessment In Note?: no
Additional Notes: Skin tags catching on necklaces, will schedule an appointment to have them removed.
Detail Level: Simple

## 2022-05-10 ENCOUNTER — APPOINTMENT (RX ONLY)
Dept: URBAN - METROPOLITAN AREA CLINIC 31 | Facility: CLINIC | Age: 67
Setting detail: DERMATOLOGY
End: 2022-05-10

## 2022-05-10 DIAGNOSIS — L91.8 OTHER HYPERTROPHIC DISORDERS OF THE SKIN: ICD-10-CM

## 2022-05-10 PROCEDURE — 11200 RMVL SKIN TAGS UP TO&INC 15: CPT

## 2022-05-10 PROCEDURE — ? SKIN TAG REMOVAL

## 2022-05-10 ASSESSMENT — LOCATION SIMPLE DESCRIPTION DERM
LOCATION SIMPLE: RIGHT UPPER ARM
LOCATION SIMPLE: RIGHT UPPER BACK
LOCATION SIMPLE: RIGHT CLAVICULAR SKIN
LOCATION SIMPLE: RIGHT BREAST
LOCATION SIMPLE: POSTERIOR NECK
LOCATION SIMPLE: LEFT ANTERIOR NECK

## 2022-05-10 ASSESSMENT — LOCATION DETAILED DESCRIPTION DERM
LOCATION DETAILED: LEFT MEDIAL TRAPEZIAL NECK
LOCATION DETAILED: LEFT CLAVICULAR NECK
LOCATION DETAILED: RIGHT SUPERIOR UPPER BACK
LOCATION DETAILED: RIGHT ANTECUBITAL SKIN
LOCATION DETAILED: LEFT INFERIOR LATERAL NECK
LOCATION DETAILED: LEFT LATERAL TRAPEZIAL NECK
LOCATION DETAILED: RIGHT LATERAL BREAST 8-9:00 REGION
LOCATION DETAILED: RIGHT CLAVICULAR SKIN

## 2022-05-10 ASSESSMENT — LOCATION ZONE DERM
LOCATION ZONE: TRUNK
LOCATION ZONE: ARM
LOCATION ZONE: NECK

## 2022-05-10 NOTE — PROCEDURE: SKIN TAG REMOVAL
Medical Necessity Clause: This procedure was medically necessary because the lesions that were treated were:
Anesthesia Type: 1% lidocaine with epinephrine
Include Z78.9 (Other Specified Conditions Influencing Health Status) As An Associated Diagnosis?: Yes
Hemostasis: Silver Nitrate
Detail Level: Detailed
Consent: Written consent obtained and the risks of skin tag removal was reviewed with the patient including but not limited to bleeding, pigmentary change, infection, pain, and remote possibility of scarring.
Medical Necessity Information: It is in your best interest to select a reason for this procedure from the list below. All of these items fulfill various CMS LCD requirements except the new and changing color options.
Anesthesia Volume In Cc: 0.1

## 2022-06-06 ENCOUNTER — OFFICE VISIT (OUTPATIENT)
Dept: MEDICAL GROUP | Facility: PHYSICIAN GROUP | Age: 67
End: 2022-06-06
Payer: MEDICARE

## 2022-06-06 VITALS
BODY MASS INDEX: 41.78 KG/M2 | HEART RATE: 103 BPM | DIASTOLIC BLOOD PRESSURE: 86 MMHG | OXYGEN SATURATION: 94 % | WEIGHT: 260 LBS | SYSTOLIC BLOOD PRESSURE: 124 MMHG | TEMPERATURE: 98 F | HEIGHT: 66 IN | RESPIRATION RATE: 20 BRPM

## 2022-06-06 DIAGNOSIS — R80.9 TYPE 2 DIABETES MELLITUS WITH MICROALBUMINURIA, WITHOUT LONG-TERM CURRENT USE OF INSULIN (HCC): ICD-10-CM

## 2022-06-06 DIAGNOSIS — I10 BENIGN ESSENTIAL HTN: ICD-10-CM

## 2022-06-06 DIAGNOSIS — I10 ESSENTIAL HYPERTENSION: ICD-10-CM

## 2022-06-06 DIAGNOSIS — E11.29 TYPE 2 DIABETES MELLITUS WITH MICROALBUMINURIA, WITHOUT LONG-TERM CURRENT USE OF INSULIN (HCC): ICD-10-CM

## 2022-06-06 DIAGNOSIS — E11.9 TYPE 2 DIABETES MELLITUS WITHOUT COMPLICATION, WITHOUT LONG-TERM CURRENT USE OF INSULIN (HCC): ICD-10-CM

## 2022-06-06 DIAGNOSIS — E11.9 TYPE 2 DIABETES MELLITUS TREATED WITHOUT INSULIN (HCC): ICD-10-CM

## 2022-06-06 DIAGNOSIS — E78.2 MIXED HYPERLIPIDEMIA: ICD-10-CM

## 2022-06-06 LAB
HBA1C MFR BLD: 7.5 % (ref 0–5.6)
INT CON NEG: NEGATIVE
INT CON POS: POSITIVE

## 2022-06-06 PROCEDURE — 99214 OFFICE O/P EST MOD 30 MIN: CPT | Performed by: FAMILY MEDICINE

## 2022-06-06 PROCEDURE — 83036 HEMOGLOBIN GLYCOSYLATED A1C: CPT | Performed by: FAMILY MEDICINE

## 2022-06-06 RX ORDER — IRBESARTAN AND HYDROCHLOROTHIAZIDE 300; 12.5 MG/1; MG/1
1 TABLET, FILM COATED ORAL DAILY
Qty: 100 TABLET | Refills: 3 | Status: SHIPPED | OUTPATIENT
Start: 2022-06-06 | End: 2023-02-03

## 2022-06-06 RX ORDER — TRIAMCINOLONE ACETONIDE 1 MG/G
CREAM TOPICAL
COMMUNITY
Start: 2022-04-12 | End: 2022-06-06

## 2022-06-06 RX ORDER — ROSUVASTATIN CALCIUM 40 MG/1
40 TABLET, COATED ORAL DAILY
Qty: 100 TABLET | Refills: 3 | Status: SHIPPED | OUTPATIENT
Start: 2022-06-06

## 2022-06-06 ASSESSMENT — ENCOUNTER SYMPTOMS
HEARTBURN: 0
BLURRED VISION: 0
HEMOPTYSIS: 0
PALPITATIONS: 0
BRUISES/BLEEDS EASILY: 0
FEVER: 0
EYES NEGATIVE: 1
CONSTITUTIONAL NEGATIVE: 1
MYALGIAS: 0
CHILLS: 0
DIZZINESS: 0
GASTROINTESTINAL NEGATIVE: 1
NEUROLOGICAL NEGATIVE: 1
COUGH: 0
PSYCHIATRIC NEGATIVE: 1
TINGLING: 0
HEADACHES: 0
DOUBLE VISION: 0
RESPIRATORY NEGATIVE: 1
NAUSEA: 0
MUSCULOSKELETAL NEGATIVE: 1
DEPRESSION: 0
CARDIOVASCULAR NEGATIVE: 1

## 2022-06-06 ASSESSMENT — FIBROSIS 4 INDEX: FIB4 SCORE: 1.83

## 2022-06-06 NOTE — PROGRESS NOTES
Annual Health Assessment Questions:    1.  Are you currently engaging in any exercise or physical activity? Yes    2.  How would you describe your mood or emotional well-being today? good    3.  Have you had any falls in the last year? No    4.  Have you noticed any problems with your balance or had difficulty walking? Yes    5.  In the last six months have you experienced any leakage of urine? No    6. DPA/Advanced Directive: Patient has Advanced Directive, but it is not on file. Instructed to bring in a copy to scan into their chart.

## 2022-06-06 NOTE — PROGRESS NOTES
"Subjective     Shahana Nj is a 67 y.o. female who presents with Diabetes Follow-up (A1C [facility] )            1. Type 2 diabetes mellitus without complication, without long-term current use of insulin (HCC)     POCT A1C    2. Type 2 diabetes mellitus with microalbuminuria, without long-term current use of insulin ([facility])  a1c 7.5  Currently treated for DM, taking meds and checking bs at home, trying to do DM diet.controlled      3. Benign essential HTN  Currently treated for HTN, taking meds with no CP or sob, monitors bp at home periodically. controlled      4. Mixed hyperlipidemia  Currently treated for HLD, taking meds with no new myalgias or joint pain, watching fats in diet  controlled      Past Medical History:  No date: Arthritis      Comment:  Osteo knees  No date: Dyslipidemia  No date: Essential hypertension  No date: GERD (gastroesophageal reflux disease)  No date: High cholesterol  11/19/2019: Left knee pain  No date: Myocardial infarct ([facility])      Comment:  Hx of 2 and states \"very Mild\". Last was approx 2014. No               longer follows with cardiologist.  No date: AALIYAH (obstructive sleep apnea)      Comment:  CPAP  No date: Snoring  No date: Type 2 diabetes mellitus ([facility])      Comment:  11/19/19-Avg AM glucose=100.  Past Surgical History:  12/3/2019: PB TOTAL KNEE ARTHROPLASTY; Left      Comment:  Procedure: ARTHROPLASTY, KNEE, TOTAL;  Surgeon: Ryan Keen M.D.;  Location: SURGERY [facility];                 Service: Orthopedics  09/2019: COLONOSCOPY  2004: ABDOMINAL HYSTERECTOMY TOTAL  1974: KNEE ARTHROSCOPY; Right  1970: RHINOPLASTY  early 2000's: BUNIONECTOMY; Left  1960's: DENTAL EXTRACTION(S)      Comment:  wisdom teeth  Infant to 2001: MYRINGOTOMY; Bilateral      Comment:  13 ear surgeries  1980's: OTHER SURGICAL PROCEDURE; Right      Comment:  fluid drained off of bone air pockets behind ear bone  as child: TONSILLECTOMY AND ADENOIDECTOMY  Social History    Tobacco " Use      Smoking status: Never Smoker      Smokeless tobacco: Never Used    Vaping Use      Vaping Use: Never used    Alcohol use: Yes      Comment: 6 times per year    Drug use: No      Comment: tried marijuana at age 17    Review of patient's family history indicates:  Problem: Heart Disease      Relation: Mother          Age of Onset: 45          Comment: cabg  Problem: Heart Disease      Relation: Father          Age of Onset: (Not Specified)          Comment: cabg and valve replacement  Problem: Diabetes      Relation: Sister          Age of Onset: (Not Specified)  Problem: Diabetes      Relation: Other          Age of Onset: (Not Specified)      Current Outpatient Medications: •  rosuvastatin (CRESTOR) 40 MG tablet, TAKE 1 TABLET BY MOUTH ONCE DAILY . APPOINTMENT REQUIRED FOR FUTURE REFILLS, Disp: 100 Tablet, Rfl: 0•  spironolactone (ALDACTONE) 25 MG Tab, Take 1/2 (one-half) tablet by mouth once daily, Disp: 45 Tablet, Rfl: 3•  metformin (GLUCOPHAGE) 1000 MG tablet, TAKE 1 TABLET BY MOUTH TWICE DAILY WITH MEALS, Disp: 200 Tablet, Rfl: 3•  irbesartan-hydrochlorothiazide (AVALIDE) 300-12.5 MG per tablet, Take 1 Tablet by mouth every day., Disp: 100 Tablet, Rfl: 3•  metoprolol SR (TOPROL XL) 50 MG TABLET SR 24 HR, Take 1 Tablet by mouth every day., Disp: 100 Tablet, Rfl: 3•  Vitamins-Lipotropics (LIPO FLAVONOID PLUS) Tab, Take  by mouth., Disp: , Rfl:  •  loratadine-pseudoephedrine (CLARITIN-D 24 HOUR)  MG per tablet, Take 1 Tablet by mouth every day., Disp: , Rfl: •  omeprazole (PRILOSEC) 20 MG delayed-release capsule, Take 20 mg by mouth every day., Disp: , Rfl: •  azelastine (ASTELIN) 137 MCG/SPRAY nasal spray, Administer 2 Sprays into affected nostril(S) 2 times a day., Disp: , Rfl: •  acetaminophen (TYLENOL) 500 MG Tab, Take 1,000 mg by mouth every 6 hours as needed., Disp: , Rfl: •  aspirin (ASA) 81 MG Chew Tab chewable tablet, Take 1 Tab by mouth every day., Disp: 100 Tab, Rfl: 11•  Misc Natural  "Products (HEALTHY LIVER PO), Take  by mouth. (Patient not taking: Reported on 6/6/2022), Disp: , Rfl:     Patient was instructed on the use of medications, either prescriptions or OTC and informed on when the appropriate follow up time period should be. In addition, patient was also instructed that should any acute worsening occur that they should notify this clinic asap or call 911.          Review of Systems   Constitutional: Negative.  Negative for chills and fever.   HENT: Negative.  Negative for hearing loss.    Eyes: Negative.  Negative for blurred vision and double vision.   Respiratory: Negative.  Negative for cough and hemoptysis.    Cardiovascular: Negative.  Negative for chest pain and palpitations.   Gastrointestinal: Negative.  Negative for heartburn and nausea.   Genitourinary: Negative.  Negative for dysuria.   Musculoskeletal: Negative.  Negative for myalgias.   Skin: Negative.  Negative for rash.   Neurological: Negative.  Negative for dizziness, tingling and headaches.   Endo/Heme/Allergies: Negative.  Does not bruise/bleed easily.   Psychiatric/Behavioral: Negative.  Negative for depression and suicidal ideas.   All other systems reviewed and are negative.             Objective     /86 (BP Location: Left arm, Patient Position: Sitting, BP Cuff Size: Adult)   Pulse (!) 103   Temp 36.7 °C (98 °F) (Temporal)   Resp 20   Ht 1.676 m (5' 6\")   Wt 118 kg (260 lb)   SpO2 94%   Breastfeeding No   BMI 41.97 kg/m²      Physical Exam  Vitals and nursing note reviewed.   Constitutional:       General: She is not in acute distress.     Appearance: She is well-developed. She is not diaphoretic.   HENT:      Head: Normocephalic and atraumatic.      Mouth/Throat:      Pharynx: No oropharyngeal exudate.   Eyes:      Pupils: Pupils are equal, round, and reactive to light.   Cardiovascular:      Rate and Rhythm: Normal rate and regular rhythm.      Heart sounds: Normal heart sounds. No murmur heard.    " No friction rub. No gallop.   Pulmonary:      Effort: Pulmonary effort is normal. No respiratory distress.      Breath sounds: Normal breath sounds. No wheezing or rales.   Chest:      Chest wall: No tenderness.   Neurological:      Mental Status: She is alert and oriented to person, place, and time.   Psychiatric:         Behavior: Behavior normal.         Thought Content: Thought content normal.         Judgment: Judgment normal.                             Assessment & Plan        1. Type 2 diabetes mellitus without complication, without long-term current use of insulin ([facility])    - POCT A1C    2. Type 2 diabetes mellitus with microalbuminuria, without long-term current use of insulin ([facility])      3. Benign essential HTN      4. Mixed hyperlipidemia

## 2022-07-28 DIAGNOSIS — I10 ESSENTIAL HYPERTENSION: ICD-10-CM

## 2022-08-01 RX ORDER — METOPROLOL SUCCINATE 50 MG/1
50 TABLET, EXTENDED RELEASE ORAL DAILY
Qty: 90 TABLET | Refills: 0 | Status: SHIPPED | OUTPATIENT
Start: 2022-08-01 | End: 2022-10-31

## 2022-10-10 ENCOUNTER — OFFICE VISIT (OUTPATIENT)
Dept: MEDICAL GROUP | Facility: PHYSICIAN GROUP | Age: 67
End: 2022-10-10
Payer: MEDICARE

## 2022-10-10 VITALS
WEIGHT: 250 LBS | HEIGHT: 66 IN | HEART RATE: 94 BPM | OXYGEN SATURATION: 94 % | SYSTOLIC BLOOD PRESSURE: 114 MMHG | TEMPERATURE: 97.8 F | BODY MASS INDEX: 40.18 KG/M2 | DIASTOLIC BLOOD PRESSURE: 60 MMHG | RESPIRATION RATE: 16 BRPM

## 2022-10-10 DIAGNOSIS — I10 BENIGN ESSENTIAL HTN: ICD-10-CM

## 2022-10-10 DIAGNOSIS — M54.2 NECK PAIN: ICD-10-CM

## 2022-10-10 DIAGNOSIS — E08.22 DIABETES MELLITUS DUE TO UNDERLYING CONDITION WITH STAGE 3B CHRONIC KIDNEY DISEASE, WITHOUT LONG-TERM CURRENT USE OF INSULIN (HCC): ICD-10-CM

## 2022-10-10 DIAGNOSIS — R80.9 TYPE 2 DIABETES MELLITUS WITH MICROALBUMINURIA (HCC): ICD-10-CM

## 2022-10-10 DIAGNOSIS — E11.29 TYPE 2 DIABETES MELLITUS WITH MICROALBUMINURIA (HCC): ICD-10-CM

## 2022-10-10 DIAGNOSIS — E66.01 MORBID OBESITY WITH BMI OF 40.0-44.9, ADULT (HCC): ICD-10-CM

## 2022-10-10 DIAGNOSIS — N18.32 CHRONIC KIDNEY DISEASE (CKD) STAGE G3B/A1, MODERATELY DECREASED GLOMERULAR FILTRATION RATE (GFR) BETWEEN 30-44 ML/MIN/1.73 SQUARE METER AND ALBUMINURIA CREATININE RATIO LESS THAN 30 MG/G: ICD-10-CM

## 2022-10-10 DIAGNOSIS — N18.32 DIABETES MELLITUS DUE TO UNDERLYING CONDITION WITH STAGE 3B CHRONIC KIDNEY DISEASE, WITHOUT LONG-TERM CURRENT USE OF INSULIN (HCC): ICD-10-CM

## 2022-10-10 LAB
HBA1C MFR BLD: 6.9 % (ref 0–5.6)
INT CON NEG: NEGATIVE
INT CON POS: POSITIVE

## 2022-10-10 PROCEDURE — 83036 HEMOGLOBIN GLYCOSYLATED A1C: CPT | Performed by: FAMILY MEDICINE

## 2022-10-10 PROCEDURE — 99214 OFFICE O/P EST MOD 30 MIN: CPT | Performed by: FAMILY MEDICINE

## 2022-10-10 ASSESSMENT — ENCOUNTER SYMPTOMS
CARDIOVASCULAR NEGATIVE: 1
NEUROLOGICAL NEGATIVE: 1
EYES NEGATIVE: 1
HEMOPTYSIS: 0
BRUISES/BLEEDS EASILY: 0
NECK PAIN: 1
HEADACHES: 0
GASTROINTESTINAL NEGATIVE: 1
TINGLING: 0
BLURRED VISION: 0
COUGH: 1
MYALGIAS: 0
CHILLS: 0
DIZZINESS: 0
CONSTITUTIONAL NEGATIVE: 1
FEVER: 0
DEPRESSION: 0
PALPITATIONS: 0
NAUSEA: 0
HEARTBURN: 0
PSYCHIATRIC NEGATIVE: 1
DOUBLE VISION: 0

## 2022-10-10 ASSESSMENT — FIBROSIS 4 INDEX: FIB4 SCORE: 1.83

## 2022-10-10 NOTE — PROGRESS NOTES
"Subjective     Shahana Nj is a 67 y.o. female who presents with Diabetes Follow-up (Neuropathy concerns), Fatigue (Neck and back stiffness/dizziness), and Cough (Typically at night time/not productive )            1. Type 2 diabetes mellitus with microalbuminuria (HCC)  A1c 6.9  Currently treated for DM, taking meds and checking bs at home, trying to do DM diet.controlled       POCT A1C   Diabetic Monofilament LE Exam    2. Benign essential HTN  Currently treated for HTN, taking meds with no CP or sob, monitors bp at home periodically. controlled        3. Diabetes mellitus due to underlying condition with stage 3b chronic kidney disease, without long-term current use of insulin ([facility])  The patient's current medical issue is well controlled on the current therapy with no new symptoms or worsening     Lipid Profile; Future   Comp Metabolic Panel; Future    4. Chronic kidney disease (CKD) stage G3b/A1, moderately decreased glomerular filtration rate (GFR) between 30-44 mL/min/1.73 square meter and albuminuria creatinine ratio less than 30 mg/g ([facility])  Patient is currently being followed for chronic renal insufficiency. They are avoiding nsaids and maintaining hydration and following renal diet. Taking all appropriate meds. No new change in urine frequency or volume.      5. Morbid obesity with BMI of 40.0-44.9, adult ([facility])  Patient has a diagnosis of obesity. They were counseled today on increasing exercise and  extensively counseled on a low carb diet         6. Neck pain     Referral to Chiropractic    Past Medical History:  No date: Arthritis      Comment:  Osteo knees  No date: Dyslipidemia  No date: Essential hypertension  No date: GERD (gastroesophageal reflux disease)  No date: High cholesterol  11/19/2019: Left knee pain  No date: Myocardial infarct ([facility])      Comment:  Hx of 2 and states \"very Mild\". Last was approx 2014. No               longer follows with cardiologist.  No date: AALIYAH (obstructive " sleep apnea)      Comment:  CPAP  No date: Snoring  No date: Type 2 diabetes mellitus (HCC)      Comment:  11/19/19-Avg AM glucose=100.  Past Surgical History:  12/3/2019: PB TOTAL KNEE ARTHROPLASTY; Left      Comment:  Procedure: ARTHROPLASTY, KNEE, TOTAL;  Surgeon: Ryan Keen M.D.;  Location: SURGERY [facility];                 Service: Orthopedics  09/2019: COLONOSCOPY  2004: ABDOMINAL HYSTERECTOMY TOTAL  1974: KNEE ARTHROSCOPY; Right  1970: RHINOPLASTY  early 2000's: BUNIONECTOMY; Left  1960's: DENTAL EXTRACTION(S)      Comment:  wisdom teeth  Infant to 2001: MYRINGOTOMY; Bilateral      Comment:  13 ear surgeries  1980's: OTHER SURGICAL PROCEDURE; Right      Comment:  fluid drained off of bone air pockets behind ear bone  as child: TONSILLECTOMY AND ADENOIDECTOMY  Social History    Tobacco Use      Smoking status: Never      Smokeless tobacco: Never    Vaping Use      Vaping Use: Never used    Alcohol use: Yes      Comment: 6 times per year    Drug use: No      Comment: tried marijuana at age 17    Review of patient's family history indicates:  Problem: Heart Disease      Relation: Mother          Age of Onset: 45          Comment: cabg  Problem: Heart Disease      Relation: Father          Age of Onset: (Not Specified)          Comment: cabg and valve replacement  Problem: Diabetes      Relation: Sister          Age of Onset: (Not Specified)  Problem: Diabetes      Relation: Other          Age of Onset: (Not Specified)      Current Outpatient Medications: ·  metoprolol SR (TOPROL XL) 50 MG TABLET SR 24 HR, Take 1 Tablet by mouth every day., Disp: 90 Tablet, Rfl: 0·  metformin (GLUCOPHAGE) 1000 MG tablet, Take 1 Tablet by mouth 2 times a day with meals., Disp: 200 Tablet, Rfl: 3·  rosuvastatin (CRESTOR) 40 MG tablet, Take 1 Tablet by mouth every day., Disp: 100 Tablet, Rfl: 3·  irbesartan-hydrochlorothiazide (AVALIDE) 300-12.5 MG per tablet, Take 1 Tablet by mouth every day., Disp: 100  Tablet, Rfl: 3·  spironolactone (ALDACTONE) 25 MG Tab, Take 1/2 (one-half) tablet by mouth once daily, Disp: 45 Tablet, Rfl: 3·  Vitamins-Lipotropics (LIPO FLAVONOID PLUS) Tab, Take  by mouth., Disp: , Rfl: ·  Misc Natural Products (HEALTHY LIVER PO), Take  by mouth., Disp: , Rfl: ·  loratadine-pseudoephedrine (CLARITIN-D 24 HOUR)  MG per tablet, Take 1 Tablet by mouth every day., Disp: , Rfl: ·  omeprazole (PRILOSEC) 20 MG delayed-release capsule, Take 20 mg by mouth every day., Disp: , Rfl: ·  azelastine (ASTELIN) 137 MCG/SPRAY nasal spray, Administer 2 Sprays into affected nostril(S) 2 times a day., Disp: , Rfl: ·  acetaminophen (TYLENOL) 500 MG Tab, Take 1,000 mg by mouth every 6 hours as needed., Disp: , Rfl: ·  aspirin (ASA) 81 MG Chew Tab chewable tablet, Take 1 Tab by mouth every day., Disp: 100 Tab, Rfl: 11    Patient was instructed on the use of medications, either prescriptions or OTC and informed on when the appropriate follow up time period should be. In addition, patient was also instructed that should any acute worsening occur that they should notify this clinic asap or call 911.            Review of Systems   Constitutional: Negative.  Negative for chills and fever.   HENT: Negative.  Negative for hearing loss.    Eyes: Negative.  Negative for blurred vision and double vision.   Respiratory:  Positive for cough. Negative for hemoptysis.    Cardiovascular: Negative.  Negative for chest pain and palpitations.   Gastrointestinal: Negative.  Negative for heartburn and nausea.   Genitourinary: Negative.  Negative for dysuria.   Musculoskeletal:  Positive for neck pain. Negative for myalgias.   Skin: Negative.  Negative for rash.   Neurological: Negative.  Negative for dizziness, tingling and headaches.   Endo/Heme/Allergies: Negative.  Does not bruise/bleed easily.   Psychiatric/Behavioral: Negative.  Negative for depression and suicidal ideas.    All other systems reviewed and are negative.      "      Objective     /60 (BP Location: Left arm, Patient Position: Sitting, BP Cuff Size: Adult)   Pulse 94   Temp 36.6 °C (97.8 °F) (Temporal)   Resp 16   Ht 1.676 m (5' 6\")   Wt 113 kg (250 lb)   SpO2 94%   BMI 40.35 kg/m²      Physical Exam  Vitals and nursing note reviewed.   Constitutional:       General: She is not in acute distress.     Appearance: She is well-developed. She is not diaphoretic.   HENT:      Head: Normocephalic and atraumatic.      Mouth/Throat:      Pharynx: No oropharyngeal exudate.   Eyes:      Pupils: Pupils are equal, round, and reactive to light.   Cardiovascular:      Rate and Rhythm: Normal rate and regular rhythm.      Heart sounds: Normal heart sounds. No murmur heard.    No friction rub. No gallop.   Pulmonary:      Effort: Pulmonary effort is normal. No respiratory distress.      Breath sounds: Normal breath sounds. No wheezing or rales.   Chest:      Chest wall: No tenderness.   Neurological:      Mental Status: She is alert and oriented to person, place, and time.   Psychiatric:         Behavior: Behavior normal.         Thought Content: Thought content normal.         Judgment: Judgment normal.                           Assessment & Plan        1. Type 2 diabetes mellitus with microalbuminuria ([facility])    - POCT A1C  - Diabetic Monofilament LE Exam    2. Benign essential HTN      3. Diabetes mellitus due to underlying condition with stage 3b chronic kidney disease, without long-term current use of insulin ([facility])    - Lipid Profile; Future  - Comp Metabolic Panel; Future    4. Chronic kidney disease (CKD) stage G3b/A1, moderately decreased glomerular filtration rate (GFR) between 30-44 mL/min/1.73 square meter and albuminuria creatinine ratio less than 30 mg/g ([facility])      5. Morbid obesity with BMI of 40.0-44.9, adult ([facility])      6. Neck pain    - Referral to Chiropractic                  "

## 2022-10-17 ENCOUNTER — OFFICE VISIT (OUTPATIENT)
Dept: MEDICAL GROUP | Facility: PHYSICIAN GROUP | Age: 67
End: 2022-10-17
Payer: MEDICARE

## 2022-10-17 VITALS
WEIGHT: 249.4 LBS | OXYGEN SATURATION: 94 % | SYSTOLIC BLOOD PRESSURE: 118 MMHG | RESPIRATION RATE: 20 BRPM | TEMPERATURE: 97.3 F | DIASTOLIC BLOOD PRESSURE: 62 MMHG | HEIGHT: 66 IN | HEART RATE: 85 BPM | BODY MASS INDEX: 40.08 KG/M2

## 2022-10-17 DIAGNOSIS — R55 SYNCOPE, UNSPECIFIED SYNCOPE TYPE: ICD-10-CM

## 2022-10-17 DIAGNOSIS — N18.32 CHRONIC KIDNEY DISEASE (CKD) STAGE G3B/A1, MODERATELY DECREASED GLOMERULAR FILTRATION RATE (GFR) BETWEEN 30-44 ML/MIN/1.73 SQUARE METER AND ALBUMINURIA CREATININE RATIO LESS THAN 30 MG/G: ICD-10-CM

## 2022-10-17 DIAGNOSIS — I10 BENIGN ESSENTIAL HTN: ICD-10-CM

## 2022-10-17 PROCEDURE — 99214 OFFICE O/P EST MOD 30 MIN: CPT | Performed by: FAMILY MEDICINE

## 2022-10-17 ASSESSMENT — ENCOUNTER SYMPTOMS
TINGLING: 0
DIZZINESS: 0
HEMOPTYSIS: 0
FEVER: 0
DOUBLE VISION: 0
PALPITATIONS: 0
HEADACHES: 0
MUSCULOSKELETAL NEGATIVE: 1
WEAKNESS: 1
NAUSEA: 0
CARDIOVASCULAR NEGATIVE: 1
EYES NEGATIVE: 1
BRUISES/BLEEDS EASILY: 0
GASTROINTESTINAL NEGATIVE: 1
HEARTBURN: 0
COUGH: 0
BLURRED VISION: 0
CHILLS: 0
SHORTNESS OF BREATH: 1
PSYCHIATRIC NEGATIVE: 1
MYALGIAS: 0
DEPRESSION: 0

## 2022-10-17 ASSESSMENT — FIBROSIS 4 INDEX: FIB4 SCORE: 1.83

## 2022-10-18 NOTE — PROGRESS NOTES
"Subjective     Shahana Nj is a 67 y.o. female who presents with Fatigue (Follow up on fatigue and neck pain, hasn't heard from Chiro, pt almost passed out in kitchen. SOB, ear issues, )            Patient had episode where she was sitting at the kitchen table and felt fatigued and was confused and slurring her words. Taken to couch by  and bs normal at 168 and recovered after a few minutes. Did not take bp or pulse at the time. Felt weak for a few hours after  No cp nor headache at the time  Reports some sob with minimal effort lately   Will get carotids and echo and ct and f/u after  Will get home bp cuff and take daily bp    1. Syncope, unspecified syncope type     CT-HEAD W/O; Future   US-CAROTID DOPPLER BILAT; Future   EC-ECHOCARDIOGRAM COMPLETE W/O CONT; Future   Comp Metabolic Panel; Future   CBC WITHOUT DIFFERENTIAL; Future    2. Benign essential HTN  Currently treated for HTN, taking meds with no CP or sob, monitors bp at home periodically. controlled       Comp Metabolic Panel; Future   CBC WITHOUT DIFFERENTIAL; Future    3. Chronic kidney disease (CKD) stage G3b/A1, moderately decreased glomerular filtration rate (GFR) between 30-44 mL/min/1.73 square meter and albuminuria creatinine ratio less than 30 mg/g (HCC)  Patient is currently being followed for chronic renal insufficiency. They are avoiding nsaids and maintaining hydration and following renal diet. Taking all appropriate meds. No new change in urine frequency or volume.     Comp Metabolic Panel; Future   CBC WITHOUT DIFFERENTIAL; Future    Past Medical History:  No date: Arthritis      Comment:  Osteo knees  No date: Dyslipidemia  No date: Essential hypertension  No date: GERD (gastroesophageal reflux disease)  No date: High cholesterol  11/19/2019: Left knee pain  No date: Myocardial infarct (HCC)      Comment:  Hx of 2 and states \"very Mild\". Last was approx 2014. No               longer follows with cardiologist.  No date: AALIYAH " (obstructive sleep apnea)      Comment:  CPAP  No date: Snoring  No date: Type 2 diabetes mellitus (HCC)      Comment:  11/19/19-Avg AM glucose=100.  Past Surgical History:  12/3/2019: PB TOTAL KNEE ARTHROPLASTY; Left      Comment:  Procedure: ARTHROPLASTY, KNEE, TOTAL;  Surgeon: Ryan Keen M.D.;  Location: SURGERY [facility];                 Service: Orthopedics  09/2019: COLONOSCOPY  2004: ABDOMINAL HYSTERECTOMY TOTAL  1974: KNEE ARTHROSCOPY; Right  1970: RHINOPLASTY  early 2000's: BUNIONECTOMY; Left  1960's: DENTAL EXTRACTION(S)      Comment:  wisdom teeth  Infant to 2001: MYRINGOTOMY; Bilateral      Comment:  13 ear surgeries  1980's: OTHER SURGICAL PROCEDURE; Right      Comment:  fluid drained off of bone air pockets behind ear bone  as child: TONSILLECTOMY AND ADENOIDECTOMY  Social History    Tobacco Use      Smoking status: Never      Smokeless tobacco: Never    Vaping Use      Vaping Use: Never used    Alcohol use: Yes      Comment: 6 times per year    Drug use: No      Comment: tried marijuana at age 17    Review of patient's family history indicates:  Problem: Heart Disease      Relation: Mother          Age of Onset: 45          Comment: cabg  Problem: Heart Disease      Relation: Father          Age of Onset: (Not Specified)          Comment: cabg and valve replacement  Problem: Diabetes      Relation: Sister          Age of Onset: (Not Specified)  Problem: Diabetes      Relation: Other          Age of Onset: (Not Specified)      Current Outpatient Medications: ·  metoprolol SR (TOPROL XL) 50 MG TABLET SR 24 HR, Take 1 Tablet by mouth every day., Disp: 90 Tablet, Rfl: 0·  metformin (GLUCOPHAGE) 1000 MG tablet, Take 1 Tablet by mouth 2 times a day with meals., Disp: 200 Tablet, Rfl: 3·  rosuvastatin (CRESTOR) 40 MG tablet, Take 1 Tablet by mouth every day., Disp: 100 Tablet, Rfl: 3·  irbesartan-hydrochlorothiazide (AVALIDE) 300-12.5 MG per tablet, Take 1 Tablet by mouth every  day., Disp: 100 Tablet, Rfl: 3·  spironolactone (ALDACTONE) 25 MG Tab, Take 1/2 (one-half) tablet by mouth once daily, Disp: 45 Tablet, Rfl: 3·  Vitamins-Lipotropics (LIPO FLAVONOID PLUS) Tab, Take  by mouth., Disp: , Rfl: ·  Misc Natural Products (HEALTHY LIVER PO), Take  by mouth., Disp: , Rfl: ·  loratadine-pseudoephedrine (CLARITIN-D 24 HOUR)  MG per tablet, Take 1 Tablet by mouth every day., Disp: , Rfl: ·  omeprazole (PRILOSEC) 20 MG delayed-release capsule, Take 20 mg by mouth every day., Disp: , Rfl: ·  azelastine (ASTELIN) 137 MCG/SPRAY nasal spray, Administer 2 Sprays into affected nostril(S) 2 times a day., Disp: , Rfl: ·  acetaminophen (TYLENOL) 500 MG Tab, Take 1,000 mg by mouth every 6 hours as needed., Disp: , Rfl: ·  aspirin (ASA) 81 MG Chew Tab chewable tablet, Take 1 Tab by mouth every day., Disp: 100 Tab, Rfl: 11    Patient was instructed on the use of medications, either prescriptions or OTC and informed on when the appropriate follow up time period should be. In addition, patient was also instructed that should any acute worsening occur that they should notify this clinic asap or call 911.            Review of Systems   Constitutional:  Positive for malaise/fatigue. Negative for chills and fever.   HENT: Negative.  Negative for hearing loss.    Eyes: Negative.  Negative for blurred vision and double vision.   Respiratory:  Positive for shortness of breath. Negative for cough and hemoptysis.    Cardiovascular: Negative.  Negative for chest pain and palpitations.   Gastrointestinal: Negative.  Negative for heartburn and nausea.   Genitourinary: Negative.  Negative for dysuria.   Musculoskeletal: Negative.  Negative for myalgias.   Skin: Negative.  Negative for rash.   Neurological:  Positive for weakness. Negative for dizziness, tingling and headaches.   Endo/Heme/Allergies: Negative.  Does not bruise/bleed easily.   Psychiatric/Behavioral: Negative.  Negative for depression and suicidal  ideas.    All other systems reviewed and are negative.           Objective     Vitals:    10/17/22 1640   BP: 118/62   Pulse: 85   Resp: 20   Temp: 36.3 °C (97.3 °F)   SpO2: 94%         Physical Exam  Vitals and nursing note reviewed.   Constitutional:       General: She is not in acute distress.     Appearance: She is well-developed. She is not diaphoretic.   HENT:      Head: Normocephalic and atraumatic.      Mouth/Throat:      Pharynx: No oropharyngeal exudate.   Eyes:      Pupils: Pupils are equal, round, and reactive to light.   Cardiovascular:      Rate and Rhythm: Normal rate and regular rhythm.      Heart sounds: Normal heart sounds. No murmur heard.    No friction rub. No gallop.   Pulmonary:      Effort: Pulmonary effort is normal. No respiratory distress.      Breath sounds: Normal breath sounds. No wheezing or rales.   Chest:      Chest wall: No tenderness.   Neurological:      Mental Status: She is alert and oriented to person, place, and time.   Psychiatric:         Behavior: Behavior normal.         Thought Content: Thought content normal.         Judgment: Judgment normal.                           Assessment & Plan        1. Syncope, unspecified syncope type    - CT-HEAD W/O; Future  - US-CAROTID DOPPLER BILAT; Future  - EC-ECHOCARDIOGRAM COMPLETE W/O CONT; Future  - Comp Metabolic Panel; Future  - CBC WITHOUT DIFFERENTIAL; Future    2. Benign essential HTN    - Comp Metabolic Panel; Future  - CBC WITHOUT DIFFERENTIAL; Future    3. Chronic kidney disease (CKD) stage G3b/A1, moderately decreased glomerular filtration rate (GFR) between 30-44 mL/min/1.73 square meter and albuminuria creatinine ratio less than 30 mg/g (HCC)  - Comp Metabolic Panel; Future  - CBC WITHOUT DIFFERENTIAL; Future

## 2022-10-27 ENCOUNTER — ANCILLARY PROCEDURE (OUTPATIENT)
Dept: CARDIOLOGY | Facility: MEDICAL CENTER | Age: 67
End: 2022-10-27
Attending: FAMILY MEDICINE
Payer: MEDICARE

## 2022-10-27 ENCOUNTER — DOCUMENTATION (OUTPATIENT)
Dept: HEALTH INFORMATION MANAGEMENT | Facility: OTHER | Age: 67
End: 2022-10-27
Payer: MEDICARE

## 2022-10-27 DIAGNOSIS — R55 SYNCOPE, UNSPECIFIED SYNCOPE TYPE: ICD-10-CM

## 2022-10-27 PROCEDURE — 93306 TTE W/DOPPLER COMPLETE: CPT

## 2022-10-28 DIAGNOSIS — I10 ESSENTIAL HYPERTENSION: ICD-10-CM

## 2022-10-28 LAB
LV EJECT FRACT  99904: 70
LV EJECT FRACT MOD 4C 99902: 79.66

## 2022-10-28 PROCEDURE — 93306 TTE W/DOPPLER COMPLETE: CPT | Mod: 26 | Performed by: INTERNAL MEDICINE

## 2022-10-30 ENCOUNTER — HOSPITAL ENCOUNTER (OUTPATIENT)
Dept: RADIOLOGY | Facility: MEDICAL CENTER | Age: 67
End: 2022-10-30
Attending: FAMILY MEDICINE
Payer: MEDICARE

## 2022-10-30 DIAGNOSIS — R55 SYNCOPE, UNSPECIFIED SYNCOPE TYPE: ICD-10-CM

## 2022-10-30 PROCEDURE — 70450 CT HEAD/BRAIN W/O DYE: CPT

## 2022-10-31 ENCOUNTER — APPOINTMENT (OUTPATIENT)
Dept: RADIOLOGY | Facility: MEDICAL CENTER | Age: 67
End: 2022-10-31
Attending: FAMILY MEDICINE
Payer: MEDICARE

## 2022-10-31 RX ORDER — METOPROLOL SUCCINATE 50 MG/1
TABLET, EXTENDED RELEASE ORAL
Qty: 90 TABLET | Refills: 3 | Status: SHIPPED | OUTPATIENT
Start: 2022-10-31 | End: 2023-11-07

## 2022-11-08 ENCOUNTER — TELEPHONE (OUTPATIENT)
Dept: MEDICAL GROUP | Facility: PHYSICIAN GROUP | Age: 67
End: 2022-11-08
Payer: MEDICARE

## 2022-11-10 ENCOUNTER — HOSPITAL ENCOUNTER (OUTPATIENT)
Dept: RADIOLOGY | Facility: MEDICAL CENTER | Age: 67
End: 2022-11-10
Attending: FAMILY MEDICINE
Payer: MEDICARE

## 2022-11-10 ENCOUNTER — TELEPHONE (OUTPATIENT)
Dept: MEDICAL GROUP | Facility: PHYSICIAN GROUP | Age: 67
End: 2022-11-10
Payer: MEDICARE

## 2022-11-10 DIAGNOSIS — R55 SYNCOPE, UNSPECIFIED SYNCOPE TYPE: ICD-10-CM

## 2022-11-10 PROCEDURE — 93880 EXTRACRANIAL BILAT STUDY: CPT

## 2022-11-10 NOTE — TELEPHONE ENCOUNTER
Lvm letting patient know provider received results and to  set up an appointment to go over those results within the next week.

## 2022-11-15 ENCOUNTER — OFFICE VISIT (OUTPATIENT)
Dept: MEDICAL GROUP | Facility: PHYSICIAN GROUP | Age: 67
End: 2022-11-15
Payer: MEDICARE

## 2022-11-15 VITALS
HEART RATE: 97 BPM | DIASTOLIC BLOOD PRESSURE: 58 MMHG | WEIGHT: 245 LBS | RESPIRATION RATE: 20 BRPM | BODY MASS INDEX: 39.37 KG/M2 | OXYGEN SATURATION: 98 % | TEMPERATURE: 97.1 F | HEIGHT: 66 IN | SYSTOLIC BLOOD PRESSURE: 112 MMHG

## 2022-11-15 DIAGNOSIS — R80.9 TYPE 2 DIABETES MELLITUS WITH MICROALBUMINURIA, WITHOUT LONG-TERM CURRENT USE OF INSULIN (HCC): ICD-10-CM

## 2022-11-15 DIAGNOSIS — E11.29 TYPE 2 DIABETES MELLITUS WITH MICROALBUMINURIA, WITHOUT LONG-TERM CURRENT USE OF INSULIN (HCC): ICD-10-CM

## 2022-11-15 DIAGNOSIS — R55 SYNCOPE, UNSPECIFIED SYNCOPE TYPE: ICD-10-CM

## 2022-11-15 DIAGNOSIS — I10 ESSENTIAL HYPERTENSION: ICD-10-CM

## 2022-11-15 PROCEDURE — 99214 OFFICE O/P EST MOD 30 MIN: CPT | Performed by: FAMILY MEDICINE

## 2022-11-15 RX ORDER — BENZONATATE 100 MG/1
100 CAPSULE ORAL 3 TIMES DAILY PRN
Qty: 60 CAPSULE | Refills: 0 | Status: SHIPPED | OUTPATIENT
Start: 2022-11-15 | End: 2023-02-07

## 2022-11-15 ASSESSMENT — ENCOUNTER SYMPTOMS
EYES NEGATIVE: 1
TINGLING: 0
BRUISES/BLEEDS EASILY: 0
MUSCULOSKELETAL NEGATIVE: 1
NEUROLOGICAL NEGATIVE: 1
HEARTBURN: 0
HEADACHES: 0
PALPITATIONS: 0
NAUSEA: 0
FEVER: 0
CARDIOVASCULAR NEGATIVE: 1
COUGH: 1
MYALGIAS: 0
BLURRED VISION: 0
CONSTITUTIONAL NEGATIVE: 1
GASTROINTESTINAL NEGATIVE: 1
HEMOPTYSIS: 0
DOUBLE VISION: 0
CHILLS: 0
DIZZINESS: 0
DEPRESSION: 0
PSYCHIATRIC NEGATIVE: 1

## 2022-11-15 ASSESSMENT — FIBROSIS 4 INDEX: FIB4 SCORE: 1.83

## 2022-11-15 NOTE — PROGRESS NOTES
"Subjective     Shahana Nj is a 67 y.o. female who presents with Results and Cough            1. Syncope, unspecified syncope type  Echo normal  Ct with microvascular change  Carotid with <50% each side  Will send to neurology to see if they think she should be on something other than asa   Referral to Neurology    2. Type 2 diabetes mellitus with microalbuminuria, without long-term current use of insulin (HCC)  Currently treated for DM, taking meds and checking bs at home, trying to do DM diet.controlled        3. Essential hypertension  Currently treated for HTN, taking meds with no CP or sob, monitors bp at home periodically. controlled        Past Medical History:  No date: Arthritis      Comment:  Osteo knees  No date: Dyslipidemia  No date: Essential hypertension  No date: GERD (gastroesophageal reflux disease)  No date: High cholesterol  11/19/2019: Left knee pain  No date: Myocardial infarct (HCC)      Comment:  Hx of 2 and states \"very Mild\". Last was approx 2014. No               longer follows with cardiologist.  No date: AALIYAH (obstructive sleep apnea)      Comment:  CPAP  No date: Snoring  No date: Type 2 diabetes mellitus (HCC)      Comment:  11/19/19-Avg AM glucose=100.  Past Surgical History:  12/3/2019: PB TOTAL KNEE ARTHROPLASTY; Left      Comment:  Procedure: ARTHROPLASTY, KNEE, TOTAL;  Surgeon: Ryan Keen M.D.;  Location: SURGERY [facility];                 Service: Orthopedics  09/2019: COLONOSCOPY  2004: ABDOMINAL HYSTERECTOMY TOTAL  1974: KNEE ARTHROSCOPY; Right  1970: RHINOPLASTY  early 2000's: BUNIONECTOMY; Left  1960's: DENTAL EXTRACTION(S)      Comment:  wisdom teeth  Infant to 2001: MYRINGOTOMY; Bilateral      Comment:  13 ear surgeries  1980's: OTHER SURGICAL PROCEDURE; Right      Comment:  fluid drained off of bone air pockets behind ear bone  as child: TONSILLECTOMY AND ADENOIDECTOMY  Social History    Tobacco Use      Smoking status: Never      " Smokeless tobacco: Never    Vaping Use      Vaping Use: Never used    Alcohol use: Yes      Comment: 6 times per year    Drug use: No      Comment: tried marijuana at age 17    Review of patient's family history indicates:      Current Outpatient Medications: ·  benzonatate (TESSALON) 100 MG Cap, Take 1 Capsule by mouth 3 times a day as needed for Cough., Disp: 60 Capsule, Rfl: 0·  metoprolol SR (TOPROL XL) 50 MG TABLET SR 24 HR, Take 1 tablet by mouth once daily, Disp: 90 Tablet, Rfl: 3·  metformin (GLUCOPHAGE) 1000 MG tablet, Take 1 Tablet by mouth 2 times a day with meals., Disp: 200 Tablet, Rfl: 3·  rosuvastatin (CRESTOR) 40 MG tablet, Take 1 Tablet by mouth every day., Disp: 100 Tablet, Rfl: 3·  irbesartan-hydrochlorothiazide (AVALIDE) 300-12.5 MG per tablet, Take 1 Tablet by mouth every day., Disp: 100 Tablet, Rfl: 3·  spironolactone (ALDACTONE) 25 MG Tab, Take 1/2 (one-half) tablet by mouth once daily, Disp: 45 Tablet, Rfl: 3·  Vitamins-Lipotropics (LIPO FLAVONOID PLUS) Tab, Take  by mouth., Disp: , Rfl: ·  Misc Natural Products (HEALTHY LIVER PO), Take  by mouth., Disp: , Rfl: ·  loratadine-pseudoephedrine (CLARITIN-D 24 HOUR)  MG per tablet, Take 1 Tablet by mouth every day., Disp: , Rfl: ·  omeprazole (PRILOSEC) 20 MG delayed-release capsule, Take 1 Capsule by mouth every day., Disp: , Rfl: ·  azelastine (ASTELIN) 137 MCG/SPRAY nasal spray, Administer 2 Sprays into affected nostril(S) 2 times a day., Disp: , Rfl: ·  acetaminophen (TYLENOL) 500 MG Tab, Take 2 Tablets by mouth every 6 hours as needed., Disp: , Rfl: ·  aspirin (ASA) 81 MG Chew Tab chewable tablet, Take 1 Tab by mouth every day., Disp: 100 Tab, Rfl: 11    Patient was instructed on the use of medications, either prescriptions or OTC and informed on when the appropriate follow up time period should be. In addition, patient was also instructed that should any acute worsening occur that they should notify this clinic asap or call  "911.            Review of Systems   Constitutional: Negative.  Negative for chills and fever.   HENT: Negative.  Negative for hearing loss.    Eyes: Negative.  Negative for blurred vision and double vision.   Respiratory:  Positive for cough. Negative for hemoptysis.    Cardiovascular: Negative.  Negative for chest pain and palpitations.   Gastrointestinal: Negative.  Negative for heartburn and nausea.   Genitourinary: Negative.  Negative for dysuria.   Musculoskeletal: Negative.  Negative for myalgias.   Skin: Negative.  Negative for rash.   Neurological: Negative.  Negative for dizziness, tingling and headaches.   Endo/Heme/Allergies: Negative.  Does not bruise/bleed easily.   Psychiatric/Behavioral: Negative.  Negative for depression and suicidal ideas.    All other systems reviewed and are negative.           Objective     /58 (BP Location: Right arm, Patient Position: Sitting, BP Cuff Size: Large adult)   Pulse 97   Temp 36.2 °C (97.1 °F) (Temporal)   Resp 20   Ht 1.676 m (5' 6\")   Wt 111 kg (245 lb)   SpO2 98%   BMI 39.54 kg/m²      Physical Exam  Vitals and nursing note reviewed.   Constitutional:       General: She is not in acute distress.     Appearance: She is well-developed. She is not diaphoretic.   HENT:      Head: Normocephalic and atraumatic.      Mouth/Throat:      Pharynx: No oropharyngeal exudate.   Eyes:      Pupils: Pupils are equal, round, and reactive to light.   Cardiovascular:      Rate and Rhythm: Normal rate and regular rhythm.      Heart sounds: Normal heart sounds. No murmur heard.    No friction rub. No gallop.   Pulmonary:      Effort: Pulmonary effort is normal. No respiratory distress.      Breath sounds: Normal breath sounds. No wheezing or rales.   Chest:      Chest wall: No tenderness.   Neurological:      Mental Status: She is alert and oriented to person, place, and time.   Psychiatric:         Behavior: Behavior normal.         Thought Content: Thought content " normal.         Judgment: Judgment normal.                           Assessment & Plan        1. Syncope, unspecified syncope type    - Referral to Neurology    2. Type 2 diabetes mellitus with microalbuminuria, without long-term current use of insulin (HCC)      3. Essential hypertension

## 2023-01-17 ENCOUNTER — TELEPHONE (OUTPATIENT)
Dept: MEDICAL GROUP | Facility: LAB | Age: 68
End: 2023-01-17
Payer: MEDICARE

## 2023-01-17 RX ORDER — OMEPRAZOLE 20 MG/1
40 CAPSULE, DELAYED RELEASE ORAL DAILY
COMMUNITY
Start: 2023-01-09 | End: 2023-01-17

## 2023-01-17 RX ORDER — DOXYCYCLINE HYCLATE 100 MG
100 TABLET ORAL 2 TIMES DAILY
COMMUNITY
Start: 2022-12-21 | End: 2023-01-26

## 2023-01-17 RX ORDER — BENZONATATE 200 MG/1
200 CAPSULE ORAL
COMMUNITY
Start: 2023-01-14 | End: 2023-01-17

## 2023-01-17 RX ORDER — FLUTICASONE PROPIONATE 50 MCG
2 SPRAY, SUSPENSION (ML) NASAL DAILY
COMMUNITY
Start: 2023-01-14

## 2023-01-17 RX ORDER — AZELASTINE 1 MG/ML
274 SPRAY, METERED NASAL
COMMUNITY
End: 2023-01-17

## 2023-01-17 RX ORDER — ENOXAPARIN SODIUM 100 MG/ML
60 INJECTION SUBCUTANEOUS
COMMUNITY
Start: 2023-01-09 | End: 2023-02-08

## 2023-01-17 RX ORDER — ALBUTEROL SULFATE 90 UG/1
1 AEROSOL, METERED RESPIRATORY (INHALATION)
COMMUNITY
Start: 2023-01-03 | End: 2023-04-03

## 2023-01-17 RX ORDER — METOPROLOL SUCCINATE 50 MG/1
50 TABLET, EXTENDED RELEASE ORAL DAILY
COMMUNITY
Start: 2022-10-31 | End: 2023-01-17

## 2023-01-17 RX ORDER — AZITHROMYCIN 250 MG/1
TABLET, FILM COATED ORAL
COMMUNITY
Start: 2023-01-03 | End: 2023-01-26

## 2023-01-17 NOTE — TELEPHONE ENCOUNTER
NEW PATIENT VISIT PRE-VISIT PLANNING    1.  [employer] Patient is checked in Patient Demographics?Yes    2.  Immunizations were updated in Epic using Reconcile Outside Information activity? Yes         3.  Is this appointment scheduled as a Hospital Follow-Up? No    4.  Patient is due for the following Health Maintenance Topics:   Health Maintenance Due   Topic Date Due    Annual Wellness Visit  Never done    FASTING LIPID PROFILE  07/30/2022    RETINAL SCREENING  12/17/2022   5.  Reviewed/Updated the following with patient:          Preferred Pharmacy? Yes          Preferred Lab? Yes          Preferred Communication? Yes          Allergies? Yes          Medications? YES. Was Abstract Encounter opened and chart updated? YES    6.  Updated Care Team?          DME Company (gait device, O2, CPAP, etc.) YES          Other Specialists (eye doctor, derm, GYN, cardiology, endo, etc): YES    7.  [facility] ([employer]) form printed for Provider? N/A

## 2023-01-17 NOTE — TELEPHONE ENCOUNTER
I spoke with Shahana and she said she had eye exam within last year with Dr. Urrutia.    I faxed records request to their office for most recent eye exam report.     Records in chart.

## 2023-01-24 ENCOUNTER — APPOINTMENT (OUTPATIENT)
Dept: NEPHROLOGY | Facility: MEDICAL CENTER | Age: 68
End: 2023-01-24
Payer: MEDICARE

## 2023-01-26 ENCOUNTER — OFFICE VISIT (OUTPATIENT)
Dept: MEDICAL GROUP | Facility: LAB | Age: 68
End: 2023-01-26
Payer: MEDICARE

## 2023-01-26 VITALS
DIASTOLIC BLOOD PRESSURE: 80 MMHG | WEIGHT: 252 LBS | OXYGEN SATURATION: 98 % | SYSTOLIC BLOOD PRESSURE: 140 MMHG | TEMPERATURE: 97.2 F | HEART RATE: 100 BPM | HEIGHT: 66 IN | BODY MASS INDEX: 40.5 KG/M2 | RESPIRATION RATE: 18 BRPM

## 2023-01-26 DIAGNOSIS — N28.89 RENAL MASS, RIGHT: ICD-10-CM

## 2023-01-26 DIAGNOSIS — R60.0 EDEMA, LOWER EXTREMITY: ICD-10-CM

## 2023-01-26 DIAGNOSIS — Z76.89 ENCOUNTER TO ESTABLISH CARE WITH NEW DOCTOR: ICD-10-CM

## 2023-01-26 PROBLEM — E11.9 TYPE 2 DIABETES MELLITUS WITHOUT COMPLICATIONS (HCC): Status: ACTIVE | Noted: 2023-01-26

## 2023-01-26 PROBLEM — K21.9 GASTRO-ESOPHAGEAL REFLUX DISEASE WITHOUT ESOPHAGITIS: Status: ACTIVE | Noted: 2023-01-26

## 2023-01-26 PROBLEM — G47.30 SLEEP APNEA: Status: ACTIVE | Noted: 2023-01-26

## 2023-01-26 PROBLEM — R13.19 ESOPHAGEAL DYSPHAGIA: Status: ACTIVE | Noted: 2023-01-26

## 2023-01-26 PROBLEM — I10 HYPERTENSION: Status: ACTIVE | Noted: 2023-01-26

## 2023-01-26 PROBLEM — E66.9 OBESITY: Status: ACTIVE | Noted: 2023-01-26

## 2023-01-26 PROBLEM — K57.92 DIVERTICULITIS: Status: ACTIVE | Noted: 2023-01-26

## 2023-01-26 PROBLEM — I25.2 OLD MYOCARDIAL INFARCTION: Status: ACTIVE | Noted: 2023-01-26

## 2023-01-26 PROBLEM — E78.00 HIGH CHOLESTEROL: Status: ACTIVE | Noted: 2023-01-26

## 2023-01-26 PROCEDURE — 99214 OFFICE O/P EST MOD 30 MIN: CPT | Performed by: FAMILY MEDICINE

## 2023-01-26 RX ORDER — FUROSEMIDE 20 MG/1
20 TABLET ORAL DAILY
Qty: 20 TABLET | Refills: 0 | Status: SHIPPED | OUTPATIENT
Start: 2023-01-26 | End: 2023-02-03

## 2023-01-26 RX ORDER — DAPAGLIFLOZIN 10 MG/1
TABLET, FILM COATED ORAL
COMMUNITY
Start: 2023-01-25

## 2023-01-26 ASSESSMENT — FIBROSIS 4 INDEX: FIB4 SCORE: 0.99

## 2023-01-26 NOTE — PROGRESS NOTES
CC: Here to establish care,   HPI: Established patient, new to me, accompanied with her male partner today.  Shahana presents today to establish care, 67-year-old female with past medical history significant for hypertension, diabetes, hyperlipidemia, morbid obesity, chronic kidney disease, hearing deficiency, arthritis, discussed the following concerns as well today:    1. Encounter to establish care with new doctor  As part of establishing care visit reviewed records, past medical problems, past surgical history, family/social history, most recent records from [facility] reviewed.  Not working retired at this time.  Lives with her .    2. Renal mass, right  New diagnosis  On her recent hospital admission, CT of the abdomen showed evidence of right renal mass, patient has an appointment to schedule and see urology for further evaluation of the mass.  Also she has been following up with her nephrologist for chronic kidney disease.  At the hospital patient was started on Lovenox blood thinner to prevent blood clotting if she is possibly diagnosed with cancer.  Denies bleeding or side effects.    3. Edema, lower extremity  New concern  Discharged from the hospital around January 14.  Has been having lower extremity edema and swelling since hospital discharge.  Denies pain or discomfort or burning sensation in both lower extremity, denies shortness of breath.  Denies orthopnea.    Patient Active Problem List    Diagnosis Date Noted    Encounter to establish care with new doctor 01/26/2023    Diverticulitis 01/26/2023    Esophageal dysphagia 01/26/2023    Gastro-esophageal reflux disease without esophagitis 01/26/2023    Hypertension 01/26/2023    Old myocardial infarction 01/26/2023    Sleep apnea 01/26/2023    High cholesterol 01/26/2023    Obesity 01/26/2023    Type 2 diabetes mellitus without complications (HCC) 01/26/2023    Renal mass, right 01/26/2023    Type 2 diabetes mellitus with  microalbuminuria ([facility]) 06/06/2022    Morbid obesity with BMI of 40.0-44.9, adult ([facility]) 02/28/2022    Bedbug bite 02/08/2022    Need for prophylactic antibiotic 02/01/2022    Mixed hyperlipidemia 12/13/2018    Benign essential HTN 12/13/2018    Type 2 diabetes mellitus without complication, without long-term current use of insulin ([facility]) 06/04/2018    Bilateral hearing loss 04/30/2018       Current Outpatient Medications   Medication Sig Dispense Refill    FARXIGA 10 MG Tab       furosemide (LASIX) 20 MG Tab Take 1 Tablet by mouth every day. 20 Tablet 0    enoxaparin (LOVENOX) 60 MG/0.6ML Solution Prefilled Syringe inj Inject 60 mg under the skin.      fluticasone (FLONASE) 50 MCG/ACT nasal spray Administer 2 Sprays into affected nostril(S) every day.      benzonatate (TESSALON) 100 MG Cap Take 1 Capsule by mouth 3 times a day as needed for Cough. 60 Capsule 0    metoprolol SR (TOPROL XL) 50 MG TABLET SR 24 HR Take 1 tablet by mouth once daily 90 Tablet 3    loratadine-pseudoephedrine (CLARITIN-D 24 HOUR)  MG per tablet Take 1 Tablet by mouth every day.      omeprazole (PRILOSEC) 20 MG delayed-release capsule Take 1 Capsule by mouth every day.      albuterol 108 (90 Base) MCG/ACT Aero Soln inhalation aerosol Inhale 1 Puff.      metformin (GLUCOPHAGE) 1000 MG tablet Take 1 Tablet by mouth 2 times a day with meals. (Patient not taking: Reported on 1/26/2023) 200 Tablet 3    rosuvastatin (CRESTOR) 40 MG tablet Take 1 Tablet by mouth every day. (Patient not taking: Reported on 1/26/2023) 100 Tablet 3    irbesartan-hydrochlorothiazide (AVALIDE) 300-12.5 MG per tablet Take 1 Tablet by mouth every day. (Patient not taking: Reported on 1/26/2023) 100 Tablet 3    spironolactone (ALDACTONE) 25 MG Tab Take 1/2 (one-half) tablet by mouth once daily (Patient not taking: Reported on 1/26/2023) 45 Tablet 3    Vitamins-Lipotropics (LIPO FLAVONOID PLUS) Tab Take  by mouth.      Misc Natural Products (HEALTHY LIVER PO) Take  by  mouth. (Patient not taking: Reported on 1/26/2023)      acetaminophen (TYLENOL) 500 MG Tab Take 2 Tablets by mouth every 6 hours as needed.      aspirin (ASA) 81 MG Chew Tab chewable tablet Take 1 Tab by mouth every day. 100 Tab 11     No current facility-administered medications for this visit.         Allergies as of 01/26/2023 - Reviewed 01/17/2023   Allergen Reaction Noted    Pcn [penicillins] Swelling 03/29/2016    Tape Rash 03/29/2016        ROS: Denies any chest pain, Shortness of breath, Changes bowel or bladder, Lower extremity edema.    Physical Exam:  Gen.: Well-developed, well-nourished, no apparent distress,pleasant and cooperative with the examination  Skin:  Warm and dry with good turgor. No rashes or suspicious lesions in visible areas  Eye: PERRLA, conjunctiva and sclera clear, lids normal  HEENT: Normocephalic/atraumatic, sinuses nontender with palpation, TMs clear, nares patent with pink mucosa and clear rhinorrhea, lips without lesions, oropharynx clear.  Neck: Trachea midline,no masses or adenopathy  Thyroid: normal consistency and size. No masses or nodules. Not tender with palpation.  Cor: Regular rate and rhythm without murmur, gallop or rub.  Lungs: Respirations unlabored.Clear to auscultation with equal breath sounds bilaterally. No wheezes, rhonchi.  Abdomen: Soft nontender without hepatosplenomegaly or masses appreciated, normoactive bowel sounds. No hernias.  Extremities: No cyanosis, clubbing, positive +2 pitting edema with erythematous changes on lower extremity., Symmetrical without deformities or malformations. Pulses 2+ and symmetrical both upper and lower extremities  Lymphatic: No abnormal adenopathy of the neck groin or axillae.  Psych: Alert and oriented x 3.Normal affect, judgement,insight and memory.        Assessment and Plan.   67 y.o. female here to establish care    1. Encounter to establish care with new doctor  Reviewed medical history, past medical problems and  records    2. Renal mass, right  New diagnosis, finding during her recent hospital stay at [facility], patient to follow-up urgently with urology for further evaluation and biopsy to rule out carcinoma  - Referral to Urology    3. Edema, lower extremity  New concern since hospital discharge.  Possibly related to volume overload, discussed with the patient carefully to start Lasix for 1 week, low-sodium intake.  And follow-up with me in a week, will monitor for cellulitis.  Follow-up as directed.  Leg elevation recommended    - furosemide (LASIX) 20 MG Tab; Take 1 Tablet by mouth every day.  Dispense: 20 Tablet; Refill: 0  - Comp Metabolic Panel; Future     Please note that this dictation was created using voice recognition software. I have worked with consultants from the vendor as well as technical experts from [employer] to optimize the interface. I have made every reasonable attempt to correct obvious errors, but I expect that there are errors of grammar and possibly content that I did not discover before finalizing the note.

## 2023-01-30 ENCOUNTER — TELEPHONE (OUTPATIENT)
Dept: MEDICAL GROUP | Facility: LAB | Age: 68
End: 2023-01-30
Payer: MEDICARE

## 2023-01-30 NOTE — TELEPHONE ENCOUNTER
ESTABLISHED PATIENT PRE-VISIT PLANNING     Patient was NOT contacted to complete PVP.     Note: Patient will not be contacted if there is no indication to call.     1.  Reviewed notes from the last few office visits within the medical group: Yes    2.  If any orders were placed at last visit or intended to be done for this visit (i.e. 6 mos follow-up), do we have Results/Consult Notes?           Labs - Labs ordered, but not to be completed until unclear?.  Note: If patient appointment is for lab review and patient did not complete labs, check with provider if OK to reschedule patient until labs completed.         Imaging - Imaging was not ordered at last office visit.         Referrals - Referral ordered, patient has NOT been seen.    3. Is this appointment scheduled as a Hospital Follow-Up? No    4.  Immunizations were updated in Epic using Reconcile Outside Information activity? Yes    5.  Patient is due for the following Health Maintenance Topics:   Health Maintenance Due   Topic Date Due    Annual Wellness Visit  Never done    FASTING LIPID PROFILE  07/30/2022     6.  AHA (Pulse[employer]) form printed for Provider? N/A

## 2023-02-01 ENCOUNTER — HOSPITAL ENCOUNTER (OUTPATIENT)
Dept: LAB | Facility: MEDICAL CENTER | Age: 68
End: 2023-02-01
Attending: FAMILY MEDICINE
Payer: MEDICARE

## 2023-02-01 DIAGNOSIS — R60.0 EDEMA, LOWER EXTREMITY: ICD-10-CM

## 2023-02-01 LAB
ALBUMIN SERPL BCP-MCNC: 4.3 G/DL (ref 3.2–4.9)
ALBUMIN/GLOB SERPL: 1.4 G/DL
ALP SERPL-CCNC: 101 U/L (ref 30–99)
ALT SERPL-CCNC: 47 U/L (ref 2–50)
ANION GAP SERPL CALC-SCNC: 12 MMOL/L (ref 7–16)
AST SERPL-CCNC: 37 U/L (ref 12–45)
BILIRUB SERPL-MCNC: 0.6 MG/DL (ref 0.1–1.5)
BUN SERPL-MCNC: 21 MG/DL (ref 8–22)
CALCIUM ALBUM COR SERPL-MCNC: 10.8 MG/DL (ref 8.5–10.5)
CALCIUM SERPL-MCNC: 11 MG/DL (ref 8.5–10.5)
CHLORIDE SERPL-SCNC: 104 MMOL/L (ref 96–112)
CO2 SERPL-SCNC: 22 MMOL/L (ref 20–33)
CREAT SERPL-MCNC: 1.27 MG/DL (ref 0.5–1.4)
GFR SERPLBLD CREATININE-BSD FMLA CKD-EPI: 46 ML/MIN/1.73 M 2
GLOBULIN SER CALC-MCNC: 3 G/DL (ref 1.9–3.5)
GLUCOSE SERPL-MCNC: 191 MG/DL (ref 65–99)
POTASSIUM SERPL-SCNC: 4 MMOL/L (ref 3.6–5.5)
PROT SERPL-MCNC: 7.3 G/DL (ref 6–8.2)
SODIUM SERPL-SCNC: 138 MMOL/L (ref 135–145)

## 2023-02-01 PROCEDURE — 36415 COLL VENOUS BLD VENIPUNCTURE: CPT

## 2023-02-01 PROCEDURE — 80053 COMPREHEN METABOLIC PANEL: CPT

## 2023-02-03 ENCOUNTER — OFFICE VISIT (OUTPATIENT)
Dept: MEDICAL GROUP | Facility: LAB | Age: 68
End: 2023-02-03
Payer: MEDICARE

## 2023-02-03 VITALS
DIASTOLIC BLOOD PRESSURE: 70 MMHG | OXYGEN SATURATION: 94 % | RESPIRATION RATE: 18 BRPM | SYSTOLIC BLOOD PRESSURE: 140 MMHG | BODY MASS INDEX: 39.21 KG/M2 | WEIGHT: 244 LBS | HEIGHT: 66 IN | TEMPERATURE: 97.4 F | HEART RATE: 90 BPM

## 2023-02-03 DIAGNOSIS — N28.89 RENAL MASS, RIGHT: ICD-10-CM

## 2023-02-03 DIAGNOSIS — I10 HYPERTENSION, UNSPECIFIED TYPE: ICD-10-CM

## 2023-02-03 DIAGNOSIS — E66.01 MORBID (SEVERE) OBESITY DUE TO EXCESS CALORIES (HCC): ICD-10-CM

## 2023-02-03 DIAGNOSIS — R60.0 EDEMA, LOWER EXTREMITY: ICD-10-CM

## 2023-02-03 PROCEDURE — 99214 OFFICE O/P EST MOD 30 MIN: CPT | Performed by: FAMILY MEDICINE

## 2023-02-03 RX ORDER — LORATADINE 10 MG/1
10 TABLET ORAL DAILY
COMMUNITY
End: 2023-02-03

## 2023-02-03 RX ORDER — APIXABAN 5 MG/1
5 TABLET, FILM COATED ORAL 2 TIMES DAILY
COMMUNITY
Start: 2023-01-30

## 2023-02-03 RX ORDER — IRBESARTAN 300 MG/1
300 TABLET ORAL NIGHTLY
Qty: 90 TABLET | Refills: 3 | Status: SHIPPED | OUTPATIENT
Start: 2023-02-03 | End: 2023-02-09

## 2023-02-03 RX ORDER — FUROSEMIDE 20 MG/1
20 TABLET ORAL 2 TIMES DAILY
Qty: 60 TABLET | Refills: 0 | Status: SHIPPED | OUTPATIENT
Start: 2023-02-03

## 2023-02-03 ASSESSMENT — FIBROSIS 4 INDEX: FIB4 SCORE: 1.25

## 2023-02-03 NOTE — PROGRESS NOTES
Chief Complaint:   Chief Complaint   Patient presents with    Follow-Up     2 week FV       HPI: Established patient, accompanied with her partner  Shahana Nj is a 67 y.o. female who presents for follow-up, discussed the following today:    1. Edema, lower extremity  Seen 2 weeks ago, started on Lasix low-dose, started after recent hospitalization.  Patient today said swelling improved but not resolved completely, the tingling and pressure sensation is almost gone.  Continues to take Lasix 20 mg daily along with spironolactone.  Seen her cardiologist recently, denies increased shortness of breath.    2. Hypertension, unspecified type  Blood pressure is 140/70, patient is supposed to be on combination of ARB and hydrochlorothiazide, discussed with the patient to continue with the ARB only and to continue with Lasix and spironolactone.  We will continue monitoring blood pressure, asymptomatic.    3. Morbid (severe) obesity due to excess calories   Discussed with the patient long-term management of obesity, and will schedule appointment for weight management in the future.  A lot of health issues is going on at this time, counseled for exercise activity, healthier diet, calorie deficiency and increase water intake.    4. Body mass index (BMI) 39.0-39.9, adult    As above  5. Renal mass, right    New concern, scheduled to see urology for further evaluation of right renal mass that was seen in her recent CT scan during hospitalization.  Her appointment is in 2 weeks        Past medical history, family history, social history and medications reviewed and updated in the record.   Current medications, problem list and allergies reviewed in [facility] maintenance topics are reviewed and updated.    Patient Active Problem List    Diagnosis Date Noted    Encounter to establish care with new doctor 01/26/2023    Diverticulitis 01/26/2023    Esophageal dysphagia 01/26/2023    Gastro-esophageal reflux disease without  esophagitis 01/26/2023    Hypertension 01/26/2023    Old myocardial infarction 01/26/2023    Sleep apnea 01/26/2023    High cholesterol 01/26/2023    Obesity 01/26/2023    Type 2 diabetes mellitus without complications ([facility]) 01/26/2023    Renal mass, right 01/26/2023    Type 2 diabetes mellitus with microalbuminuria ([facility]) 06/06/2022    Morbid obesity with BMI of 40.0-44.9, adult ([facility]) 02/28/2022    Bedbug bite 02/08/2022    Need for prophylactic antibiotic 02/01/2022    Mixed hyperlipidemia 12/13/2018    Benign essential HTN 12/13/2018    Type 2 diabetes mellitus without complication, without long-term current use of insulin ([facility]) 06/04/2018    Bilateral hearing loss 04/30/2018     Family History   Problem Relation Age of Onset    Diabetes Mother     Cancer Mother         brain ca    Heart Disease Mother 45        cabg    Heart Disease Father         cabg and valve replacement    Cancer Sister         bone ca    Diabetes Sister     Diabetes Other      Social History     Socioeconomic History    Marital status: Single     Spouse name: Not on file    Number of children: Not on file    Years of education: Not on file    Highest education level: Not on file   Occupational History     Comment: retired , [employer]   Tobacco Use    Smoking status: Never    Smokeless tobacco: Never   Vaping Use    Vaping Use: Never used   Substance and Sexual Activity    Alcohol use: Yes     Comment: 6 times per year    Drug use: No     Comment: tried marijuana at age 17    Sexual activity: Yes     Partners: Male   Other Topics Concern    Not on file   Social History Narrative    Not on file     Social Determinants of Health     Financial Resource Strain: Not on file   Food Insecurity: Not on file   Transportation Needs: Not on file   Physical Activity: Not on file   Stress: Not on file   Social Connections: Not on file   Intimate Partner Violence: Not on file   Housing Stability: Not on file       Current Outpatient Medications    Medication Sig Dispense Refill    apixaban (ELIQUIS) 5mg Tab Take 1 Tablet by mouth 2 times a day.      furosemide (LASIX) 20 MG Tab Take 1 Tablet by mouth 2 times a day. 60 Tablet 0    irbesartan (AVAPRO) 300 MG Tab Take 1 Tablet by mouth every evening. 90 Tablet 3    vitamin D (VITAMIND D3) 1000 UNIT Tab Take 2,000 Units by mouth every day.      ELIQUIS 5 MG Tab Take 5 mg by mouth 2 times a day.      FARXIGA 10 MG Tab       albuterol 108 (90 Base) MCG/ACT Aero Soln inhalation aerosol Inhale 1 Puff.      enoxaparin (LOVENOX) 60 MG/0.6ML Solution Prefilled Syringe inj Inject 60 mg under the skin.      fluticasone (FLONASE) 50 MCG/ACT nasal spray Administer 2 Sprays into affected nostril(S) every day.      benzonatate (TESSALON) 100 MG Cap Take 1 Capsule by mouth 3 times a day as needed for Cough. 60 Capsule 0    metoprolol SR (TOPROL XL) 50 MG TABLET SR 24 HR Take 1 tablet by mouth once daily 90 Tablet 3    metformin (GLUCOPHAGE) 1000 MG tablet Take 1 Tablet by mouth 2 times a day with meals. (Patient not taking: Reported on 1/26/2023) 200 Tablet 3    rosuvastatin (CRESTOR) 40 MG tablet Take 1 Tablet by mouth every day. (Patient not taking: Reported on 1/26/2023) 100 Tablet 3    spironolactone (ALDACTONE) 25 MG Tab Take 1/2 (one-half) tablet by mouth once daily (Patient not taking: Reported on 1/26/2023) 45 Tablet 3    Vitamins-Lipotropics (LIPO FLAVONOID PLUS) Tab Take  by mouth.      Misc Natural Products (HEALTHY LIVER PO) Take  by mouth. (Patient not taking: Reported on 1/26/2023)      loratadine-pseudoephedrine (CLARITIN-D 24 HOUR)  MG per tablet Take 1 Tablet by mouth every day.      omeprazole (PRILOSEC) 20 MG delayed-release capsule Take 1 Capsule by mouth every day.      acetaminophen (TYLENOL) 500 MG Tab Take 2 Tablets by mouth every 6 hours as needed.      aspirin (ASA) 81 MG Chew Tab chewable tablet Take 1 Tab by mouth every day. 100 Tab 11     No current facility-administered medications for  "this visit.         Review Of Systems  As documented in HPI above  PHYSICAL EXAMINATION:    BP (!) 140/70 (BP Location: Right arm, Patient Position: Sitting, BP Cuff Size: Adult)   Pulse 90   Temp 36.3 °C (97.4 °F) (Temporal)   Resp 18   Ht 1.676 m (5' 6\")   Wt 111 kg (244 lb)   SpO2 94%   BMI 39.38 kg/m²   Gen.: Well-developed, well-nourished, no apparent distress, pleasant and cooperative with the examination  HEENT: Normocephalic/atraumatic, sinuses nontender with palpation, TMs clear, nares patent with pink mucosa and clear rhinorrhea, oropharynx clear  Neck: No JVD or bruits, no adenopathy  Cor: Regular rate and rhythm without murmur gallop or rub  Lungs: Clear to auscultation with equal breath sounds bilaterally. No wheezes, rhonchi.  Abdomen: Soft nontender without hepatosplenomegaly or masses appreciated, normoactive bowel sounds  Extremities: No cyanosis, +1 edema, improved from last visit.       ASSESSMENT/Plan:  1. Edema, lower extremity  Ongoing problem, most likely dependent and overload edema, increase Lasix to 20 mg twice a day, will monitor kidney function test.  Continue spironolactone and stop hydrochlorothiazide.  Follow-up in 2 weeks as directed 3-4  furosemide (LASIX) 20 MG Tab      2. Hypertension, unspecified type  Chronic fair control continue medications as directed.      3. Morbid (severe) obesity due to excess calories (HCC)  We will schedule patient for weight management, when she is more medically stable.  Continue healthy lifestyle dietary restrictions exercise as tolerated and increase water intake      4. Body mass index (BMI) 39.0-39.9, adult  As above      5. Renal mass, right  New diagnosis, follow-up with urology for further evaluation of findings and CT and for the nature of the right renal mass and possible biopsy, continue with anticoagulation for now, follow-up with me after urology appointment.         Please note that this dictation was created using voice recognition " software. I have made every reasonable attempt to correct obvious errors but there may be errors of grammar and content that I may have overlooked prior to finalization of this note.

## 2023-02-07 ENCOUNTER — HOSPITAL ENCOUNTER (OUTPATIENT)
Dept: LAB | Facility: MEDICAL CENTER | Age: 68
End: 2023-02-07
Attending: NURSE PRACTITIONER
Payer: MEDICARE

## 2023-02-07 ENCOUNTER — TELEPHONE (OUTPATIENT)
Dept: MEDICAL GROUP | Facility: LAB | Age: 68
End: 2023-02-07

## 2023-02-07 LAB
ANISOCYTOSIS BLD QL SMEAR: ABNORMAL
APPEARANCE UR: CLEAR
BASOPHILS # BLD AUTO: 2.6 % (ref 0–1.8)
BASOPHILS # BLD: 0.22 K/UL (ref 0–0.12)
BILIRUB UR QL STRIP.AUTO: NEGATIVE
COLOR UR: YELLOW
EOSINOPHIL # BLD AUTO: 0.51 K/UL (ref 0–0.51)
EOSINOPHIL NFR BLD: 5.9 % (ref 0–6.9)
ERYTHROCYTE [DISTWIDTH] IN BLOOD BY AUTOMATED COUNT: 74.7 FL (ref 35.9–50)
EST. AVERAGE GLUCOSE BLD GHB EST-MCNC: 140 MG/DL
GLUCOSE UR STRIP.AUTO-MCNC: >=1000 MG/DL
HBA1C MFR BLD: 6.5 % (ref 4–5.6)
HCT VFR BLD AUTO: 35.5 % (ref 37–47)
HGB BLD-MCNC: 10 G/DL (ref 12–16)
KETONES UR STRIP.AUTO-MCNC: NEGATIVE MG/DL
LEUKOCYTE ESTERASE UR QL STRIP.AUTO: NEGATIVE
LYMPHOCYTES # BLD AUTO: 2.12 K/UL (ref 1–4.8)
LYMPHOCYTES NFR BLD: 24.6 % (ref 22–41)
MANUAL DIFF BLD: NORMAL
MCH RBC QN AUTO: 20.6 PG (ref 27–33)
MCHC RBC AUTO-ENTMCNC: 28.2 G/DL (ref 33.6–35)
MCV RBC AUTO: 73 FL (ref 81.4–97.8)
MICRO URNS: ABNORMAL
MICROCYTES BLD QL SMEAR: ABNORMAL
MONOCYTES # BLD AUTO: 0.65 K/UL (ref 0–0.85)
MONOCYTES NFR BLD AUTO: 7.6 % (ref 0–13.4)
MORPHOLOGY BLD-IMP: NORMAL
NEUTROPHILS # BLD AUTO: 5.1 K/UL (ref 2–7.15)
NEUTROPHILS NFR BLD: 59.3 % (ref 44–72)
NITRITE UR QL STRIP.AUTO: NEGATIVE
NRBC # BLD AUTO: 0 K/UL
NRBC BLD-RTO: 0 /100 WBC
OVALOCYTES BLD QL SMEAR: NORMAL
PH UR STRIP.AUTO: 6.5 [PH] (ref 5–8)
PLATELET # BLD AUTO: 411 K/UL (ref 164–446)
PLATELET BLD QL SMEAR: NORMAL
PMV BLD AUTO: 9.3 FL (ref 9–12.9)
POIKILOCYTOSIS BLD QL SMEAR: NORMAL
POLYCHROMASIA BLD QL SMEAR: NORMAL
PROT UR QL STRIP: NEGATIVE MG/DL
RBC # BLD AUTO: 4.86 M/UL (ref 4.2–5.4)
RBC BLD AUTO: PRESENT
RBC UR QL AUTO: NEGATIVE
SCHISTOCYTES BLD QL SMEAR: NORMAL
SP GR UR STRIP.AUTO: 1.01
UROBILINOGEN UR STRIP.AUTO-MCNC: 0.2 MG/DL
WBC # BLD AUTO: 8.6 K/UL (ref 4.8–10.8)

## 2023-02-07 PROCEDURE — 84550 ASSAY OF BLOOD/URIC ACID: CPT

## 2023-02-07 PROCEDURE — 85007 BL SMEAR W/DIFF WBC COUNT: CPT

## 2023-02-07 PROCEDURE — 84100 ASSAY OF PHOSPHORUS: CPT

## 2023-02-07 PROCEDURE — 80053 COMPREHEN METABOLIC PANEL: CPT

## 2023-02-07 PROCEDURE — 83970 ASSAY OF PARATHORMONE: CPT

## 2023-02-07 PROCEDURE — 84156 ASSAY OF PROTEIN URINE: CPT

## 2023-02-07 PROCEDURE — 83735 ASSAY OF MAGNESIUM: CPT

## 2023-02-07 PROCEDURE — 83036 HEMOGLOBIN GLYCOSYLATED A1C: CPT

## 2023-02-07 PROCEDURE — 82570 ASSAY OF URINE CREATININE: CPT | Mod: 91

## 2023-02-07 PROCEDURE — 82043 UR ALBUMIN QUANTITATIVE: CPT

## 2023-02-07 PROCEDURE — 82306 VITAMIN D 25 HYDROXY: CPT

## 2023-02-07 PROCEDURE — 36415 COLL VENOUS BLD VENIPUNCTURE: CPT

## 2023-02-07 PROCEDURE — 85025 COMPLETE CBC W/AUTO DIFF WBC: CPT

## 2023-02-07 PROCEDURE — 81003 URINALYSIS AUTO W/O SCOPE: CPT

## 2023-02-07 RX ORDER — BENZONATATE 200 MG/1
200 CAPSULE ORAL 3 TIMES DAILY PRN
Qty: 60 CAPSULE | Refills: 0 | Status: SHIPPED | OUTPATIENT
Start: 2023-02-07 | End: 2023-02-07 | Stop reason: SDUPTHER

## 2023-02-07 RX ORDER — BENZONATATE 200 MG/1
200 CAPSULE ORAL 3 TIMES DAILY PRN
Qty: 60 CAPSULE | Refills: 0 | Status: SHIPPED | OUTPATIENT
Start: 2023-02-07 | End: 2023-03-23

## 2023-02-07 RX ORDER — LORATADINE PSEUDOEPHEDRINE SULFATE 10; 240 MG/1; MG/1
1 TABLET, EXTENDED RELEASE ORAL DAILY
Qty: 30 TABLET | Refills: 3 | Status: SHIPPED | OUTPATIENT
Start: 2023-02-07 | End: 2023-02-14 | Stop reason: SDUPTHER

## 2023-02-08 LAB
25(OH)D3 SERPL-MCNC: 66 NG/ML (ref 30–100)
ALBUMIN SERPL BCP-MCNC: 4.5 G/DL (ref 3.2–4.9)
ALBUMIN/GLOB SERPL: 1.6 G/DL
ALP SERPL-CCNC: 96 U/L (ref 30–99)
ALT SERPL-CCNC: 35 U/L (ref 2–50)
ANION GAP SERPL CALC-SCNC: 13 MMOL/L (ref 7–16)
AST SERPL-CCNC: 30 U/L (ref 12–45)
BILIRUB SERPL-MCNC: 0.4 MG/DL (ref 0.1–1.5)
BUN SERPL-MCNC: 23 MG/DL (ref 8–22)
CALCIUM ALBUM COR SERPL-MCNC: 11 MG/DL (ref 8.5–10.5)
CALCIUM SERPL-MCNC: 11.4 MG/DL (ref 8.5–10.5)
CHLORIDE SERPL-SCNC: 102 MMOL/L (ref 96–112)
CO2 SERPL-SCNC: 25 MMOL/L (ref 20–33)
CREAT SERPL-MCNC: 1.42 MG/DL (ref 0.5–1.4)
CREAT UR-MCNC: 16.64 MG/DL
CREAT UR-MCNC: 18.15 MG/DL
FASTING STATUS PATIENT QL REPORTED: NORMAL
GFR SERPLBLD CREATININE-BSD FMLA CKD-EPI: 40 ML/MIN/1.73 M 2
GLOBULIN SER CALC-MCNC: 2.8 G/DL (ref 1.9–3.5)
GLUCOSE SERPL-MCNC: 139 MG/DL (ref 65–99)
MAGNESIUM SERPL-MCNC: 2.1 MG/DL (ref 1.5–2.5)
MICROALBUMIN UR-MCNC: 1.9 MG/DL
MICROALBUMIN/CREAT UR: 114 MG/G (ref 0–30)
PHOSPHATE SERPL-MCNC: 3 MG/DL (ref 2.5–4.5)
POTASSIUM SERPL-SCNC: 4 MMOL/L (ref 3.6–5.5)
PROT SERPL-MCNC: 7.3 G/DL (ref 6–8.2)
PROT UR-MCNC: 6 MG/DL (ref 0–15)
PROT/CREAT UR: 331 MG/G (ref 10–107)
PTH-INTACT SERPL-MCNC: 122 PG/ML (ref 14–72)
SODIUM SERPL-SCNC: 140 MMOL/L (ref 135–145)
URATE SERPL-MCNC: 4.9 MG/DL (ref 1.9–8.2)

## 2023-02-08 NOTE — TELEPHONE ENCOUNTER
Shahana called stating we haven't sent in an rx for the generic Claritin-D to [facility] yet.  Please advise.

## 2023-02-09 RX ORDER — IRBESARTAN 300 MG/1
TABLET ORAL
Qty: 100 EACH | Refills: 3 | Status: SHIPPED
Start: 2023-02-09 | End: 2023-05-01 | Stop reason: SDUPTHER

## 2023-02-14 ENCOUNTER — TELEPHONE (OUTPATIENT)
Dept: MEDICAL GROUP | Facility: LAB | Age: 68
End: 2023-02-14
Payer: MEDICARE

## 2023-02-14 RX ORDER — LORATADINE PSEUDOEPHEDRINE SULFATE 10; 240 MG/1; MG/1
1 TABLET, EXTENDED RELEASE ORAL DAILY
Qty: 30 TABLET | Refills: 3 | Status: SHIPPED | OUTPATIENT
Start: 2023-02-14 | End: 2023-02-16 | Stop reason: SDUPTHER

## 2023-02-14 NOTE — TELEPHONE ENCOUNTER
----- Message from Christin Jacobo sent at 2/7/2023 10:54 AM PST -----  Regarding: Med Refill  Patient came in office requesting refills of Benzonatate 200mg and also Claritin D 24hr tablets she was having problems with the pharmacy getting those refilled. She uses the [facility] pharmacy in [place]. She said if you guys can call her to let her know when they have been sent, Thank you!

## 2023-02-14 NOTE — TELEPHONE ENCOUNTER
Spoke to patient and informed her that we sent the claritin d to [facility] in [place].

## 2023-02-16 ENCOUNTER — TELEPHONE (OUTPATIENT)
Dept: MEDICAL GROUP | Facility: LAB | Age: 68
End: 2023-02-16

## 2023-02-16 RX ORDER — LORATADINE PSEUDOEPHEDRINE SULFATE 10; 240 MG/1; MG/1
1 TABLET, EXTENDED RELEASE ORAL DAILY
Qty: 30 TABLET | Refills: 3 | Status: SHIPPED | OUTPATIENT
Start: 2023-02-16 | End: 2023-12-21

## 2023-02-16 NOTE — TELEPHONE ENCOUNTER
Shahana called. States that [facility] currently doesn't have the generic Claritin D.  Shahana would like the rx to be sent to [facility] instead. I have pended the medication.  Thank you

## 2023-02-22 ENCOUNTER — TELEPHONE (OUTPATIENT)
Dept: MEDICAL GROUP | Facility: LAB | Age: 68
End: 2023-02-22
Payer: MEDICARE

## 2023-02-28 ENCOUNTER — OFFICE VISIT (OUTPATIENT)
Dept: MEDICAL GROUP | Facility: LAB | Age: 68
End: 2023-02-28
Payer: MEDICARE

## 2023-02-28 VITALS
BODY MASS INDEX: 38.57 KG/M2 | RESPIRATION RATE: 16 BRPM | DIASTOLIC BLOOD PRESSURE: 70 MMHG | TEMPERATURE: 97 F | HEIGHT: 66 IN | SYSTOLIC BLOOD PRESSURE: 104 MMHG | OXYGEN SATURATION: 97 % | WEIGHT: 240 LBS | HEART RATE: 76 BPM

## 2023-02-28 DIAGNOSIS — Z12.31 ENCOUNTER FOR SCREENING MAMMOGRAM FOR MALIGNANT NEOPLASM OF BREAST: ICD-10-CM

## 2023-02-28 DIAGNOSIS — Z23 NEED FOR PNEUMOCOCCAL VACCINATION: ICD-10-CM

## 2023-02-28 DIAGNOSIS — R60.0 EDEMA, LOWER EXTREMITY: ICD-10-CM

## 2023-02-28 DIAGNOSIS — E66.01 CLASS 2 SEVERE OBESITY DUE TO EXCESS CALORIES WITH SERIOUS COMORBIDITY AND BODY MASS INDEX (BMI) OF 38.0 TO 38.9 IN ADULT (HCC): ICD-10-CM

## 2023-02-28 DIAGNOSIS — I10 BENIGN ESSENTIAL HTN: ICD-10-CM

## 2023-02-28 PROBLEM — E66.812 CLASS 2 SEVERE OBESITY DUE TO EXCESS CALORIES WITH SERIOUS COMORBIDITY AND BODY MASS INDEX (BMI) OF 38.0 TO 38.9 IN ADULT (HCC): Status: ACTIVE | Noted: 2023-02-28

## 2023-02-28 PROCEDURE — 90677 PCV20 VACCINE IM: CPT | Performed by: FAMILY MEDICINE

## 2023-02-28 PROCEDURE — 99214 OFFICE O/P EST MOD 30 MIN: CPT | Mod: 25 | Performed by: FAMILY MEDICINE

## 2023-02-28 PROCEDURE — G0009 ADMIN PNEUMOCOCCAL VACCINE: HCPCS | Performed by: FAMILY MEDICINE

## 2023-02-28 RX ORDER — LATANOPROST 50 UG/ML
SOLUTION/ DROPS OPHTHALMIC
COMMUNITY
Start: 2023-02-22

## 2023-02-28 ASSESSMENT — FIBROSIS 4 INDEX: FIB4 SCORE: 0.84

## 2023-02-28 NOTE — PROGRESS NOTES
Chief Complaint:   Chief Complaint   Patient presents with    Follow-Up       HPI: Established patient, accompanied with her   Shahana Nj is a 67 y.o. female who presents for follow-up, discussed the following today:   1. Encounter for screening mammogram for malignant neoplasm of breast  Due for breast exam with mammogram,    2. Need for pneumococcal vaccination  Vaccination provided to the patient today, denies acute illness or fever    3. Class 2 severe obesity     Chronic ongoing, discussed with the patient lifestyle changes, exercise, requesting prescription of a stationary bike since she is able to pay for it if she is recommended by medical provider to be reimbursed by her parents trust    4. Primary sleep apnea of , unspecified type  Chronic ongoing, on CPAP.  Patient is due for setting check    5. Edema, lower extremity  Improved, no concerns.  Mild tingling sometimes when there is swelling.  On Lasix and tolerating medication well.    6. Benign essential HTN   Well-controlled today 104/70, asymptomatic.  Continues to take all medications as directed.    Past medical history, family history, social history and medications reviewed and updated in the record.   Current medications, problem list and allergies reviewed in [facility]  [facility] topics are reviewed and updated.    Patient Active Problem List    Diagnosis Date Noted    Encounter to establish care with new doctor 2023    Class 2 severe obesity due to excess calories with serious comorbidity and body mass index (BMI) of 38.0 to 38.9 in adult (HCC) 2023    Diverticulitis 2023    Esophageal dysphagia 2023    Gastro-esophageal reflux disease without esophagitis 2023    Hypertension 2023    Old myocardial infarction 2023    Sleep apnea 2023    High cholesterol 2023    Obesity 2023    Type 2 diabetes mellitus without complications (HCC) 2023    Renal mass, right  01/26/2023    Type 2 diabetes mellitus with microalbuminuria ([facility]) 06/06/2022    Morbid obesity with BMI of 40.0-44.9, adult ([facility]) 02/28/2022    Bedbug bite 02/08/2022    Need for prophylactic antibiotic 02/01/2022    Mixed hyperlipidemia 12/13/2018    Benign essential HTN 12/13/2018    Type 2 diabetes mellitus without complication, without long-term current use of insulin ([facility]) 06/04/2018    Bilateral hearing loss 04/30/2018     Family History   Problem Relation Age of Onset    Diabetes Mother     Cancer Mother         brain ca    Heart Disease Mother 45        cabg    Heart Disease Father         cabg and valve replacement    Cancer Sister         bone ca    Diabetes Sister     Diabetes Other      Social History     Socioeconomic History    Marital status: Single     Spouse name: Not on file    Number of children: Not on file    Years of education: Not on file    Highest education level: Not on file   Occupational History     Comment: retired , [employer]   Tobacco Use    Smoking status: Never    Smokeless tobacco: Never   Vaping Use    Vaping Use: Never used   Substance and Sexual Activity    Alcohol use: Yes     Comment: 6 times per year    Drug use: No     Comment: tried marijuana at age 17    Sexual activity: Yes     Partners: Male   Other Topics Concern    Not on file   Social History Narrative    Not on file     Social Determinants of Health     Financial Resource Strain: Not on file   Food Insecurity: Not on file   Transportation Needs: Not on file   Physical Activity: Not on file   Stress: Not on file   Social Connections: Not on file   Intimate Partner Violence: Not on file   Housing Stability: Not on file     Current Outpatient Medications   Medication Sig Dispense Refill    Misc. Devices Misc As directed 1 Each 0    latanoprost (XALATAN) 0.005 % Solution INSTILL 1 DROP INTO EACH EYE ONCE DAILY AT NIGHT      loratadine-pseudoephedrine (CLARITIN-D 24 HOUR)  MG per tablet Take 1 Tablet by mouth  every day. 30 Tablet 3    irbesartan (AVAPRO) 300 MG Tab TAKE 1 TABLET BY MOUTH ONCE DAILY IN THE EVENING 100 Each 3    benzonatate (TESSALON) 200 MG capsule Take 1 Capsule by mouth 3 times a day as needed for Cough. 60 Capsule 0    vitamin D (VITAMIND D3) 1000 UNIT Tab Take 2,000 Units by mouth every day.      ELIQUIS 5 MG Tab Take 5 mg by mouth 2 times a day.      apixaban (ELIQUIS) 5mg Tab Take 1 Tablet by mouth 2 times a day.      furosemide (LASIX) 20 MG Tab Take 1 Tablet by mouth 2 times a day. 60 Tablet 0    FARXIGA 10 MG Tab       albuterol 108 (90 Base) MCG/ACT Aero Soln inhalation aerosol Inhale 1 Puff.      fluticasone (FLONASE) 50 MCG/ACT nasal spray Administer 2 Sprays into affected nostril(S) every day.      metoprolol SR (TOPROL XL) 50 MG TABLET SR 24 HR Take 1 tablet by mouth once daily 90 Tablet 3    metformin (GLUCOPHAGE) 1000 MG tablet Take 1 Tablet by mouth 2 times a day with meals. (Patient not taking: Reported on 1/26/2023) 200 Tablet 3    rosuvastatin (CRESTOR) 40 MG tablet Take 1 Tablet by mouth every day. (Patient not taking: Reported on 1/26/2023) 100 Tablet 3    spironolactone (ALDACTONE) 25 MG Tab Take 1/2 (one-half) tablet by mouth once daily (Patient not taking: Reported on 1/26/2023) 45 Tablet 3    Vitamins-Lipotropics (LIPO FLAVONOID PLUS) Tab Take  by mouth.      Misc Natural Products (HEALTHY LIVER PO) Take  by mouth. (Patient not taking: Reported on 1/26/2023)      loratadine-pseudoephedrine (CLARITIN-D 24 HOUR)  MG per tablet Take 1 Tablet by mouth every day.      omeprazole (PRILOSEC) 20 MG delayed-release capsule Take 1 Capsule by mouth every day.      acetaminophen (TYLENOL) 500 MG Tab Take 2 Tablets by mouth every 6 hours as needed.      aspirin (ASA) 81 MG Chew Tab chewable tablet Take 1 Tab by mouth every day. 100 Tab 11     No current facility-administered medications for this visit.          Review Of Systems  As documented in HPI above  PHYSICAL EXAMINATION:    BP  "104/70 (BP Location: Left arm, Patient Position: Sitting, BP Cuff Size: Adult)   Pulse 76   Temp 36.1 °C (97 °F) (Temporal)   Resp 16   Ht 1.676 m (5' 6\")   Wt 109 kg (240 lb)   SpO2 97%   BMI 38.74 kg/m²   Gen.: Well-developed, well-nourished, no apparent distress, pleasant and cooperative with the examination  HEENT: Normocephalic/atraumatic,     Neck: No JVD or bruits, no adenopathy  Cor: Regular rate and rhythm without murmur gallop or rub  Lungs: Clear to auscultation with equal breath sounds bilaterally. No wheezes, rhonchi.  Abdomen: Soft nontender without hepatosplenomegaly or masses appreciated, normoactive bowel sounds  Extremities: No cyanosis, clubbing or edema       ASSESSMENT/Plan:  1. Encounter for screening mammogram for malignant neoplasm of breast  MA-SCREENING MAMMO BILAT W/O CAD      2. Need for pneumococcal vaccination  Pneumococcal Conjugate Vaccine 20-Valent (19 yrs+)      3. Class 2 severe obesity due to excess calories with serious comorbidity and body mass index (BMI) of 38.0 to 38.9 in adult (HCC)  Chronic, discussed with the patient calorie deficiency, protein diet within limits because of her kidney function to avoid dairy protein.  And exercise, prescription for stationary bike provided to patient today.      4. Primary sleep apnea of , unspecified type  Patient to check with sleep specialist because of the edema to make sure that the settings are okay, continue CPAP machine use      5. Edema, lower extremity  Improved, continue Lasix once a day, leg elevation discussed      6. Benign essential HTN  Well-controlled continue current regimen        Please note that this dictation was created using voice recognition software. I have worked with consultants from the vendor as well as technical experts from [employer] to optimize the interface. I have made every reasonable attempt to correct obvious errors, but I expect that there are errors of grammar and possibly content " that I did not discover before finalizing the note.

## 2023-03-07 ENCOUNTER — HOSPITAL ENCOUNTER (OUTPATIENT)
Dept: RADIOLOGY | Facility: MEDICAL CENTER | Age: 68
End: 2023-03-07

## 2023-03-07 ENCOUNTER — APPOINTMENT (OUTPATIENT)
Dept: RADIOLOGY | Facility: MEDICAL CENTER | Age: 68
End: 2023-03-07
Attending: FAMILY MEDICINE
Payer: MEDICARE

## 2023-03-07 DIAGNOSIS — Z12.31 ENCOUNTER FOR SCREENING MAMMOGRAM FOR MALIGNANT NEOPLASM OF BREAST: ICD-10-CM

## 2023-03-07 PROCEDURE — 77067 SCR MAMMO BI INCL CAD: CPT

## 2023-03-09 ENCOUNTER — TELEPHONE (OUTPATIENT)
Dept: MEDICAL GROUP | Facility: LAB | Age: 68
End: 2023-03-09
Payer: MEDICARE

## 2023-03-09 NOTE — TELEPHONE ENCOUNTER
----- Message from Andrea Ortega M.D. sent at 3/9/2023  1:09 PM PST -----  Please call patient and let her know mammogram is normal/negative.

## 2023-03-16 ENCOUNTER — HOSPITAL ENCOUNTER (OUTPATIENT)
Dept: LAB | Facility: MEDICAL CENTER | Age: 68
End: 2023-03-16
Attending: NURSE PRACTITIONER
Payer: MEDICARE

## 2023-03-16 LAB
BUN SERPL-MCNC: 30 MG/DL (ref 8–22)
CREAT SERPL-MCNC: 1.4 MG/DL (ref 0.5–1.4)
GFR SERPLBLD CREATININE-BSD FMLA CKD-EPI: 41 ML/MIN/1.73 M 2

## 2023-03-16 PROCEDURE — 36415 COLL VENOUS BLD VENIPUNCTURE: CPT

## 2023-03-16 PROCEDURE — 82565 ASSAY OF CREATININE: CPT

## 2023-03-16 PROCEDURE — 84520 ASSAY OF UREA NITROGEN: CPT

## 2023-03-21 ENCOUNTER — HOSPITAL ENCOUNTER (OUTPATIENT)
Dept: LAB | Facility: MEDICAL CENTER | Age: 68
End: 2023-03-21
Attending: UROLOGY
Payer: MEDICARE

## 2023-03-21 LAB
ANION GAP SERPL CALC-SCNC: 14 MMOL/L (ref 7–16)
BUN SERPL-MCNC: 31 MG/DL (ref 8–22)
CALCIUM SERPL-MCNC: 10.4 MG/DL (ref 8.5–10.5)
CHLORIDE SERPL-SCNC: 102 MMOL/L (ref 96–112)
CO2 SERPL-SCNC: 19 MMOL/L (ref 20–33)
CREAT SERPL-MCNC: 1.44 MG/DL (ref 0.5–1.4)
GFR SERPLBLD CREATININE-BSD FMLA CKD-EPI: 39 ML/MIN/1.73 M 2
GLUCOSE SERPL-MCNC: 176 MG/DL (ref 65–99)
POTASSIUM SERPL-SCNC: 4.4 MMOL/L (ref 3.6–5.5)
SODIUM SERPL-SCNC: 135 MMOL/L (ref 135–145)

## 2023-03-21 PROCEDURE — 80048 BASIC METABOLIC PNL TOTAL CA: CPT

## 2023-03-21 PROCEDURE — 36415 COLL VENOUS BLD VENIPUNCTURE: CPT

## 2023-03-23 RX ORDER — BENZONATATE 200 MG/1
CAPSULE ORAL
Qty: 60 CAPSULE | Refills: 0 | Status: SHIPPED | OUTPATIENT
Start: 2023-03-23

## 2023-03-23 NOTE — TELEPHONE ENCOUNTER
Received request via: Pharmacy    Was the patient seen in the last year in this department? Yes  2/28/23  Does the patient have an active prescription (recently filled or refills available) for medication(s) requested? No    Does the patient have [facility] Plus and need 100 day supply (blood pressure, diabetes and cholesterol meds only)?

## 2023-04-11 ENCOUNTER — HOSPITAL ENCOUNTER (OUTPATIENT)
Dept: LAB | Facility: MEDICAL CENTER | Age: 68
End: 2023-04-11
Attending: STUDENT IN AN ORGANIZED HEALTH CARE EDUCATION/TRAINING PROGRAM
Payer: MEDICARE

## 2023-04-11 LAB
ALBUMIN SERPL BCP-MCNC: 4.3 G/DL (ref 3.2–4.9)
ANISOCYTOSIS BLD QL SMEAR: ABNORMAL
APPEARANCE UR: CLEAR
BACTERIA #/AREA URNS HPF: NEGATIVE /HPF
BASOPHILS # BLD AUTO: 0.9 % (ref 0–1.8)
BASOPHILS # BLD: 0.07 K/UL (ref 0–0.12)
BILIRUB UR QL STRIP.AUTO: NEGATIVE
BUN SERPL-MCNC: 33 MG/DL (ref 8–22)
CALCIUM ALBUM COR SERPL-MCNC: 10.7 MG/DL (ref 8.5–10.5)
CALCIUM SERPL-MCNC: 10.9 MG/DL (ref 8.5–10.5)
CHLORIDE SERPL-SCNC: 101 MMOL/L (ref 96–112)
CO2 SERPL-SCNC: 22 MMOL/L (ref 20–33)
COLOR UR: YELLOW
CREAT SERPL-MCNC: 1.43 MG/DL (ref 0.5–1.4)
CREAT UR-MCNC: 26.39 MG/DL
CREAT UR-MCNC: 26.64 MG/DL
DACRYOCYTES BLD QL SMEAR: NORMAL
EOSINOPHIL # BLD AUTO: 0.57 K/UL (ref 0–0.51)
EOSINOPHIL NFR BLD: 7 % (ref 0–6.9)
EPI CELLS #/AREA URNS HPF: NEGATIVE /HPF
ERYTHROCYTE [DISTWIDTH] IN BLOOD BY AUTOMATED COUNT: 50.1 FL (ref 35.9–50)
FASTING STATUS PATIENT QL REPORTED: NORMAL
FERRITIN SERPL-MCNC: 8 NG/ML (ref 10–291)
GFR SERPLBLD CREATININE-BSD FMLA CKD-EPI: 40 ML/MIN/1.73 M 2
GLUCOSE SERPL-MCNC: 189 MG/DL (ref 65–99)
GLUCOSE UR STRIP.AUTO-MCNC: >=1000 MG/DL
HCT VFR BLD AUTO: 31.2 % (ref 37–47)
HGB BLD-MCNC: 8.5 G/DL (ref 12–16)
HYALINE CASTS #/AREA URNS LPF: NORMAL /LPF
HYPOCHROMIA BLD QL SMEAR: ABNORMAL
IRON SATN MFR SERPL: 3 % (ref 15–55)
IRON SERPL-MCNC: 14 UG/DL (ref 40–170)
KETONES UR STRIP.AUTO-MCNC: NEGATIVE MG/DL
LEUKOCYTE ESTERASE UR QL STRIP.AUTO: ABNORMAL
LYMPHOCYTES # BLD AUTO: 0.63 K/UL (ref 1–4.8)
LYMPHOCYTES NFR BLD: 7.8 % (ref 22–41)
MANUAL DIFF BLD: NORMAL
MCH RBC QN AUTO: 18.6 PG (ref 27–33)
MCHC RBC AUTO-ENTMCNC: 27.2 G/DL (ref 33.6–35)
MCV RBC AUTO: 68.4 FL (ref 81.4–97.8)
MICRO URNS: ABNORMAL
MICROALBUMIN UR-MCNC: <1.2 MG/DL
MICROALBUMIN/CREAT UR: NORMAL MG/G (ref 0–30)
MICROCYTES BLD QL SMEAR: ABNORMAL
MONOCYTES # BLD AUTO: 0.21 K/UL (ref 0–0.85)
MONOCYTES NFR BLD AUTO: 2.6 % (ref 0–13.4)
MORPHOLOGY BLD-IMP: NORMAL
NEUTROPHILS # BLD AUTO: 6.62 K/UL (ref 2–7.15)
NEUTROPHILS NFR BLD: 81.7 % (ref 44–72)
NITRITE UR QL STRIP.AUTO: NEGATIVE
NRBC # BLD AUTO: 0 K/UL
NRBC BLD-RTO: 0 /100 WBC
OVALOCYTES BLD QL SMEAR: NORMAL
PH UR STRIP.AUTO: 6 [PH] (ref 5–8)
PHOSPHATE SERPL-MCNC: 3.3 MG/DL (ref 2.5–4.5)
PLATELET # BLD AUTO: 413 K/UL (ref 164–446)
PLATELET BLD QL SMEAR: NORMAL
PMV BLD AUTO: 9.9 FL (ref 9–12.9)
POIKILOCYTOSIS BLD QL SMEAR: NORMAL
POTASSIUM SERPL-SCNC: 4.8 MMOL/L (ref 3.6–5.5)
PROT UR QL STRIP: NEGATIVE MG/DL
PROT UR-MCNC: 4 MG/DL (ref 0–15)
PROT/CREAT UR: 152 MG/G (ref 10–107)
RBC # BLD AUTO: 4.56 M/UL (ref 4.2–5.4)
RBC # URNS HPF: NORMAL /HPF
RBC BLD AUTO: PRESENT
RBC UR QL AUTO: NEGATIVE
SODIUM SERPL-SCNC: 136 MMOL/L (ref 135–145)
SP GR UR STRIP.AUTO: 1.01
TIBC SERPL-MCNC: 432 UG/DL (ref 250–450)
UIBC SERPL-MCNC: 418 UG/DL (ref 110–370)
UROBILINOGEN UR STRIP.AUTO-MCNC: 0.2 MG/DL
WBC # BLD AUTO: 8.1 K/UL (ref 4.8–10.8)
WBC #/AREA URNS HPF: NORMAL /HPF

## 2023-04-11 PROCEDURE — 82043 UR ALBUMIN QUANTITATIVE: CPT

## 2023-04-11 PROCEDURE — 85025 COMPLETE CBC W/AUTO DIFF WBC: CPT

## 2023-04-11 PROCEDURE — 85007 BL SMEAR W/DIFF WBC COUNT: CPT

## 2023-04-11 PROCEDURE — 82728 ASSAY OF FERRITIN: CPT

## 2023-04-11 PROCEDURE — 36415 COLL VENOUS BLD VENIPUNCTURE: CPT

## 2023-04-11 PROCEDURE — 81001 URINALYSIS AUTO W/SCOPE: CPT

## 2023-04-11 PROCEDURE — 83550 IRON BINDING TEST: CPT

## 2023-04-11 PROCEDURE — 84156 ASSAY OF PROTEIN URINE: CPT

## 2023-04-11 PROCEDURE — 82570 ASSAY OF URINE CREATININE: CPT

## 2023-04-11 PROCEDURE — 80069 RENAL FUNCTION PANEL: CPT

## 2023-04-11 PROCEDURE — 83540 ASSAY OF IRON: CPT

## 2023-04-13 ENCOUNTER — TELEPHONE (OUTPATIENT)
Dept: HEALTH INFORMATION MANAGEMENT | Facility: OTHER | Age: 68
End: 2023-04-13

## 2023-05-02 RX ORDER — IRBESARTAN 300 MG/1
300 TABLET ORAL EVERY EVENING
Qty: 100 EACH | Refills: 3 | Status: SHIPPED | OUTPATIENT
Start: 2023-05-02

## 2023-05-18 ENCOUNTER — TELEPHONE (OUTPATIENT)
Dept: MEDICAL GROUP | Facility: LAB | Age: 68
End: 2023-05-18

## 2023-05-18 NOTE — TELEPHONE ENCOUNTER
Patient LVM stating that she ws have knee pain and swelling in the knee she had replaced 1 1/2 years ago. Called vera and recommend that she contact the [facility]  to be seen. She states that she already has an appt with Dr Wilson tomorrow and will discuss with her at that time/ let patient know to go to the ER with worsening symptoms/ swelling/ fever pain.

## 2023-05-19 ENCOUNTER — APPOINTMENT (OUTPATIENT)
Dept: MEDICAL GROUP | Facility: LAB | Age: 68
End: 2023-05-19

## 2023-06-08 ENCOUNTER — APPOINTMENT (OUTPATIENT)
Dept: NEUROLOGY | Facility: MEDICAL CENTER | Age: 68
End: 2023-06-08
Attending: PSYCHIATRY & NEUROLOGY

## 2023-06-14 NOTE — TELEPHONE ENCOUNTER
Colorectal Cancer Screening: Next 10/2029    Retinal exam: next 1/2024    A1c screening: Appt in system.

## 2023-06-16 ENCOUNTER — OFFICE VISIT (OUTPATIENT)
Dept: MEDICAL GROUP | Facility: LAB | Age: 68
End: 2023-06-16

## 2023-06-16 ENCOUNTER — HOSPITAL ENCOUNTER (OUTPATIENT)
Facility: MEDICAL CENTER | Age: 68
End: 2023-06-16
Attending: FAMILY MEDICINE

## 2023-06-16 DIAGNOSIS — R35.0 URINE FREQUENCY: ICD-10-CM

## 2023-06-16 DIAGNOSIS — C64.1 RENAL CELL CARCINOMA OF RIGHT KIDNEY (HCC): ICD-10-CM

## 2023-06-16 DIAGNOSIS — R05.3 CHRONIC COUGH: ICD-10-CM

## 2023-06-16 DIAGNOSIS — R80.9 TYPE 2 DIABETES MELLITUS WITH MICROALBUMINURIA, WITHOUT LONG-TERM CURRENT USE OF INSULIN (HCC): ICD-10-CM

## 2023-06-16 DIAGNOSIS — E11.29 TYPE 2 DIABETES MELLITUS WITH MICROALBUMINURIA, WITHOUT LONG-TERM CURRENT USE OF INSULIN (HCC): ICD-10-CM

## 2023-06-16 PROBLEM — C64.9 RENAL CELL CANCER (HCC): Status: ACTIVE | Noted: 2023-06-16

## 2023-06-16 LAB
HBA1C MFR BLD: 7.9 % (ref ?–5.8)
POCT INT CON NEG: NEGATIVE
POCT INT CON POS: POSITIVE

## 2023-06-16 PROCEDURE — 99214 OFFICE O/P EST MOD 30 MIN: CPT | Performed by: FAMILY MEDICINE

## 2023-06-16 PROCEDURE — 83036 HEMOGLOBIN GLYCOSYLATED A1C: CPT | Performed by: FAMILY MEDICINE

## 2023-06-16 PROCEDURE — 87086 URINE CULTURE/COLONY COUNT: CPT

## 2023-06-16 RX ORDER — SEMAGLUTIDE 0.68 MG/ML
0.5 INJECTION, SOLUTION SUBCUTANEOUS
Qty: 3 ML | Refills: 11 | Status: SHIPPED | OUTPATIENT
Start: 2023-06-16 | End: 2023-07-18 | Stop reason: SDUPTHER

## 2023-06-16 ASSESSMENT — FIBROSIS 4 INDEX: FIB4 SCORE: 0.92

## 2023-06-16 NOTE — PROGRESS NOTES
Chief Complaint:   Chief Complaint   Patient presents with    Follow-Up    Cough     X2 months    Enuresis     X1 month       HPI:Established patient   Shahana Nj is a 68 y.o. fe/male who presents for follow-up, discussed the following today:      Increased urinary frequency:    Reports for the past few weeks has been having increased urine frequency, denies burning sensation or fever.  Discussed with patient to also address it with her urologist and surgeon in few days since she recently had surgery for renal cancer excision, will rule out urine infection here at the office  Type 2 diabetes mellitus    Chronic, uncontrolled rechecked A1c today 7.9.  Patient on Farxiga only, metformin was stopped because of her kidney function and hospital admission, she also admits that she has been eating a lot of carbohydrates and sugary stuff.      Chronic cough  Chronic ongoing for the past few months, denies chest pain or palpitations, no sputum production but she states she feels that her throat is deconditioned when she started coughing.  Denies fever or flulike symptoms  Renal cell carcinoma of right kidney     Status post surgical excision of the tumor, reviewed records from Dr. Almendarez, patient has an appointment to follow-up with a surgeon in 10 days.  Recovering well  Surgery was done at [facility], have no records available at this time      Past medical history, family history, social history and medications reviewed and updated in the record.  Today  Current medications, problem list and allergies reviewed in [facility]  [employer] maintenance topics are reviewed and updated.    Patient Active Problem List    Diagnosis Date Noted    Encounter to establish care with new doctor 01/26/2023    Renal cell cancer (HCC) 06/16/2023    Class 2 severe obesity due to excess calories with serious comorbidity and body mass index (BMI) of 38.0 to 38.9 in adult (HCC) 02/28/2023    Diverticulitis 01/26/2023    Esophageal  dysphagia 01/26/2023    Gastro-esophageal reflux disease without esophagitis 01/26/2023    Hypertension 01/26/2023    Old myocardial infarction 01/26/2023    Sleep apnea 01/26/2023    High cholesterol 01/26/2023    Obesity 01/26/2023    Type 2 diabetes mellitus without complications ([facility]) 01/26/2023    Renal mass, right 01/26/2023    Type 2 diabetes mellitus with microalbuminuria ([facility]) 06/06/2022    Morbid obesity with BMI of 40.0-44.9, adult ([facility]) 02/28/2022    Bedbug bite 02/08/2022    Need for prophylactic antibiotic 02/01/2022    Mixed hyperlipidemia 12/13/2018    Benign essential HTN 12/13/2018    Type 2 diabetes mellitus without complication, without long-term current use of insulin ([facility]) 06/04/2018    Bilateral hearing loss 04/30/2018     Family History   Problem Relation Age of Onset    Diabetes Mother     Cancer Mother         brain ca    Heart Disease Mother 45        cabg    Heart Disease Father         cabg and valve replacement    Cancer Sister         bone ca    Diabetes Sister     Diabetes Other      Social History     Socioeconomic History    Marital status: Single     Spouse name: Not on file    Number of children: Not on file    Years of education: Not on file    Highest education level: Not on file   Occupational History     Comment: retired , [employer]   Tobacco Use    Smoking status: Never    Smokeless tobacco: Never   Vaping Use    Vaping Use: Never used   Substance and Sexual Activity    Alcohol use: Yes     Comment: 6 times per year    Drug use: No     Comment: tried marijuana at age 17    Sexual activity: Yes     Partners: Male   Other Topics Concern    Not on file   Social History Narrative    Not on file     Social Determinants of Health     Financial Resource Strain: Not on file   Food Insecurity: Not on file   Transportation Needs: Not on file   Physical Activity: Not on file   Stress: Not on file   Social Connections: Not on file   Intimate Partner Violence: Not on file   Housing  Stability: Not on file     Current Outpatient Medications   Medication Sig Dispense Refill    Semaglutide,0.25 or 0.5MG/DOS, (OZEMPIC, 0.25 OR 0.5 MG/DOSE,) 2 MG/3ML Solution Pen-injector Inject 0.5 mg under the skin every 7 days. 3 mL 11    acetaminophen (TYLENOL) 500 MG Tab Take 300 mg by mouth.      HYDROcodone-acetaminophen (NORCO) 5-325 MG Tab per tablet TAKE 1 TABLET BY MOUTH EVERY 6 HOURS AS NEEDED FOR MODERATE PAIN FOR 7 DAYS      Latanoprost 0.005 % Emulsion Administer  into affected eye(s).      omeprazole (PRILOSEC OTC) 20 MG tablet Take  by mouth.      irbesartan (AVAPRO) 150 MG Tab Take  by mouth.      Vitamins-Lipotropics (LIPOFLAVONOID PO) Take  by mouth.      irbesartan (AVAPRO) 300 MG Tab Take 1 Tablet by mouth every evening. 100 Each 3    benzonatate (TESSALON) 200 MG capsule TAKE ONE CAPSULE BY MOUTH THREE TIMES DAILY AS NEEDED FOR COUGH 60 Capsule 0    latanoprost (XALATAN) 0.005 % Solution INSTILL 1 DROP INTO EACH EYE ONCE DAILY AT NIGHT      Mis. Devices Misc As directed 1 Each 0    loratadine-pseudoephedrine (CLARITIN-D 24 HOUR)  MG per tablet Take 1 Tablet by mouth every day. 30 Tablet 3    vitamin D (VITAMIND D3) 1000 UNIT Tab Take 2,000 Units by mouth every day.      ELIQUIS 5 MG Tab Take 5 mg by mouth 2 times a day.      apixaban (ELIQUIS) 5mg Tab Take 1 Tablet by mouth 2 times a day.      furosemide (LASIX) 20 MG Tab Take 1 Tablet by mouth 2 times a day. 60 Tablet 0    FARXIGA 10 MG Tab       fluticasone (FLONASE) 50 MCG/ACT nasal spray Administer 2 Sprays into affected nostril(S) every day.      metoprolol SR (TOPROL XL) 50 MG TABLET SR 24 HR Take 1 tablet by mouth once daily 90 Tablet 3    rosuvastatin (CRESTOR) 40 MG tablet Take 1 Tablet by mouth every day. 100 Tablet 3    spironolactone (ALDACTONE) 25 MG Tab Take 1/2 (one-half) tablet by mouth once daily 45 Tablet 3    Vitamins-Lipotropics (LIPO FLAVONOID PLUS) Tab Take  by mouth.      Misc Natural Products (HEALTHY LIVER PO)  "Take  by mouth.      loratadine-pseudoephedrine (CLARITIN-D 24 HOUR)  MG per tablet Take 1 Tablet by mouth every day.      omeprazole (PRILOSEC) 20 MG delayed-release capsule Take 1 Capsule by mouth every day.      acetaminophen (TYLENOL) 500 MG Tab Take 2 Tablets by mouth every 6 hours as needed.      aspirin (ASA) 81 MG Chew Tab chewable tablet Take 1 Tab by mouth every day. 100 Tab 11     No current facility-administered medications for this visit.           Review Of Systems  As documented in HPI above  PHYSICAL EXAMINATION:    BP (!) (P) 140/60 (BP Location: Left arm, Patient Position: Sitting, BP Cuff Size: Adult)   Pulse (P) 96   Temp (P) 36.3 °C (97.4 °F) (Temporal)   Resp (P) 16   Ht (P) 1.676 m (5' 6\")   Wt (P) 112 kg (246 lb)   SpO2 (P) 97%   BMI (P) 39.71 kg/m²   Gen.: Well-developed, well-nourished, no apparent distress, pleasant and cooperative with the examination  HEENT: Normocephalic/atraumatic,   Neck: No JVD or bruits, no adenopathy  Cor: Regular rate and rhythm without murmur gallop or rub  Lungs: Clear to auscultation with mild decreased air entry on the right side.  No crepitations or wheezing abdomen: Soft nontender without hepatosplenomegaly or masses appreciated, normoactive bowel sounds  Extremities: No cyanosis, clubbing or edema       ASSESSMENT/Plan:  1. Type 2 diabetes mellitus with microalbuminuria, without long-term current use of insulin (HCC)  Chronic and controlled, continue Farxiga, discussed with the patient to start GLP-1 agonist at 0.25 for 2 weeks and then 0.5 weekly, to also discuss it with her kidney specialist and neurologist.  No history of thyroid cancer or multiple endocrine tumors, diet control and healthy lifestyle recommended    POCT Hemoglobin A1C    Semaglutide,0.25 or 0.5MG/DOS, (OZEMPIC, 0.25 OR 0.5 MG/DOSE,) 2 MG/3ML Solution Pen-injector      2. Chronic cough  New concern to me, chronic ongoing for the past few months, chest x-ray to rule out " pathology benign clinical exam  DX-CHEST-2 VIEWS      3. Renal cell carcinoma of right kidney (HCC)  Status postsurgical excision of the tumor, patient continues to follow-up with urology      4. Urine frequency  to rule out UTI, will also send it for culture.  Patient to discuss it further with urology status post recent renal cell carcinoma excision of the right kidney.  Also discussed importance of diabetes control

## 2023-06-19 LAB
BACTERIA UR CULT: NORMAL
SIGNIFICANT IND 70042: NORMAL
SITE SITE: NORMAL
SOURCE SOURCE: NORMAL

## 2023-06-20 ENCOUNTER — TELEPHONE (OUTPATIENT)
Dept: MEDICAL GROUP | Facility: LAB | Age: 68
End: 2023-06-20

## 2023-06-20 NOTE — TELEPHONE ENCOUNTER
Shahana said you wanted to know when her Kidney Ablation was. It was done on April 21, 2023 at [facility].

## 2023-07-14 ENCOUNTER — HOSPITAL ENCOUNTER (OUTPATIENT)
Dept: RADIOLOGY | Facility: MEDICAL CENTER | Age: 68
End: 2023-07-14
Attending: FAMILY MEDICINE

## 2023-07-14 DIAGNOSIS — R05.3 CHRONIC COUGH: ICD-10-CM

## 2023-07-14 PROCEDURE — 71046 X-RAY EXAM CHEST 2 VIEWS: CPT

## 2023-07-18 ENCOUNTER — OFFICE VISIT (OUTPATIENT)
Dept: MEDICAL GROUP | Facility: LAB | Age: 68
End: 2023-07-18

## 2023-07-18 VITALS
HEART RATE: 90 BPM | SYSTOLIC BLOOD PRESSURE: 110 MMHG | TEMPERATURE: 97.8 F | RESPIRATION RATE: 16 BRPM | DIASTOLIC BLOOD PRESSURE: 60 MMHG | BODY MASS INDEX: 39.86 KG/M2 | HEIGHT: 66 IN | WEIGHT: 248 LBS | OXYGEN SATURATION: 96 %

## 2023-07-18 DIAGNOSIS — R05.3 CHRONIC COUGH: ICD-10-CM

## 2023-07-18 DIAGNOSIS — E11.9 TYPE 2 DIABETES MELLITUS WITHOUT COMPLICATION, WITHOUT LONG-TERM CURRENT USE OF INSULIN (HCC): ICD-10-CM

## 2023-07-18 DIAGNOSIS — E11.29 TYPE 2 DIABETES MELLITUS WITH MICROALBUMINURIA, WITHOUT LONG-TERM CURRENT USE OF INSULIN (HCC): ICD-10-CM

## 2023-07-18 DIAGNOSIS — M79.18 MUSCULOSKELETAL PAIN: ICD-10-CM

## 2023-07-18 DIAGNOSIS — R80.9 TYPE 2 DIABETES MELLITUS WITH MICROALBUMINURIA, WITHOUT LONG-TERM CURRENT USE OF INSULIN (HCC): ICD-10-CM

## 2023-07-18 PROCEDURE — 99214 OFFICE O/P EST MOD 30 MIN: CPT | Performed by: FAMILY MEDICINE

## 2023-07-18 PROCEDURE — 3078F DIAST BP <80 MM HG: CPT | Performed by: FAMILY MEDICINE

## 2023-07-18 PROCEDURE — 3074F SYST BP LT 130 MM HG: CPT | Performed by: FAMILY MEDICINE

## 2023-07-18 RX ORDER — ALBUTEROL SULFATE 90 UG/1
2 AEROSOL, METERED RESPIRATORY (INHALATION) EVERY 4 HOURS PRN
Qty: 1 EACH | Refills: 1 | Status: SHIPPED | OUTPATIENT
Start: 2023-07-18

## 2023-07-18 RX ORDER — OMEPRAZOLE 20 MG/1
20 CAPSULE, DELAYED RELEASE ORAL 2 TIMES DAILY
Qty: 60 CAPSULE | Refills: 1 | Status: SHIPPED | OUTPATIENT
Start: 2023-07-18 | End: 2023-09-05

## 2023-07-18 RX ORDER — SEMAGLUTIDE 0.68 MG/ML
0.5 INJECTION, SOLUTION SUBCUTANEOUS
Qty: 3 ML | Refills: 11 | Status: SHIPPED | OUTPATIENT
Start: 2023-07-18

## 2023-07-18 ASSESSMENT — FIBROSIS 4 INDEX: FIB4 SCORE: 0.92

## 2023-07-18 NOTE — PROGRESS NOTES
Chief Complaint:   Chief Complaint   Patient presents with    Follow-Up    Cough    Joint Pain    Bladder Problem       HPI: Established patient  Shahana Nj is a 68 y.o. female who presents for follow-up, discussed the following today:    1. Chronic cough  Chronic ongoing, reviewed x-rays and echocardiogram no significant findings.  Patient said she is on Prilosec once a day, still having the dry cough sometimes with mild shortness of breath although the shortness of breath is better now.  Patient on diuretics and blood pressure medications.  Denies chest pain today.  No unintentional weight loss  2. Type 2 diabetes mellitus without complication, without long-term current use of insulin   A1c 7.9, patient said she has not been taking any medications at this time, last visit patient was supposed to start GLP-1 agonist Ozempic to help control her blood sugar and weight.  She said she was not able to get the medication from pharmacy not taking any medication stop metformin because of kidney function test patient supposed to be on Farxiga    3. Musculoskeletal pain  Generalized muscle pain patient reports that she is just feeling pain in her back and knees lower extremities in all over her body has been going on for a long time.  Recently noticing it more.            Past medical history, family history, social history and medications reviewed and updated in the record.  Today  Current medications, problem list and allergies reviewed in EPIC today  Health maintenance topics are reviewed and updated.    Patient Active Problem List    Diagnosis Date Noted    Encounter to establish care with new doctor 01/26/2023    Renal cell cancer (HCC) 06/16/2023    Class 2 severe obesity due to excess calories with serious comorbidity and body mass index (BMI) of 38.0 to 38.9 in adult (HCC) 02/28/2023    Diverticulitis 01/26/2023    Esophageal dysphagia 01/26/2023    Gastro-esophageal reflux disease without esophagitis  01/26/2023    Hypertension 01/26/2023    Old myocardial infarction 01/26/2023    Sleep apnea 01/26/2023    High cholesterol 01/26/2023    Obesity 01/26/2023    Type 2 diabetes mellitus without complications ([facility]) 01/26/2023    Renal mass, right 01/26/2023    Type 2 diabetes mellitus with microalbuminuria ([facility]) 06/06/2022    Morbid obesity with BMI of 40.0-44.9, adult ([facility]) 02/28/2022    Bedbug bite 02/08/2022    Need for prophylactic antibiotic 02/01/2022    Mixed hyperlipidemia 12/13/2018    Benign essential HTN 12/13/2018    Type 2 diabetes mellitus without complication, without long-term current use of insulin ([facility]) 06/04/2018    Bilateral hearing loss 04/30/2018     Family History   Problem Relation Age of Onset    Diabetes Mother     Cancer Mother         brain ca    Heart Disease Mother 45        cabg    Heart Disease Father         cabg and valve replacement    Cancer Sister         bone ca    Diabetes Sister     Diabetes Other      Social History     Socioeconomic History    Marital status: Single     Spouse name: Not on file    Number of children: Not on file    Years of education: Not on file    Highest education level: Not on file   Occupational History     Comment: retired , [employer]   Tobacco Use    Smoking status: Never    Smokeless tobacco: Never   Vaping Use    Vaping Use: Never used   Substance and Sexual Activity    Alcohol use: Yes     Comment: 6 times per year    Drug use: No     Comment: tried marijuana at age 17    Sexual activity: Yes     Partners: Male   Other Topics Concern    Not on file   Social History Narrative    Not on file     Social Determinants of Health     Financial Resource Strain: Not on file   Food Insecurity: Not on file   Transportation Needs: Not on file   Physical Activity: Not on file   Stress: Not on file   Social Connections: Not on file   Intimate Partner Violence: Not on file   Housing Stability: Not on file     Current Outpatient Medications   Medication Sig  Dispense Refill    omeprazole (PRILOSEC) 20 MG delayed-release capsule Take 1 Capsule by mouth 2 times a day. 60 Capsule 1    albuterol 108 (90 Base) MCG/ACT Aero Soln inhalation aerosol Inhale 2 Puffs every four hours as needed for Shortness of Breath. 1 Each 1    Semaglutide,0.25 or 0.5MG/DOS, (OZEMPIC, 0.25 OR 0.5 MG/DOSE,) 2 MG/3ML Solution Pen-injector Inject 0.5 mg under the skin every 7 days. 3 mL 11    acetaminophen (TYLENOL) 500 MG Tab Take 300 mg by mouth.      HYDROcodone-acetaminophen (NORCO) 5-325 MG Tab per tablet TAKE 1 TABLET BY MOUTH EVERY 6 HOURS AS NEEDED FOR MODERATE PAIN FOR 7 DAYS      Latanoprost 0.005 % Emulsion Administer  into affected eye(s).      omeprazole (PRILOSEC OTC) 20 MG tablet Take  by mouth.      irbesartan (AVAPRO) 150 MG Tab Take  by mouth.      Vitamins-Lipotropics (LIPOFLAVONOID PO) Take  by mouth.      irbesartan (AVAPRO) 300 MG Tab Take 1 Tablet by mouth every evening. 100 Each 3    benzonatate (TESSALON) 200 MG capsule TAKE ONE CAPSULE BY MOUTH THREE TIMES DAILY AS NEEDED FOR COUGH 60 Capsule 0    latanoprost (XALATAN) 0.005 % Solution INSTILL 1 DROP INTO EACH EYE ONCE DAILY AT NIGHT      Misc. Devices Misc As directed 1 Each 0    loratadine-pseudoephedrine (CLARITIN-D 24 HOUR)  MG per tablet Take 1 Tablet by mouth every day. 30 Tablet 3    vitamin D (VITAMIND D3) 1000 UNIT Tab Take 2,000 Units by mouth every day.      ELIQUIS 5 MG Tab Take 5 mg by mouth 2 times a day.      apixaban (ELIQUIS) 5mg Tab Take 1 Tablet by mouth 2 times a day.      furosemide (LASIX) 20 MG Tab Take 1 Tablet by mouth 2 times a day. 60 Tablet 0    FARXIGA 10 MG Tab       fluticasone (FLONASE) 50 MCG/ACT nasal spray Administer 2 Sprays into affected nostril(S) every day.      metoprolol SR (TOPROL XL) 50 MG TABLET SR 24 HR Take 1 tablet by mouth once daily 90 Tablet 3    rosuvastatin (CRESTOR) 40 MG tablet Take 1 Tablet by mouth every day. 100 Tablet 3    spironolactone (ALDACTONE) 25 MG Tab  "Take 1/2 (one-half) tablet by mouth once daily 45 Tablet 3    Vitamins-Lipotropics (LIPO FLAVONOID PLUS) Tab Take  by mouth.      Misc Natural Products (HEALTHY LIVER PO) Take  by mouth.      loratadine-pseudoephedrine (CLARITIN-D 24 HOUR)  MG per tablet Take 1 Tablet by mouth every day.      omeprazole (PRILOSEC) 20 MG delayed-release capsule Take 1 Capsule by mouth every day.      acetaminophen (TYLENOL) 500 MG Tab Take 2 Tablets by mouth every 6 hours as needed.      aspirin (ASA) 81 MG Chew Tab chewable tablet Take 1 Tab by mouth every day. 100 Tab 11     No current facility-administered medications for this visit.           Review Of Systems  As documented in HPI above  PHYSICAL EXAMINATION:    /60 (BP Location: Left arm, Patient Position: Sitting, BP Cuff Size: Adult)   Pulse 90   Temp 36.6 °C (97.8 °F) (Temporal)   Resp 16   Ht 1.676 m (5' 6\")   Wt 112 kg (248 lb)   SpO2 96%   BMI 40.03 kg/m²   Gen.: Well-developed, well-nourished, no apparent distress, pleasant and cooperative with the examination  HEENT: Normocephalic/atraumatic, sinuses nontender with palpation, TMs clear, nares patent with pink mucosa and clear rhinorrhea, oropharynx clear  Neck: No JVD or bruits, no adenopathy  Cor: Regular rate and rhythm without murmur gallop or rub  Lungs: Clear to auscultation with equal breath sounds bilaterally. No wheezes, rhonchi.  Abdomen: Soft nontender without hepatosplenomegaly or masses appreciated, normoactive bowel sounds  Extremities: No cyanosis, clubbing or edema       ASSESSMENT/Plan:  1. Chronic cough  Patient increase omeprazole possibly related to acid reflux.  CT and echocardiogram within normal limits, advised to also do a CT chest for further evaluation.  Add albuterol for possible bronchospasm.  omeprazole (PRILOSEC) 20 MG delayed-release capsule    albuterol 108 (90 Base) MCG/ACT Aero Soln inhalation aerosol    CT-CHEST (THORAX) W/O      2. Type 2 diabetes mellitus without " complication, without long-term current use of insulin (HCC)  Chronic, uncontrolled.  Not taking Ozempic.  I will try starting patient on Ozempic low-dose and monitor kidney function test and thyroid function test.  Recently diagnosed with kidney cancer.  I think patient will be a good candidate for GLP-1 agonist which will help control her weight and blood sugar.  Along with dietary changes and healthy lifestyle.  Patient to continue with Farxiga      3. Musculoskeletal pain        4. Type 2 diabetes mellitus with microalbuminuria, without long-term current use of insulin (HCC)  Semaglutide,0.25 or 0.5MG/DOS, (OZEMPIC, 0.25 OR 0.5 MG/DOSE,) 2 MG/3ML Solution Pen-injector         Please note that this dictation was created using voice recognition software. I have worked with consultants from the vendor as well as technical experts from [employer] [employer] to optimize the interface. I have made every reasonable attempt to correct obvious errors, but I expect that there are errors of grammar and possibly content that I did not discover before finalizing the note.

## 2023-08-17 ENCOUNTER — TELEPHONE (OUTPATIENT)
Dept: MEDICAL GROUP | Facility: LAB | Age: 68
End: 2023-08-17

## 2023-08-17 NOTE — TELEPHONE ENCOUNTER
Shahana called c/o her chronic cough.  She has been coughing for months now, and it's starting to put a strain on her mental health and .  She would like to know if you can prescribe a cough medicine to help alleviate the cough.  Please advise.

## 2023-08-31 ENCOUNTER — TELEPHONE (OUTPATIENT)
Dept: MEDICAL GROUP | Facility: LAB | Age: 68
End: 2023-08-31

## 2023-08-31 RX ORDER — BENZONATATE 100 MG/1
100 CAPSULE ORAL 3 TIMES DAILY PRN
Qty: 60 CAPSULE | Refills: 0 | Status: SHIPPED | OUTPATIENT
Start: 2023-08-31 | End: 2023-09-28

## 2023-09-03 DIAGNOSIS — R05.3 CHRONIC COUGH: ICD-10-CM

## 2023-09-05 RX ORDER — OMEPRAZOLE 20 MG/1
20 CAPSULE, DELAYED RELEASE ORAL 2 TIMES DAILY
Qty: 60 CAPSULE | Refills: 0 | Status: SHIPPED | OUTPATIENT
Start: 2023-09-05 | End: 2023-09-29

## 2023-09-28 RX ORDER — BENZONATATE 100 MG/1
100 CAPSULE ORAL 3 TIMES DAILY PRN
Qty: 60 CAPSULE | Refills: 0 | Status: SHIPPED | OUTPATIENT
Start: 2023-09-28

## 2023-09-28 NOTE — TELEPHONE ENCOUNTER
Received request via: Pharmacy    Was the patient seen in the last year in this department? Yes  LOV 07/18/2023  Does the patient have an active prescription (recently filled or refills available) for medication(s) requested? No    Does the patient have [facility] Plus and need 100 day supply (blood pressure, diabetes and cholesterol meds only)? Medication is not for cholesterol, blood pressure or diabetes and Patient does not have SCP

## 2023-09-29 DIAGNOSIS — R05.3 CHRONIC COUGH: ICD-10-CM

## 2023-09-29 RX ORDER — OMEPRAZOLE 20 MG/1
20 CAPSULE, DELAYED RELEASE ORAL 2 TIMES DAILY
Qty: 60 CAPSULE | Refills: 0 | Status: SHIPPED | OUTPATIENT
Start: 2023-09-29 | End: 2023-10-26

## 2023-09-29 NOTE — TELEPHONE ENCOUNTER
Received request via: Pharmacy    Was the patient seen in the last year in this department? Yes  7/18/23  Does the patient have an active prescription (recently filled or refills available) for medication(s) requested? No    Does the patient have assisted Plus and need 100 day supply (blood pressure, diabetes and cholesterol meds only)? Medication is not for cholesterol, blood pressure or diabetes

## 2023-10-25 DIAGNOSIS — R05.3 CHRONIC COUGH: ICD-10-CM

## 2023-10-25 NOTE — TELEPHONE ENCOUNTER
Received request via: Pharmacy    Was the patient seen in the last year in this department? Yes  7/18/23  Does the patient have an active prescription (recently filled or refills available) for medication(s) requested? No    Does the patient have alf Plus and need 100 day supply (blood pressure, diabetes and cholesterol meds only)? Medication is not for cholesterol, blood pressure or diabetes

## 2023-10-26 RX ORDER — OMEPRAZOLE 20 MG/1
20 CAPSULE, DELAYED RELEASE ORAL 2 TIMES DAILY
Qty: 60 CAPSULE | Refills: 0 | Status: SHIPPED | OUTPATIENT
Start: 2023-10-26 | End: 2023-11-20

## 2023-11-04 DIAGNOSIS — I10 ESSENTIAL HYPERTENSION: ICD-10-CM

## 2023-11-07 RX ORDER — METOPROLOL SUCCINATE 50 MG/1
TABLET, EXTENDED RELEASE ORAL
Qty: 90 TABLET | Refills: 0 | Status: SHIPPED | OUTPATIENT
Start: 2023-11-07 | End: 2024-01-30

## 2023-11-20 DIAGNOSIS — R05.3 CHRONIC COUGH: ICD-10-CM

## 2023-11-20 RX ORDER — LORATADINE AND PSEUDOEPHEDRINE SULFATE 10; 240 MG/1; MG/1
1 TABLET, EXTENDED RELEASE ORAL
Qty: 30 TABLET | Refills: 0 | Status: SHIPPED | OUTPATIENT
Start: 2023-11-20 | End: 2023-12-21

## 2023-11-20 RX ORDER — OMEPRAZOLE 20 MG/1
20 CAPSULE, DELAYED RELEASE ORAL 2 TIMES DAILY
Qty: 60 CAPSULE | Refills: 0 | Status: SHIPPED | OUTPATIENT
Start: 2023-11-20 | End: 2023-12-26

## 2023-12-21 RX ORDER — LORATADINE AND PSEUDOEPHEDRINE SULFATE 10; 240 MG/1; MG/1
1 TABLET, EXTENDED RELEASE ORAL DAILY
Qty: 30 TABLET | Refills: 0 | Status: SHIPPED | OUTPATIENT
Start: 2023-12-21 | End: 2024-01-18

## 2023-12-24 DIAGNOSIS — R05.3 CHRONIC COUGH: ICD-10-CM

## 2023-12-26 RX ORDER — OMEPRAZOLE 20 MG/1
20 CAPSULE, DELAYED RELEASE ORAL 2 TIMES DAILY
Qty: 60 CAPSULE | Refills: 0 | Status: SHIPPED | OUTPATIENT
Start: 2023-12-26 | End: 2024-01-19

## 2024-01-18 RX ORDER — LORATADINE AND PSEUDOEPHEDRINE SULFATE 10; 240 MG/1; MG/1
1 TABLET, EXTENDED RELEASE ORAL DAILY
Qty: 30 TABLET | Refills: 0 | Status: SHIPPED | OUTPATIENT
Start: 2024-01-18 | End: 2024-02-20

## 2024-01-19 ENCOUNTER — DOCUMENTATION (OUTPATIENT)
Dept: HEALTH INFORMATION MANAGEMENT | Facility: OTHER | Age: 69
End: 2024-01-19

## 2024-01-19 DIAGNOSIS — R05.3 CHRONIC COUGH: ICD-10-CM

## 2024-01-19 RX ORDER — OMEPRAZOLE 20 MG/1
20 CAPSULE, DELAYED RELEASE ORAL 2 TIMES DAILY
Qty: 60 CAPSULE | Refills: 0 | Status: SHIPPED | OUTPATIENT
Start: 2024-01-19 | End: 2024-02-20

## 2024-01-19 NOTE — TELEPHONE ENCOUNTER
Received request via: Pharmacy    Was the patient seen in the last year in this department? Yes  7/18/23  Does the patient have an active prescription (recently filled or refills available) for medication(s) requested? No    Pharmacy Name:   [facility]  Does the patient have [facility] Plus and need 100 day supply (blood pressure, diabetes and cholesterol meds only)? Medication is not for cholesterol, blood pressure or diabetes

## 2024-01-28 DIAGNOSIS — I10 ESSENTIAL HYPERTENSION: ICD-10-CM

## 2024-01-30 RX ORDER — METOPROLOL SUCCINATE 50 MG/1
TABLET, EXTENDED RELEASE ORAL
Qty: 90 TABLET | Refills: 0 | Status: SHIPPED | OUTPATIENT
Start: 2024-01-30

## 2024-01-30 NOTE — TELEPHONE ENCOUNTER
Received request via: Patient    Was the patient seen in the last year in this department? Yes    Does the patient have an active prescription (recently filled or refills available) for medication(s) requested? No    [facility]    Does the [facility] have [facility] and need 100 day supply (blood pressure, diabetes and cholesterol meds only)? Patient does not have SCP   Referral generated.

## 2024-02-17 DIAGNOSIS — R05.3 CHRONIC COUGH: ICD-10-CM

## 2024-02-20 RX ORDER — OMEPRAZOLE 20 MG/1
20 CAPSULE, DELAYED RELEASE ORAL 2 TIMES DAILY
Qty: 60 CAPSULE | Refills: 0 | Status: SHIPPED | OUTPATIENT
Start: 2024-02-20 | End: 2024-03-18

## 2024-02-20 RX ORDER — LORATADINE AND PSEUDOEPHEDRINE SULFATE 10; 240 MG/1; MG/1
1 TABLET, EXTENDED RELEASE ORAL DAILY
Qty: 30 TABLET | Refills: 0 | Status: SHIPPED | OUTPATIENT
Start: 2024-02-20 | End: 2024-03-18

## 2024-02-20 NOTE — TELEPHONE ENCOUNTER
Received request via: Pharmacy    Was the patient seen in the last year in this department? Yes    Does the patient have an active prescription (recently filled or refills available) for medication(s) requested? No    [facility]     Does the patient have residential Plus and need 100 day supply (blood pressure, diabetes and cholesterol meds only)? Patient does not have SCP

## 2024-02-20 NOTE — TELEPHONE ENCOUNTER
Received request via: Pharmacy    Was the patient seen in the last year in this department? Yes    Does the patient have an active prescription (recently filled or refills available) for medication(s) requested? No    Pharmacy Name: [facility] [address]    Does the patient have [facility] Plus and need 100 day supply (blood pressure, diabetes and cholesterol meds only)? Patient does not have SCP

## 2024-03-17 DIAGNOSIS — R05.3 CHRONIC COUGH: ICD-10-CM

## 2024-03-18 RX ORDER — LORATADINE AND PSEUDOEPHEDRINE SULFATE 10; 240 MG/1; MG/1
1 TABLET, EXTENDED RELEASE ORAL DAILY
Qty: 30 TABLET | Refills: 0 | Status: SHIPPED | OUTPATIENT
Start: 2024-03-18

## 2024-03-18 RX ORDER — OMEPRAZOLE 20 MG/1
20 CAPSULE, DELAYED RELEASE ORAL 2 TIMES DAILY
Qty: 60 CAPSULE | Refills: 1 | Status: SHIPPED | OUTPATIENT
Start: 2024-03-18

## 2024-04-18 RX ORDER — LORATADINE AND PSEUDOEPHEDRINE SULFATE 10; 240 MG/1; MG/1
1 TABLET, EXTENDED RELEASE ORAL DAILY
Qty: 30 TABLET | Refills: 0 | Status: SHIPPED | OUTPATIENT
Start: 2024-04-18

## 2024-04-25 DIAGNOSIS — I10 ESSENTIAL HYPERTENSION: ICD-10-CM

## 2024-04-25 NOTE — TELEPHONE ENCOUNTER
Received request via: Patient    Was the patient seen in the last year in this department? Yes    Does the patient have an active prescription (recently filled or refills available) for medication(s) requested? No    [facility]    Does the patient have [facility] Plus and need 100 day supply (blood pressure, diabetes and cholesterol meds only)? Patient does not have SCP

## 2024-04-26 RX ORDER — METOPROLOL SUCCINATE 50 MG/1
TABLET, EXTENDED RELEASE ORAL
Qty: 90 TABLET | Refills: 0 | Status: SHIPPED | OUTPATIENT
Start: 2024-04-26

## 2024-05-11 DIAGNOSIS — R05.3 CHRONIC COUGH: ICD-10-CM

## 2024-05-13 RX ORDER — OMEPRAZOLE 20 MG/1
20 CAPSULE, DELAYED RELEASE ORAL 2 TIMES DAILY
Qty: 60 CAPSULE | Refills: 3 | Status: SHIPPED | OUTPATIENT
Start: 2024-05-13

## 2024-05-13 NOTE — TELEPHONE ENCOUNTER
Received request via: Pharmacy    Was the patient seen in the last year in this department? Yes 7/18/23    Does the patient have an active prescription (recently filled or refills available) for medication(s) requested? No    Pharmacy Name: [facility] [address] [place] [address]     Does the patient have assisted Plus and need 100 day supply (blood pressure, diabetes and cholesterol meds only)? Patient does not have SCP

## 2024-05-15 NOTE — TELEPHONE ENCOUNTER
Received request via: Pharmacy    Was the patient seen in the last year in this department? Yes  7/18/23  Does the patient have an active prescription (recently filled or refills available) for medication(s) requested? No    [facility]    Does the patient have [facility] Plus and need 100 day supply (blood pressure, diabetes and cholesterol meds only)? Medication is not for cholesterol, blood pressure or diabetes

## 2024-05-16 RX ORDER — LORATADINE AND PSEUDOEPHEDRINE SULFATE 10; 240 MG/1; MG/1
1 TABLET, EXTENDED RELEASE ORAL DAILY
Qty: 30 TABLET | Refills: 0 | Status: SHIPPED | OUTPATIENT
Start: 2024-05-16

## 2024-06-17 RX ORDER — IRBESARTAN 300 MG/1
300 TABLET ORAL EVERY EVENING
Qty: 90 TABLET | Refills: 0 | Status: SHIPPED | OUTPATIENT
Start: 2024-06-17

## 2024-06-17 NOTE — TELEPHONE ENCOUNTER
Received request via: Pharmacy    Was the patient seen in the last year in this department? Yes 7/18/2023    Does the patient have an active prescription (recently filled or refills available) for medication(s) requested? No    Pharmacy Name:   [facility] [address] [place] [address]    Does the patient have skilled nursing Plus and need 100 day supply (blood pressure, diabetes and cholesterol meds only)? Patient does not have SCP

## 2024-07-24 ENCOUNTER — TELEPHONE (OUTPATIENT)
Dept: HEALTH INFORMATION MANAGEMENT | Facility: OTHER | Age: 69
End: 2024-07-24

## 2024-07-24 DIAGNOSIS — I10 ESSENTIAL HYPERTENSION: ICD-10-CM

## 2024-07-25 RX ORDER — METOPROLOL SUCCINATE 50 MG/1
50 TABLET, EXTENDED RELEASE ORAL DAILY
Qty: 90 TABLET | Refills: 0 | Status: SHIPPED | OUTPATIENT
Start: 2024-07-25

## 2024-10-24 ENCOUNTER — TELEPHONE (OUTPATIENT)
Dept: HEALTH INFORMATION MANAGEMENT | Facility: OTHER | Age: 69
End: 2024-10-24

## 2025-01-15 ENCOUNTER — OFFICE VISIT (OUTPATIENT)
Dept: MEDICAL GROUP | Facility: LAB | Age: 70
End: 2025-01-15
Payer: MEDICARE

## 2025-01-15 VITALS
RESPIRATION RATE: 16 BRPM | WEIGHT: 241 LBS | TEMPERATURE: 97.5 F | SYSTOLIC BLOOD PRESSURE: 138 MMHG | DIASTOLIC BLOOD PRESSURE: 72 MMHG | HEIGHT: 66 IN | BODY MASS INDEX: 38.73 KG/M2 | HEART RATE: 94 BPM | OXYGEN SATURATION: 96 %

## 2025-01-15 DIAGNOSIS — G47.33 OBSTRUCTIVE SLEEP APNEA: ICD-10-CM

## 2025-01-15 DIAGNOSIS — D50.9 IRON DEFICIENCY ANEMIA, UNSPECIFIED IRON DEFICIENCY ANEMIA TYPE: ICD-10-CM

## 2025-01-15 DIAGNOSIS — R05.3 CHRONIC COUGH: ICD-10-CM

## 2025-01-15 DIAGNOSIS — N18.32 STAGE 3B CHRONIC KIDNEY DISEASE: ICD-10-CM

## 2025-01-15 DIAGNOSIS — I15.2 HYPERTENSION ASSOCIATED WITH TYPE 2 DIABETES MELLITUS (HCC): ICD-10-CM

## 2025-01-15 DIAGNOSIS — I10 ESSENTIAL HYPERTENSION: ICD-10-CM

## 2025-01-15 DIAGNOSIS — R80.9 MICROALBUMINURIA DUE TO TYPE 2 DIABETES MELLITUS (HCC): ICD-10-CM

## 2025-01-15 DIAGNOSIS — E11.29 MICROALBUMINURIA DUE TO TYPE 2 DIABETES MELLITUS (HCC): ICD-10-CM

## 2025-01-15 DIAGNOSIS — E11.69 TYPE 2 DIABETES MELLITUS WITH OTHER SPECIFIED COMPLICATION, WITHOUT LONG-TERM CURRENT USE OF INSULIN (HCC): ICD-10-CM

## 2025-01-15 DIAGNOSIS — E78.5 HYPERLIPIDEMIA ASSOCIATED WITH TYPE 2 DIABETES MELLITUS (HCC): ICD-10-CM

## 2025-01-15 DIAGNOSIS — E11.69 HYPERLIPIDEMIA ASSOCIATED WITH TYPE 2 DIABETES MELLITUS (HCC): ICD-10-CM

## 2025-01-15 DIAGNOSIS — R32 URINARY INCONTINENCE, UNSPECIFIED TYPE: ICD-10-CM

## 2025-01-15 DIAGNOSIS — E11.59 HYPERTENSION ASSOCIATED WITH TYPE 2 DIABETES MELLITUS (HCC): ICD-10-CM

## 2025-01-15 PROBLEM — R13.19 ESOPHAGEAL DYSPHAGIA: Status: RESOLVED | Noted: 2023-01-26 | Resolved: 2025-01-15

## 2025-01-15 LAB
HBA1C MFR BLD: 8.3 % (ref ?–5.8)
POCT INT CON NEG: NEGATIVE
POCT INT CON POS: POSITIVE

## 2025-01-15 PROCEDURE — 3078F DIAST BP <80 MM HG: CPT | Performed by: STUDENT IN AN ORGANIZED HEALTH CARE EDUCATION/TRAINING PROGRAM

## 2025-01-15 PROCEDURE — 99214 OFFICE O/P EST MOD 30 MIN: CPT | Performed by: STUDENT IN AN ORGANIZED HEALTH CARE EDUCATION/TRAINING PROGRAM

## 2025-01-15 PROCEDURE — 3075F SYST BP GE 130 - 139MM HG: CPT | Performed by: STUDENT IN AN ORGANIZED HEALTH CARE EDUCATION/TRAINING PROGRAM

## 2025-01-15 PROCEDURE — 83036 HEMOGLOBIN GLYCOSYLATED A1C: CPT | Performed by: STUDENT IN AN ORGANIZED HEALTH CARE EDUCATION/TRAINING PROGRAM

## 2025-01-15 ASSESSMENT — ENCOUNTER SYMPTOMS
ABDOMINAL PAIN: 0
COUGH: 1
SHORTNESS OF BREATH: 0
NAUSEA: 0
FEVER: 0
CHILLS: 0
WEIGHT LOSS: 0
VOMITING: 0
SORE THROAT: 0
DIARRHEA: 0

## 2025-01-15 ASSESSMENT — PATIENT HEALTH QUESTIONNAIRE - PHQ9: CLINICAL INTERPRETATION OF PHQ2 SCORE: 0

## 2025-01-15 ASSESSMENT — FIBROSIS 4 INDEX: FIB4 SCORE: 0.93

## 2025-01-15 NOTE — PROGRESS NOTES
Subjective:     Chief Complaint   Patient presents with    New Patient    Cough     2 years     HISTORY OF THE PRESENT ILLNESS: Patient is a 69 y.o. female. This pleasant patient is here today to establish care and discuss chronic cough. His/her prior PCP was Dr. Wilson.    Verbal consent was acquired by the patient to use [employer] ambient listening note generation during this visit Yes.    History of Present Illness  The patient is a 69-year-old female who presents to establish care and discuss chronic cough.    She has been experiencing a persistent dry cough for over 2 years, which is triggered by various factors such as sudden inhalation or eating/drinking. The cough is constant throughout the day and is not productive except on occasion when productive in the morning. [place] where dust is prevalent despite having air conditioning at home.  They do have carpeted floors and reports that their house is quite cluttered.  She reports nasal dryness and congestion, frequently blowing her nose.  She denies a history of asthma but reports occasional wheezing.  She smoked very little in college but none since.  She reports no difficulty swallowing but notes that her throat feels dry.  She uses her CPAP nightly.  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     For her cough and congestion she has tried Allegra which she is currently taking and Allegra-D which she ran out of and not currently taking, both were not effective for her symptoms.  She does recall potentially trying Flonase before but not certain of its efficacy.  She has not seen a pulmonologist or ENT but states that when living in [facility] she did see an allergist.    Regarding her diabetes she never started semaglutide due to cost of medication.  She is not currently on any medication.  She attempts overall to adhere to a diabetic diet but does endorse some dietary  "discretion.    She is currently on metoprolol and Avapro for blood pressure management but does not monitor her blood pressure at home.    She reports urinary leakage but no other urinary issues.     Review of Systems   Constitutional:  Negative for chills, fever, malaise/fatigue and weight loss.   HENT:  Positive for congestion. Negative for sore throat.    Respiratory:  Positive for cough. Negative for shortness of breath.    Cardiovascular:  Negative for chest pain.   Gastrointestinal:  Negative for abdominal pain, diarrhea, nausea and vomiting.   Skin:  Negative for rash.     Objective:     Exam: /72 (BP Location: Left arm, Patient Position: Sitting, BP Cuff Size: Large adult)   Pulse 94   Temp 36.4 °C (97.5 °F) (Temporal)   Resp 16   Ht 1.676 m (5' 6\")   Wt 109 kg (241 lb)   SpO2 96%  Body mass index is 38.9 kg/m².    Physical Exam  Vitals reviewed.   Constitutional:       General: She is not in acute distress.     Appearance: Normal appearance. She is obese. She is not ill-appearing.   HENT:      Head: Normocephalic and atraumatic.      Mouth/Throat:      Mouth: Mucous membranes are dry.      Pharynx: Uvula midline. No oropharyngeal exudate, posterior oropharyngeal erythema or uvula swelling.      Comments: bifid uvula  Eyes:      General: No scleral icterus.  Cardiovascular:      Rate and Rhythm: Normal rate and regular rhythm.      Heart sounds: Normal heart sounds.   Pulmonary:      Effort: Pulmonary effort is normal.      Breath sounds: Normal breath sounds.   Abdominal:      General: Abdomen is flat. Bowel sounds are normal. There is no distension.      Palpations: Abdomen is soft. There is no mass.      Tenderness: There is no abdominal tenderness. There is no guarding or rebound.   Skin:     General: Skin is warm and dry.      Coloration: Skin is not jaundiced.   Neurological:      General: No focal deficit present.      Mental Status: She is alert and oriented to person, place, and time. "   Psychiatric:         Mood and Affect: Mood normal.         Behavior: Behavior normal.         Thought Content: Thought content normal.       Labs: Reviewed from 2021, 2023  Imaging: Reviewed CXR 7/2023    Assessment & Plan:   69 y.o. female with the following -    1. Chronic cough  The etiology of the cough could be multifactorial, potentially attributable to cough variant asthma, upper airway cough syndrome, or gastroesophageal reflux disease. Given her history of renal cell carcinoma, it is prudent to rule out metastasis as a potential cause despite normal CXR, obtain CT chest. She has been advised to discontinue Claritin-D and Allegra due to their ineffectiveness and potential to exacerbate her hypertension, also with CKD so allegra should be avoided. She has been instructed to use Flonase 1 to 2 sprays daily, preceded by a saline rinse with purified or distilled water. She has been encouraged to maintain a clean living environment, including daily vacuuming and the use of a HEPA filter. She has been advised against the use of oral decongestants due to her elevated blood pressure and uncertain renal function. The use of Claritin has been suggested as a potential alternative to flonase. A pulmonary function test will be conducted. A referral to an ENT specialist will be made to evaluate for potential physical obstructions contributing to the cough. She will be referred to an allergist for further evaluation.  - PULMONARY FUNCTION TESTS -Test requested: Complete Pulmonary Function Test; Include MIPS/MEPS? No; Future  - Referral to ENT  - Referral to Allergy  - CT-CHEST (THORAX) W/O; Future    2. Type 2 diabetes mellitus with other specified complication, without long-term current use of insulin (HCC)  Chronic, uncontrolled. Given CKD with history of microalbuminuria we will start Jardiance.  Discussed potential medication side effects and importance of adequate hydration.  Encouraged diabetic diet and exercise as  tolerated.  - A1C in 3 months. Discussed ideal glucose ranges.  - Annual labs well overdue.  - Retinopathy screening and monofilament to do at follow up.   - Comp Metabolic Panel; Future  - Lipid Profile; Future  - POCT  A1C  - Empagliflozin (JARDIANCE) 10 MG Tab tablet; Take 1 Tablet by mouth every day.  Dispense: 90 Tablet; Refill: 3    3. Hypertension associated with type 2 diabetes mellitus (HCC)  Chronic, improved on repeat.  She will continue medication as below for now.  Well overdue for labs.  - Comp Metabolic Panel; Future    metoprolol SR (TOPROL XL) 50 MG TABLET SR 24 HR, Take 1 Tablet by mouth every day., Disp: 90 Tablet, Rfl: 3    irbesartan (AVAPRO) 300 MG Tab, TAKE 1 TABLET BY MOUTH ONCE DAILY IN THE EVENING, Disp: 90 Tablet, Rfl: 0    4. Hyperlipidemia associated with type 2 diabetes mellitus (HCC)  Chronic, no longer taking rosuvastatin 40 mg daily, well overdue for labs.  Plan pending results.  Suspect we will likely resume statin ensuring that there are no contraindications.  - Comp Metabolic Panel; Future  - Lipid Profile; Future  - TSH WITH REFLEX TO FT4; Future    5. Microalbuminuria due to type 2 diabetes mellitus (HCC)  Well overdue to trend.  Diabetes as above.  Starting Jardiance.  - MICROALBUMIN CREAT RATIO URINE; Future  - Empagliflozin (JARDIANCE) 10 MG Tab tablet; Take 1 Tablet by mouth every day.  Dispense: 90 Tablet; Refill: 3    6. Stage 3b chronic kidney disease  Chronic, well overdue to trend.  Starting Jardiance as below.  Continue ARB.  Avoid nephrotoxic medication.  Consider nephrology referral pending results.  - Empagliflozin (JARDIANCE) 10 MG Tab tablet; Take 1 Tablet by mouth every day.  Dispense: 90 Tablet; Refill: 3  - PTH INTACT (PTH ONLY); Future  - PHOSPHORUS; Future  - VITAMIN D,25 HYDROXY (DEFICIENCY); Future    7. Iron deficiency anemia, unspecified iron deficiency anemia type  Over due to trend. Plan pending results.  - CBC WITH DIFFERENTIAL; Future  - FERRITIN;  Future  - FOLATE; Future  - IRON/TOTAL IRON BIND; Future  - VITAMIN B12; Future    8. Obstructive sleep apnea  Chronic, reports good compliance with CPAP.  Lost to follow-up and needs to reestablish with sleep medicine.  - Referral to Pulmonary and Sleep Medicine    9. Urinary incontinence, unspecified type  Chronic, patient initially reported that this is a priority to treat.  Denies any other urinary symptoms and this has been stable.  Not currently interested in trial of pelvic floor physical therapy or referral to urology/urogynecology so for now plan to monitor and discuss at follow-up.    Return in about 1 month (around 2/18/2025), or if symptoms worsen or fail to improve, for labs/follow up.    Please note that this dictation was created using voice recognition software. I have made every reasonable attempt to correct obvious errors, but I expect that there are errors of grammar and possibly content that I did not discover before finalizing the note.

## 2025-01-15 NOTE — PATIENT INSTRUCTIONS
Restart Flonase 1-2 sprays each nostril daily   If nasal dryness/nose bleeds develop:  Consider in room humidifier, AYR gel/spray, Vasoline, or Ponaris Nasal Emollient, and/or Flonase reduce down to 1 spray a day    Mt Med Saline Rinse (black top bottle)-can be used prior to flonase as well to improve efficacy     Can try claritin 10mg daily if above not helping (can dry you out more)  Avoid oral decongestants (can rise blood pressure/heart rate)    Ideal blood pressure <130/80 and at least <140/90.  If <100 for the top number we may be over treating.  Severe range >180/120, with symptoms=ER    Ideal glucose ranges: fasting (8-10 hours) , random (1-2 hours after eating) <180. If glucose <70, we need to adjust your medication to prevent continuing low blood sugar.

## 2025-01-16 ENCOUNTER — HOSPITAL ENCOUNTER (OUTPATIENT)
Dept: LAB | Facility: MEDICAL CENTER | Age: 70
End: 2025-01-16
Attending: STUDENT IN AN ORGANIZED HEALTH CARE EDUCATION/TRAINING PROGRAM
Payer: MEDICARE

## 2025-01-16 DIAGNOSIS — I15.2 HYPERTENSION ASSOCIATED WITH TYPE 2 DIABETES MELLITUS (HCC): ICD-10-CM

## 2025-01-16 DIAGNOSIS — R80.9 MICROALBUMINURIA DUE TO TYPE 2 DIABETES MELLITUS (HCC): ICD-10-CM

## 2025-01-16 DIAGNOSIS — N18.32 STAGE 3B CHRONIC KIDNEY DISEASE: ICD-10-CM

## 2025-01-16 DIAGNOSIS — E11.69 HYPERLIPIDEMIA ASSOCIATED WITH TYPE 2 DIABETES MELLITUS (HCC): ICD-10-CM

## 2025-01-16 DIAGNOSIS — E11.29 MICROALBUMINURIA DUE TO TYPE 2 DIABETES MELLITUS (HCC): ICD-10-CM

## 2025-01-16 DIAGNOSIS — E78.5 HYPERLIPIDEMIA ASSOCIATED WITH TYPE 2 DIABETES MELLITUS (HCC): ICD-10-CM

## 2025-01-16 DIAGNOSIS — E11.59 HYPERTENSION ASSOCIATED WITH TYPE 2 DIABETES MELLITUS (HCC): ICD-10-CM

## 2025-01-16 DIAGNOSIS — D50.9 IRON DEFICIENCY ANEMIA, UNSPECIFIED IRON DEFICIENCY ANEMIA TYPE: ICD-10-CM

## 2025-01-16 DIAGNOSIS — E11.69 TYPE 2 DIABETES MELLITUS WITH OTHER SPECIFIED COMPLICATION, WITHOUT LONG-TERM CURRENT USE OF INSULIN (HCC): ICD-10-CM
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
## 2025-01-16 PROBLEM — K57.92 DIVERTICULITIS: Status: RESOLVED | Noted: 2023-01-26 | Resolved: 2025-01-16

## 2025-01-16 LAB
25(OH)D3 SERPL-MCNC: 33 NG/ML (ref 30–100)
ALBUMIN SERPL BCP-MCNC: 4.1 G/DL (ref 3.2–4.9)
ALBUMIN/GLOB SERPL: 1.8 G/DL
ALP SERPL-CCNC: 101 U/L (ref 30–99)
ALT SERPL-CCNC: 16 U/L (ref 2–50)
ANION GAP SERPL CALC-SCNC: 12 MMOL/L (ref 7–16)
ANISOCYTOSIS BLD QL SMEAR: ABNORMAL
AST SERPL-CCNC: 15 U/L (ref 12–45)
BASOPHILS # BLD AUTO: 1.1 % (ref 0–1.8)
BASOPHILS # BLD: 0.08 K/UL (ref 0–0.12)
BILIRUB SERPL-MCNC: 0.3 MG/DL (ref 0.1–1.5)
BUN SERPL-MCNC: 25 MG/DL (ref 8–22)
CALCIUM ALBUM COR SERPL-MCNC: 10.6 MG/DL (ref 8.5–10.5)
CALCIUM SERPL-MCNC: 10.7 MG/DL (ref 8.5–10.5)
CHLORIDE SERPL-SCNC: 105 MMOL/L (ref 96–112)
CHOLEST SERPL-MCNC: 170 MG/DL (ref 100–199)
CO2 SERPL-SCNC: 21 MMOL/L (ref 20–33)
COMMENT 1642: NORMAL
CREAT SERPL-MCNC: 1.15 MG/DL (ref 0.5–1.4)
CREAT UR-MCNC: 53.34 MG/DL
DACRYOCYTES BLD QL SMEAR: NORMAL
EOSINOPHIL # BLD AUTO: 0.41 K/UL (ref 0–0.51)
EOSINOPHIL NFR BLD: 5.5 % (ref 0–6.9)
ERYTHROCYTE [DISTWIDTH] IN BLOOD BY AUTOMATED COUNT: 45.2 FL (ref 35.9–50)
FERRITIN SERPL-MCNC: 5.6 NG/ML (ref 10–291)
FOLATE SERPL-MCNC: 8.4 NG/ML
GFR SERPLBLD CREATININE-BSD FMLA CKD-EPI: 51 ML/MIN/1.73 M 2
GLOBULIN SER CALC-MCNC: 2.3 G/DL (ref 1.9–3.5)
GLUCOSE SERPL-MCNC: 157 MG/DL (ref 65–99)
HCT VFR BLD AUTO: 25.6 % (ref 37–47)
HDLC SERPL-MCNC: 33 MG/DL
HGB BLD-MCNC: 6.1 G/DL (ref 12–16)
HYPOCHROMIA BLD QL SMEAR: ABNORMAL
IMM GRANULOCYTES # BLD AUTO: 0.03 K/UL (ref 0–0.11)
IMM GRANULOCYTES NFR BLD AUTO: 0.4 % (ref 0–0.9)
IRON SATN MFR SERPL: 2 % (ref 15–55)
IRON SERPL-MCNC: 9 UG/DL (ref 40–170)
LDLC SERPL CALC-MCNC: 113 MG/DL
LYMPHOCYTES # BLD AUTO: 1.32 K/UL (ref 1–4.8)
LYMPHOCYTES NFR BLD: 17.6 % (ref 22–41)
MACROCYTES BLD QL SMEAR: ABNORMAL
MCH RBC QN AUTO: 13.9 PG (ref 27–33)
MCHC RBC AUTO-ENTMCNC: 23.8 G/DL (ref 32.2–35.5)
MCV RBC AUTO: 58.3 FL (ref 81.4–97.8)
MICROALBUMIN UR-MCNC: 31 MG/DL
MICROALBUMIN/CREAT UR: 581 MG/G (ref 0–30)
MICROCYTES BLD QL SMEAR: ABNORMAL
MONOCYTES # BLD AUTO: 0.64 K/UL (ref 0–0.85)
MONOCYTES NFR BLD AUTO: 8.6 % (ref 0–13.4)
MORPHOLOGY BLD-IMP: NORMAL
NEUTROPHILS # BLD AUTO: 5 K/UL (ref 1.82–7.42)
NEUTROPHILS NFR BLD: 66.8 % (ref 44–72)
NRBC # BLD AUTO: 0.02 K/UL
NRBC BLD-RTO: 0.3 /100 WBC (ref 0–0.2)
OVALOCYTES BLD QL SMEAR: NORMAL
PHOSPHATE SERPL-MCNC: 3.1 MG/DL (ref 2.5–4.5)
PLATELET # BLD AUTO: 346 K/UL (ref 164–446)
PLATELET BLD QL SMEAR: NORMAL
POIKILOCYTOSIS BLD QL SMEAR: NORMAL
POLYCHROMASIA BLD QL SMEAR: NORMAL
POTASSIUM SERPL-SCNC: 4.4 MMOL/L (ref 3.6–5.5)
PROT SERPL-MCNC: 6.4 G/DL (ref 6–8.2)
PTH-INTACT SERPL-MCNC: 119 PG/ML (ref 14–72)
RBC # BLD AUTO: 4.39 M/UL (ref 4.2–5.4)
RBC BLD AUTO: PRESENT
SCHISTOCYTES BLD QL SMEAR: NORMAL
SODIUM SERPL-SCNC: 138 MMOL/L (ref 135–145)
TIBC SERPL-MCNC: 413 UG/DL (ref 250–450)
TRIGL SERPL-MCNC: 121 MG/DL (ref 0–149)
TSH SERPL DL<=0.005 MIU/L-ACNC: 1.77 UIU/ML (ref 0.38–5.33)
UIBC SERPL-MCNC: 404 UG/DL (ref 110–370)
VIT B12 SERPL-MCNC: 616 PG/ML (ref 211–911)
WBC # BLD AUTO: 7.5 K/UL (ref 4.8–10.8)
WBC TOXIC VACUOLES BLD QL SMEAR: NORMAL

## 2025-01-16 PROCEDURE — 36415 COLL VENOUS BLD VENIPUNCTURE: CPT

## 2025-01-16 PROCEDURE — 80061 LIPID PANEL: CPT

## 2025-01-16 PROCEDURE — 83550 IRON BINDING TEST: CPT

## 2025-01-16 PROCEDURE — 84443 ASSAY THYROID STIM HORMONE: CPT

## 2025-01-16 PROCEDURE — 82306 VITAMIN D 25 HYDROXY: CPT

## 2025-01-16 PROCEDURE — 82728 ASSAY OF FERRITIN: CPT

## 2025-01-16 PROCEDURE — 82607 VITAMIN B-12: CPT

## 2025-01-16 PROCEDURE — 82570 ASSAY OF URINE CREATININE: CPT

## 2025-01-16 PROCEDURE — 80053 COMPREHEN METABOLIC PANEL: CPT

## 2025-01-16 PROCEDURE — 84100 ASSAY OF PHOSPHORUS: CPT

## 2025-01-16 PROCEDURE — 82043 UR ALBUMIN QUANTITATIVE: CPT

## 2025-01-16 PROCEDURE — 82746 ASSAY OF FOLIC ACID SERUM: CPT

## 2025-01-16 PROCEDURE — 83970 ASSAY OF PARATHORMONE: CPT

## 2025-01-16 PROCEDURE — 83540 ASSAY OF IRON: CPT

## 2025-01-16 PROCEDURE — 85025 COMPLETE CBC W/AUTO DIFF WBC: CPT

## 2025-01-16 RX ORDER — IRBESARTAN 300 MG/1
300 TABLET ORAL EVERY EVENING
Qty: 90 TABLET | Refills: 0 | Status: SHIPPED | OUTPATIENT
Start: 2025-01-16

## 2025-01-16 RX ORDER — METOPROLOL SUCCINATE 50 MG/1
50 TABLET, EXTENDED RELEASE ORAL DAILY
Qty: 100 TABLET | Refills: 3 | OUTPATIENT
Start: 2025-01-16

## 2025-01-16 RX ORDER — METOPROLOL SUCCINATE 50 MG/1
50 TABLET, EXTENDED RELEASE ORAL DAILY
Qty: 90 TABLET | Refills: 3 | Status: SHIPPED | OUTPATIENT
Start: 2025-01-16

## 2025-01-23 ENCOUNTER — TELEPHONE (OUTPATIENT)
Dept: MEDICAL GROUP | Facility: LAB | Age: 70
End: 2025-01-23
Payer: MEDICARE

## 2025-01-23 DIAGNOSIS — R05.3 CHRONIC COUGH: ICD-10-CM

## 2025-01-23 NOTE — TELEPHONE ENCOUNTER
1. Caller Name: Shahana                           Call Back Number: [phone]        How would the patient prefer to be contacted with a response: Phone call OK to leave a detailed message    Patient called today regarding a prescription for a cough that she states that she has.  Patient states that this cough has been keeping her up at driving her  crazy.  I did advise to the patient that I will be sending this message to Dr. Traore on her behalf.

## 2025-01-31 RX ORDER — BENZONATATE 100 MG/1
100-200 CAPSULE ORAL 3 TIMES DAILY PRN
Qty: 60 CAPSULE | Refills: 0 | Status: SHIPPED | OUTPATIENT
Start: 2025-01-31

## 2025-02-04 ENCOUNTER — TELEPHONE (OUTPATIENT)
Dept: HEALTH INFORMATION MANAGEMENT | Facility: OTHER | Age: 70
End: 2025-02-04
Payer: MEDICARE

## 2025-02-04 ENCOUNTER — PATIENT OUTREACH (OUTPATIENT)
Dept: HEALTH INFORMATION MANAGEMENT | Facility: OTHER | Age: 70
End: 2025-02-04
Payer: MEDICARE

## 2025-02-04 DIAGNOSIS — N18.31 STAGE 3A CHRONIC KIDNEY DISEASE: ICD-10-CM

## 2025-02-04 DIAGNOSIS — E83.52 HYPERCALCEMIA: ICD-10-CM

## 2025-02-04 DIAGNOSIS — E21.3 HYPERPARATHYROIDISM (HCC): ICD-10-CM

## 2025-02-04 DIAGNOSIS — D50.9 IRON DEFICIENCY ANEMIA, UNSPECIFIED IRON DEFICIENCY ANEMIA TYPE: ICD-10-CM

## 2025-02-04 DIAGNOSIS — K21.9 GASTRO-ESOPHAGEAL REFLUX DISEASE WITHOUT ESOPHAGITIS: ICD-10-CM

## 2025-02-05 NOTE — PROGRESS NOTES
Patient agreeable to referral.  Urgent referral sent to gastroenterology.  May benefit also from hematology, referred.  Referred to endocrinology/general surgery regarding hyperparathyroidism.  She prefers to discuss lab work at her upcoming appointment later this month rather than consider iron infusions.

## 2025-02-07 ENCOUNTER — HOSPITAL ENCOUNTER (OUTPATIENT)
Dept: HEMATOLOGY ONCOLOGY | Facility: MEDICAL CENTER | Age: 70
End: 2025-02-07
Attending: STUDENT IN AN ORGANIZED HEALTH CARE EDUCATION/TRAINING PROGRAM
Payer: MEDICARE

## 2025-02-07 VITALS
SYSTOLIC BLOOD PRESSURE: 144 MMHG | HEIGHT: 66 IN | BODY MASS INDEX: 36.23 KG/M2 | OXYGEN SATURATION: 97 % | TEMPERATURE: 97.2 F | HEART RATE: 94 BPM | DIASTOLIC BLOOD PRESSURE: 84 MMHG | WEIGHT: 225.42 LBS

## 2025-02-07 DIAGNOSIS — R05.3 CHRONIC COUGH: ICD-10-CM

## 2025-02-07 DIAGNOSIS — D50.9 IRON DEFICIENCY ANEMIA, UNSPECIFIED IRON DEFICIENCY ANEMIA TYPE: ICD-10-CM

## 2025-02-07 DIAGNOSIS — C64.1 RENAL CELL CARCINOMA OF RIGHT KIDNEY (HCC): ICD-10-CM

## 2025-02-07 PROCEDURE — 99204 OFFICE O/P NEW MOD 45 MIN: CPT | Performed by: STUDENT IN AN ORGANIZED HEALTH CARE EDUCATION/TRAINING PROGRAM

## 2025-02-07 PROCEDURE — 99212 OFFICE O/P EST SF 10 MIN: CPT | Performed by: STUDENT IN AN ORGANIZED HEALTH CARE EDUCATION/TRAINING PROGRAM

## 2025-02-07 ASSESSMENT — FIBROSIS 4 INDEX: FIB4 SCORE: 0.76

## 2025-02-07 NOTE — PROGRESS NOTES
Consult:  Hematology/Oncology    Primary Care:  Gricelda Traore D.O.    Diagnosis:   Iron Deficiency Anemia   At least Stage I RCC s/p nephrectomy     Chief Complaint:  Establish Care    History of Presenting Illness:  Shahana Nj is a 69 y.o. female with PMH microcytic anemia 2/2 TIERRA and at least Stage I RCC who presents today to establish care for TIERRA.      She initially presented 1/2023 with hypotension and SVT.  Found to have a hemoglobin of 5 and status post 3 units of PRBC.  At that time she was found to have a renal mass status post nephrectomy.  She lost insurance and has not followed up or had been on surveillance.  She is not sure if immunotherapy was recommended to her at that time.     Overall feels generally well.  Reports that she has not had an increased cough over the last year.  She has Tessalon Perles provided by PCP.  She has CT chest ordered but has not been completed yet.  Denies fever, chills, night sweats, unintentional weight loss.  Reports having bladder leaks which are very frustrating for her.  Denies abdominal pain, cramping, nausea, vomiting, hematochezia, melena.  Reports it has been a long time since she has gotten a colonoscopy.  Does report some fatigue.  Denies shortness of breath, chest pain or palpitations currently but reports that sometimes she has shortness of breath at home when ambulating.    Past Medical History:   Diagnosis Date    Arthritis     Osteo knees    Bedbug bite 02/08/2022    Ongoing encounter with eye physician.  She reports that she was scheduled for surgery on her eye after both cataract however she has a rash secondary to that bedbug infestation.  She reports that she is currently undergoing the process of clearing it from her house and the final days on Thursday 2-.  She has several bites both forearms that have decreased in intensity over the past week.  T    Diverticulitis 01/26/2023    Dyslipidemia     Esophageal dysphagia 01/26/2023     "Essential hypertension     GERD (gastroesophageal reflux disease)     High cholesterol     Left knee pain 11/19/2019    Myocardial infarct (HCC)     Hx of 2 and states \"very Mild\". Last was approx 2014. No longer follows with cardiologist.    AALIYAH (obstructive sleep apnea)     CPAP    Snoring     Type 2 diabetes mellitus (HCC)     11/19/19-Avg AM glucose=100.       Past Surgical History:   Procedure Laterality Date    PB TOTAL KNEE ARTHROPLASTY Left 12/3/2019    Procedure: ARTHROPLASTY, KNEE, TOTAL;  Surgeon: Ryan Keen M.D.;  Location: SURGERY [facility];  Service: Orthopedics    COLONOSCOPY  09/2019    ABDOMINAL HYSTERECTOMY TOTAL  2004    KNEE ARTHROSCOPY Right 1974    RHINOPLASTY  1970    BUNIONECTOMY Left early 2000's    DENTAL EXTRACTION(S)  1960's    wisdom teeth    MYRINGOTOMY Bilateral Infant to 2001    13 ear surgeries    OTHER SURGICAL PROCEDURE Right 1980's    fluid drained off of bone air pockets behind ear bone    TONSILLECTOMY AND ADENOIDECTOMY  as child       Social History     Socioeconomic History    Marital status: Single     Spouse name: Not on file    Number of children: Not on file    Years of education: Not on file    Highest education level: Not on file   Occupational History     Comment: retired , [employer]   Tobacco Use    Smoking status: Never    Smokeless tobacco: Never   Vaping Use    Vaping status: Never Used   Substance and Sexual Activity    Alcohol use: Yes     Comment: 6 times per year    Drug use: No     Comment: tried marijuana at age 17    Sexual activity: Yes     Partners: Male   Other Topics Concern    Not on file   Social History Narrative    Not on file     Social Drivers of Health     Financial Resource Strain: Not on file   Food Insecurity: Not on file   Transportation Needs: Not on file   Physical Activity: Not on file   Stress: Not on file   Social Connections: Not on file   Intimate Partner Violence: Low Risk  (1/6/2023)    Received from [facility]    History of Abuse     History of Abuse: Denies   Housing Stability: Not on file       Family History   Problem Relation Age of Onset    Diabetes Mother     Cancer Mother         brain ca    Heart Disease Mother 45        cabg    Heart Disease Father         cabg and valve replacement    Cancer Sister         bone ca    Diabetes Sister     Diabetes Other        OB History    Para Term  AB Living   0 0 0 0 0 0   SAB IAB Ectopic Molar Multiple Live Births   0 0 0 0 0 0       Allergies as of 2025 - Reviewed 01/15/2025   Allergen Reaction Noted    Pcn [penicillins] Swelling 2016    Tape Rash 2016       Current Outpatient Medications:     benzonatate (TESSALON) 100 MG Cap, Take 1-2 Capsules by mouth 3 times a day as needed for Cough., Disp: 60 Capsule, Rfl: 0    irbesartan (AVAPRO) 300 MG Tab, Take 1 Tablet by mouth every evening. For blood pressure, Disp: 90 Tablet, Rfl: 0    metoprolol SR (TOPROL XL) 50 MG TABLET SR 24 HR, Take 1 Tablet by mouth every day., Disp: 90 Tablet, Rfl: 3    Empagliflozin (JARDIANCE) 10 MG Tab tablet, Take 1 Tablet by mouth every day., Disp: 90 Tablet, Rfl: 3    omeprazole (PRILOSEC) 20 MG delayed-release capsule, Take 1 capsule by mouth twice daily, Disp: 60 Capsule, Rfl: 3    vitamin D (VITAMIND D3) 1000 UNIT Tab, Take 1,000 Units by mouth every day., Disp: , Rfl:     Review of Systems:  ROS as above       Physical Exam:  Vitals:    25 1256   BP: (!) 144/84   BP Location: Left arm   Patient Position: Sitting   BP Cuff Size: Adult   Pulse: 94   Temp: 36.2 °C (97.2 °F)   TempSrc: Temporal   SpO2: 97%   Weight: 102 kg (225 lb 6.7 oz)   Height: 1.676 m (5' 5.98\")     Physical Exam  Constitutional:       General: She is not in acute distress.     Appearance: Normal appearance. She is not toxic-appearing.   HENT:      Head: Normocephalic and atraumatic.      Right Ear: External ear normal.      Left Ear: External ear normal.      Mouth/Throat:      " Mouth: Mucous membranes are moist.      Pharynx: Oropharynx is clear. No oropharyngeal exudate.   Eyes:      General: No scleral icterus.     Conjunctiva/sclera: Conjunctivae normal.   Cardiovascular:      Pulses: Normal pulses.   Pulmonary:      Effort: Pulmonary effort is normal. No respiratory distress.   Abdominal:      General: There is no distension.      Palpations: Abdomen is soft. There is no mass.      Tenderness: There is no abdominal tenderness.   Musculoskeletal:      Cervical back: Neck supple. No tenderness.   Skin:     General: Skin is warm and dry.   Neurological:      Mental Status: She is alert and oriented to person, place, and time. Mental status is at baseline.   Psychiatric:         Mood and Affect: Mood normal.         Thought Content: Thought content normal.         Judgment: Judgment normal.          Labs:  Lab Results   Component Value Date/Time    WBC 7.5 01/16/2025 10:10 AM    RBC 4.39 01/16/2025 10:10 AM    HEMOGLOBIN 6.1 (L) 01/16/2025 10:10 AM    HEMATOCRIT 25.6 (L) 01/16/2025 10:10 AM    MCV 58.3 (L) 01/16/2025 10:10 AM    MCH 13.9 (L) 01/16/2025 10:10 AM    MCHC 23.8 (L) 01/16/2025 10:10 AM    RDW 45.2 01/16/2025 10:10 AM    PLATELETCT 346 01/16/2025 10:10 AM    MPV 8.1 04/21/2023 07:37 AM    MPV 9.9 04/11/2023 08:33 AM    NEUTSPOLYS 66.80 01/16/2025 10:10 AM    LYMPHOCYTES 17.60 (L) 01/16/2025 10:10 AM    MONOCYTES 8.60 01/16/2025 10:10 AM    EOSINOPHILS 5.50 01/16/2025 10:10 AM    BASOPHILS 1.10 01/16/2025 10:10 AM    HYPOCHROMIA 1+ 01/16/2025 10:10 AM    ANISOCYTOSIS 1+ 01/16/2025 10:10 AM        Lab Results   Component Value Date/Time    SODIUM 138 01/16/2025 10:10 AM    POTASSIUM 4.4 01/16/2025 10:10 AM    CHLORIDE 105 01/16/2025 10:10 AM    CO2 21 01/16/2025 10:10 AM    GLUCOSE 157 (H) 01/16/2025 10:10 AM    BUN 25 (H) 01/16/2025 10:10 AM    CREATININE 1.15 01/16/2025 10:10 AM    GLOMRATE 30 (L) 01/14/2023 10:34 AM        Imaging:   CT 3/24/23    1. Heterogeneous solid  inferior pole right renal mass measures 4.6 x 4.8 cm   worrisome for renal cell carcinoma     Electronically Signed by: JAMES ONTIVEROS MD 3/24/2023 7:21 PM     Assessment & Plan:  Shahana Nj is a 69 y.o. female with PMH microcytic anemia 2/2 TIERRA and at least Stage I RCC who presents today to establish care for TIERRA.      -Most recently hemoglobin 6.1, appears to be chronic issue but unclear why the sudden drop  -She is certainly iron deficient and iron infusion is indicated but etiology is unclear  -CT chest ordered by PCP.  Will order CT abdomen pelvis as well as she has not had surveillance for RCC since diagnosis  -Referral to GI placed for evaluation colonoscopy  -Referral to urology placed with concern for urinary incontinence  -Of note with hypercalcemia, seems to be related to hyperparathyroidism and endocrinology referral has already been placed but will order SPEP   -Other labs today include CBC, CMP, peripheral smear, reticulocyte count, ferritin, iron studies, CBC, LDH and uric acid    RTC after CT scans    Any questions and concerns raised by the patient were answered to the best of my ability. Thank you for allowing me to participate in the care for this patient. Please feel free to contact me for any questions or concerns.

## 2025-02-18 ENCOUNTER — OFFICE VISIT (OUTPATIENT)
Dept: MEDICAL GROUP | Facility: LAB | Age: 70
End: 2025-02-18
Payer: MEDICARE

## 2025-02-18 VITALS
WEIGHT: 220.7 LBS | TEMPERATURE: 97.4 F | SYSTOLIC BLOOD PRESSURE: 118 MMHG | HEIGHT: 66 IN | OXYGEN SATURATION: 95 % | BODY MASS INDEX: 35.47 KG/M2 | HEART RATE: 90 BPM | DIASTOLIC BLOOD PRESSURE: 68 MMHG

## 2025-02-18 DIAGNOSIS — R05.3 CHRONIC COUGH: ICD-10-CM

## 2025-02-18 DIAGNOSIS — E21.3 HYPERPARATHYROIDISM (HCC): ICD-10-CM

## 2025-02-18 DIAGNOSIS — E78.5 HYPERLIPIDEMIA ASSOCIATED WITH TYPE 2 DIABETES MELLITUS (HCC): ICD-10-CM

## 2025-02-18 DIAGNOSIS — E11.69 HYPERLIPIDEMIA ASSOCIATED WITH TYPE 2 DIABETES MELLITUS (HCC): ICD-10-CM

## 2025-02-18 DIAGNOSIS — D50.9 IRON DEFICIENCY ANEMIA, UNSPECIFIED IRON DEFICIENCY ANEMIA TYPE: ICD-10-CM

## 2025-02-18 DIAGNOSIS — E11.29 MICROALBUMINURIA DUE TO TYPE 2 DIABETES MELLITUS (HCC): ICD-10-CM

## 2025-02-18 DIAGNOSIS — E83.52 HYPERCALCEMIA: ICD-10-CM

## 2025-02-18 DIAGNOSIS — N18.31 STAGE 3A CHRONIC KIDNEY DISEASE: ICD-10-CM

## 2025-02-18 DIAGNOSIS — Z12.31 ENCOUNTER FOR SCREENING MAMMOGRAM FOR BREAST CANCER: ICD-10-CM

## 2025-02-18 DIAGNOSIS — E11.69 TYPE 2 DIABETES MELLITUS WITH OTHER SPECIFIED COMPLICATION, WITHOUT LONG-TERM CURRENT USE OF INSULIN (HCC): ICD-10-CM

## 2025-02-18 DIAGNOSIS — R80.9 MICROALBUMINURIA DUE TO TYPE 2 DIABETES MELLITUS (HCC): ICD-10-CM

## 2025-02-18 PROBLEM — N28.89 RENAL MASS, RIGHT: Status: RESOLVED | Noted: 2023-01-26 | Resolved: 2025-02-18

## 2025-02-18 PROCEDURE — 3078F DIAST BP <80 MM HG: CPT | Performed by: STUDENT IN AN ORGANIZED HEALTH CARE EDUCATION/TRAINING PROGRAM

## 2025-02-18 PROCEDURE — 3074F SYST BP LT 130 MM HG: CPT | Performed by: STUDENT IN AN ORGANIZED HEALTH CARE EDUCATION/TRAINING PROGRAM

## 2025-02-18 PROCEDURE — 99214 OFFICE O/P EST MOD 30 MIN: CPT | Performed by: STUDENT IN AN ORGANIZED HEALTH CARE EDUCATION/TRAINING PROGRAM

## 2025-02-18 RX ORDER — ROSUVASTATIN CALCIUM 20 MG/1
20 TABLET, COATED ORAL EVERY EVENING
Qty: 100 TABLET | Refills: 3 | Status: SHIPPED | OUTPATIENT
Start: 2025-02-18 | End: 2026-03-25

## 2025-02-18 RX ORDER — BENZONATATE 100 MG/1
200 CAPSULE ORAL 2 TIMES DAILY PRN
Qty: 360 CAPSULE | Refills: 0 | Status: SHIPPED | OUTPATIENT
Start: 2025-02-18

## 2025-02-18 ASSESSMENT — ENCOUNTER SYMPTOMS
VOMITING: 0
COUGH: 1
DIARRHEA: 0
SENSORY CHANGE: 1
FEVER: 0
BLOOD IN STOOL: 0
HEARTBURN: 0
ABDOMINAL PAIN: 0
TINGLING: 1
SHORTNESS OF BREATH: 0
CHILLS: 0
NAUSEA: 0
WEIGHT LOSS: 0
DIZZINESS: 0

## 2025-02-18 ASSESSMENT — FIBROSIS 4 INDEX: FIB4 SCORE: 0.76

## 2025-02-18 NOTE — PATIENT INSTRUCTIONS
[facility]  [id number]  [place] [address]  Phone: [phone]    Pulmonary function test [phone]    Ideal blood pressure <130/80.  If <100 for the top number we may be over treating.  Severe range >180/120, with symptoms=ER    Aim for goal of 150 minutes a week of moderate intensity exercise (ex 30 minutes 5 times a week)    We can increase jardiance to 25mg daily if not at goal.  Ideal glucose ranges: fasting (8-10 hours) , random (1-2 hours after eating) <180. If glucose <70, we need to adjust your medication to prevent continuing low blood sugar.     fasting labs due ASAP for hematology    fasting labs due late April (after 4/15) for cholesterol

## 2025-02-18 NOTE — Clinical Note
Lorna, Mrs. Nj has an appointment for her CT coming up, but I saw your note that you wanted a CT abdomen pelvis, but I see your order was for CT chest with contrast.. My order was for without contrast of the chest. I wanted to make sure we were getting the right CT before she goes in 2/21. Thanks!

## 2025-02-18 NOTE — PROGRESS NOTES
Verbal consent was acquired by the patient to use [employer] ambient listening note generation during this visit Yes.    Subjective:     Chief Complaint   Patient presents with    Diabetes Follow-up    Lab Results    Chronic Cough       HPI:     History of Present Illness  The patient is a 69-year-old female who presents for follow-up on chronic cough, lab results, and diabetes management.    She has been under the care of a hematologist, who has recommended an iron infusion due to her low iron levels. She has been referred for a colonoscopy and additional laboratory tests. A CT scan of the chest without contrast has been ordered, which she is scheduled to undergo on Friday. She has lost 20 pounds since her last visit, with her weight decreasing from 241 to 221 pounds. She reports no difficulty swallowing or gastrointestinal discomfort. She occasionally experiences fatigue.    She has not yet scheduled her pulmonary function test. Her cough has remained stable, neither improving nor worsening. She finds relief from Tessalon Perles, which she takes twice daily, 2 in the morning and 2 at night. She reports no recent episodes of hematochezia.    She has been on Jardiance for approximately 1 month and does not monitor her blood glucose levels at home. She experiences intermittent numbness and tingling in the ball of her foot.    She has a history of hypercalcemia and is scheduled to see Dr. Tse on 03/06/2025 to discuss this issue. She is not currently taking any calcium supplements.    She has a history of hyperlipidemia and was previously on medication for this condition, although she does not recall the specific medication.    She has a history of heartburn, which is well-managed with omeprazole taken twice daily.    She has a history of urinary incontinence and is [facility]    Review of Systems   Constitutional:  Positive for malaise/fatigue. Negative for chills,  "fever and weight loss.   Respiratory:  Positive for cough. Negative for shortness of breath.    Cardiovascular:  Negative for chest pain.   Gastrointestinal:  Negative for abdominal pain, blood in stool, diarrhea, heartburn, nausea and vomiting.   Skin:  Negative for rash.   Neurological:  Positive for tingling and sensory change. Negative for dizziness.       Objective:     Exam:  /68 (BP Location: Left arm, Patient Position: Sitting, BP Cuff Size: Large adult long)   Pulse 90   Temp 36.3 °C (97.4 °F) (Temporal)   Ht 1.676 m (5' 6\")   Wt 100 kg (220 lb 11.2 oz)   SpO2 95%   BMI 35.62 kg/m²  Body mass index is 35.62 kg/m².    Physical Exam  Vitals reviewed.   Constitutional:       General: She is not in acute distress.     Appearance: Normal appearance. She is obese. She is not ill-appearing.   HENT:      Head: Normocephalic and atraumatic.      Mouth/Throat:      Mouth: Mucous membranes are moist.   Eyes:      General: No scleral icterus.  Cardiovascular:      Rate and Rhythm: Normal rate and regular rhythm.      Heart sounds: Normal heart sounds.   Pulmonary:      Effort: Pulmonary effort is normal.      Breath sounds: Normal breath sounds.   Abdominal:      General: Abdomen is flat. Bowel sounds are normal. There is no distension.      Palpations: Abdomen is soft. There is no mass.      Tenderness: There is abdominal tenderness (minimal epigastric, mild non reproducible LLQ). There is no guarding or rebound.   Feet:      Comments: Monofilament testing with a 10 gram force: sensation intact: intact bilaterally  Visual Inspection: Feet without maceration, ulcers, fissures.  Pedal pulses: intact bilaterally    Skin:     General: Skin is warm and dry.      Coloration: Skin is not jaundiced.   Neurological:      General: No focal deficit present.      Mental Status: She is alert and oriented to person, place, and time.   Psychiatric:         Mood and Affect: Mood normal.         Behavior: Behavior normal.  "        Thought Content: Thought content normal.       Labs: Reviewed from 1/16/2025    Assessment & Plan:     69 y.o. female with the following -     1. Chronic cough  The etiology of the cough could be multifactorial. Has CT chest scheduled. Reminded her to schedule pulmonary function test. She has been referred to ENT, allergist and GI. Refilled tessalon which is effective.  - benzonatate (TESSALON) 100 MG Cap; Take 2 Capsules by mouth 2 times a day as needed for Cough.  Dispense: 360 Capsule; Refill: 0    2. Iron deficiency anemia, unspecified iron deficiency anemia type  Chronic and worse from 2023.  She has labs to complete for hematology who agreed that she does meet criteria for iron infusion.  Has an active referral to gastroenterology that I encouraged her to utilize.  I messaged her hematologist/oncologist in regards to which imaging she would like her to complete as she ordered a CT of the chest with contrast and not the CT abdomen pelvis mentioned in her note. Denies any GI issues at this time, continues with PPI BID without GERD symptoms.     3. Microalbuminuria due to type 2 diabetes mellitus (HCC)  4. Type 2 diabetes mellitus with other specified complication, without long-term current use of insulin (HCC)  Chronic, uncontrolled with microalbuminuria but started Jardiance last month which she is tolerating well.  - A1C in 3 months from last along with repeat microalbumin/cr ratio. Discussed ideal glucose ranges, patient to check at home and if not at goal we can increase Jardiance to 25 mg daily up from 10mg daily if needed.  - Retinopathy screening due, referred.  - Referral for Retinal Screening Exam; Future  - HEMOGLOBIN A1C; Future  - MICROALBUMIN CREAT RATIO URINE; Future    5. Hyperlipidemia associated with type 2 diabetes mellitus (HCC)  Chronic, uncontrolled.  Previously Lipitor elevated liver enzymes but it seems that she did tolerate rosuvastatin well so will start at 20 mg nightly and trend  in 3 months.  - rosuvastatin (CRESTOR) 20 MG Tab; Take 1 Tablet by mouth every evening.  Dispense: 100 Tablet; Refill: 3  - Comp Metabolic Panel; Future  - Lipid Profile; Future    6. Stage 3a chronic kidney disease  Chronic condition, improved. BP well controlled, continue ARB. DM as above, on jardiance. Avoid nephrotoxic medications. Trend in GFR 3m along with CMP.    7. Hyperparathyroidism (HCC)  8. Hypercalcemia  Chronic in this patient with CKD.  Bone density normal in 2021.  Has appointment to discuss treatment options with surgeon 3/2025. Monitor at least Q6m, sooner prn.    9. Encounter for screening mammogram for breast cancer  - MA-SCREENING MAMMO BILAT W/TOMOSYNTHESIS W/CAD; Future    I spent a total of 36 minutes with record review, exam, communication with the patient, and documentation of this encounter.    Return in about 3 months (around 5/18/2025), or if symptoms worsen or fail to improve, for Diabetes, Annual.    Please note that this dictation was created using voice recognition software. I have made every reasonable attempt to correct obvious errors, but I expect that there are errors of grammar and possibly content that I did not discover before finalizing the note.

## 2025-02-21 ENCOUNTER — HOSPITAL ENCOUNTER (OUTPATIENT)
Dept: RADIOLOGY | Facility: MEDICAL CENTER | Age: 70
End: 2025-02-21
Attending: STUDENT IN AN ORGANIZED HEALTH CARE EDUCATION/TRAINING PROGRAM
Payer: MEDICARE

## 2025-02-21 DIAGNOSIS — R05.3 CHRONIC COUGH: ICD-10-CM

## 2025-02-21 DIAGNOSIS — C64.1 RENAL CELL CARCINOMA OF RIGHT KIDNEY (HCC): ICD-10-CM

## 2025-02-21 DIAGNOSIS — D50.9 IRON DEFICIENCY ANEMIA, UNSPECIFIED IRON DEFICIENCY ANEMIA TYPE: ICD-10-CM

## 2025-02-21 PROCEDURE — 700117 HCHG RX CONTRAST REV CODE 255: Performed by: STUDENT IN AN ORGANIZED HEALTH CARE EDUCATION/TRAINING PROGRAM

## 2025-02-21 PROCEDURE — 71260 CT THORAX DX C+: CPT

## 2025-02-21 RX ADMIN — IOHEXOL 100 ML: 350 INJECTION, SOLUTION INTRAVENOUS at 10:45

## 2025-02-27 PROBLEM — E21.0 PRIMARY HYPERPARATHYROIDISM (HCC): Status: ACTIVE | Noted: 2025-02-27

## 2025-02-27 PROBLEM — E21.3 HYPERPARATHYROIDISM (HCC): Status: RESOLVED | Noted: 2025-02-18 | Resolved: 2025-02-27

## 2025-02-27 NOTE — PROGRESS NOTES
Surgical Endocrinology New Patient Visit    This is a 69 y.o. female who was referred to my office by Dr. Traore for a surgical evaluation of primary hyperparathyroidism. She was found to have elevated calcium by her PCP.    The patient has no complaints.     Recent labs:   Pth, Intact   Date Value Ref Range Status   01/16/2025 119.0 (H) 14.0 - 72.0 pg/mL Final   02/07/2023 122.0 (H) 14.0 - 72.0 pg/mL Final   09/22/2021 56.7 14.0 - 72.0 pg/mL Final     Calcium   Date Value Ref Range Status   01/16/2025 10.7 (H) 8.5 - 10.5 mg/dL Final   04/11/2023 10.9 (H) 8.5 - 10.5 mg/dL Final   03/21/2023 10.4 8.5 - 10.5 mg/dL Final     Lab Results   Component Value Date/Time    PTHINTACT 119.0 (H) 01/16/2025 1010     The patient does nothave a family history of parathyroid disease.    The patient does not have a history of radiation to their head or neck.    Review of Systems   Constitutional:  Negative for chills, fever, malaise/fatigue and weight loss.   HENT: Negative.     Eyes: Negative.    Respiratory:  Negative for cough and sputum production.    Cardiovascular:  Negative for chest pain and palpitations.   Gastrointestinal: Negative.    Genitourinary:  Negative for frequency and urgency.   Musculoskeletal: Negative.    Skin: Negative.    Neurological:  Negative for dizziness, weakness and headaches.   Endo/Heme/Allergies: Negative.    Psychiatric/Behavioral:  Negative for depression and memory loss. The patient is not nervous/anxious and does not have insomnia.    All other systems reviewed and are negative.    Past Medical History:   Diagnosis Date    Arthritis     Osteo knees    Bedbug bite 02/08/2022    Ongoing encounter with eye physician.  She reports that she was scheduled for surgery on her eye after both cataract however she has a rash secondary to that bedbug infestation.  She reports that she is currently undergoing the process of clearing it from her house and the final days on Thursday 2-.  She has several  "bites both forearms that have decreased in intensity over the past week.  T    Diverticulitis 01/26/2023    Dyslipidemia     Esophageal dysphagia 01/26/2023    Essential hypertension     GERD (gastroesophageal reflux disease)     High cholesterol     Left knee pain 11/19/2019    Myocardial infarct (HCC)     Hx of 2 and states \"very Mild\". Last was approx 2014. No longer follows with cardiologist.    AALIYAH (obstructive sleep apnea)     CPAP    Renal mass, right 01/26/2023    Snoring     Type 2 diabetes mellitus (HCC)     11/19/19-Avg AM glucose=100.     Past Surgical History:   Procedure Laterality Date    PB TOTAL KNEE ARTHROPLASTY Left 12/3/2019    Procedure: ARTHROPLASTY, KNEE, TOTAL;  Surgeon: Ryan Keen M.D.;  Location: SURGERY [facility];  Service: Orthopedics    COLONOSCOPY  09/2019    ABDOMINAL HYSTERECTOMY TOTAL  2004    KNEE ARTHROSCOPY Right 1974    RHINOPLASTY  1970    BUNIONECTOMY Left early 2000's    DENTAL EXTRACTION(S)  1960's    wisdom teeth    MYRINGOTOMY Bilateral Infant to 2001    13 ear surgeries    OTHER SURGICAL PROCEDURE Right 1980's    fluid drained off of bone air pockets behind ear bone    TONSILLECTOMY AND ADENOIDECTOMY  as child     Home Medications    Medication Sig Taking? Last Dose Authorizing Provider   rosuvastatin (CRESTOR) 20 MG Tab Take 1 Tablet by mouth every evening. Yes Taking Gricelda Traore D.O.   irbesartan (AVAPRO) 300 MG Tab Take 1 Tablet by mouth every evening. For blood pressure Yes Taking Gricelda Traore D.O.   metoprolol SR (TOPROL XL) 50 MG TABLET SR 24 HR Take 1 Tablet by mouth every day. Yes Taking Gricelda Traore D.O.   Empagliflozin (JARDIANCE) 10 MG Tab tablet Take 1 Tablet by mouth every day. Yes Taking Gricelda Traore D.O.   omeprazole (PRILOSEC) 20 MG delayed-release capsule Take 1 capsule by mouth twice daily Yes Taking Miko Wilson M.D.   vitamin D (VITAMIND D3) 1000 UNIT Tab Take 1,000 Units by mouth every day. Yes Taking Physician Outpatient "   benzonatate (TESSALON) 100 MG Cap Take 2 Capsules by mouth 2 times a day as needed for Cough.  Patient not taking: Reported on 3/6/2025  Not Taking Gricelda Traore D.O.     Vitals:    03/06/25 0946   BP: (!) 150/80   Pulse: 100   Temp: 36.3 °C (97.4 °F)   SpO2: 95%     Physical Exam  Constitutional:       Appearance: Normal appearance.   HENT:      Head: Normocephalic and atraumatic.      Mouth/Throat:      Pharynx: No oropharyngeal exudate (dexa).   Cardiovascular:      Rate and Rhythm: Normal rate.   Pulmonary:      Effort: Pulmonary effort is normal.   Abdominal:      General: Abdomen is flat.      Palpations: Abdomen is soft.   Musculoskeletal:         General: Normal range of motion.      Cervical back: Normal range of motion and neck supple.   Skin:     General: Skin is warm and dry.   Neurological:      General: No focal deficit present.      Mental Status: She is alert.   Psychiatric:         Mood and Affect: Mood normal.     Imaging:  DS-BONE DENSITY STUDY (DEXA)    Narrative  8/26/2021 8:53 AM    HISTORY/REASON FOR EXAM:  Postmenopausal, hysterectomy    TECHNIQUE/EXAM DESCRIPTION AND NUMBER OF VIEWS:  Dual x-ray bone densitometry was performed from the L1, L2 and L4 levels and from the proximal left femur utilizing the GE Prodigy unit.    COMPARISON:   None    FINDINGS:  The mean bone mineral density for the lumbar spine is 1.444 g/cm2, which corresponds to a T score of 2.3 and a Z score of 2.7.    The proximal left femur has a mean bone mineral density of 1.122 g/cm2, with a T score of 0.9 and a Z score of 1.4.    Impression  According to the World Health Organization classification, bone mineral density of this patient is normal for the lumbar spine are normal for the left femur.    10-year Probability of Fracture:  Major Osteoporotic     6.6%  Hip     0.3%  Population      [place] ()    No significant focal uptake identified, negative for parathyroid  adenoma.    EXAM: Parathyroid scan and  SPECT.    TECHNIQUE: Following a discussion of the risks and benefits of the procedure  informed written consent was obtained. 20 mCi of Sestamibi was administered  intravenously. Subsequently, scanning of the neck was performed with planar  imaging 15 minutes and 90 minutes after injection. SPECT imaging performed in 3  planes on delayed imaging.    INDICATION: Hyperparathyroidism. Hypercalcemia.    COMPARISON: None available.    FINDINGS: Physiologic salivary and thyroid gland uptake is identified. There is  no evidence of abnormal uptake in the region of the parathyroid glands.  Exam End: 03/29/23      Assessment/Plan:  Primary hyperparathyroidism (HCC)  Primary hyperparathyroidism, I have recommended completing the workup with an updated DEXA scan and a 24h urine to see if she meets criteria for surgery, and I will see her back for further discussion. Most recent sestamibi 2023, if she meets criteria for surgery, may repeat for localization purposes.    Kalpana Tse M.D., FACS  Department of Surgical Oncology  [facility]  [phone]

## 2025-02-27 NOTE — ASSESSMENT & PLAN NOTE
Primary hyperparathyroidism, I have recommended completing the workup with an updated DEXA scan and a 24h urine to see if she meets criteria for surgery, and I will see her back for further discussion. Most recent sestamibi 2023, if she meets criteria for surgery, may repeat for localization purposes.

## 2025-03-04 ENCOUNTER — TELEPHONE (OUTPATIENT)
Dept: HEALTH INFORMATION MANAGEMENT | Facility: OTHER | Age: 70
End: 2025-03-04
Payer: MEDICARE

## 2025-03-05 ENCOUNTER — APPOINTMENT (OUTPATIENT)
Dept: RADIOLOGY | Facility: MEDICAL CENTER | Age: 70
End: 2025-03-05
Attending: STUDENT IN AN ORGANIZED HEALTH CARE EDUCATION/TRAINING PROGRAM
Payer: MEDICARE

## 2025-03-05 DIAGNOSIS — Z12.31 ENCOUNTER FOR SCREENING MAMMOGRAM FOR BREAST CANCER: ICD-10-CM

## 2025-03-05 PROCEDURE — 77067 SCR MAMMO BI INCL CAD: CPT

## 2025-03-06 ENCOUNTER — HOSPITAL ENCOUNTER (OUTPATIENT)
Dept: LAB | Facility: MEDICAL CENTER | Age: 70
End: 2025-03-06
Attending: STUDENT IN AN ORGANIZED HEALTH CARE EDUCATION/TRAINING PROGRAM
Payer: MEDICARE

## 2025-03-06 ENCOUNTER — OFFICE VISIT (OUTPATIENT)
Facility: MEDICAL CENTER | Age: 70
End: 2025-03-06
Payer: MEDICARE

## 2025-03-06 VITALS
SYSTOLIC BLOOD PRESSURE: 150 MMHG | DIASTOLIC BLOOD PRESSURE: 80 MMHG | WEIGHT: 227.07 LBS | BODY MASS INDEX: 36.49 KG/M2 | HEART RATE: 100 BPM | HEIGHT: 66 IN | OXYGEN SATURATION: 95 % | TEMPERATURE: 97.4 F

## 2025-03-06 DIAGNOSIS — D50.9 IRON DEFICIENCY ANEMIA, UNSPECIFIED IRON DEFICIENCY ANEMIA TYPE: ICD-10-CM

## 2025-03-06 DIAGNOSIS — E21.0 PRIMARY HYPERPARATHYROIDISM (HCC): ICD-10-CM

## 2025-03-06 LAB
ALBUMIN SERPL BCP-MCNC: 4.2 G/DL (ref 3.2–4.9)
ALBUMIN/GLOB SERPL: 1.5 G/DL
ALP SERPL-CCNC: 111 U/L (ref 30–99)
ALT SERPL-CCNC: 13 U/L (ref 2–50)
ANION GAP SERPL CALC-SCNC: 11 MMOL/L (ref 7–16)
ANISOCYTOSIS BLD QL SMEAR: ABNORMAL
AST SERPL-CCNC: 15 U/L (ref 12–45)
BASOPHILS # BLD AUTO: 1.7 % (ref 0–1.8)
BASOPHILS # BLD: 0.19 K/UL (ref 0–0.12)
BILIRUB SERPL-MCNC: 0.4 MG/DL (ref 0.1–1.5)
BUN SERPL-MCNC: 22 MG/DL (ref 8–22)
CALCIUM ALBUM COR SERPL-MCNC: 10.9 MG/DL (ref 8.5–10.5)
CALCIUM SERPL-MCNC: 11.1 MG/DL (ref 8.5–10.5)
CHLORIDE SERPL-SCNC: 105 MMOL/L (ref 96–112)
CO2 SERPL-SCNC: 21 MMOL/L (ref 20–33)
CREAT SERPL-MCNC: 1.26 MG/DL (ref 0.5–1.4)
EOSINOPHIL # BLD AUTO: 0.48 K/UL (ref 0–0.51)
EOSINOPHIL NFR BLD: 4.3 % (ref 0–6.9)
ERYTHROCYTE [DISTWIDTH] IN BLOOD BY AUTOMATED COUNT: 49.9 FL (ref 35.9–50)
FERRITIN SERPL-MCNC: 5.3 NG/ML (ref 10–291)
GFR SERPLBLD CREATININE-BSD FMLA CKD-EPI: 46 ML/MIN/1.73 M 2
GLOBULIN SER CALC-MCNC: 2.8 G/DL (ref 1.9–3.5)
GLUCOSE SERPL-MCNC: 143 MG/DL (ref 65–99)
HCT VFR BLD AUTO: 27.3 % (ref 37–47)
HGB BLD-MCNC: 6.4 G/DL (ref 12–16)
HGB RETIC QN AUTO: 15.7 PG/CELL (ref 29–35)
HYPOCHROMIA BLD QL SMEAR: ABNORMAL
IMM RETICS NFR: 26.5 % (ref 2.6–16.1)
IRON SATN MFR SERPL: 3 % (ref 15–55)
IRON SERPL-MCNC: 13 UG/DL (ref 40–170)
LDH SERPL L TO P-CCNC: 152 U/L (ref 107–266)
LYMPHOCYTES # BLD AUTO: 1.15 K/UL (ref 1–4.8)
LYMPHOCYTES NFR BLD: 10.4 % (ref 22–41)
MANUAL DIFF BLD: NORMAL
MCH RBC QN AUTO: 14.2 PG (ref 27–33)
MCHC RBC AUTO-ENTMCNC: 23.4 G/DL (ref 32.2–35.5)
MCV RBC AUTO: 60.4 FL (ref 81.4–97.8)
MICROCYTES BLD QL SMEAR: ABNORMAL
MONOCYTES # BLD AUTO: 1.24 K/UL (ref 0–0.85)
MONOCYTES NFR BLD AUTO: 11.2 % (ref 0–13.4)
MORPHOLOGY BLD-IMP: NORMAL
MORPHOLOGY BLD-IMP: NORMAL
NEUTROPHILS # BLD AUTO: 8.04 K/UL (ref 1.82–7.42)
NEUTROPHILS NFR BLD: 72.4 % (ref 44–72)
NRBC # BLD AUTO: 0 K/UL
NRBC BLD-RTO: 0 /100 WBC (ref 0–0.2)
OVALOCYTES BLD QL SMEAR: NORMAL
PLATELET # BLD AUTO: 386 K/UL (ref 164–446)
PLATELET BLD QL SMEAR: NORMAL
PMV BLD AUTO: 9.1 FL (ref 9–12.9)
POIKILOCYTOSIS BLD QL SMEAR: NORMAL
POTASSIUM SERPL-SCNC: 4.7 MMOL/L (ref 3.6–5.5)
PROT SERPL-MCNC: 7 G/DL (ref 6–8.2)
RBC # BLD AUTO: 4.52 M/UL (ref 4.2–5.4)
RBC BLD AUTO: PRESENT
RETICS # AUTO: 0.12 M/UL (ref 0.04–0.12)
RETICS/RBC NFR: 2.7 % (ref 0.8–2.6)
SODIUM SERPL-SCNC: 137 MMOL/L (ref 135–145)
TIBC SERPL-MCNC: 435 UG/DL (ref 250–450)
UIBC SERPL-MCNC: 422 UG/DL (ref 110–370)
URATE SERPL-MCNC: 4.9 MG/DL (ref 1.9–8.2)
WBC # BLD AUTO: 11.1 K/UL (ref 4.8–10.8)

## 2025-03-06 PROCEDURE — 82784 ASSAY IGA/IGD/IGG/IGM EACH: CPT

## 2025-03-06 PROCEDURE — 86334 IMMUNOFIX E-PHORESIS SERUM: CPT

## 2025-03-06 PROCEDURE — 85046 RETICYTE/HGB CONCENTRATE: CPT

## 2025-03-06 PROCEDURE — 99214 OFFICE O/P EST MOD 30 MIN: CPT | Performed by: SURGERY

## 2025-03-06 PROCEDURE — 83540 ASSAY OF IRON: CPT

## 2025-03-06 PROCEDURE — 84550 ASSAY OF BLOOD/URIC ACID: CPT

## 2025-03-06 PROCEDURE — 3079F DIAST BP 80-89 MM HG: CPT | Performed by: SURGERY

## 2025-03-06 PROCEDURE — 83550 IRON BINDING TEST: CPT

## 2025-03-06 PROCEDURE — 84165 PROTEIN E-PHORESIS SERUM: CPT

## 2025-03-06 PROCEDURE — 36415 COLL VENOUS BLD VENIPUNCTURE: CPT

## 2025-03-06 PROCEDURE — 3077F SYST BP >= 140 MM HG: CPT | Performed by: SURGERY

## 2025-03-06 PROCEDURE — 83615 LACTATE (LD) (LDH) ENZYME: CPT

## 2025-03-06 PROCEDURE — 84155 ASSAY OF PROTEIN SERUM: CPT

## 2025-03-06 PROCEDURE — 85027 COMPLETE CBC AUTOMATED: CPT

## 2025-03-06 PROCEDURE — 80053 COMPREHEN METABOLIC PANEL: CPT

## 2025-03-06 PROCEDURE — 85007 BL SMEAR W/DIFF WBC COUNT: CPT

## 2025-03-06 PROCEDURE — 82728 ASSAY OF FERRITIN: CPT

## 2025-03-06 ASSESSMENT — ENCOUNTER SYMPTOMS
WEIGHT LOSS: 0
DEPRESSION: 0
MEMORY LOSS: 0
EYES NEGATIVE: 1
FEVER: 0
CHILLS: 0
DIZZINESS: 0
PALPITATIONS: 0
INSOMNIA: 0
NERVOUS/ANXIOUS: 0
SPUTUM PRODUCTION: 0
MUSCULOSKELETAL NEGATIVE: 1
GASTROINTESTINAL NEGATIVE: 1
HEADACHES: 0
COUGH: 0
WEAKNESS: 0

## 2025-03-06 ASSESSMENT — FIBROSIS 4 INDEX: FIB4 SCORE: 0.76

## 2025-03-07 ENCOUNTER — RESULTS FOLLOW-UP (OUTPATIENT)
Dept: MEDICAL GROUP | Facility: LAB | Age: 70
End: 2025-03-07

## 2025-03-07 ENCOUNTER — HOSPITAL ENCOUNTER (OUTPATIENT)
Dept: HEMATOLOGY ONCOLOGY | Facility: MEDICAL CENTER | Age: 70
End: 2025-03-07
Attending: STUDENT IN AN ORGANIZED HEALTH CARE EDUCATION/TRAINING PROGRAM
Payer: MEDICARE

## 2025-03-07 VITALS
TEMPERATURE: 97.2 F | SYSTOLIC BLOOD PRESSURE: 138 MMHG | HEART RATE: 85 BPM | DIASTOLIC BLOOD PRESSURE: 72 MMHG | BODY MASS INDEX: 36.35 KG/M2 | OXYGEN SATURATION: 98 % | WEIGHT: 226.19 LBS | HEIGHT: 66 IN

## 2025-03-07 DIAGNOSIS — E04.1 THYROID NODULE: ICD-10-CM

## 2025-03-07 DIAGNOSIS — C64.1 RENAL CELL CARCINOMA OF RIGHT KIDNEY (HCC): ICD-10-CM

## 2025-03-07 DIAGNOSIS — D50.9 IRON DEFICIENCY ANEMIA, UNSPECIFIED IRON DEFICIENCY ANEMIA TYPE: ICD-10-CM

## 2025-03-07 PROCEDURE — 99212 OFFICE O/P EST SF 10 MIN: CPT | Performed by: STUDENT IN AN ORGANIZED HEALTH CARE EDUCATION/TRAINING PROGRAM

## 2025-03-07 PROCEDURE — 99214 OFFICE O/P EST MOD 30 MIN: CPT | Performed by: STUDENT IN AN ORGANIZED HEALTH CARE EDUCATION/TRAINING PROGRAM

## 2025-03-07 RX ORDER — SODIUM CHLORIDE 9 MG/ML
INJECTION, SOLUTION INTRAVENOUS CONTINUOUS
OUTPATIENT
Start: 2025-03-08

## 2025-03-07 RX ORDER — METHYLPREDNISOLONE SODIUM SUCCINATE 125 MG/2ML
125 INJECTION, POWDER, LYOPHILIZED, FOR SOLUTION INTRAMUSCULAR; INTRAVENOUS PRN
OUTPATIENT
Start: 2025-03-08

## 2025-03-07 RX ORDER — DIPHENHYDRAMINE HYDROCHLORIDE 50 MG/ML
50 INJECTION, SOLUTION INTRAMUSCULAR; INTRAVENOUS PRN
OUTPATIENT
Start: 2025-03-08

## 2025-03-07 RX ORDER — EPINEPHRINE 1 MG/ML(1)
0.5 AMPUL (ML) INJECTION PRN
OUTPATIENT
Start: 2025-03-08

## 2025-03-07 ASSESSMENT — FIBROSIS 4 INDEX: FIB4 SCORE: 0.75

## 2025-03-07 NOTE — PROGRESS NOTES
Consult:  Hematology/Oncology    Primary Care:  Gricelda Traore D.O.    Diagnosis:   Iron Deficiency Anemia   At least Stage I RCC s/p nephrectomy     Chief Complaint:  Establish Care    History of Presenting Illness:  Shahana Nj is a 69 y.o. female with PMH microcytic anemia 2/2 TIERRA and at least Stage I RCC who presents today to establish care for TIERRA.      She initially presented 1/2023 with hypotension and SVT.  Found to have a hemoglobin of 5 and status post 3 units of PRBC.  At that time she was found to have a renal mass status post nephrectomy.  She lost insurance and has not followed up or had been on surveillance.  She is not sure if immunotherapy was recommended to her at that time.     Overall feels generally well.  Reports that she has not had an increased cough over the last year.  She has Tessalon Perles provided by PCP.  She has CT chest ordered but has not been completed yet.  Denies fever, chills, night sweats, unintentional weight loss.  Reports having bladder leaks which are very frustrating for her.  Denies abdominal pain, cramping, nausea, vomiting, hematochezia, melena.  Reports it has been a long time since she has gotten a colonoscopy.  Does report some fatigue.  Denies shortness of breath, chest pain or palpitations currently but reports that sometimes she has shortness of breath at home when ambulating.    Interval History: Hg 6.4 WBC 11.1 with neutrophilic predominance      Past Medical History:   Diagnosis Date   • Arthritis     Osteo knees   • Bedbug bite 02/08/2022    Ongoing encounter with eye physician.  She reports that she was scheduled for surgery on her eye after both cataract however she has a rash secondary to that bedbug infestation.  She reports that she is currently undergoing the process of clearing it from her house and the final days on Thursday 2-.  She has several bites both forearms that have decreased in intensity over the past week.  T   •  "Diverticulitis 01/26/2023   • Dyslipidemia    • Esophageal dysphagia 01/26/2023   • Essential hypertension    • GERD (gastroesophageal reflux disease)    • High cholesterol    • Left knee pain 11/19/2019   • Myocardial infarct (HCC)     Hx of 2 and states \"very Mild\". Last was approx 2014. No longer follows with cardiologist.   • AALIYAH (obstructive sleep apnea)     CPAP   • Renal mass, right 01/26/2023   • Snoring    • Type 2 diabetes mellitus (HCC)     11/19/19-Avg AM glucose=100.       Past Surgical History:   Procedure Laterality Date   • PB TOTAL KNEE ARTHROPLASTY Left 12/3/2019    Procedure: ARTHROPLASTY, KNEE, TOTAL;  Surgeon: Ryan Keen M.D.;  Location: SURGERY [facility];  Service: Orthopedics   • COLONOSCOPY  09/2019   • ABDOMINAL HYSTERECTOMY TOTAL  2004   • KNEE ARTHROSCOPY Right 1974   • RHINOPLASTY  1970   • BUNIONECTOMY Left early 2000's   • DENTAL EXTRACTION(S)  1960's    wisdom teeth   • MYRINGOTOMY Bilateral Infant to 2001    13 ear surgeries   • OTHER SURGICAL PROCEDURE Right 1980's    fluid drained off of bone air pockets behind ear bone   • TONSILLECTOMY AND ADENOIDECTOMY  as child       Social History     Socioeconomic History   • Marital status: Single     Spouse name: Not on file   • Number of children: Not on file   • Years of education: Not on file   • Highest education level: Not on file   Occupational History     Comment: retired , [employer]   Tobacco Use   • Smoking status: Never   • Smokeless tobacco: Never   Vaping Use   • Vaping status: Never Used   Substance and Sexual Activity   • Alcohol use: Yes     Comment: 6 times per year   • Drug use: No     Comment: tried marijuana at age 17   • Sexual activity: Yes     Partners: Male   Other Topics Concern   • Not on file   Social History Narrative   • Not on file     Social Drivers of Health     Financial Resource Strain: Not on file   Food Insecurity: Not on file   Transportation Needs: Not on file   Physical Activity: Not on " "file   Stress: Not on file   Social Connections: Not on file   Intimate Partner Violence: Low Risk  (2023)    [facility]    History of Abuse    • History of Abuse: Denies   Housing Stability: Not on file       Family History   Problem Relation Age of Onset   • Diabetes Mother    • Cancer Mother         brain ca   • Heart Disease Mother 45        cabg   • Heart Disease Father         cabg and valve replacement   • Cancer Sister         bone ca   • Diabetes Sister    • Diabetes Other        OB History    Para Term  AB Living   0 0 0 0 0 0   SAB IAB Ectopic Molar Multiple Live Births   0 0 0 0 0 0       Allergies as of 2025 - Reviewed 2025   Allergen Reaction Noted   • Pcn [penicillins] Swelling 2016   • Tape Rash 2016       Current Outpatient Medications:   •  benzonatate (TESSALON) 100 MG Cap, Take 2 Capsules by mouth 2 times a day as needed for Cough. (Patient not taking: Reported on 3/6/2025), Disp: 360 Capsule, Rfl: 0  •  rosuvastatin (CRESTOR) 20 MG Tab, Take 1 Tablet by mouth every evening., Disp: 100 Tablet, Rfl: 3  •  irbesartan (AVAPRO) 300 MG Tab, Take 1 Tablet by mouth every evening. For blood pressure, Disp: 90 Tablet, Rfl: 0  •  metoprolol SR (TOPROL XL) 50 MG TABLET SR 24 HR, Take 1 Tablet by mouth every day., Disp: 90 Tablet, Rfl: 3  •  Empagliflozin (JARDIANCE) 10 MG Tab tablet, Take 1 Tablet by mouth every day., Disp: 90 Tablet, Rfl: 3  •  omeprazole (PRILOSEC) 20 MG delayed-release capsule, Take 1 capsule by mouth twice daily, Disp: 60 Capsule, Rfl: 3  •  vitamin D (VITAMIND D3) 1000 UNIT Tab, Take 1,000 Units by mouth every day., Disp: , Rfl:     Review of Systems:  ROS as above       Physical Exam:  Vitals:    25 0912   Height: 1.676 m (5' 5.98\")     Physical Exam  Constitutional:       General: She is not in acute distress.     Appearance: Normal appearance. She is not toxic-appearing.   HENT:      Head: Normocephalic and " atraumatic.      Right Ear: External ear normal.      Left Ear: External ear normal.      Mouth/Throat:      Mouth: Mucous membranes are moist.      Pharynx: Oropharynx is clear. No oropharyngeal exudate.   Eyes:      General: No scleral icterus.     Conjunctiva/sclera: Conjunctivae normal.   Cardiovascular:      Pulses: Normal pulses.   Pulmonary:      Effort: Pulmonary effort is normal. No respiratory distress.   Abdominal:      General: There is no distension.      Palpations: Abdomen is soft. There is no mass.      Tenderness: There is no abdominal tenderness.   Musculoskeletal:      Cervical back: Neck supple. No tenderness.   Skin:     General: Skin is warm and dry.   Neurological:      Mental Status: She is alert and oriented to person, place, and time. Mental status is at baseline.   Psychiatric:         Mood and Affect: Mood normal.         Thought Content: Thought content normal.         Judgment: Judgment normal.        Labs:  Lab Results   Component Value Date/Time    WBC 11.1 (H) 03/06/2025 10:27 AM    RBC 4.52 03/06/2025 10:27 AM    HEMOGLOBIN 6.4 (L) 03/06/2025 10:27 AM    HEMATOCRIT 27.3 (L) 03/06/2025 10:27 AM    MCV 60.4 (L) 03/06/2025 10:27 AM    MCH 14.2 (L) 03/06/2025 10:27 AM    MCHC 23.4 (L) 03/06/2025 10:27 AM    RDW 49.9 03/06/2025 10:27 AM    PLATELETCT 386 03/06/2025 10:27 AM    MPV 9.1 03/06/2025 10:27 AM    NEUTSPOLYS 72.40 (H) 03/06/2025 10:27 AM    LYMPHOCYTES 10.40 (L) 03/06/2025 10:27 AM    MONOCYTES 11.20 03/06/2025 10:27 AM    EOSINOPHILS 4.30 03/06/2025 10:27 AM    BASOPHILS 1.70 03/06/2025 10:27 AM    HYPOCHROMIA 1+ 03/06/2025 10:27 AM    ANISOCYTOSIS 1+ 03/06/2025 10:27 AM        Lab Results   Component Value Date/Time    SODIUM 137 03/06/2025 10:27 AM    POTASSIUM 4.7 03/06/2025 10:27 AM    CHLORIDE 105 03/06/2025 10:27 AM    CO2 21 03/06/2025 10:27 AM    GLUCOSE 143 (H) 03/06/2025 10:27 AM    BUN 22 03/06/2025 10:27 AM    CREATININE 1.26 03/06/2025 10:27 AM    GLOMRATE 30 (L)  01/14/2023 10:34 AM        Imaging:   CT 3/24/23    1. Heterogeneous solid inferior pole right renal mass measures 4.6 x 4.8 cm   worrisome for renal cell carcinoma     Electronically Signed by: JAMES ONTIVEROS MD 3/24/2023 7:21 PM     Assessment & Plan:  Shahana Nj is a 69 y.o. female with PMH microcytic anemia 2/2 TIERRA and at least Stage I RCC who presents today to establish care for TIERRA.      -Most recently hemoglobin 6.1, appears to be chronic issue but unclear why the sudden drop  -She is certainly iron deficient and iron infusion is indicated but etiology is unclear  -CT chest ordered by PCP.  Will order CT abdomen pelvis as well as she has not had surveillance for RCC since diagnosis  -Referral to GI placed for evaluation colonoscopy  -Referral to urology placed with concern for urinary incontinence  -Of note with hypercalcemia, seems to be related to hyperparathyroidism and endocrinology referral has already been placed but will order SPEP   -Other labs today include CBC, CMP, peripheral smear, reticulocyte count, ferritin, iron studies, CBC, LDH and uric acid    RTC after CT scans    Any questions and concerns raised by the patient were answered to the best of my ability. Thank you for allowing me to participate in the care for this patient. Please feel free to contact me for any questions or concerns.

## 2025-03-07 NOTE — PROGRESS NOTES
Consult:  Hematology/Oncology    Primary Care:  Gricelda Traore D.O.    Diagnosis:   Iron Deficiency Anemia   At least Stage I RCC s/p nephrectomy     Chief Complaint:  Establish Care    History of Presenting Illness:  Shahana Nj is a 69 y.o. female with PMH microcytic anemia 2/2 TIERRA and at least Stage I RCC who presents today to establish care for TIERRA.      She initially presented 1/2023 with hypotension and SVT.  Found to have a hemoglobin of 5 and status post 3 units of PRBC.  At that time she was found to have a renal mass status post nephrectomy.  She lost insurance and has not followed up or had been on surveillance.  She is not sure if immunotherapy was recommended to her at that time.     Overall feels generally well.  Reports that she has not had an increased cough over the last year.  She has Tessalon Perles provided by PCP.  She has CT chest ordered but has not been completed yet.  Denies fever, chills, night sweats, unintentional weight loss.  Reports having bladder leaks which are very frustrating for her.  Denies abdominal pain, cramping, nausea, vomiting, hematochezia, melena.  Reports it has been a long time since she has gotten a colonoscopy.  Does report some fatigue.  Denies shortness of breath, chest pain or palpitations currently but reports that sometimes she has shortness of breath at home when ambulating.    Interval History: Overall feels okay.  Notes some intermittent SOB.  Has established with Urology and has not yet seen GI.     Past Medical History:   Diagnosis Date    Arthritis     Osteo knees    Bedbug bite 02/08/2022    Ongoing encounter with eye physician.  She reports that she was scheduled for surgery on her eye after both cataract however she has a rash secondary to that bedbug infestation.  She reports that she is currently undergoing the process of clearing it from her house and the final days on Thursday 2-.  She has several bites both forearms that have  "decreased in intensity over the past week.  T    Diverticulitis 01/26/2023    Dyslipidemia     Esophageal dysphagia 01/26/2023    Essential hypertension     GERD (gastroesophageal reflux disease)     High cholesterol     Left knee pain 11/19/2019    Myocardial infarct (HCC)     Hx of 2 and states \"very Mild\". Last was approx 2014. No longer follows with cardiologist.    AALIYAH (obstructive sleep apnea)     CPAP    Renal mass, right 01/26/2023    Snoring     Type 2 diabetes mellitus (HCC)     11/19/19-Avg AM glucose=100.       Past Surgical History:   Procedure Laterality Date    PB TOTAL KNEE ARTHROPLASTY Left 12/3/2019    Procedure: ARTHROPLASTY, KNEE, TOTAL;  Surgeon: yRan Keen M.D.;  Location: SURGERY [facility];  Service: Orthopedics    COLONOSCOPY  09/2019    ABDOMINAL HYSTERECTOMY TOTAL  2004    KNEE ARTHROSCOPY Right 1974    RHINOPLASTY  1970    BUNIONECTOMY Left early 2000's    DENTAL EXTRACTION(S)  1960's    wisdom teeth    MYRINGOTOMY Bilateral Infant to 2001    13 ear surgeries    OTHER SURGICAL PROCEDURE Right 1980's    fluid drained off of bone air pockets behind ear bone    TONSILLECTOMY AND ADENOIDECTOMY  as child       Social History     Socioeconomic History    Marital status: Single     Spouse name: Not on file    Number of children: Not on file    Years of education: Not on file    Highest education level: Not on file   Occupational History     Comment: retired , [employer]   Tobacco Use    Smoking status: Never    Smokeless tobacco: Never   Vaping Use    Vaping status: Never Used   Substance and Sexual Activity    Alcohol use: Yes     Comment: 6 times per year    Drug use: No     Comment: tried marijuana at age 17    Sexual activity: Yes     Partners: Male   Other Topics Concern    Not on file   Social History Narrative    Not on file     Social Drivers of Health     Financial Resource Strain: Not on file   Food Insecurity: Not on file   Transportation Needs: Not on file   Physical " Activity: Not on file   Stress: Not on file   Social Connections: Not on file   Intimate Partner Violence: Low Risk  (2023)    [facility]    History of Abuse     History of Abuse: Denies   Housing Stability: Not on file       Family History   Problem Relation Age of Onset    Diabetes Mother     Cancer Mother         brain ca    Heart Disease Mother 45        cabg    Heart Disease Father         cabg and valve replacement    Cancer Sister         bone ca    Diabetes Sister     Diabetes Other        OB History    Para Term  AB Living   0 0 0 0 0 0   SAB IAB Ectopic Molar Multiple Live Births   0 0 0 0 0 0       Allergies as of 2025 - Reviewed 2025   Allergen Reaction Noted    Pcn [penicillins] Swelling 2016    Tape Rash 2016       Current Outpatient Medications:     rosuvastatin (CRESTOR) 20 MG Tab, Take 1 Tablet by mouth every evening., Disp: 100 Tablet, Rfl: 3    irbesartan (AVAPRO) 300 MG Tab, Take 1 Tablet by mouth every evening. For blood pressure, Disp: 90 Tablet, Rfl: 0    metoprolol SR (TOPROL XL) 50 MG TABLET SR 24 HR, Take 1 Tablet by mouth every day., Disp: 90 Tablet, Rfl: 3    Empagliflozin (JARDIANCE) 10 MG Tab tablet, Take 1 Tablet by mouth every day., Disp: 90 Tablet, Rfl: 3    omeprazole (PRILOSEC) 20 MG delayed-release capsule, Take 1 capsule by mouth twice daily, Disp: 60 Capsule, Rfl: 3    vitamin D (VITAMIND D3) 1000 UNIT Tab, Take 1,000 Units by mouth every day., Disp: , Rfl:     benzonatate (TESSALON) 100 MG Cap, Take 2 Capsules by mouth 2 times a day as needed for Cough. (Patient not taking: Reported on 3/7/2025), Disp: 360 Capsule, Rfl: 0    Review of Systems:  ROS as above       Physical Exam:  Vitals:    25 0912   BP: 138/72   BP Location: Left arm   Patient Position: Sitting   BP Cuff Size: Large adult   Pulse: 85   Temp: 36.2 °C (97.2 °F)   TempSrc: Temporal   SpO2: 98%   Weight: 103 kg (226 lb 3.1 oz)   Height:  "1.676 m (5' 5.98\")     Physical Exam  Constitutional:       General: She is not in acute distress.     Appearance: Normal appearance. She is not toxic-appearing.   HENT:      Head: Normocephalic and atraumatic.      Right Ear: External ear normal.      Left Ear: External ear normal.      Mouth/Throat:      Mouth: Mucous membranes are moist.      Pharynx: Oropharynx is clear. No oropharyngeal exudate.   Eyes:      General: No scleral icterus.     Conjunctiva/sclera: Conjunctivae normal.   Cardiovascular:      Pulses: Normal pulses.   Pulmonary:      Effort: Pulmonary effort is normal. No respiratory distress.   Abdominal:      General: There is no distension.      Palpations: Abdomen is soft. There is no mass.      Tenderness: There is no abdominal tenderness.   Musculoskeletal:      Cervical back: Neck supple. No tenderness.   Skin:     General: Skin is warm and dry.   Neurological:      Mental Status: She is alert and oriented to person, place, and time. Mental status is at baseline.   Psychiatric:         Mood and Affect: Mood normal.         Thought Content: Thought content normal.         Judgment: Judgment normal.          Labs:  Lab Results   Component Value Date/Time    WBC 11.1 (H) 03/06/2025 10:27 AM    RBC 4.52 03/06/2025 10:27 AM    HEMOGLOBIN 6.4 (L) 03/06/2025 10:27 AM    HEMATOCRIT 27.3 (L) 03/06/2025 10:27 AM    MCV 60.4 (L) 03/06/2025 10:27 AM    MCH 14.2 (L) 03/06/2025 10:27 AM    MCHC 23.4 (L) 03/06/2025 10:27 AM    RDW 49.9 03/06/2025 10:27 AM    PLATELETCT 386 03/06/2025 10:27 AM    MPV 9.1 03/06/2025 10:27 AM    NEUTSPOLYS 72.40 (H) 03/06/2025 10:27 AM    LYMPHOCYTES 10.40 (L) 03/06/2025 10:27 AM    MONOCYTES 11.20 03/06/2025 10:27 AM    EOSINOPHILS 4.30 03/06/2025 10:27 AM    BASOPHILS 1.70 03/06/2025 10:27 AM    HYPOCHROMIA 1+ 03/06/2025 10:27 AM    ANISOCYTOSIS 1+ 03/06/2025 10:27 AM        Lab Results   Component Value Date/Time    SODIUM 137 03/06/2025 10:27 AM    POTASSIUM 4.7 03/06/2025 " 10:27 AM    CHLORIDE 105 03/06/2025 10:27 AM    CO2 21 03/06/2025 10:27 AM    GLUCOSE 143 (H) 03/06/2025 10:27 AM    BUN 22 03/06/2025 10:27 AM    CREATININE 1.26 03/06/2025 10:27 AM    GLOMRATE 30 (L) 01/14/2023 10:34 AM        Imaging:   CT 3/24/23    1. Heterogeneous solid inferior pole right renal mass measures 4.6 x 4.8 cm   worrisome for renal cell carcinoma     Electronically Signed by: JAMES ONTIVEROS MD 3/24/2023 7:21 PM     Assessment & Plan:  Shahana Nj is a 69 y.o. female with PMH microcytic anemia 2/2 TIERRA and at least Stage I RCC who presents today to establish care for TIERRA.      -Most recently hemoglobin 6.4, appears to be chronic issue but unclear why the sudden drop.  Orders for iron infusion placed today   -CT chest with 1.3cm nodule on thyroid and will get US to characterize better .  Will order CT abdomen pelvis as well as she has not had surveillance for RCC since diagnosis, ordered today   -Referral to GI placed for evaluation colonoscopy at last visit  -Referral to urology placed with concern for urinary incontinence and has been seen, has upcoming follow up  -Of note with hypercalcemia, seems to be related to hyperparathyroidism and endocrinology referral has already been placed but will order SPEP which is pending    RTC 1 month with APRN    Any questions and concerns raised by the patient were answered to the best of my ability. Thank you for allowing me to participate in the care for this patient. Please feel free to contact me for any questions or concerns.

## 2025-03-07 NOTE — PATIENT INSTRUCTIONS
You have a 1.3cm thyroid nodule seen on CT scan - we are going to order a Thyroid Ultrasound to take another look  We will continue surveillance due to your history of kidney cancer with a CT of your Abdomen  We are scheduling you for an iron infusion.  If new sudden and worsening shortness of breath, chest pain, heart palpitations or notice new evidence of bleeding we recommend you go to the ED for more urgent evaluation

## 2025-03-10 NOTE — TELEPHONE ENCOUNTER
----- Message from Physician Gricelda Traore D.O. sent at 3/7/2025  3:31 PM PST -----  Please call the patient to let her know that her mammogram was normal without any evidence of cancer.

## 2025-03-11 LAB
ALBUMIN SERPL ELPH-MCNC: 3.95 G/DL (ref 3.75–5.01)
ALPHA1 GLOB SERPL ELPH-MCNC: 0.38 G/DL (ref 0.19–0.46)
ALPHA2 GLOB SERPL ELPH-MCNC: 0.94 G/DL (ref 0.48–1.05)
B-GLOBULIN SERPL ELPH-MCNC: 0.94 G/DL (ref 0.48–1.1)
GAMMA GLOB SERPL ELPH-MCNC: 0.5 G/DL (ref 0.62–1.51)
IGA SERPL-MCNC: 102 MG/DL (ref 68–408)
IGG SERPL-MCNC: 461 MG/DL (ref 768–1632)
IGM SERPL-MCNC: 88 MG/DL (ref 35–263)
INTERPRETATION SERPL IFE-IMP: ABNORMAL
MONOCLON BAND OBS SERPL: ABNORMAL
MONOCLONAL PROTEIN NL11656: ABNORMAL G/DL
PATHOLOGY STUDY: ABNORMAL
PROT SERPL-MCNC: 6.7 G/DL (ref 6.3–8.2)

## 2025-03-19 ENCOUNTER — TELEPHONE (OUTPATIENT)
Dept: HEALTH INFORMATION MANAGEMENT | Facility: OTHER | Age: 70
End: 2025-03-19
Payer: MEDICARE

## 2025-03-22 ENCOUNTER — HOSPITAL ENCOUNTER (OUTPATIENT)
Dept: RADIOLOGY | Facility: MEDICAL CENTER | Age: 70
End: 2025-03-22
Attending: STUDENT IN AN ORGANIZED HEALTH CARE EDUCATION/TRAINING PROGRAM
Payer: MEDICARE

## 2025-03-22 DIAGNOSIS — C64.1 RENAL CELL CARCINOMA OF RIGHT KIDNEY (HCC): ICD-10-CM

## 2025-03-22 DIAGNOSIS — E04.1 THYROID NODULE: ICD-10-CM

## 2025-03-22 DIAGNOSIS — D50.9 IRON DEFICIENCY ANEMIA, UNSPECIFIED IRON DEFICIENCY ANEMIA TYPE: ICD-10-CM

## 2025-03-22 PROCEDURE — 700117 HCHG RX CONTRAST REV CODE 255: Performed by: STUDENT IN AN ORGANIZED HEALTH CARE EDUCATION/TRAINING PROGRAM

## 2025-03-22 PROCEDURE — 76536 US EXAM OF HEAD AND NECK: CPT

## 2025-03-22 PROCEDURE — 74177 CT ABD & PELVIS W/CONTRAST: CPT

## 2025-03-22 RX ADMIN — IOHEXOL 100 ML: 350 INJECTION, SOLUTION INTRAVENOUS at 15:21

## 2025-03-26 ENCOUNTER — HOSPITAL ENCOUNTER (OUTPATIENT)
Facility: MEDICAL CENTER | Age: 70
End: 2025-03-26
Attending: SURGERY
Payer: MEDICARE

## 2025-03-26 PROCEDURE — 81050 URINALYSIS VOLUME MEASURE: CPT

## 2025-03-26 PROCEDURE — 82340 ASSAY OF CALCIUM IN URINE: CPT

## 2025-03-26 PROCEDURE — 82570 ASSAY OF URINE CREATININE: CPT

## 2025-03-27 DIAGNOSIS — E21.0 PRIMARY HYPERPARATHYROIDISM (HCC): ICD-10-CM

## 2025-03-27 LAB
CREAT 24H UR-MSRATE: 563 MG/24 HR (ref 800–1800)
CREAT UR-MCNC: 37.5 MG/DL
SPECIMEN VOL UR: 1500 ML

## 2025-03-29 LAB
CALCIUM 24H UR-MCNC: 2.8 MG/DL
CALCIUM 24H UR-MRATE: 42 MG/D (ref 100–250)
CALCIUM/CREAT 24H UR: 76 MG/G (ref 20–300)
COLLECT DURATION TIME SPEC: 24 HR
CREAT 24H UR-MCNC: 37 MG/DL
SPECIMEN VOL ?TM UR: 1500 ML

## 2025-04-03 ENCOUNTER — RESULTS FOLLOW-UP (OUTPATIENT)
Facility: MEDICAL CENTER | Age: 70
End: 2025-04-03

## 2025-04-07 NOTE — PROGRESS NOTES
New Patient Consult Note for Endocrinology  Referred by: Gricelda Traore D.O.    Reason for consult:   Hyperparathyroidism  Hypercalcemia    HPI:  Shahana Nj is a 69 y.o. female who was referred to endocrinology service for evaluation of hypercalcemia.     Hypercalcemia:  - first was noted in  (no prior labs are available/on prior labs had normal Ca levels), since then Ca was persistently elevated, Ca levels were ranging -  , latest Ca level -   - not on HCTZ  - PTH is elevated, ranging   - vit D, phosphorus level   - urine Ca is available  - fractures, kidney stones  - DEXA scan   - Fhx:  - sestamibi scan is negative for parathyroid adenoma, last thyroid US didn't describe parathyroid adenoma as well    Medications:  Current Outpatient Medications:     benzonatate (TESSALON) 100 MG Cap, Take 2 Capsules by mouth 2 times a day as needed for Cough. (Patient not taking: Reported on 3/7/2025), Disp: 360 Capsule, Rfl: 0    rosuvastatin (CRESTOR) 20 MG Tab, Take 1 Tablet by mouth every evening., Disp: 100 Tablet, Rfl: 3    irbesartan (AVAPRO) 300 MG Tab, Take 1 Tablet by mouth every evening. For blood pressure, Disp: 90 Tablet, Rfl: 0    metoprolol SR (TOPROL XL) 50 MG TABLET SR 24 HR, Take 1 Tablet by mouth every day., Disp: 90 Tablet, Rfl: 3    Empagliflozin (JARDIANCE) 10 MG Tab tablet, Take 1 Tablet by mouth every day., Disp: 90 Tablet, Rfl: 3    omeprazole (PRILOSEC) 20 MG delayed-release capsule, Take 1 capsule by mouth twice daily, Disp: 60 Capsule, Rfl: 3    vitamin D (VITAMIND D3) 1000 UNIT Tab, Take 1,000 Units by mouth every day., Disp: , Rfl:     Physical Examination:   Vital signs: There were no vitals taken for this visit.  General: No distress, cooperative, well dressed and well nourished.   Eyes: No scleral icterus or discharge  ENMT: Normal on external inspection of nose, lips, No nasal drainage   Neck: No abnormal masses on inspection  Resp: Normal effort  CVS: Regular rate and  rhythm  Extremities: No edema bilateral extremities  Neuro: Alert and oriented  Skin: No rash, No Ulcers  Psych: Normal mood and affect    Labs:  - reviewed   Reference Range  02/07/23 04/11/23 01/16/25 03/06/25    Creatinine 0.50 - 1.40 mg/dL 1.42 (H) 1.43 (H) 1.15 1.26    GFR (CKD-EPI) >60 mL/min/1.73 m 2 40 ! 40 ! 51 ! 46 !    Calcium 8.5 - 10.5 mg/dL 11.4 (H) 10.9 (H) 10.7 (H) 11.1 (H)    Correct Calcium 8.5 - 10.5 mg/dL 11.0 (H) 10.7 (H) 10.6 (H) 10.9 (H)    AST(SGOT) 12 - 45 U/L 30  15 15    ALT(SGPT) 2 - 50 U/L 35  16 13    ALP 30 - 99 U/L 96  101 (H) 111 (H)    Albumin 3.2 - 4.9 g/dL 4.5 4.3 4.1 4.2    Phosphorus 2.5 - 4.5 mg/dL 3.0 3.3 3.1     25 vit D 30 - 100 ng/mL 66  33     TSH 0.380 - 5.330 uIU/mL   1.770     Pth, Intact 14.0 - 72.0 pg/mL 122.0 (H)  119.0 (H)       SPEP:     Reference Range  03/06/25    Albumin 3.75 - 5.01 g/dL 3.95   Gamma Globulin 0.62 - 1.51 g/dL 0.50 (L)   Alpha-1 Globulin 0.19 - 0.46 g/dL 0.38   Alpha-2 Globulin 0.48 - 1.05 g/dL 0.94   Beta Globulin 0.48 - 1.10 g/dL 0.94     24 hr urine:   Reference Range  03/26/25    Total Volume mL 1500   Creatinine 800 - 1800 mg/24 Hr 563 (L)   Calcium 100 - 250 mg/d 42 (L)   Calcium Creat Ratio   20 - 300 mg/g 76     Hypercalcemia:      Imaging:  - reviewed  DEXA scan:  Date Machine L1- L4  BMD/ T-score LFN  BMD/ T-score FRAX Rx    8/26/2021   GE Prodigy unit   1.444 g/cm2 / 2.3  1.122 g/cm2  / 0.9  6.6 % / 0.3%      CT abdomen  with contrast on 3/24/25:  IMPRESSION:  1.  Enhancing mass again seen in the inferior pole the right kidney abutting the inferior collecting system with some interval growth in size consistent concerning for residual disease in the right kidney. No lymphadenopathy.   2.  Unchanged lucent lesions in the L2 and L3 vertebral bodies dating back to 2023.   3. Cholelithiasis without cholecystitis.     Parathyroid scan on 3/29/23:  EXAM: Parathyroid scan and SPECT.   TECHNIQUE: Following a discussion of the risks and  benefits of the procedure informed written consent was obtained. 20 mCi of Sestamibi was administered intravenously. Subsequently, scanning of the neck was performed with planar imaging 15 minutes and 90 minutes after injection. SPECT imaging performed in 3 planes on delayed imaging.   INDICATION: Hyperparathyroidism. Hypercalcemia.   COMPARISON: None available.   FINDINGS: Physiologic salivary and thyroid gland uptake is identified. There is   no evidence of abnormal uptake in the region of the parathyroid glands.   IMPRESSION:  No significant focal uptake identified, negative for parathyroid adenoma.     Thyroid US on 3/22/2025:  HISTORY/REASON FOR EXAM:  Thyroid nodule seen on chest CT scan  TECHNIQUE/EXAM DESCRIPTION: Ultrasound of the soft tissues of the head and neck.  COMPARISON:  None  FINDINGS:  The thyroid gland is heterogeneous.  Vascularity is normal.  The right lobe of the thyroid gland measures 2.07 cm x 4.63 cm x 1.78 cm. The contour and echogenicity are normal. No focal mass lesions are identified.  The left lobe of the thyroid gland measures 2.38 cm x 4.07 cm x 1.44 cm. The contour and echogenicity are normal. No focal mass lesions are identified.  The isthmus measures 1.01 cm.  There are no significant thyroid nodules seen. There is a left lower pole nodule measuring 9 mm in maximum dimension. The nodule does not meet size criteria for further evaluation using TIRADS criteria.  IMPRESSION:  Heterogeneous thyroid gland with a subcentimeter nodules seen within the lower lobe. This does not meet criteria for further evaluation using TIRADS criteria.     Assessment and Plan:        RTC:    Total time (face-to-face and non-face-to face time):  min - discussion of diagnoses, treatment, prognosis, medical charts, lab, imaging, pathology review, documentation.      Plan reviewed with the patient and agreed with plan.  All questions answered to patient's satisfaction.  Thank you kindly for allowing me to  participate in the care plan for this patient.    Joseline Melchor MD    CC:   Gricelda Traore D.O.

## 2025-04-08 ENCOUNTER — HOSPITAL ENCOUNTER (OUTPATIENT)
Dept: HEMATOLOGY ONCOLOGY | Facility: MEDICAL CENTER | Age: 70
End: 2025-04-08
Attending: NURSE PRACTITIONER
Payer: MEDICARE

## 2025-04-08 ENCOUNTER — HOSPITAL ENCOUNTER (OUTPATIENT)
Dept: LAB | Facility: MEDICAL CENTER | Age: 70
End: 2025-04-08
Attending: NURSE PRACTITIONER
Payer: MEDICARE

## 2025-04-08 VITALS
HEIGHT: 66 IN | BODY MASS INDEX: 35.04 KG/M2 | OXYGEN SATURATION: 96 % | WEIGHT: 218.03 LBS | SYSTOLIC BLOOD PRESSURE: 128 MMHG | RESPIRATION RATE: 17 BRPM | DIASTOLIC BLOOD PRESSURE: 56 MMHG | HEART RATE: 82 BPM | TEMPERATURE: 97 F

## 2025-04-08 DIAGNOSIS — D50.9 IRON DEFICIENCY ANEMIA, UNSPECIFIED IRON DEFICIENCY ANEMIA TYPE: ICD-10-CM

## 2025-04-08 DIAGNOSIS — C64.1 RENAL CELL CARCINOMA OF RIGHT KIDNEY (HCC): ICD-10-CM

## 2025-04-08 DIAGNOSIS — N28.89 RENAL MASS: ICD-10-CM

## 2025-04-08 LAB
ANISOCYTOSIS BLD QL SMEAR: ABNORMAL
BASOPHILS # BLD AUTO: 0.8 % (ref 0–1.8)
BASOPHILS # BLD: 0.07 K/UL (ref 0–0.12)
COMMENT 1642: NORMAL
EOSINOPHIL # BLD AUTO: 0.38 K/UL (ref 0–0.51)
EOSINOPHIL NFR BLD: 4.1 % (ref 0–6.9)
ERYTHROCYTE [DISTWIDTH] IN BLOOD BY AUTOMATED COUNT: 48.7 FL (ref 35.9–50)
FERRITIN SERPL-MCNC: 5.6 NG/ML (ref 10–291)
HCT VFR BLD AUTO: 28.1 % (ref 37–47)
HGB BLD-MCNC: 6.8 G/DL (ref 12–16)
HYPOCHROMIA BLD QL SMEAR: ABNORMAL
IMM GRANULOCYTES # BLD AUTO: 0.03 K/UL (ref 0–0.11)
IMM GRANULOCYTES NFR BLD AUTO: 0.3 % (ref 0–0.9)
IRON SATN MFR SERPL: 4 % (ref 15–55)
IRON SERPL-MCNC: 16 UG/DL (ref 40–170)
LYMPHOCYTES # BLD AUTO: 0.93 K/UL (ref 1–4.8)
LYMPHOCYTES NFR BLD: 10.1 % (ref 22–41)
MCH RBC QN AUTO: 14.7 PG (ref 27–33)
MCHC RBC AUTO-ENTMCNC: 24.2 G/DL (ref 32.2–35.5)
MCV RBC AUTO: 60.8 FL (ref 81.4–97.8)
MICROCYTES BLD QL SMEAR: ABNORMAL
MONOCYTES # BLD AUTO: 0.64 K/UL (ref 0–0.85)
MONOCYTES NFR BLD AUTO: 6.9 % (ref 0–13.4)
MORPHOLOGY BLD-IMP: NORMAL
NEUTROPHILS # BLD AUTO: 7.2 K/UL (ref 1.82–7.42)
NEUTROPHILS NFR BLD: 77.8 % (ref 44–72)
NRBC # BLD AUTO: 0 K/UL
NRBC BLD-RTO: 0 /100 WBC (ref 0–0.2)
OVALOCYTES BLD QL SMEAR: NORMAL
PLATELET # BLD AUTO: 383 K/UL (ref 164–446)
PLATELET BLD QL SMEAR: NORMAL
PMV BLD AUTO: 9.1 FL (ref 9–12.9)
POIKILOCYTOSIS BLD QL SMEAR: NORMAL
RBC # BLD AUTO: 4.62 M/UL (ref 4.2–5.4)
RBC BLD AUTO: PRESENT
SCHISTOCYTES BLD QL SMEAR: NORMAL
STOMATOCYTES BLD QL SMEAR: NORMAL
TIBC SERPL-MCNC: 429 UG/DL (ref 250–450)
UIBC SERPL-MCNC: 413 UG/DL (ref 110–370)
WBC # BLD AUTO: 9.3 K/UL (ref 4.8–10.8)

## 2025-04-08 PROCEDURE — 82728 ASSAY OF FERRITIN: CPT

## 2025-04-08 PROCEDURE — 99212 OFFICE O/P EST SF 10 MIN: CPT | Performed by: NURSE PRACTITIONER

## 2025-04-08 PROCEDURE — 83540 ASSAY OF IRON: CPT

## 2025-04-08 PROCEDURE — 85025 COMPLETE CBC W/AUTO DIFF WBC: CPT

## 2025-04-08 PROCEDURE — 99214 OFFICE O/P EST MOD 30 MIN: CPT | Performed by: NURSE PRACTITIONER

## 2025-04-08 PROCEDURE — 36415 COLL VENOUS BLD VENIPUNCTURE: CPT

## 2025-04-08 PROCEDURE — 83550 IRON BINDING TEST: CPT

## 2025-04-08 RX ORDER — ACETAMINOPHEN 325 MG/1
650 TABLET ORAL ONCE
Status: CANCELLED | OUTPATIENT
Start: 2025-04-11

## 2025-04-08 RX ORDER — HYDROCORTISONE SODIUM SUCCINATE 100 MG/2ML
100 INJECTION INTRAMUSCULAR; INTRAVENOUS ONCE
Status: CANCELLED | OUTPATIENT
Start: 2025-04-11 | End: 2025-04-11

## 2025-04-08 RX ORDER — ACETAMINOPHEN 325 MG/1
650 TABLET ORAL PRN
Status: CANCELLED | OUTPATIENT
Start: 2025-04-11

## 2025-04-08 RX ORDER — 0.9 % SODIUM CHLORIDE 0.9 %
VIAL (ML) INJECTION PRN
Status: CANCELLED | OUTPATIENT
Start: 2025-04-11

## 2025-04-08 RX ORDER — 0.9 % SODIUM CHLORIDE 0.9 %
10 VIAL (ML) INJECTION PRN
Status: CANCELLED | OUTPATIENT
Start: 2025-04-11

## 2025-04-08 RX ORDER — 0.9 % SODIUM CHLORIDE 0.9 %
3 VIAL (ML) INJECTION PRN
Status: CANCELLED | OUTPATIENT
Start: 2025-04-11

## 2025-04-08 RX ORDER — DIPHENHYDRAMINE HYDROCHLORIDE 50 MG/ML
25 INJECTION, SOLUTION INTRAMUSCULAR; INTRAVENOUS PRN
Status: CANCELLED | OUTPATIENT
Start: 2025-04-11

## 2025-04-08 RX ORDER — HYDROCORTISONE SODIUM SUCCINATE 100 MG/2ML
100 INJECTION INTRAMUSCULAR; INTRAVENOUS PRN
Status: CANCELLED | OUTPATIENT
Start: 2025-04-11

## 2025-04-08 RX ORDER — DIPHENHYDRAMINE HCL 25 MG
25 TABLET ORAL ONCE
Status: CANCELLED | OUTPATIENT
Start: 2025-04-11 | End: 2025-04-11

## 2025-04-08 RX ORDER — SODIUM CHLORIDE 9 MG/ML
INJECTION, SOLUTION INTRAVENOUS CONTINUOUS
Status: CANCELLED | OUTPATIENT
Start: 2025-04-11

## 2025-04-08 ASSESSMENT — ENCOUNTER SYMPTOMS
COUGH: 0
VOMITING: 0
DIARRHEA: 0
CHILLS: 0
NAUSEA: 0
PALPITATIONS: 0
CONSTIPATION: 0
WEIGHT LOSS: 1
BLOOD IN STOOL: 0
FEVER: 0
SHORTNESS OF BREATH: 0
WEAKNESS: 0
DIZZINESS: 0

## 2025-04-08 ASSESSMENT — PAIN SCALES - GENERAL: PAINLEVEL_OUTOF10: NO PAIN

## 2025-04-08 ASSESSMENT — FIBROSIS 4 INDEX: FIB4 SCORE: 0.83

## 2025-04-08 NOTE — PROGRESS NOTES
Subjective     Shahana Nj is a 69 y.o. female who presents with Cancer (Adalberto Renal Cell Carcinoma/ 1 month FV)          HPI    Patient is seen today in follow-up for chronic iron deficiency anemia and history of renal cell carcinoma.  Patient presents unaccompanied for today's visit.    History of Presenting Illness:  Patient initially presented in January 2023 with hypertension and SVT.  She was found to have a hemoglobin of 5 and given 3 units of PRBC.  At that time she was also found to have a renal mass status post nephrectomy (later found out that it was actually a surgical excision of the mass only).  Patient was lost insurance and had not followed up with any provider and had not had any surveillance for her renal cell carcinoma.  She is not certain if immunotherapy was recommended at that time for her treatment.    After initial consultation in February 2025 labs were completed and patient found to be iron deficient.  It was recommended patient undergo iron infusion.  Referral placed to GI for evaluation for colonoscopy.    Of note, patient noted to have hypercalcemia which seems to have been related to hyperparathyroidism and endocrinology referrals pending.    Lastly patient was requested to undergo CT of the abdomen and pelvis for surveillance of her history of renal cell carcinoma.    Interval history  Patient presents today with continued fatigue.  She did recently go to the ER on 4/1/2025 at the outside organization for fatigue.  It was noted that she was anemic with a hemoglobin of 7.5.  She was not treated with any blood products as patient was going to be having iron infusions soon.  She did state that the fatigue has somewhat improved since her hospitalization but still present.  Patient has not had the iron infusion that was requested back in March 2025, it was noted that authorization is still pending from insurance.    Patient did have a CT abdomen and pelvis completed on 3/22/2025  per Dr. lGover which did show a 5.6 cm enhancing irregular right inferior pole renal mass consistent with patient's history of renal cell carcinoma.  There is no evidence of any adenopathy or metastatic disease.  At 1.5 cm simple right hepatic cyst noted with cholelithiasis.  I did review the CT results in detail with the patient today.  Patient did confirm that she was seen by Dr. Cerda at [facility], and patient is due to see a surgical urologist on 4/17/2025.  In further discussion with patient she did state that her previous renal cell carcinoma she underwent a surgical intervention in which they remove the mass but her kidney did remain.    Patient is scheduled to see and establish care with GI at [facility] on 4/28/2025.  Patient is also scheduled to establish care with endocrinology on 4/9/2025.  Bone density test is also pending.      Allergies   Allergen Reactions    Pcn [Penicillins] Swelling     All over swelling like a balloon when she was a child    Tape Rash     Plastic and cloth tape is OK  *NO PAPER TAPE*  Plastic and cloth tape is OK  *NO PAPER TAPE*     Current Outpatient Medications on File Prior to Encounter   Medication Sig Dispense Refill    Zinc Sulfate (ZINC 15 PO) Take 15 mg by mouth every day.      benzonatate (TESSALON) 100 MG Cap Take 2 Capsules by mouth 2 times a day as needed for Cough. 360 Capsule 0    rosuvastatin (CRESTOR) 20 MG Tab Take 1 Tablet by mouth every evening. 100 Tablet 3    irbesartan (AVAPRO) 300 MG Tab Take 1 Tablet by mouth every evening. For blood pressure 90 Tablet 0    metoprolol SR (TOPROL XL) 50 MG TABLET SR 24 HR Take 1 Tablet by mouth every day. 90 Tablet 3    Empagliflozin (JARDIANCE) 10 MG Tab tablet Take 1 Tablet by mouth every day. 90 Tablet 3    omeprazole (PRILOSEC) 20 MG delayed-release capsule Take 1 capsule by mouth twice daily 60 Capsule 3    vitamin D (VITAMIND D3) 1000 UNIT Tab Take 1,000 Units by mouth every day.       No current  "facility-administered medications on file prior to encounter.         Review of Systems   Constitutional:  Positive for malaise/fatigue and weight loss (trying to lose weight - appetite is stable and well). Negative for chills and fever.   Respiratory:  Negative for cough and shortness of breath.    Cardiovascular:  Negative for chest pain and palpitations.   Gastrointestinal:  Negative for blood in stool, constipation, diarrhea, nausea and vomiting.   Genitourinary:  Negative for dysuria and hematuria.   Neurological:  Negative for dizziness and weakness.              Objective     /56 (BP Location: Right arm, Patient Position: Sitting, BP Cuff Size: Adult long)   Pulse 82   Temp 36.1 °C (97 °F) (Temporal)   Resp 17   Ht 1.676 m (5' 5.98\")   Wt 98.9 kg (218 lb 0.6 oz)   SpO2 96%   BMI 35.21 kg/m²      Physical Exam  Vitals reviewed.   Constitutional:       General: She is not in acute distress.     Appearance: Normal appearance. She is not diaphoretic.   HENT:      Head: Normocephalic and atraumatic.   Cardiovascular:      Rate and Rhythm: Normal rate and regular rhythm.      Heart sounds: Normal heart sounds. No murmur heard.     No friction rub. No gallop.   Pulmonary:      Effort: Pulmonary effort is normal. No respiratory distress.      Breath sounds: Normal breath sounds. No wheezing.   Abdominal:      General: Bowel sounds are normal. There is no distension.      Palpations: Abdomen is soft.      Tenderness: There is no abdominal tenderness.   Musculoskeletal:         General: No swelling or tenderness. Normal range of motion.   Skin:     General: Skin is warm and dry.   Neurological:      Mental Status: She is alert and oriented to person, place, and time.   Psychiatric:         Mood and Affect: Mood normal.         Behavior: Behavior normal.             Latest Reference Range & Units 04/01/25 16:32 04/01/25 17:06 04/08/25 09:54   WBC 4.8 - 10.8 K/uL   9.3   RBC 4.20 - 5.40 M/uL 5.31 (H) (E)  " 4.62   Hemoglobin 12.0 - 16.0 g/dL 7.5 (L) (E)  6.8 (L)   Hematocrit 37.0 - 47.0 % 28.7 (L) (E)  28.1 (L)   MCV 81.4 - 97.8 fL 54 (L) (E)  60.8 (L)   MCH 27.0 - 33.0 pg 14.1 (L) (E)  14.7 (L)   MCHC 32.2 - 35.5 g/dL 26.1 (L) (E)  24.2 (L)   RDW 35.9 - 50.0 fL 24.1 (H) (E)  48.7   Platelet Count 164 - 446 K/uL 350 (E)  383   MPV 9.0 - 12.9 fL 8.4 (E)  9.1   Neutrophils-Polys 44.00 - 72.00 % 90.2 (H) (E)  77.80 (H)   Neutrophils (Absolute) 1.82 - 7.42 K/uL 13.4 (H) (E)  7.20   Lymphocytes 22.00 - 41.00 % 3.2 (L) (E)  10.10 (L)   Lymphs (Absolute) 1.00 - 4.80 K/uL 0.5 (L) (E)  0.93 (L)   Monocytes 0.00 - 13.40 % 4.4 (L) (E)  6.90   Monos (Absolute) 0.00 - 0.85 K/uL 0.7 (E)  0.64   Eosinophils 0.00 - 6.90 % 1.1 (E)  4.10   Eos (Absolute) 0.00 - 0.51 K/uL 0.2 (E)  0.38   Basophils 0.00 - 1.80 % 1.1 (E)  0.80   Baso (Absolute) 0.00 - 0.12 K/uL 0.2 (E)  0.07   Immature Granulocytes 0.00 - 0.90 %   0.30   Immature Granulocytes (abs) 0.00 - 0.11 K/uL   0.03   Nucleated RBC 0.00 - 0.20 /100 WBC   0.00   NRBC (Absolute) K/uL   0.00   Plt Estimation    Normal   RBC Morphology    Present   Hypochromia    2+ !   Anisocytosis    1+   Microcytosis    3+ !   Poikilocytosis    2+   Ovalocytes    1+   Schistocytes    1+   Stomatocytes    1+   Comments-Diff    see below   Peripheral Smear Review    see below   Iron 40 - 170 ug/dL   16 (L)   Total Iron Binding 250 - 450 ug/dL   429   % Saturation 15 - 55 %   4 (L)   Unsat Iron Binding 110 - 370 ug/dL   413 (H)   Ferritin 10.0 - 291.0 ng/mL   5.6 (L)   DX-CHEST-LIMITED (1 VIEW)   Rpt (E)    Leukocytes, Absolute 3.7 - 10.6 x10E3/uL 14.9 (H) (E)                  Assessment & Plan     1. Iron deficiency anemia, unspecified iron deficiency anemia type  CBC WITH DIFFERENTIAL    FERRITIN    IRON/TOTAL IRON BIND      2. Renal cell carcinoma of right kidney (HCC)        3. Renal mass                1.  Patient with chronic iron deficiency anemia with request for iron infusion about 1 month ago.   However still pending insurance authorization.  Will go ahead and request repeat CBC, iron studies and ferritin at this time and likely will move forward with blood transfusion depending upon results.  Also will contact authorization team to see if we can get the authorization to proceed with iron infusion moved up stat.    2.  With regards to the renal mass, it was learned today that patient did not have a nephrectomy during her first episode of her malignancy and therefore I have highly encouraged patient to follow-up with her urologist.  Plan for an appointment on 4/17/2025, and we did discuss likely nephrectomy would be requested.      Addendum:  Patient's labs returned noted that she had a hemoglobin of 6.8, percent saturation at 4%, ferritin at 5.6.  Will go ahead and attempt a get 2 units of red blood cells now.  And then if authorization is approved for iron infusion will request iron infusion to be completed in approximately 2 weeks.  Will request patient follow-up with our office in 6 weeks after iron infusion with repeat labs at that time.      Please note that this dictation was created using voice recognition software. I have made every reasonable attempt to correct obvious errors, but I expect that there are errors of grammar and possibly content that I did not discover before finalizing the note.

## 2025-04-09 ENCOUNTER — APPOINTMENT (OUTPATIENT)
Dept: ENDOCRINOLOGY | Facility: MEDICAL CENTER | Age: 70
End: 2025-04-09
Attending: STUDENT IN AN ORGANIZED HEALTH CARE EDUCATION/TRAINING PROGRAM
Payer: MEDICARE

## 2025-04-09 ENCOUNTER — OUTPATIENT INFUSION SERVICES (OUTPATIENT)
Dept: ONCOLOGY | Facility: MEDICAL CENTER | Age: 70
End: 2025-04-09
Attending: NURSE PRACTITIONER
Payer: MEDICARE

## 2025-04-09 VITALS
HEIGHT: 66 IN | WEIGHT: 216.05 LBS | HEART RATE: 85 BPM | BODY MASS INDEX: 34.72 KG/M2 | OXYGEN SATURATION: 98 % | RESPIRATION RATE: 16 BRPM | SYSTOLIC BLOOD PRESSURE: 139 MMHG | TEMPERATURE: 98.4 F | DIASTOLIC BLOOD PRESSURE: 69 MMHG

## 2025-04-09 DIAGNOSIS — D50.9 IRON DEFICIENCY ANEMIA, UNSPECIFIED IRON DEFICIENCY ANEMIA TYPE: ICD-10-CM

## 2025-04-09 LAB
ABO + RH BLD: NORMAL
ABO GROUP BLD: ABNORMAL
BARCODED ABORH UBTYP: 5100
BARCODED ABORH UBTYP: 5100
BARCODED PRD CODE UBPRD: ABNORMAL
BARCODED PRD CODE UBPRD: ABNORMAL
BARCODED UNIT NUM UBUNT: ABNORMAL
BARCODED UNIT NUM UBUNT: ABNORMAL
BLD GP AB INVEST PLASRBC-IMP: ABNORMAL
BLD GP AB SCN SERPL QL: ABNORMAL
COMPONENT R 8504R: ABNORMAL
COMPONENT R 8504R: ABNORMAL
PRODUCT TYPE UPROD: ABNORMAL
PRODUCT TYPE UPROD: ABNORMAL
RH BLD: ABNORMAL
UNIT STATUS USTAT: ABNORMAL
UNIT STATUS USTAT: ABNORMAL

## 2025-04-09 PROCEDURE — 86922 COMPATIBILITY TEST ANTIGLOB: CPT | Mod: 91

## 2025-04-09 PROCEDURE — 86870 RBC ANTIBODY IDENTIFICATION: CPT

## 2025-04-09 PROCEDURE — 306780 HCHG STAT FOR TRANSFUSION PER CASE

## 2025-04-09 PROCEDURE — 86900 BLOOD TYPING SEROLOGIC ABO: CPT | Mod: 91

## 2025-04-09 PROCEDURE — 86920 COMPATIBILITY TEST SPIN: CPT | Mod: 91

## 2025-04-09 PROCEDURE — P9016 RBC LEUKOCYTES REDUCED: HCPCS

## 2025-04-09 PROCEDURE — 86901 BLOOD TYPING SEROLOGIC RH(D): CPT | Mod: 91

## 2025-04-09 PROCEDURE — 36430 TRANSFUSION BLD/BLD COMPNT: CPT

## 2025-04-09 PROCEDURE — 86850 RBC ANTIBODY SCREEN: CPT

## 2025-04-09 PROCEDURE — 700102 HCHG RX REV CODE 250 W/ 637 OVERRIDE(OP): Performed by: NURSE PRACTITIONER

## 2025-04-09 PROCEDURE — A9270 NON-COVERED ITEM OR SERVICE: HCPCS | Performed by: NURSE PRACTITIONER

## 2025-04-09 RX ORDER — ACETAMINOPHEN 325 MG/1
650 TABLET ORAL ONCE
Status: COMPLETED | OUTPATIENT
Start: 2025-04-09 | End: 2025-04-09

## 2025-04-09 RX ORDER — DIPHENHYDRAMINE HCL 25 MG
25 TABLET ORAL ONCE
OUTPATIENT
Start: 2025-04-09 | End: 2025-04-09

## 2025-04-09 RX ORDER — HYDROCORTISONE SODIUM SUCCINATE 100 MG/2ML
100 INJECTION INTRAMUSCULAR; INTRAVENOUS PRN
OUTPATIENT
Start: 2025-04-09

## 2025-04-09 RX ORDER — 0.9 % SODIUM CHLORIDE 0.9 %
VIAL (ML) INJECTION PRN
OUTPATIENT
Start: 2025-04-09

## 2025-04-09 RX ORDER — 0.9 % SODIUM CHLORIDE 0.9 %
3 VIAL (ML) INJECTION PRN
OUTPATIENT
Start: 2025-04-09

## 2025-04-09 RX ORDER — SODIUM CHLORIDE 9 MG/ML
INJECTION, SOLUTION INTRAVENOUS CONTINUOUS
OUTPATIENT
Start: 2025-04-09

## 2025-04-09 RX ORDER — ACETAMINOPHEN 325 MG/1
650 TABLET ORAL PRN
OUTPATIENT
Start: 2025-04-09

## 2025-04-09 RX ORDER — DIPHENHYDRAMINE HYDROCHLORIDE 50 MG/ML
25 INJECTION, SOLUTION INTRAMUSCULAR; INTRAVENOUS PRN
OUTPATIENT
Start: 2025-04-09

## 2025-04-09 RX ORDER — SODIUM CHLORIDE 9 MG/ML
INJECTION, SOLUTION INTRAVENOUS CONTINUOUS
Status: DISCONTINUED | OUTPATIENT
Start: 2025-04-09 | End: 2025-04-09 | Stop reason: HOSPADM

## 2025-04-09 RX ORDER — DIPHENHYDRAMINE HCL 25 MG
25 TABLET ORAL ONCE
Status: COMPLETED | OUTPATIENT
Start: 2025-04-09 | End: 2025-04-09

## 2025-04-09 RX ORDER — 0.9 % SODIUM CHLORIDE 0.9 %
10 VIAL (ML) INJECTION PRN
OUTPATIENT
Start: 2025-04-09

## 2025-04-09 RX ORDER — HYDROCORTISONE SODIUM SUCCINATE 100 MG/2ML
100 INJECTION INTRAMUSCULAR; INTRAVENOUS PRN
Status: DISCONTINUED | OUTPATIENT
Start: 2025-04-09 | End: 2025-04-09 | Stop reason: HOSPADM

## 2025-04-09 RX ORDER — DIPHENHYDRAMINE HYDROCHLORIDE 50 MG/ML
25 INJECTION, SOLUTION INTRAMUSCULAR; INTRAVENOUS PRN
Status: DISCONTINUED | OUTPATIENT
Start: 2025-04-09 | End: 2025-04-09 | Stop reason: HOSPADM

## 2025-04-09 RX ORDER — ACETAMINOPHEN 325 MG/1
650 TABLET ORAL ONCE
OUTPATIENT
Start: 2025-04-09

## 2025-04-09 RX ORDER — ACETAMINOPHEN 325 MG/1
650 TABLET ORAL PRN
Status: DISCONTINUED | OUTPATIENT
Start: 2025-04-09 | End: 2025-04-09 | Stop reason: HOSPADM

## 2025-04-09 RX ADMIN — DIPHENHYDRAMINE HYDROCHLORIDE 25 MG: 25 TABLET ORAL at 11:56

## 2025-04-09 RX ADMIN — ACETAMINOPHEN 650 MG: 325 TABLET ORAL at 11:56

## 2025-04-09 ASSESSMENT — FIBROSIS 4 INDEX: FIB4 SCORE: 0.76

## 2025-04-09 NOTE — PROGRESS NOTES
Patient arrived to [facility] for 2 unit RBC, no concerns at this time. Lab results reviewed from 4/8/25. PIV started to LAC, brisk blood return noted, COD collected. Blood bank called and stated antibodies present in blood. Pre-medication given, 2 units RBC infused per order, well tolerated. PIV flushed and d'c/d, gauze and coban applied. Next appointment confirmed, patient left home in stable condition.

## 2025-04-10 DIAGNOSIS — E11.59 HYPERTENSION ASSOCIATED WITH TYPE 2 DIABETES MELLITUS (HCC): ICD-10-CM

## 2025-04-10 DIAGNOSIS — I15.2 HYPERTENSION ASSOCIATED WITH TYPE 2 DIABETES MELLITUS (HCC): ICD-10-CM

## 2025-04-10 NOTE — ADDENDUM NOTE
Encounter addended by: DELMER TorresPFELIPE on: 4/10/2025 8:28 AM   Actions taken: Order list changed, Diagnosis association updated, Clinical Note Signed

## 2025-04-11 RX ORDER — IRBESARTAN 300 MG/1
TABLET ORAL
Qty: 100 TABLET | Refills: 1 | Status: SHIPPED | OUTPATIENT
Start: 2025-04-11

## 2025-04-11 NOTE — TELEPHONE ENCOUNTER
Received request via: Pharmacy    Was the patient seen in the last year in this department? Yes    Does the patient have an active prescription (recently filled or refills available) for medication(s) requested? No    Pharmacy Name: [facility]    Does the patient have halfway Plus and need 100-day supply? (This applies to ALL medications) Yes, quantity updated to 100 days

## 2025-04-17 ASSESSMENT — ENCOUNTER SYMPTOMS
INSOMNIA: 0
HEADACHES: 0
WEIGHT LOSS: 0
NERVOUS/ANXIOUS: 0
WEAKNESS: 0
MEMORY LOSS: 0
CHILLS: 0
SPUTUM PRODUCTION: 0
MUSCULOSKELETAL NEGATIVE: 1
GASTROINTESTINAL NEGATIVE: 1
COUGH: 0
DIZZINESS: 0
EYES NEGATIVE: 1
FEVER: 0
PALPITATIONS: 0
DEPRESSION: 0

## 2025-04-17 NOTE — PROGRESS NOTES
Surgical Endocrinology New Patient Visit    This is a 69 y.o. female who was referred to my office by Dr. Traore for a surgical evaluation of primary hyperparathyroidism. She was found to have elevated calcium by her PCP.    The patient has no complaints.     She reports that she is being worked up for renal cell carcinoma with upcoming plans for surgery.     She is here to go over her 24h urine and DEXA:    Component  Ref Range & Units (hover) 3 wk ago   Calcium Random Urine 2.8   Total Volume 1500   Collection Length 24 VC   Comment: Per 24h calculations are provided to aid interpretation for  collections with a duration of 24 hours and an average daily urine  volume. For specimens with notable deviations in collection time  or volume, ratios of analytes to a corresponding urine creatinine  concentration may assist in result interpretation.   Calcium Urine 42 Low    Comment: INTERPRETIVE INFORMATION: CALCIUM, URINE - mg/day  Calcium-free diet: 5-40 mg/d  Low calcium diet (800 mg/d or less):  mg/d  Average calcium diet (about 800 mg/d): 100-250 mg/d  High calcium diet (800 mg/d or greater): greater than 250 mg/d   Creatinine Urine 37   Calcium Creat Ratio   12547 76               Component  Ref Range & Units (hover) 3 wk ago  (3/26/25) 3 mo ago  (1/16/25) 2 yr ago  (4/11/23) 2 yr ago  (2/7/23) 3 yr ago  (9/22/21) 3 yr ago  (7/30/21) 4 yr ago  (11/17/20)   Total Volume, Urine 1500         Creatinine 24 Hr Urine 563 Low          Creatinine, Urine 37.50 53.34 26.64 16.64 33.29 112.95 62.92        4/22/2025 8:20 AM     HISTORY/REASON FOR EXAM:  Primary hyperparathyroidism, postmenopausal, hysterectomy     TECHNIQUE/EXAM DESCRIPTION AND NUMBER OF VIEWS:  Dual x-ray bone densitometry was performed from the L1 through L4 levels and from the proximal left femur utilizing the GE Prodigy unit.     COMPARISON: Bone densitometry scan of the left femur 8/26/2021     FINDINGS:  The lumbar spine has a mean bone mineral  density of 1.475 g/cm2, with a T score of 2.5 and a Z score of 3.0.     The proximal left femur has a mean bone mineral density of 1.051 g/cm2, with a T score of 0.3 and a Z score of 1.0.     When compared with the most recent study dated 8/26/2021, there has been a 6.3% decrease in the bone mineral density of the left femur.     IMPRESSION:     According to the World Health Organization classification, bone mineral density of this patient is normal.     10-year Probability of Fracture:  Major Osteoporotic     7.0%  Hip     0.4%  Population      [place] ()    Recent labs:   Pth, Intact   Date Value Ref Range Status   01/16/2025 119.0 (H) 14.0 - 72.0 pg/mL Final   02/07/2023 122.0 (H) 14.0 - 72.0 pg/mL Final   09/22/2021 56.7 14.0 - 72.0 pg/mL Final     Calcium   Date Value Ref Range Status   03/06/2025 11.1 (H) 8.5 - 10.5 mg/dL Final   01/16/2025 10.7 (H) 8.5 - 10.5 mg/dL Final   04/11/2023 10.9 (H) 8.5 - 10.5 mg/dL Final     Lab Results   Component Value Date/Time    PTHINTACT 119.0 (H) 01/16/2025 1010     The patient does not have a family history of parathyroid disease.    The patient does not have a history of radiation to their head or neck.    Review of Systems   Constitutional:  Negative for chills, fever, malaise/fatigue and weight loss.   HENT: Negative.     Eyes: Negative.    Respiratory:  Negative for cough and sputum production.    Cardiovascular:  Negative for chest pain and palpitations.   Gastrointestinal: Negative.    Genitourinary:  Negative for frequency and urgency.   Musculoskeletal: Negative.    Skin: Negative.    Neurological:  Negative for dizziness, weakness and headaches.   Endo/Heme/Allergies: Negative.    Psychiatric/Behavioral:  Negative for depression and memory loss. The patient is not nervous/anxious and does not have insomnia.    All other systems reviewed and are negative.    Past Medical History:   Diagnosis Date    Arthritis     Osteo knees    Bedbug bite 02/08/2022    Ongoing  "encounter with eye physician.  She reports that she was scheduled for surgery on her eye after both cataract however she has a rash secondary to that bedbug infestation.  She reports that she is currently undergoing the process of clearing it from her house and the final days on Thursday 2-.  She has several bites both forearms that have decreased in intensity over the past week.  T    Diverticulitis 01/26/2023    Dyslipidemia     Esophageal dysphagia 01/26/2023    Essential hypertension     GERD (gastroesophageal reflux disease)     High cholesterol     Left knee pain 11/19/2019    Myocardial infarct (HCC)     Hx of 2 and states \"very Mild\". Last was approx 2014. No longer follows with cardiologist.    AALIYAH (obstructive sleep apnea)     CPAP    Renal mass, right 01/26/2023    Snoring     Type 2 diabetes mellitus (HCC)     11/19/19-Avg AM glucose=100.     Past Surgical History:   Procedure Laterality Date    PB TOTAL KNEE ARTHROPLASTY Left 12/3/2019    Procedure: ARTHROPLASTY, KNEE, TOTAL;  Surgeon: Ryan Keen M.D.;  Location: SURGERY [facility];  Service: Orthopedics    COLONOSCOPY  09/2019    ABDOMINAL HYSTERECTOMY TOTAL  2004    KNEE ARTHROSCOPY Right 1974    RHINOPLASTY  1970    BUNIONECTOMY Left early 2000's    DENTAL EXTRACTION(S)  1960's    wisdom teeth    MYRINGOTOMY Bilateral Infant to 2001    13 ear surgeries    OTHER SURGICAL PROCEDURE Right 1980's    fluid drained off of bone air pockets behind ear bone    TONSILLECTOMY AND ADENOIDECTOMY  as child     Home Medications    Medication Sig Taking? Last Dose Authorizing Provider   irbesartan (AVAPRO) 300 MG Tab TAKE 1 TABLET BY MOUTH IN THE EVENING FOR BLOOD PRESSURE   Gricelda Traore D.O.   Zinc Sulfate (ZINC 15 PO) Take 15 mg by mouth every day.   Physician Outpatient   benzonatate (TESSALON) 100 MG Cap Take 2 Capsules by mouth 2 times a day as needed for Cough.   Gricelda Traore D.O.   rosuvastatin (CRESTOR) 20 MG Tab Take 1 Tablet by " mouth every evening.   Gricelda Traore D.O.   metoprolol SR (TOPROL XL) 50 MG TABLET SR 24 HR Take 1 Tablet by mouth every day.   Gricelda Traore D.O.   Empagliflozin (JARDIANCE) 10 MG Tab tablet Take 1 Tablet by mouth every day.   Gricelda Traore D.O.   omeprazole (PRILOSEC) 20 MG delayed-release capsule Take 1 capsule by mouth twice daily   Miko Wilson M.D.   vitamin D (VITAMIND D3) 1000 UNIT Tab Take 1,000 Units by mouth every day.   Physician Outpatient     Vitals:    04/24/25 1135   BP: (!) 140/72   Pulse: 76   Temp: 36.1 °C (97 °F)   SpO2: 95%     Physical Exam  Constitutional:       Appearance: Normal appearance.   HENT:      Head: Normocephalic and atraumatic.      Mouth/Throat:      Pharynx: No oropharyngeal exudate (dexa).   Cardiovascular:      Rate and Rhythm: Normal rate.   Pulmonary:      Effort: Pulmonary effort is normal.   Abdominal:      General: Abdomen is flat.      Palpations: Abdomen is soft.   Musculoskeletal:         General: Normal range of motion.      Cervical back: Normal range of motion and neck supple.   Skin:     General: Skin is warm and dry.   Neurological:      General: No focal deficit present.      Mental Status: She is alert.   Psychiatric:         Mood and Affect: Mood normal.     Imaging:    No significant focal uptake identified, negative for parathyroid  adenoma.    EXAM: Parathyroid scan and SPECT.    TECHNIQUE: Following a discussion of the risks and benefits of the procedure  informed written consent was obtained. 20 mCi of Sestamibi was administered  intravenously. Subsequently, scanning of the neck was performed with planar  imaging 15 minutes and 90 minutes after injection. SPECT imaging performed in 3  planes on delayed imaging.    INDICATION: Hyperparathyroidism. Hypercalcemia.    COMPARISON: None available.    FINDINGS: Physiologic salivary and thyroid gland uptake is identified. There is  no evidence of abnormal uptake in the region of the parathyroid  glands.       Assessment/Plan:  Primary hyperparathyroidism (HCC)  Primary hyperparathyroidism, does not meet criteria for surgery. I will see her in 6 months for repeat calcium for evaluation.     Kalpana Tse M.D., FACS  Department of Surgical Oncology  [facility]  [phone]

## 2025-04-17 NOTE — ASSESSMENT & PLAN NOTE
Primary hyperparathyroidism, does not meet criteria for surgery. I will see her in 6 months for repeat calcium for evaluation.

## 2025-04-18 ENCOUNTER — OFFICE VISIT (OUTPATIENT)
Dept: SLEEP MEDICINE | Facility: MEDICAL CENTER | Age: 70
End: 2025-04-18
Attending: STUDENT IN AN ORGANIZED HEALTH CARE EDUCATION/TRAINING PROGRAM
Payer: MEDICARE

## 2025-04-18 VITALS
SYSTOLIC BLOOD PRESSURE: 122 MMHG | HEIGHT: 66 IN | RESPIRATION RATE: 18 BRPM | DIASTOLIC BLOOD PRESSURE: 62 MMHG | HEART RATE: 107 BPM | BODY MASS INDEX: 35.36 KG/M2 | WEIGHT: 220 LBS | OXYGEN SATURATION: 93 %

## 2025-04-18 DIAGNOSIS — E66.812 OBESITY, CLASS II, BMI 35-39.9: ICD-10-CM

## 2025-04-18 DIAGNOSIS — G47.33 OBSTRUCTIVE SLEEP APNEA: ICD-10-CM

## 2025-04-18 PROCEDURE — 99214 OFFICE O/P EST MOD 30 MIN: CPT | Performed by: PHYSICIAN ASSISTANT

## 2025-04-18 PROCEDURE — 3078F DIAST BP <80 MM HG: CPT | Performed by: PHYSICIAN ASSISTANT

## 2025-04-18 PROCEDURE — 3074F SYST BP LT 130 MM HG: CPT | Performed by: PHYSICIAN ASSISTANT

## 2025-04-18 PROCEDURE — 99213 OFFICE O/P EST LOW 20 MIN: CPT | Performed by: PHYSICIAN ASSISTANT

## 2025-04-18 ASSESSMENT — ENCOUNTER SYMPTOMS
HEADACHES: 1
PALPITATIONS: 0
INSOMNIA: 1
SPUTUM PRODUCTION: 0
FEVER: 0
WHEEZING: 0
CHILLS: 0
WEIGHT LOSS: 0
DIZZINESS: 0
SORE THROAT: 0
ORTHOPNEA: 0
COUGH: 1
TREMORS: 0
HEARTBURN: 1
SHORTNESS OF BREATH: 1
SINUS PAIN: 0

## 2025-04-18 ASSESSMENT — FIBROSIS 4 INDEX: FIB4 SCORE: 0.76

## 2025-04-18 NOTE — PATIENT INSTRUCTIONS
1-reviewed compliance  2-demonstrating consistent use and benefit  3-has been on cpap for many years  4-however her apneas are quite elevated now  5-will trial pressure change  6-will likely need updated sleep study (to include diagnostic/split night as original studies are not available)   7-discussed benefits of weight loss  8-3 month follow up   9-As a reminder use distilled water only in humidifier chamber.  Fresh fill daily.  10-Today we reviewed equipment cleaning  once weekly minimum  mask, tubing and water chamber  use dedicated container  use mild soap and water  SoClean or other ozone  are not recommended  white vinegar and water solution is no longer recommended  hang tubing to dry  mask sanitizing wipes are an option for use   11-updated order for mask and supplies to [facility]

## 2025-04-18 NOTE — PROGRESS NOTES
"Chief Complaint   Patient presents with    Establish Care     Ref by  Gricelda Traore D.O. Dx: G47.33 (ICD-10-CM) - Obstructive sleep apnea    AutoCPAP 17-19    Set up: 2/17/2022    Last seen 4/1/2022 with        HPI:  Shahana Nj is a 69 y.o. year old female here today for follow-up on obstructive sleep apnea and re-establish with [facility] sleep medicine .  Patient was referred by Dr. Gricelda Traore.  Previously evaluated by Dr. Yenni Aguilar 4/1/2022.    Past Medical History: AALIYAH, type 2 diabetes, stage IIIa chronic kidney disease, obesity, GERD, hypertension, renal cell cancer, iron deficiency anemia, primary hyperparathyroidism, MI.    Vitals:  /62 (BP Location: Left arm, Patient Position: Sitting, BP Cuff Size: Large adult)   Pulse (!) 107   Resp 18   Ht 1.676 m (5' 6\")   Wt 99.8 kg (220 lb)   SpO2 93% BMI of 35.51 kg/m².    Recent Imaging: Chest x-ray 4/1/2025 demonstrated no acute cardiopulmonary changes.    CT chest obtained 2/21/2025 demonstrated basilar atelectasis, scattered coronary artery calcifications, possible 1.3 cm left lobe of the thyroid gland nodule.  Low-attenuation liver consistent with fatty changes, calcified gallstone.    Transesophageal echo obtained 1/7/2023 demonstrated a LVEF estimated at 65-70% with normal right ventricular size and function.  Trace mitral regurgitation, trace tricuspid regurgitation.  Estimated RVSP of 45-50 mmHg.    Currently using  Resmed auto CPAP @17-19 cm H20 pressure; compliance reviewed for 3/19/2025 through 4/17/2025, days used 30/30, average daily usage 6 hours 49 minutes, 100% of days greater than or equal to 4 hours, mask leak at 23.2 LPM at 95th percentile, AHI 15.6 per hour.  Elevated apneas with mild mask leak noted.  See media for full report.    Device obtained 2/17/2022  DME provider Preferred  Mask interface nasal mask    Initial diagnostic sleep study obtained in [facility] approximately 20-25 years ago.  Second " "sleep study was done here in [place] at [facility] approximately 10 years ago by Dr. Armstrong this facility is now closed.      Overnight oximetry obtained on auto CPAP of 13-17 cm H2O pressure on 3/21/2022 demonstrated low O2 sat of 83% with sats less than 88% for 8.5 minutes of total sleep time.  Findings consistent with mild nocturnal hypoxia.  Pressure changes were made with increased to 13.6-19 cm H2O pressure.      As per supplemental questionnaire to be scanned or imported into chart:    [place] Sleepiness Score: 4/24 on 4/18/2025    Sleep Schedule  Bedtime: 11 PM  Wake time: 6-6:30 AM  Sleep-onset latency: 20 minutes  Awakenings from sleep: 0-1  Difficulty falling back asleep: No  Bedroom partner: Yes  Naps: Occasionally up to 3 hours    DAYTIME SYMPTOMS:   Excessive daytime sleepiness: No  Daytime fatigue: Yes  Difficulty concentrating: No  Memory problems: No  Irritability: Yes  Work/school performance issues: Not applicable  Sleepiness with driving: No  Caffeine/stimulant use: 4, 1 in the morning and 3 in the afternoon  Alcohol use: Rare     SLEEP RELATED SYMPTOMS  Snoring: Yes  Witnessed apnea or gasping/choking: No  Dry mouth or mouth breathing: No no  Night Sweating: No  Teeth grinding/biting: No  Morning headaches: No  Refreshed Upon Awakening: Yes     SLEEP RELATED BEHAVIORS:  Parasomnias (walking, talking, eating, violence): No  Leg kicking: No  Restless legs - \"urge to move\": No  Nightmares: No recurrent: No  Dream enactment: No     NARCOLEPSY:  Cataplexy: No  Sleep paralysis: No  Sleep attacks: No  Hypnagogic/hypnopompic hallucinations: No      Review of Systems   Constitutional:  Positive for malaise/fatigue. Negative for chills, fever and weight loss.   HENT:  Positive for congestion (starting using navage, mouth breather), hearing loss (hearing aid in left, cannot hear out of right) and tinnitus (constant for one year +). Negative for nosebleeds, sinus pain and sore throat.    Eyes:         " "Presc glasses    Respiratory:  Positive for cough (tessalon perles) and shortness of breath (with exertion). Negative for sputum production and wheezing.         Mouth breather   Cardiovascular:  Positive for leg swelling (slight). Negative for chest pain, palpitations and orthopnea.   Gastrointestinal:  Positive for heartburn (mostly controlled on meds).        No dentures, missing about 6 teeth, no swallowing issues    Musculoskeletal:         Cane for balance, bad knees    Skin:         Bruising hands due to old people skin, no blood thinner   Neurological:  Positive for headaches. Negative for dizziness and tremors.   Psychiatric/Behavioral:  The patient has insomnia.        Past Medical History:   Diagnosis Date    Arthritis     Osteo knees    Bedbug bite 02/08/2022    Ongoing encounter with eye physician.  She reports that she was scheduled for surgery on her eye after both cataract however she has a rash secondary to that bedbug infestation.  She reports that she is currently undergoing the process of clearing it from her house and the final days on Thursday 2-.  She has several bites both forearms that have decreased in intensity over the past week.  T    Diverticulitis 01/26/2023    Dyslipidemia     Esophageal dysphagia 01/26/2023    Essential hypertension     GERD (gastroesophageal reflux disease)     High cholesterol     Left knee pain 11/19/2019    Myocardial infarct (HCC)     Hx of 2 and states \"very Mild\". Last was approx 2014. No longer follows with cardiologist.    AALIYAH (obstructive sleep apnea)     CPAP    Renal mass, right 01/26/2023    Snoring     Type 2 diabetes mellitus (HCC)     11/19/19-Avg AM glucose=100.       Past Surgical History:   Procedure Laterality Date    PB TOTAL KNEE ARTHROPLASTY Left 12/3/2019    Procedure: ARTHROPLASTY, KNEE, TOTAL;  Surgeon: Ryan Keen M.D.;  Location: SURGERY [facility];  Service: Orthopedics    COLONOSCOPY  09/2019    ABDOMINAL HYSTERECTOMY " TOTAL  2004    KNEE ARTHROSCOPY Right 1974    RHINOPLASTY  1970    BUNIONECTOMY Left early 2000's    DENTAL EXTRACTION(S)  1960's    wisdom teeth    MYRINGOTOMY Bilateral Infant to 2001    13 ear surgeries    OTHER SURGICAL PROCEDURE Right 1980's    fluid drained off of bone air pockets behind ear bone    TONSILLECTOMY AND ADENOIDECTOMY  as child       Family History   Problem Relation Age of Onset    Diabetes Mother     Cancer Mother         brain ca    Heart Disease Mother 45        cabg    Heart Disease Father         cabg and valve replacement    Cancer Sister         bone ca    Diabetes Sister     Diabetes Other        Social History     Socioeconomic History    Marital status: Single     Spouse name: Not on file    Number of children: Not on file    Years of education: Not on file    Highest education level: Not on file   Occupational History     Comment: retired , [employer]   Tobacco Use    Smoking status: Never    Smokeless tobacco: Never   Vaping Use    Vaping status: Never Used   Substance and Sexual Activity    Alcohol use: Yes     Comment: 6 times per year    Drug use: No     Comment: tried marijuana at age 17    Sexual activity: Yes     Partners: Male   Other Topics Concern    Not on file   Social History Narrative    Not on file     Social Drivers of Health     Financial Resource Strain: Not on file   Food Insecurity: Not on file   Transportation Needs: Not on file   Physical Activity: Not on file   Stress: Not on file   Social Connections: Not on file   Intimate Partner Violence: Low Risk  (1/6/2023)    Received from [facility]    History of Abuse     History of Abuse: Denies   Housing Stability: Not on file       Allergies as of 04/18/2025 - Reviewed 04/18/2025   Allergen Reaction Noted    Pcn [penicillins] Swelling 03/29/2016    Tape Rash 03/29/2016          Current medications as of today   Current Outpatient Medications   Medication Sig Dispense Refill    irbesartan (AVAPRO) 300  MG Tab TAKE 1 TABLET BY MOUTH IN THE EVENING FOR BLOOD PRESSURE 100 Tablet 1    Zinc Sulfate (ZINC 15 PO) Take 15 mg by mouth every day.      benzonatate (TESSALON) 100 MG Cap Take 2 Capsules by mouth 2 times a day as needed for Cough. 360 Capsule 0    rosuvastatin (CRESTOR) 20 MG Tab Take 1 Tablet by mouth every evening. 100 Tablet 3    metoprolol SR (TOPROL XL) 50 MG TABLET SR 24 HR Take 1 Tablet by mouth every day. 90 Tablet 3    Empagliflozin (JARDIANCE) 10 MG Tab tablet Take 1 Tablet by mouth every day. 90 Tablet 3    omeprazole (PRILOSEC) 20 MG delayed-release capsule Take 1 capsule by mouth twice daily 60 Capsule 3    vitamin D (VITAMIND D3) 1000 UNIT Tab Take 1,000 Units by mouth every day.       No current facility-administered medications for this visit.         Physical Exam:   Gen:           Alert and oriented, No apparent distress. Mood and affect appropriate, normal interaction with examiner.   Hearing:     Grossly intact.  Nose:          Normal, no lesions or deformities.  Dentition:    poor dentition.   Oropharynx:   Tongue normal, posterior pharynx without erythema or exudate.  Mallampati Classification: III  Neck:        Supple, trachea midline, no masses.  Respiratory Effort: No intercostal retractions or use of accessory muscles.   Gait and Station: Normal.  Digits and Nails: No clubbing, cyanosis, petechiae, or nodes.   Skin:        No rashes, lesions or ulcers noted.               Ext:           No cyanosis or edema.      Immunizations:  Flu: 8/24/2024  Pneumovax 23: 9/28/2023  Prevnar 13: 2/28/2022  PCV 20: 2/28/2023  Pfizer SARS CoV2 Vaccine: 7/18/2022, 3/30/2021, 3/18/2021  Moderna booster SARS-CoV-2 vaccine: 10/5/2022  Moderna SARS-CoV-2 vaccine: 12/21/2021    Assessment / Plan:  1. Obstructive sleep apnea  - DME Other  - DME Mask and Supplies      Reviewed compliance, demonstrating consistent use and benefit.  Patient has been on CPAP for many years with documented benefit.  However her  apneas are quite elevated now, will trial pressure change with short-term follow-up.  Patient will likely need updated sleep study to include diagnostic/split-night if possible as original studies are not available.  Patient was reminded to use distilled water only in humidifier chamber with fresh fill daily.  Reviewed equipment cleaning and equipment supply change recommendations.  Updated orders for mask and supplies to preferred.    2. Obesity, Class II, BMI 35-39.9    Elevated BMI: Discussion regarding impact central adiposity on pulmonary function.  Encouraged to increase activity as tolerated and monitor nutritional intake.        Follow-up:   Return in about 3 months (around 7/18/2025) for Return with Mahogany Bailey PA-C.    Please note that this dictation was created using voice recognition software. I have made every reasonable attempt to correct obvious errors, but it is possible there are errors of grammar and possibly content that I did not discover before finalizing the note.

## 2025-04-22 ENCOUNTER — HOSPITAL ENCOUNTER (OUTPATIENT)
Dept: RADIOLOGY | Facility: MEDICAL CENTER | Age: 70
End: 2025-04-22
Attending: SURGERY
Payer: MEDICARE

## 2025-04-22 DIAGNOSIS — E21.0 PRIMARY HYPERPARATHYROIDISM (HCC): ICD-10-CM

## 2025-04-22 PROCEDURE — 77080 DXA BONE DENSITY AXIAL: CPT

## 2025-04-23 ENCOUNTER — OUTPATIENT INFUSION SERVICES (OUTPATIENT)
Dept: ONCOLOGY | Facility: MEDICAL CENTER | Age: 70
End: 2025-04-23
Attending: NURSE PRACTITIONER
Payer: MEDICARE

## 2025-04-23 VITALS
DIASTOLIC BLOOD PRESSURE: 63 MMHG | BODY MASS INDEX: 34.97 KG/M2 | SYSTOLIC BLOOD PRESSURE: 143 MMHG | HEART RATE: 88 BPM | TEMPERATURE: 97.6 F | HEIGHT: 66 IN | OXYGEN SATURATION: 94 % | RESPIRATION RATE: 18 BRPM | WEIGHT: 217.59 LBS

## 2025-04-23 DIAGNOSIS — D50.9 IRON DEFICIENCY ANEMIA, UNSPECIFIED IRON DEFICIENCY ANEMIA TYPE: ICD-10-CM

## 2025-04-23 PROCEDURE — 700105 HCHG RX REV CODE 258: Performed by: STUDENT IN AN ORGANIZED HEALTH CARE EDUCATION/TRAINING PROGRAM

## 2025-04-23 PROCEDURE — 700111 HCHG RX REV CODE 636 W/ 250 OVERRIDE (IP): Mod: JZ,TB | Performed by: STUDENT IN AN ORGANIZED HEALTH CARE EDUCATION/TRAINING PROGRAM

## 2025-04-23 PROCEDURE — 96365 THER/PROPH/DIAG IV INF INIT: CPT

## 2025-04-23 RX ORDER — METHYLPREDNISOLONE SODIUM SUCCINATE 125 MG/2ML
125 INJECTION, POWDER, LYOPHILIZED, FOR SOLUTION INTRAMUSCULAR; INTRAVENOUS PRN
Status: DISCONTINUED | OUTPATIENT
Start: 2025-04-23 | End: 2025-04-23

## 2025-04-23 RX ORDER — METHYLPREDNISOLONE SODIUM SUCCINATE 125 MG/2ML
125 INJECTION, POWDER, LYOPHILIZED, FOR SOLUTION INTRAMUSCULAR; INTRAVENOUS PRN
Status: CANCELLED | OUTPATIENT
Start: 2025-04-23

## 2025-04-23 RX ORDER — DIPHENHYDRAMINE HYDROCHLORIDE 50 MG/ML
50 INJECTION, SOLUTION INTRAMUSCULAR; INTRAVENOUS PRN
Status: CANCELLED | OUTPATIENT
Start: 2025-04-23

## 2025-04-23 RX ORDER — EPINEPHRINE 1 MG/ML(1)
0.5 AMPUL (ML) INJECTION PRN
Status: CANCELLED | OUTPATIENT
Start: 2025-04-23

## 2025-04-23 RX ORDER — SODIUM CHLORIDE 9 MG/ML
INJECTION, SOLUTION INTRAVENOUS CONTINUOUS
Status: CANCELLED | OUTPATIENT
Start: 2025-04-23

## 2025-04-23 RX ORDER — DIPHENHYDRAMINE HYDROCHLORIDE 50 MG/ML
50 INJECTION, SOLUTION INTRAMUSCULAR; INTRAVENOUS PRN
Status: DISCONTINUED | OUTPATIENT
Start: 2025-04-23 | End: 2025-04-23

## 2025-04-23 RX ORDER — SODIUM CHLORIDE 9 MG/ML
INJECTION, SOLUTION INTRAVENOUS CONTINUOUS
Status: DISCONTINUED | OUTPATIENT
Start: 2025-04-23 | End: 2025-04-23

## 2025-04-23 RX ORDER — EPINEPHRINE 1 MG/ML(1)
0.5 AMPUL (ML) INJECTION PRN
Status: DISCONTINUED | OUTPATIENT
Start: 2025-04-23 | End: 2025-04-23

## 2025-04-23 RX ADMIN — SODIUM CHLORIDE: 9 INJECTION, SOLUTION INTRAVENOUS at 15:40

## 2025-04-23 RX ADMIN — SODIUM CHLORIDE 1000 MG: 9 INJECTION, SOLUTION INTRAVENOUS at 15:46

## 2025-04-23 ASSESSMENT — FIBROSIS 4 INDEX: FIB4 SCORE: 0.76

## 2025-04-23 NOTE — PROGRESS NOTES
Pt arrived ambulatory for iron infusion, c/o ongoing fatigue but no new medical problems.  Labs drawn 4/8 reviewed, meets parameters for infusion today.  PIV started in left wrist with good blood return, pt denies any hx asthma, minimal allergies.  Iron infusion started at 75cc/hr x 5 minutes, then stopped for 10 minutes.  No reaction noted, Infed resumed at 333cc/hr, tolerated remainder of infusion, no reaction noted.  Dc'd home without incident, pt will f/u with provider as scheduled for further care.

## 2025-04-24 ENCOUNTER — HOSPITAL ENCOUNTER (OUTPATIENT)
Dept: RADIOLOGY | Facility: MEDICAL CENTER | Age: 70
End: 2025-04-24
Attending: UROLOGY
Payer: MEDICARE

## 2025-04-24 ENCOUNTER — OFFICE VISIT (OUTPATIENT)
Facility: MEDICAL CENTER | Age: 70
End: 2025-04-24
Payer: MEDICARE

## 2025-04-24 VITALS
WEIGHT: 216.05 LBS | OXYGEN SATURATION: 95 % | SYSTOLIC BLOOD PRESSURE: 140 MMHG | TEMPERATURE: 97 F | DIASTOLIC BLOOD PRESSURE: 72 MMHG | HEART RATE: 76 BPM | BODY MASS INDEX: 36 KG/M2 | HEIGHT: 65 IN

## 2025-04-24 DIAGNOSIS — C64.9 METASTASIS FROM MALIGNANT TUMOR OF KIDNEY (HCC): ICD-10-CM

## 2025-04-24 DIAGNOSIS — E21.0 PRIMARY HYPERPARATHYROIDISM (HCC): ICD-10-CM

## 2025-04-24 DIAGNOSIS — C79.9 METASTASIS FROM MALIGNANT TUMOR OF KIDNEY (HCC): ICD-10-CM

## 2025-04-24 PROCEDURE — 3077F SYST BP >= 140 MM HG: CPT | Performed by: SURGERY

## 2025-04-24 PROCEDURE — 71250 CT THORAX DX C-: CPT

## 2025-04-24 PROCEDURE — 99213 OFFICE O/P EST LOW 20 MIN: CPT | Performed by: SURGERY

## 2025-04-24 PROCEDURE — 3078F DIAST BP <80 MM HG: CPT | Performed by: SURGERY

## 2025-04-24 ASSESSMENT — FIBROSIS 4 INDEX: FIB4 SCORE: 0.76

## 2025-04-25 ENCOUNTER — APPOINTMENT (OUTPATIENT)
Dept: ADMISSIONS | Facility: MEDICAL CENTER | Age: 70
DRG: 658 | End: 2025-04-25
Attending: UROLOGY
Payer: MEDICARE

## 2025-04-29 ENCOUNTER — APPOINTMENT (OUTPATIENT)
Dept: RADIOLOGY | Facility: MEDICAL CENTER | Age: 70
End: 2025-04-29
Attending: UROLOGY
Payer: MEDICARE

## 2025-04-29 ENCOUNTER — APPOINTMENT (OUTPATIENT)
Dept: ADMISSIONS | Facility: MEDICAL CENTER | Age: 70
End: 2025-04-29
Attending: UROLOGY
Payer: MEDICARE

## 2025-04-30 ENCOUNTER — APPOINTMENT (OUTPATIENT)
Dept: RADIOLOGY | Facility: MEDICAL CENTER | Age: 70
End: 2025-04-30
Attending: UROLOGY
Payer: MEDICARE

## 2025-04-30 ENCOUNTER — HOSPITAL ENCOUNTER (OUTPATIENT)
Facility: MEDICAL CENTER | Age: 70
End: 2025-04-30
Attending: UROLOGY | Admitting: UROLOGY
Payer: MEDICARE

## 2025-04-30 ENCOUNTER — HOSPITAL ENCOUNTER (OUTPATIENT)
Dept: RADIOLOGY | Facility: MEDICAL CENTER | Age: 70
End: 2025-04-30
Attending: UROLOGY | Admitting: UROLOGY
Payer: MEDICARE

## 2025-04-30 VITALS
DIASTOLIC BLOOD PRESSURE: 63 MMHG | OXYGEN SATURATION: 97 % | HEIGHT: 66 IN | TEMPERATURE: 96.7 F | SYSTOLIC BLOOD PRESSURE: 140 MMHG | WEIGHT: 213.85 LBS | BODY MASS INDEX: 34.37 KG/M2 | HEART RATE: 86 BPM | RESPIRATION RATE: 16 BRPM

## 2025-04-30 DIAGNOSIS — C64.9 METASTASIS FROM MALIGNANT TUMOR OF KIDNEY (HCC): ICD-10-CM

## 2025-04-30 DIAGNOSIS — C79.9 METASTASIS FROM MALIGNANT TUMOR OF KIDNEY (HCC): ICD-10-CM

## 2025-04-30 DIAGNOSIS — C64.9: ICD-10-CM

## 2025-04-30 LAB
ERYTHROCYTE [DISTWIDTH] IN BLOOD BY AUTOMATED COUNT: 86 FL (ref 35.9–50)
HCT VFR BLD AUTO: 37.2 % (ref 37–47)
HGB BLD-MCNC: 10.1 G/DL (ref 12–16)
INR PPP: 1.02 (ref 0.87–1.13)
MCH RBC QN AUTO: 19.7 PG (ref 27–33)
MCHC RBC AUTO-ENTMCNC: 27.2 G/DL (ref 32.2–35.5)
MCV RBC AUTO: 72.5 FL (ref 81.4–97.8)
PATHOLOGY CONSULT NOTE: NORMAL
PLATELET # BLD AUTO: 335 K/UL (ref 164–446)
PMV BLD AUTO: 9 FL (ref 9–12.9)
PROTHROMBIN TIME: 13.4 SEC (ref 12–14.6)
RBC # BLD AUTO: 5.13 M/UL (ref 4.2–5.4)
WBC # BLD AUTO: 9.3 K/UL (ref 4.8–10.8)

## 2025-04-30 PROCEDURE — 88305 TISSUE EXAM BY PATHOLOGIST: CPT | Mod: 26 | Performed by: PATHOLOGY

## 2025-04-30 PROCEDURE — 700117 HCHG RX CONTRAST REV CODE 255: Mod: JZ

## 2025-04-30 PROCEDURE — 160002 HCHG RECOVERY MINUTES (STAT)

## 2025-04-30 PROCEDURE — 72158 MRI LUMBAR SPINE W/O & W/DYE: CPT

## 2025-04-30 PROCEDURE — 160046 HCHG PACU - 1ST 60 MINS PHASE II

## 2025-04-30 PROCEDURE — A9579 GAD-BASE MR CONTRAST NOS,1ML: HCPCS | Mod: JZ

## 2025-04-30 PROCEDURE — 700111 HCHG RX REV CODE 636 W/ 250 OVERRIDE (IP): Mod: JZ | Performed by: RADIOLOGY

## 2025-04-30 PROCEDURE — 85027 COMPLETE CBC AUTOMATED: CPT

## 2025-04-30 PROCEDURE — 160015 HCHG STAT PREOP MINUTES

## 2025-04-30 PROCEDURE — 700111 HCHG RX REV CODE 636 W/ 250 OVERRIDE (IP): Mod: JZ

## 2025-04-30 PROCEDURE — 88342 IMHCHEM/IMCYTCHM 1ST ANTB: CPT | Performed by: PATHOLOGY

## 2025-04-30 PROCEDURE — 160035 HCHG PACU - 1ST 60 MINS PHASE I

## 2025-04-30 PROCEDURE — 88342 IMHCHEM/IMCYTCHM 1ST ANTB: CPT | Mod: 26 | Performed by: PATHOLOGY

## 2025-04-30 PROCEDURE — 85610 PROTHROMBIN TIME: CPT

## 2025-04-30 PROCEDURE — 88305 TISSUE EXAM BY PATHOLOGIST: CPT | Performed by: PATHOLOGY

## 2025-04-30 PROCEDURE — 36500 INSERTION OF CATHETER VEIN: CPT

## 2025-04-30 RX ORDER — ONDANSETRON 2 MG/ML
4 INJECTION INTRAMUSCULAR; INTRAVENOUS PRN
Status: DISCONTINUED | OUTPATIENT
Start: 2025-04-30 | End: 2025-04-30 | Stop reason: HOSPADM

## 2025-04-30 RX ORDER — HYDRALAZINE HYDROCHLORIDE 20 MG/ML
INJECTION INTRAMUSCULAR; INTRAVENOUS
Status: COMPLETED | OUTPATIENT
Start: 2025-04-30 | End: 2025-04-30

## 2025-04-30 RX ORDER — MIDAZOLAM HYDROCHLORIDE 1 MG/ML
INJECTION INTRAMUSCULAR; INTRAVENOUS
Status: COMPLETED
Start: 2025-04-30 | End: 2025-04-30

## 2025-04-30 RX ORDER — HYDRALAZINE HYDROCHLORIDE 20 MG/ML
INJECTION INTRAMUSCULAR; INTRAVENOUS
Status: DISCONTINUED
Start: 2025-04-30 | End: 2025-04-30 | Stop reason: HOSPADM

## 2025-04-30 RX ORDER — MIDAZOLAM HYDROCHLORIDE 1 MG/ML
.5-2 INJECTION INTRAMUSCULAR; INTRAVENOUS PRN
Status: DISCONTINUED | OUTPATIENT
Start: 2025-04-30 | End: 2025-04-30 | Stop reason: HOSPADM

## 2025-04-30 RX ORDER — ONDANSETRON 2 MG/ML
4 INJECTION INTRAMUSCULAR; INTRAVENOUS EVERY 8 HOURS PRN
Status: CANCELLED | OUTPATIENT
Start: 2025-04-30 | End: 2025-05-01

## 2025-04-30 RX ORDER — SODIUM CHLORIDE 9 MG/ML
500 INJECTION, SOLUTION INTRAVENOUS
Status: DISCONTINUED | OUTPATIENT
Start: 2025-04-30 | End: 2025-04-30 | Stop reason: HOSPADM

## 2025-04-30 RX ORDER — OXYCODONE HYDROCHLORIDE 5 MG/1
2.5 TABLET ORAL
Refills: 0 | Status: CANCELLED | OUTPATIENT
Start: 2025-04-30 | End: 2025-05-01

## 2025-04-30 RX ADMIN — FENTANYL CITRATE 50 MCG: 50 INJECTION, SOLUTION INTRAMUSCULAR; INTRAVENOUS at 11:52

## 2025-04-30 RX ADMIN — HYDRALAZINE HYDROCHLORIDE 20 MG: 20 INJECTION, SOLUTION INTRAMUSCULAR; INTRAVENOUS at 11:39

## 2025-04-30 RX ADMIN — FENTANYL CITRATE 50 MCG: 50 INJECTION, SOLUTION INTRAMUSCULAR; INTRAVENOUS at 12:02

## 2025-04-30 RX ADMIN — MIDAZOLAM HYDROCHLORIDE 2 MG: 1 INJECTION, SOLUTION INTRAMUSCULAR; INTRAVENOUS at 11:52

## 2025-04-30 RX ADMIN — GADOTERIDOL 20 ML: 279.3 INJECTION, SOLUTION INTRAVENOUS at 17:14

## 2025-04-30 RX ADMIN — FENTANYL CITRATE 50 MCG: 50 INJECTION, SOLUTION INTRAMUSCULAR; INTRAVENOUS at 11:56

## 2025-04-30 RX ADMIN — MIDAZOLAM HYDROCHLORIDE 1 MG: 1 INJECTION, SOLUTION INTRAMUSCULAR; INTRAVENOUS at 11:57

## 2025-04-30 ASSESSMENT — FIBROSIS 4 INDEX: FIB4 SCORE: 0.77

## 2025-04-30 NOTE — PROGRESS NOTES
Pt presents to CT4. Pt was consented by MD at bedside, confirmed by this RN and consent at bedside. Pt transferred to CT table in prone position. Patient underwent a right renal mass biopsy by Dr. Cook. Procedure site was marked by MD and verified using imaging guidance. Pt placed on monitor, prepped and draped in a sterile fashion. Vitals were taken every 5 minutes and remained stable during procedure (see doc flow sheet for results). CO2 waveform capnography was monitored and remained WNL throughout procedure. Report called to PPU RN. Pt transported by stretcher with RN to PPU.     Specimen: 6 x 18g cores in formalin from RLL renal mass hand delivered to lab.

## 2025-04-30 NOTE — DISCHARGE INSTRUCTIONS
What to Expect Post Anesthesia    Rest and take it easy for the first 24 hours.  A responsible adult is recommended to remain with you during that time.  It is normal to feel sleepy.  We encourage you to not do anything that requires balance, judgment or coordination.    FOR 24 HOURS DO NOT:  Drive, operate machinery or run household appliances.  Drink beer or alcoholic beverages.  Make important decisions or sign legal documents.    To avoid nausea, slowly advance diet as tolerated, avoiding spicy or greasy foods for the first day.  Add more substantial food to your diet according to your provider's instructions.  Babies can be fed formula or breast milk as soon as they are hungry.  INCREASE FLUIDS AND FIBER TO AVOID CONSTIPATION.    MILD FLU-LIKE SYMPTOMS ARE NORMAL.  YOU MAY EXPERIENCE GENERALIZED MUSCLE ACHES, THROAT IRRITATION, HEADACHE AND/OR SOME NAUSEA.    Care After Percutaneous Kidney Biopsy     This sheet gives you information about how to care for yourself after your procedure. Your health care provider may also give you more specific instructions. If you have problems or questions, contact your health care provider.     What can I expect after the procedure?     After the procedure, it is common to have:    Pain or soreness near the biopsy site.    Pink or cloudy urine for 24 hours after the procedure. This is normal.     Follow these instructions at home:     Activity    Return to your normal activities as told by your health care provider. Ask your health care provider what activities are safe for you.    If you were given a sedative during the procedure, it can affect you for several hours. Do not drive or operate machinery for 24 hours.    Do not lift anything that is heavier than 10 lbs for 7-10 days.     Heavy lifting, strenuous exercise, including contact sports, and sexual intercourse should be avoided for two weeks after the biopsy.     General instructions    Take over-the-counter and  prescription medicines only as told by your health care provider.    Leave the dressing in place for 24 hours and do not shower while dressing is in place. Do not submerge the biopsy site in water (baths, lakes, hot tubs) for 5 days.     Check your biopsy site every day for signs of infection. Check for:     More redness, swelling, or pain.     Fluid or blood.     Warmth.     Pus or a bad smell.      Keep all follow-up visits as told by your health care provider. This is important.     Contact a health care provider if:    You have more redness, swelling, or pain around your biopsy site.    You have fluid or blood coming from your biopsy site.    Your biopsy site feels warm to the touch.    You have pus or a bad smell coming from your biopsy site.    You have blood in your urine more than 24 hours after your procedure.     Get help right away if:    Your urine is dark red or brown.    You have a fever.    You are not able to urinate.    You feel burning when you urinate.    You feel dizzy or light-headed.    You have severe pain in your abdomen or side.     Summary    After the procedure, it is common to have pain or soreness at the biopsy site and pink or cloudy urine for the first 24 hours.    Check your biopsy site each day for signs of infection, such as more redness, swelling, or pain; fluid, blood, pus or a bad smell coming from the biopsy site; or the biopsy site feeling warm to the touch.      Return to your normal activities as told by your health care provider.

## 2025-04-30 NOTE — OR SURGEON
Immediate Post- Operative Note    PostOp Diagnosis: RIGHT RENAL LOWER POLE MASS       Procedure(s): CT GUIDED RIGHT RENAL MASS BX    18G CORES X 6 IN FORMALIN      Estimated Blood Loss: <5CC      FINDINGS: NEEDLE CONFIRMED     SMALL AMT PERINEPHRIC HEMORRHAGE        Complications: NONE            4/30/2025     12:08 PM     Chico Cook M.D.

## 2025-04-30 NOTE — PROGRESS NOTES
1220 Pt arrived from IR awake alert and oriented x 4 with no c/o pain. Right renal biopsy site assessed with circulating RN. Site is covered with gauze and tegaderm. Biopsy site is CDI with no signs of bleeding or hematoma.    1240 Pt  called and updated to the status of the patient.     1326 Discharge instructions given to  and pt. Both verbalized understanding.    1403 PIV removed with tip intact. Dressing applied.     1415 Pt ambulated and voided straw urine. Pt dressed without assistance.    1416 Pt wheeled out with RN to responsible adult.

## 2025-05-01 ENCOUNTER — PRE-ADMISSION TESTING (OUTPATIENT)
Dept: ADMISSIONS | Facility: MEDICAL CENTER | Age: 70
DRG: 658 | End: 2025-05-01
Attending: UROLOGY
Payer: MEDICARE

## 2025-05-01 DIAGNOSIS — Z01.812 PRE-OPERATIVE LABORATORY EXAMINATION: ICD-10-CM

## 2025-05-01 DIAGNOSIS — Z01.810 PRE-OPERATIVE CARDIOVASCULAR EXAMINATION: ICD-10-CM

## 2025-05-01 LAB
ACANTHOCYTES BLD QL SMEAR: NORMAL
ANION GAP SERPL CALC-SCNC: 9 MMOL/L (ref 7–16)
ANISOCYTOSIS BLD QL SMEAR: ABNORMAL
APPEARANCE UR: CLEAR
BACTERIA #/AREA URNS HPF: NORMAL /HPF
BASOPHILS # BLD AUTO: 0.9 % (ref 0–1.8)
BASOPHILS # BLD: 0.09 K/UL (ref 0–0.12)
BILIRUB UR QL STRIP.AUTO: NEGATIVE
BUN SERPL-MCNC: 25 MG/DL (ref 8–22)
BURR CELLS BLD QL SMEAR: NORMAL
CALCIUM SERPL-MCNC: 11.2 MG/DL (ref 8.5–10.5)
CASTS URNS QL MICRO: NORMAL /LPF (ref 0–2)
CHLORIDE SERPL-SCNC: 103 MMOL/L (ref 96–112)
CO2 SERPL-SCNC: 25 MMOL/L (ref 20–33)
COLOR UR: YELLOW
CREAT SERPL-MCNC: 1.35 MG/DL (ref 0.5–1.4)
EKG IMPRESSION: NORMAL
EOSINOPHIL # BLD AUTO: 0.28 K/UL (ref 0–0.51)
EOSINOPHIL NFR BLD: 2.7 % (ref 0–6.9)
EPITHELIAL CELLS 1715: NORMAL /HPF (ref 0–5)
ERYTHROCYTE [DISTWIDTH] IN BLOOD BY AUTOMATED COUNT: 91.3 FL (ref 35.9–50)
EST. AVERAGE GLUCOSE BLD GHB EST-MCNC: 108 MG/DL
GFR SERPLBLD CREATININE-BSD FMLA CKD-EPI: 42 ML/MIN/1.73 M 2
GLUCOSE SERPL-MCNC: 122 MG/DL (ref 65–99)
GLUCOSE UR STRIP.AUTO-MCNC: >=1000 MG/DL
HBA1C MFR BLD: 5.4 % (ref 4–5.6)
HCT VFR BLD AUTO: 39.8 % (ref 37–47)
HGB BLD-MCNC: 10.5 G/DL (ref 12–16)
HYPOCHROMIA BLD QL SMEAR: ABNORMAL
KETONES UR STRIP.AUTO-MCNC: NEGATIVE MG/DL
LEUKOCYTE ESTERASE UR QL STRIP.AUTO: NEGATIVE
LYMPHOCYTES # BLD AUTO: 1 K/UL (ref 1–4.8)
LYMPHOCYTES NFR BLD: 9.7 % (ref 22–41)
MANUAL DIFF BLD: NORMAL
MCH RBC QN AUTO: 19.8 PG (ref 27–33)
MCHC RBC AUTO-ENTMCNC: 26.4 G/DL (ref 32.2–35.5)
MCV RBC AUTO: 75.1 FL (ref 81.4–97.8)
MICRO URNS: ABNORMAL
MICROCYTES BLD QL SMEAR: ABNORMAL
MONOCYTES # BLD AUTO: 0.64 K/UL (ref 0–0.85)
MONOCYTES NFR BLD AUTO: 6.2 % (ref 0–13.4)
MORPHOLOGY BLD-IMP: NORMAL
NEUTROPHILS # BLD AUTO: 8.29 K/UL (ref 1.82–7.42)
NEUTROPHILS NFR BLD: 80.5 % (ref 44–72)
NITRITE UR QL STRIP.AUTO: NEGATIVE
NRBC # BLD AUTO: 0 K/UL
NRBC BLD-RTO: 0 /100 WBC (ref 0–0.2)
OVALOCYTES BLD QL SMEAR: NORMAL
PH UR STRIP.AUTO: 6 [PH] (ref 5–8)
PLATELET # BLD AUTO: 393 K/UL (ref 164–446)
PLATELET BLD QL SMEAR: NORMAL
PMV BLD AUTO: 9.6 FL (ref 9–12.9)
POIKILOCYTOSIS BLD QL SMEAR: NORMAL
POTASSIUM SERPL-SCNC: 4.5 MMOL/L (ref 3.6–5.5)
PROT UR QL STRIP: 30 MG/DL
RBC # BLD AUTO: 5.3 M/UL (ref 4.2–5.4)
RBC # URNS HPF: NORMAL /HPF (ref 0–2)
RBC BLD AUTO: PRESENT
RBC UR QL AUTO: NEGATIVE
SCHISTOCYTES BLD QL SMEAR: NORMAL
SODIUM SERPL-SCNC: 137 MMOL/L (ref 135–145)
SP GR UR STRIP.AUTO: 1.02
UROBILINOGEN UR STRIP.AUTO-MCNC: 0.2 EU/DL
WBC # BLD AUTO: 10.3 K/UL (ref 4.8–10.8)
WBC #/AREA URNS HPF: NORMAL /HPF

## 2025-05-01 PROCEDURE — 81001 URINALYSIS AUTO W/SCOPE: CPT

## 2025-05-01 PROCEDURE — 85007 BL SMEAR W/DIFF WBC COUNT: CPT

## 2025-05-01 PROCEDURE — 93010 ELECTROCARDIOGRAM REPORT: CPT | Performed by: INTERNAL MEDICINE

## 2025-05-01 PROCEDURE — 36415 COLL VENOUS BLD VENIPUNCTURE: CPT

## 2025-05-01 PROCEDURE — 80048 BASIC METABOLIC PNL TOTAL CA: CPT

## 2025-05-01 PROCEDURE — 83036 HEMOGLOBIN GLYCOSYLATED A1C: CPT

## 2025-05-01 PROCEDURE — 93005 ELECTROCARDIOGRAM TRACING: CPT | Mod: TC

## 2025-05-01 PROCEDURE — 87086 URINE CULTURE/COLONY COUNT: CPT

## 2025-05-01 PROCEDURE — 85027 COMPLETE CBC AUTOMATED: CPT

## 2025-05-01 NOTE — PREADMIT AVS NOTE
Current Medications   Medication Instructions    oxybutynin SR (DITROPAN-XL) 10 MG CR tablet Continue taking medication as prescribed, including morning of procedure     irbesartan (AVAPRO) 300 MG Tab Stop 24 hours before surgery    Zinc Sulfate (ZINC 15 PO) Stop 7 days before surgery    benzonatate (TESSALON) 100 MG Cap Continue taking medication as prescribed, including morning of procedure     rosuvastatin (CRESTOR) 20 MG Tab Continue taking as prescribed.    metoprolol SR (TOPROL XL) 50 MG TABLET SR 24 HR Continue taking as prescribed.    Empagliflozin (JARDIANCE) 10 MG Tab tablet Stop 4 days before surgery    omeprazole (PRILOSEC) 20 MG delayed-release capsule Continue taking as prescribed.    vitamin D (VITAMIND D3) 1000 UNIT Tab Stop 7 days before surgery     
73

## 2025-05-01 NOTE — OR NURSING
RN pre admit tele appointment complete for surgery on 5/7/25.  Medication and fasting instructions given per anesthesia protocol.  Patient stated understanding of all instructions and had no further questions.  Patient rarely uses her MyChart, so AVS printed and is to be given to at testing appointment on 5/1/25

## 2025-05-03 LAB
BACTERIA UR CULT: NORMAL
SIGNIFICANT IND 70042: NORMAL
SITE SITE: NORMAL
SOURCE SOURCE: NORMAL

## 2025-05-07 ENCOUNTER — ANESTHESIA EVENT (OUTPATIENT)
Dept: SURGERY | Facility: MEDICAL CENTER | Age: 70
DRG: 658 | End: 2025-05-07
Payer: MEDICARE

## 2025-05-07 ENCOUNTER — HOSPITAL ENCOUNTER (INPATIENT)
Facility: MEDICAL CENTER | Age: 70
LOS: 3 days | DRG: 658 | End: 2025-05-10
Attending: UROLOGY | Admitting: UROLOGY
Payer: MEDICARE

## 2025-05-07 ENCOUNTER — ANESTHESIA (OUTPATIENT)
Dept: SURGERY | Facility: MEDICAL CENTER | Age: 70
DRG: 658 | End: 2025-05-07
Payer: MEDICARE

## 2025-05-07 LAB
ABO GROUP BLD: ABNORMAL
ANION GAP SERPL CALC-SCNC: 7 MMOL/L (ref 7–16)
BLD GP AB INVEST PLASRBC-IMP: ABNORMAL
BLD GP AB SCN SERPL QL: ABNORMAL
BUN SERPL-MCNC: 29 MG/DL (ref 8–22)
CALCIUM SERPL-MCNC: 10.3 MG/DL (ref 8.5–10.5)
CHLORIDE SERPL-SCNC: 106 MMOL/L (ref 96–112)
CO2 SERPL-SCNC: 23 MMOL/L (ref 20–33)
CREAT SERPL-MCNC: 1.34 MG/DL (ref 0.5–1.4)
DAT C3D-SP REAG RBC QL: ABNORMAL
DAT IGG-SP REAG RBC QL: ABNORMAL
GFR SERPLBLD CREATININE-BSD FMLA CKD-EPI: 42 ML/MIN/1.73 M 2
GLUCOSE BLD STRIP.AUTO-MCNC: 143 MG/DL (ref 65–99)
GLUCOSE BLD STRIP.AUTO-MCNC: 171 MG/DL (ref 65–99)
GLUCOSE BLD STRIP.AUTO-MCNC: 200 MG/DL (ref 65–99)
GLUCOSE SERPL-MCNC: 271 MG/DL (ref 65–99)
HCT VFR BLD AUTO: 39.6 % (ref 37–47)
HGB BLD-MCNC: 11.2 G/DL (ref 12–16)
MCH RBC QN AUTO: 21.7 PG (ref 27–33)
MCHC RBC AUTO-ENTMCNC: 28.3 G/DL (ref 32.2–35.5)
MCV RBC AUTO: 76.9 FL (ref 81.4–97.8)
PATHOLOGY CONSULT NOTE: NORMAL
PLATELET # BLD AUTO: 252 K/UL (ref 164–446)
PMV BLD AUTO: 9.4 FL (ref 9–12.9)
POTASSIUM SERPL-SCNC: 5.2 MMOL/L (ref 3.6–5.5)
RBC # BLD AUTO: 5.15 M/UL (ref 4.2–5.4)
RH BLD: ABNORMAL
SODIUM SERPL-SCNC: 136 MMOL/L (ref 135–145)
WBC # BLD AUTO: 9.6 K/UL (ref 4.8–10.8)

## 2025-05-07 PROCEDURE — 770001 HCHG ROOM/CARE - MED/SURG/GYN PRIV*

## 2025-05-07 PROCEDURE — 700101 HCHG RX REV CODE 250: Performed by: STUDENT IN AN ORGANIZED HEALTH CARE EDUCATION/TRAINING PROGRAM

## 2025-05-07 PROCEDURE — 700102 HCHG RX REV CODE 250 W/ 637 OVERRIDE(OP): Performed by: UROLOGY

## 2025-05-07 PROCEDURE — 86850 RBC ANTIBODY SCREEN: CPT

## 2025-05-07 PROCEDURE — 88307 TISSUE EXAM BY PATHOLOGIST: CPT | Mod: 26 | Performed by: PATHOLOGY

## 2025-05-07 PROCEDURE — 700105 HCHG RX REV CODE 258: Performed by: UROLOGY

## 2025-05-07 PROCEDURE — 160048 HCHG OR STATISTICAL LEVEL 1-5: Performed by: UROLOGY

## 2025-05-07 PROCEDURE — 0TT04ZZ RESECTION OF RIGHT KIDNEY, PERCUTANEOUS ENDOSCOPIC APPROACH: ICD-10-PCS | Performed by: UROLOGY

## 2025-05-07 PROCEDURE — 85027 COMPLETE CBC AUTOMATED: CPT

## 2025-05-07 PROCEDURE — 36415 COLL VENOUS BLD VENIPUNCTURE: CPT

## 2025-05-07 PROCEDURE — 160009 HCHG ANES TIME/MIN: Performed by: UROLOGY

## 2025-05-07 PROCEDURE — 700111 HCHG RX REV CODE 636 W/ 250 OVERRIDE (IP): Mod: JZ | Performed by: UROLOGY

## 2025-05-07 PROCEDURE — A9270 NON-COVERED ITEM OR SERVICE: HCPCS | Performed by: STUDENT IN AN ORGANIZED HEALTH CARE EDUCATION/TRAINING PROGRAM

## 2025-05-07 PROCEDURE — 86901 BLOOD TYPING SEROLOGIC RH(D): CPT

## 2025-05-07 PROCEDURE — 160035 HCHG PACU - 1ST 60 MINS PHASE I: Performed by: UROLOGY

## 2025-05-07 PROCEDURE — 700111 HCHG RX REV CODE 636 W/ 250 OVERRIDE (IP): Mod: JZ | Performed by: STUDENT IN AN ORGANIZED HEALTH CARE EDUCATION/TRAINING PROGRAM

## 2025-05-07 PROCEDURE — 160042 HCHG SURGERY MINUTES - EA ADDL 1 MIN LEVEL 5: Performed by: UROLOGY

## 2025-05-07 PROCEDURE — 160015 HCHG STAT PREOP MINUTES: Performed by: UROLOGY

## 2025-05-07 PROCEDURE — 700111 HCHG RX REV CODE 636 W/ 250 OVERRIDE (IP): Performed by: STUDENT IN AN ORGANIZED HEALTH CARE EDUCATION/TRAINING PROGRAM

## 2025-05-07 PROCEDURE — 8E0W4CZ ROBOTIC ASSISTED PROCEDURE OF TRUNK REGION, PERCUTANEOUS ENDOSCOPIC APPROACH: ICD-10-PCS | Performed by: UROLOGY

## 2025-05-07 PROCEDURE — 700101 HCHG RX REV CODE 250: Performed by: UROLOGY

## 2025-05-07 PROCEDURE — 80048 BASIC METABOLIC PNL TOTAL CA: CPT

## 2025-05-07 PROCEDURE — 160002 HCHG RECOVERY MINUTES (STAT): Performed by: UROLOGY

## 2025-05-07 PROCEDURE — 86880 COOMBS TEST DIRECT: CPT | Mod: 91

## 2025-05-07 PROCEDURE — 160031 HCHG SURGERY MINUTES - 1ST 30 MINS LEVEL 5: Performed by: UROLOGY

## 2025-05-07 PROCEDURE — 88307 TISSUE EXAM BY PATHOLOGIST: CPT | Performed by: PATHOLOGY

## 2025-05-07 PROCEDURE — 82962 GLUCOSE BLOOD TEST: CPT

## 2025-05-07 PROCEDURE — 502714 HCHG ROBOTIC SURGERY SERVICES: Performed by: UROLOGY

## 2025-05-07 PROCEDURE — 86870 RBC ANTIBODY IDENTIFICATION: CPT

## 2025-05-07 PROCEDURE — A9270 NON-COVERED ITEM OR SERVICE: HCPCS | Performed by: UROLOGY

## 2025-05-07 PROCEDURE — 700102 HCHG RX REV CODE 250 W/ 637 OVERRIDE(OP): Performed by: STUDENT IN AN ORGANIZED HEALTH CARE EDUCATION/TRAINING PROGRAM

## 2025-05-07 PROCEDURE — 86900 BLOOD TYPING SEROLOGIC ABO: CPT

## 2025-05-07 RX ORDER — SCOPOLAMINE 1 MG/3D
1 PATCH, EXTENDED RELEASE TRANSDERMAL
Status: COMPLETED | OUTPATIENT
Start: 2025-05-07 | End: 2025-05-10

## 2025-05-07 RX ORDER — HYDROMORPHONE HYDROCHLORIDE 1 MG/ML
0.1 INJECTION, SOLUTION INTRAMUSCULAR; INTRAVENOUS; SUBCUTANEOUS
Status: DISCONTINUED | OUTPATIENT
Start: 2025-05-07 | End: 2025-05-07 | Stop reason: HOSPADM

## 2025-05-07 RX ORDER — BUPIVACAINE HYDROCHLORIDE AND EPINEPHRINE 2.5; 5 MG/ML; UG/ML
INJECTION, SOLUTION EPIDURAL; INFILTRATION; INTRACAUDAL; PERINEURAL
Status: DISCONTINUED | OUTPATIENT
Start: 2025-05-07 | End: 2025-05-07 | Stop reason: HOSPADM

## 2025-05-07 RX ORDER — EPHEDRINE SULFATE 50 MG/ML
5 INJECTION, SOLUTION INTRAVENOUS
Status: DISCONTINUED | OUTPATIENT
Start: 2025-05-07 | End: 2025-05-07 | Stop reason: HOSPADM

## 2025-05-07 RX ORDER — HYDROMORPHONE HYDROCHLORIDE 2 MG/ML
INJECTION, SOLUTION INTRAMUSCULAR; INTRAVENOUS; SUBCUTANEOUS PRN
Status: DISCONTINUED | OUTPATIENT
Start: 2025-05-07 | End: 2025-05-07 | Stop reason: SURG

## 2025-05-07 RX ORDER — HALOPERIDOL 5 MG/ML
1 INJECTION INTRAMUSCULAR
Status: DISCONTINUED | OUTPATIENT
Start: 2025-05-07 | End: 2025-05-07 | Stop reason: HOSPADM

## 2025-05-07 RX ORDER — MIDAZOLAM HYDROCHLORIDE 1 MG/ML
INJECTION INTRAMUSCULAR; INTRAVENOUS PRN
Status: DISCONTINUED | OUTPATIENT
Start: 2025-05-07 | End: 2025-05-07 | Stop reason: SURG

## 2025-05-07 RX ORDER — OXYCODONE HYDROCHLORIDE 5 MG/1
2.5 TABLET ORAL EVERY 6 HOURS PRN
Status: DISCONTINUED | OUTPATIENT
Start: 2025-05-07 | End: 2025-05-10 | Stop reason: HOSPADM

## 2025-05-07 RX ORDER — KETAMINE HYDROCHLORIDE 50 MG/ML
INJECTION, SOLUTION, CONCENTRATE INTRAMUSCULAR; INTRAVENOUS PRN
Status: DISCONTINUED | OUTPATIENT
Start: 2025-05-07 | End: 2025-05-07 | Stop reason: SURG

## 2025-05-07 RX ORDER — ALBUTEROL SULFATE 5 MG/ML
2.5 SOLUTION RESPIRATORY (INHALATION)
Status: DISCONTINUED | OUTPATIENT
Start: 2025-05-07 | End: 2025-05-07 | Stop reason: HOSPADM

## 2025-05-07 RX ORDER — CEFAZOLIN SODIUM 1 G/3ML
INJECTION, POWDER, FOR SOLUTION INTRAMUSCULAR; INTRAVENOUS PRN
Status: DISCONTINUED | OUTPATIENT
Start: 2025-05-07 | End: 2025-05-07 | Stop reason: SURG

## 2025-05-07 RX ORDER — IRBESARTAN 150 MG/1
300 TABLET ORAL EVERY EVENING
Status: DISCONTINUED | OUTPATIENT
Start: 2025-05-07 | End: 2025-05-10 | Stop reason: HOSPADM

## 2025-05-07 RX ORDER — LIDOCAINE HYDROCHLORIDE 20 MG/ML
INJECTION, SOLUTION EPIDURAL; INFILTRATION; INTRACAUDAL; PERINEURAL PRN
Status: DISCONTINUED | OUTPATIENT
Start: 2025-05-07 | End: 2025-05-07 | Stop reason: SURG

## 2025-05-07 RX ORDER — KETOROLAC TROMETHAMINE 15 MG/ML
INJECTION, SOLUTION INTRAMUSCULAR; INTRAVENOUS PRN
Status: DISCONTINUED | OUTPATIENT
Start: 2025-05-07 | End: 2025-05-07 | Stop reason: SURG

## 2025-05-07 RX ORDER — HEPARIN SODIUM 5000 [USP'U]/ML
5000 INJECTION, SOLUTION INTRAVENOUS; SUBCUTANEOUS EVERY 8 HOURS
Status: DISCONTINUED | OUTPATIENT
Start: 2025-05-07 | End: 2025-05-10 | Stop reason: HOSPADM

## 2025-05-07 RX ORDER — DEXMEDETOMIDINE HYDROCHLORIDE 100 UG/ML
INJECTION, SOLUTION INTRAVENOUS PRN
Status: DISCONTINUED | OUTPATIENT
Start: 2025-05-07 | End: 2025-05-07 | Stop reason: SURG

## 2025-05-07 RX ORDER — DEXAMETHASONE SODIUM PHOSPHATE 4 MG/ML
INJECTION, SOLUTION INTRA-ARTICULAR; INTRALESIONAL; INTRAMUSCULAR; INTRAVENOUS; SOFT TISSUE PRN
Status: DISCONTINUED | OUTPATIENT
Start: 2025-05-07 | End: 2025-05-07 | Stop reason: SURG

## 2025-05-07 RX ORDER — POLYETHYLENE GLYCOL 3350 17 G/17G
1 POWDER, FOR SOLUTION ORAL DAILY
Status: DISCONTINUED | OUTPATIENT
Start: 2025-05-08 | End: 2025-05-10 | Stop reason: HOSPADM

## 2025-05-07 RX ORDER — LABETALOL HYDROCHLORIDE 5 MG/ML
5 INJECTION, SOLUTION INTRAVENOUS
Status: DISCONTINUED | OUTPATIENT
Start: 2025-05-07 | End: 2025-05-07 | Stop reason: HOSPADM

## 2025-05-07 RX ORDER — AMOXICILLIN 250 MG
2 CAPSULE ORAL EVERY EVENING
Status: DISCONTINUED | OUTPATIENT
Start: 2025-05-07 | End: 2025-05-10 | Stop reason: HOSPADM

## 2025-05-07 RX ORDER — ACETAMINOPHEN 500 MG
1000 TABLET ORAL EVERY 8 HOURS
Status: COMPLETED | OUTPATIENT
Start: 2025-05-07 | End: 2025-05-09

## 2025-05-07 RX ORDER — OMEPRAZOLE 20 MG/1
20 CAPSULE, DELAYED RELEASE ORAL 2 TIMES DAILY
Status: DISCONTINUED | OUTPATIENT
Start: 2025-05-07 | End: 2025-05-10 | Stop reason: HOSPADM

## 2025-05-07 RX ORDER — DEXTROSE MONOHYDRATE 25 G/50ML
25 INJECTION, SOLUTION INTRAVENOUS
Status: DISCONTINUED | OUTPATIENT
Start: 2025-05-07 | End: 2025-05-10 | Stop reason: HOSPADM

## 2025-05-07 RX ORDER — OXYBUTYNIN CHLORIDE 5 MG/1
10 TABLET, EXTENDED RELEASE ORAL DAILY
Status: DISCONTINUED | OUTPATIENT
Start: 2025-05-07 | End: 2025-05-10 | Stop reason: HOSPADM

## 2025-05-07 RX ORDER — DIPHENHYDRAMINE HYDROCHLORIDE 50 MG/ML
12.5 INJECTION, SOLUTION INTRAMUSCULAR; INTRAVENOUS
Status: DISCONTINUED | OUTPATIENT
Start: 2025-05-07 | End: 2025-05-07 | Stop reason: HOSPADM

## 2025-05-07 RX ORDER — HYDROMORPHONE HYDROCHLORIDE 1 MG/ML
0.5 INJECTION, SOLUTION INTRAMUSCULAR; INTRAVENOUS; SUBCUTANEOUS EVERY 4 HOURS PRN
Status: DISCONTINUED | OUTPATIENT
Start: 2025-05-07 | End: 2025-05-10 | Stop reason: HOSPADM

## 2025-05-07 RX ORDER — OXYCODONE HYDROCHLORIDE 5 MG/1
5 TABLET ORAL EVERY 6 HOURS PRN
Status: DISCONTINUED | OUTPATIENT
Start: 2025-05-07 | End: 2025-05-10 | Stop reason: HOSPADM

## 2025-05-07 RX ORDER — HYDROMORPHONE HYDROCHLORIDE 1 MG/ML
0.2 INJECTION, SOLUTION INTRAMUSCULAR; INTRAVENOUS; SUBCUTANEOUS
Status: DISCONTINUED | OUTPATIENT
Start: 2025-05-07 | End: 2025-05-07 | Stop reason: HOSPADM

## 2025-05-07 RX ORDER — ONDANSETRON 2 MG/ML
4 INJECTION INTRAMUSCULAR; INTRAVENOUS
Status: DISCONTINUED | OUTPATIENT
Start: 2025-05-07 | End: 2025-05-07 | Stop reason: HOSPADM

## 2025-05-07 RX ORDER — LABETALOL HYDROCHLORIDE 5 MG/ML
INJECTION, SOLUTION INTRAVENOUS PRN
Status: DISCONTINUED | OUTPATIENT
Start: 2025-05-07 | End: 2025-05-07 | Stop reason: SURG

## 2025-05-07 RX ORDER — CEFAZOLIN SODIUM 1 G/50ML
1 INJECTION, SOLUTION INTRAVENOUS EVERY 8 HOURS
Status: COMPLETED | OUTPATIENT
Start: 2025-05-07 | End: 2025-05-08

## 2025-05-07 RX ORDER — BENZONATATE 100 MG/1
200 CAPSULE ORAL 2 TIMES DAILY PRN
Status: DISCONTINUED | OUTPATIENT
Start: 2025-05-07 | End: 2025-05-10 | Stop reason: HOSPADM

## 2025-05-07 RX ORDER — ACETAMINOPHEN 500 MG
1000 TABLET ORAL ONCE
Status: COMPLETED | OUTPATIENT
Start: 2025-05-07 | End: 2025-05-07

## 2025-05-07 RX ORDER — LIDOCAINE HYDROCHLORIDE 20 MG/ML
JELLY TOPICAL PRN
Status: DISCONTINUED | OUTPATIENT
Start: 2025-05-07 | End: 2025-05-07 | Stop reason: SURG

## 2025-05-07 RX ORDER — SODIUM CHLORIDE 9 MG/ML
INJECTION, SOLUTION INTRAVENOUS CONTINUOUS
Status: ACTIVE | OUTPATIENT
Start: 2025-05-07 | End: 2025-05-08

## 2025-05-07 RX ORDER — HEPARIN SODIUM 5000 [USP'U]/ML
INJECTION, SOLUTION INTRAVENOUS; SUBCUTANEOUS
Status: DISCONTINUED | OUTPATIENT
Start: 2025-05-07 | End: 2025-05-07 | Stop reason: HOSPADM

## 2025-05-07 RX ORDER — SODIUM CHLORIDE, SODIUM LACTATE, POTASSIUM CHLORIDE, CALCIUM CHLORIDE 600; 310; 30; 20 MG/100ML; MG/100ML; MG/100ML; MG/100ML
INJECTION, SOLUTION INTRAVENOUS CONTINUOUS
Status: ACTIVE | OUTPATIENT
Start: 2025-05-07 | End: 2025-05-07

## 2025-05-07 RX ORDER — ONDANSETRON 2 MG/ML
4 INJECTION INTRAMUSCULAR; INTRAVENOUS EVERY 4 HOURS PRN
Status: DISCONTINUED | OUTPATIENT
Start: 2025-05-07 | End: 2025-05-10 | Stop reason: HOSPADM

## 2025-05-07 RX ORDER — METOPROLOL SUCCINATE 50 MG/1
50 TABLET, EXTENDED RELEASE ORAL DAILY
Status: DISCONTINUED | OUTPATIENT
Start: 2025-05-08 | End: 2025-05-09

## 2025-05-07 RX ORDER — ONDANSETRON 2 MG/ML
INJECTION INTRAMUSCULAR; INTRAVENOUS PRN
Status: DISCONTINUED | OUTPATIENT
Start: 2025-05-07 | End: 2025-05-07 | Stop reason: SURG

## 2025-05-07 RX ORDER — ROCURONIUM BROMIDE 10 MG/ML
INJECTION, SOLUTION INTRAVENOUS PRN
Status: DISCONTINUED | OUTPATIENT
Start: 2025-05-07 | End: 2025-05-07 | Stop reason: SURG

## 2025-05-07 RX ORDER — INSULIN LISPRO 100 [IU]/ML
2-9 INJECTION, SOLUTION INTRAVENOUS; SUBCUTANEOUS
Status: DISCONTINUED | OUTPATIENT
Start: 2025-05-07 | End: 2025-05-10 | Stop reason: HOSPADM

## 2025-05-07 RX ORDER — HYDRALAZINE HYDROCHLORIDE 20 MG/ML
5 INJECTION INTRAMUSCULAR; INTRAVENOUS
Status: DISCONTINUED | OUTPATIENT
Start: 2025-05-07 | End: 2025-05-07 | Stop reason: HOSPADM

## 2025-05-07 RX ORDER — ACETAMINOPHEN 500 MG
1000 TABLET ORAL EVERY 8 HOURS PRN
Status: DISCONTINUED | OUTPATIENT
Start: 2025-05-09 | End: 2025-05-10 | Stop reason: HOSPADM

## 2025-05-07 RX ORDER — HYDROMORPHONE HYDROCHLORIDE 1 MG/ML
0.4 INJECTION, SOLUTION INTRAMUSCULAR; INTRAVENOUS; SUBCUTANEOUS
Status: DISCONTINUED | OUTPATIENT
Start: 2025-05-07 | End: 2025-05-07 | Stop reason: HOSPADM

## 2025-05-07 RX ORDER — OXYCODONE HCL 5 MG/5 ML
10 SOLUTION, ORAL ORAL
Status: DISCONTINUED | OUTPATIENT
Start: 2025-05-07 | End: 2025-05-07 | Stop reason: HOSPADM

## 2025-05-07 RX ORDER — OXYCODONE HCL 5 MG/5 ML
5 SOLUTION, ORAL ORAL
Status: DISCONTINUED | OUTPATIENT
Start: 2025-05-07 | End: 2025-05-07 | Stop reason: HOSPADM

## 2025-05-07 RX ADMIN — ACETAMINOPHEN 1000 MG: 500 TABLET ORAL at 21:44

## 2025-05-07 RX ADMIN — PROPOFOL 100 MG: 10 INJECTION, EMULSION INTRAVENOUS at 07:38

## 2025-05-07 RX ADMIN — ROCURONIUM BROMIDE 20 MG: 10 INJECTION INTRAVENOUS at 09:37

## 2025-05-07 RX ADMIN — HYDRALAZINE HYDROCHLORIDE 5 MG: 20 INJECTION INTRAMUSCULAR; INTRAVENOUS at 14:46

## 2025-05-07 RX ADMIN — LIDOCAINE HYDROCHLORIDE 80 MG: 20 INJECTION, SOLUTION EPIDURAL; INFILTRATION; INTRACAUDAL; PERINEURAL at 07:38

## 2025-05-07 RX ADMIN — KETOROLAC TROMETHAMINE 15 MG: 15 INJECTION, SOLUTION INTRAMUSCULAR; INTRAVENOUS at 09:58

## 2025-05-07 RX ADMIN — CEFAZOLIN 2 G: 1 INJECTION, POWDER, FOR SOLUTION INTRAMUSCULAR; INTRAVENOUS at 07:32

## 2025-05-07 RX ADMIN — DEXAMETHASONE SODIUM PHOSPHATE 8 MG: 4 INJECTION INTRA-ARTICULAR; INTRALESIONAL; INTRAMUSCULAR; INTRAVENOUS; SOFT TISSUE at 07:38

## 2025-05-07 RX ADMIN — SENNOSIDES AND DOCUSATE SODIUM 2 TABLET: 50; 8.6 TABLET ORAL at 17:29

## 2025-05-07 RX ADMIN — SCOPOLAMINE 1 PATCH: 1.5 PATCH, EXTENDED RELEASE TRANSDERMAL at 07:07

## 2025-05-07 RX ADMIN — ACETAMINOPHEN 1000 MG: 500 TABLET ORAL at 17:28

## 2025-05-07 RX ADMIN — SODIUM CHLORIDE, POTASSIUM CHLORIDE, SODIUM LACTATE AND CALCIUM CHLORIDE: 600; 310; 30; 20 INJECTION, SOLUTION INTRAVENOUS at 07:08

## 2025-05-07 RX ADMIN — Medication 30 MG: at 07:38

## 2025-05-07 RX ADMIN — INSULIN LISPRO 2 UNITS: 100 INJECTION, SOLUTION INTRAVENOUS; SUBCUTANEOUS at 17:38

## 2025-05-07 RX ADMIN — HEPARIN SODIUM 5000 UNITS: 5000 INJECTION, SOLUTION INTRAVENOUS; SUBCUTANEOUS at 21:45

## 2025-05-07 RX ADMIN — ROCURONIUM BROMIDE 20 MG: 10 INJECTION INTRAVENOUS at 09:01

## 2025-05-07 RX ADMIN — CEFAZOLIN SODIUM 1 G: 1 INJECTION, SOLUTION INTRAVENOUS at 17:34

## 2025-05-07 RX ADMIN — DEXMEDETOMIDINE 5 MCG: 100 INJECTION, SOLUTION INTRAVENOUS at 08:29

## 2025-05-07 RX ADMIN — HYDRALAZINE HYDROCHLORIDE 5 MG: 20 INJECTION INTRAMUSCULAR; INTRAVENOUS at 13:51

## 2025-05-07 RX ADMIN — HYDRALAZINE HYDROCHLORIDE 5 MG: 20 INJECTION INTRAMUSCULAR; INTRAVENOUS at 14:01

## 2025-05-07 RX ADMIN — MIDAZOLAM HYDROCHLORIDE 1 MG: 1 INJECTION, SOLUTION INTRAMUSCULAR; INTRAVENOUS at 07:32

## 2025-05-07 RX ADMIN — ACETAMINOPHEN 1000 MG: 500 TABLET ORAL at 07:08

## 2025-05-07 RX ADMIN — SUGAMMADEX 200 MG: 100 INJECTION, SOLUTION INTRAVENOUS at 09:58

## 2025-05-07 RX ADMIN — CEFAZOLIN SODIUM 1 G: 1 INJECTION, SOLUTION INTRAVENOUS at 21:52

## 2025-05-07 RX ADMIN — HYDROMORPHONE HYDROCHLORIDE 0.5 MG: 2 INJECTION INTRAMUSCULAR; INTRAVENOUS; SUBCUTANEOUS at 09:37

## 2025-05-07 RX ADMIN — Medication 20 MG: at 08:29

## 2025-05-07 RX ADMIN — DEXMEDETOMIDINE 20 MCG: 100 INJECTION, SOLUTION INTRAVENOUS at 07:38

## 2025-05-07 RX ADMIN — SODIUM CHLORIDE: 9 INJECTION, SOLUTION INTRAVENOUS at 17:33

## 2025-05-07 RX ADMIN — FENTANYL CITRATE 100 MCG: 50 INJECTION, SOLUTION INTRAMUSCULAR; INTRAVENOUS at 07:38

## 2025-05-07 RX ADMIN — PROPOFOL 20 MG: 10 INJECTION, EMULSION INTRAVENOUS at 09:58

## 2025-05-07 RX ADMIN — ROCURONIUM BROMIDE 20 MG: 10 INJECTION INTRAVENOUS at 08:29

## 2025-05-07 RX ADMIN — LABETALOL HYDROCHLORIDE 5 MG: 5 INJECTION, SOLUTION INTRAVENOUS at 10:05

## 2025-05-07 RX ADMIN — INSULIN LISPRO 2 UNITS: 100 INJECTION, SOLUTION INTRAVENOUS; SUBCUTANEOUS at 21:56

## 2025-05-07 RX ADMIN — LIDOCAINE HYDROCHLORIDE 1 ML: 20 JELLY TOPICAL at 07:38

## 2025-05-07 RX ADMIN — IRBESARTAN 300 MG: 150 TABLET ORAL at 17:40

## 2025-05-07 RX ADMIN — ROCURONIUM BROMIDE 60 MG: 10 INJECTION INTRAVENOUS at 07:38

## 2025-05-07 RX ADMIN — HYDROMORPHONE HYDROCHLORIDE 0.5 MG: 2 INJECTION INTRAMUSCULAR; INTRAVENOUS; SUBCUTANEOUS at 08:29

## 2025-05-07 RX ADMIN — OMEPRAZOLE 20 MG: 20 CAPSULE, DELAYED RELEASE ORAL at 17:28

## 2025-05-07 RX ADMIN — OXYCODONE 5 MG: 5 TABLET ORAL at 23:31

## 2025-05-07 RX ADMIN — ONDANSETRON 4 MG: 2 INJECTION INTRAMUSCULAR; INTRAVENOUS at 09:58

## 2025-05-07 RX ADMIN — OXYCODONE 5 MG: 5 TABLET ORAL at 17:27

## 2025-05-07 RX ADMIN — OXYBUTYNIN CHLORIDE 10 MG: 5 TABLET, EXTENDED RELEASE ORAL at 17:28

## 2025-05-07 SDOH — ECONOMIC STABILITY: TRANSPORTATION INSECURITY
IN THE PAST 12 MONTHS, HAS THE LACK OF TRANSPORTATION KEPT YOU FROM MEDICAL APPOINTMENTS OR FROM GETTING MEDICATIONS?: NO

## 2025-05-07 SDOH — ECONOMIC STABILITY: TRANSPORTATION INSECURITY
IN THE PAST 12 MONTHS, HAS LACK OF RELIABLE TRANSPORTATION KEPT YOU FROM MEDICAL APPOINTMENTS, MEETINGS, WORK OR FROM GETTING THINGS NEEDED FOR DAILY LIVING?: NO

## 2025-05-07 ASSESSMENT — PATIENT HEALTH QUESTIONNAIRE - PHQ9
2. FEELING DOWN, DEPRESSED, IRRITABLE, OR HOPELESS: NOT AT ALL
1. LITTLE INTEREST OR PLEASURE IN DOING THINGS: NOT AT ALL
SUM OF ALL RESPONSES TO PHQ9 QUESTIONS 1 AND 2: 0

## 2025-05-07 ASSESSMENT — LIFESTYLE VARIABLES
TOTAL SCORE: 0
DOES PATIENT WANT TO STOP DRINKING: NO
AVERAGE NUMBER OF DAYS PER WEEK YOU HAVE A DRINK CONTAINING ALCOHOL: 0
ON A TYPICAL DAY WHEN YOU DRINK ALCOHOL HOW MANY DRINKS DO YOU HAVE: 1
CONSUMPTION TOTAL: NEGATIVE
HAVE PEOPLE ANNOYED YOU BY CRITICIZING YOUR DRINKING: NO
EVER HAD A DRINK FIRST THING IN THE MORNING TO STEADY YOUR NERVES TO GET RID OF A HANGOVER: NO
HOW MANY TIMES IN THE PAST YEAR HAVE YOU HAD 5 OR MORE DRINKS IN A DAY: 0
TOTAL SCORE: 0
ALCOHOL_USE: YES
EVER FELT BAD OR GUILTY ABOUT YOUR DRINKING: NO
HAVE YOU EVER FELT YOU SHOULD CUT DOWN ON YOUR DRINKING: NO
TOTAL SCORE: 0

## 2025-05-07 ASSESSMENT — COGNITIVE AND FUNCTIONAL STATUS - GENERAL
DAILY ACTIVITIY SCORE: 22
WALKING IN HOSPITAL ROOM: A LITTLE
TOILETING: A LITTLE
CLIMB 3 TO 5 STEPS WITH RAILING: A LITTLE
HELP NEEDED FOR BATHING: A LITTLE
MOBILITY SCORE: 22
SUGGESTED CMS G CODE MODIFIER MOBILITY: CJ
SUGGESTED CMS G CODE MODIFIER DAILY ACTIVITY: CJ

## 2025-05-07 ASSESSMENT — SOCIAL DETERMINANTS OF HEALTH (SDOH)

## 2025-05-07 ASSESSMENT — PAIN DESCRIPTION - PAIN TYPE
TYPE: ACUTE PAIN

## 2025-05-07 ASSESSMENT — FIBROSIS 4 INDEX: FIB4 SCORE: 0.75

## 2025-05-07 ASSESSMENT — PAIN SCALES - GENERAL: PAIN_LEVEL: 1

## 2025-05-07 NOTE — PROGRESS NOTES
Medication history reviewed with PT at bedside    [facility] is complete per PT reporting    Allergies reviewed.     Patient denies any outpatient antibiotics in the last 30 days.     Patient is not taking anticoagulants.    Dispense history is available in [employer].    Preferred pharmacy for this visit - [facility] on [address] ([phone])

## 2025-05-07 NOTE — PROGRESS NOTES
Pt arrives from OR to PACU at 1033. Pt identification verified by team, pt placed on all monitors with alarms audible, report and care of pt received from Anesthesiologist and RN. Assessment completed, pt changed into hospital gown and provided with warm blankets.    1222-family updated and brought to bedside to say hi  1345-belongings brought to bedside  1508-report called to Sol RN  1545-family updated  1550-Patient to floor with transport in stable condition. VSS. Surgical dressings clean dry intact. Aox4 and on 2 l O2. No further needs.

## 2025-05-07 NOTE — H&P
"No changes to below H&P .    To OR for robotic RIGHT radical nephrectomy.    Pre-op: SQH    BP (!) 159/74   Pulse 85   Temp 36.1 °C (97 °F) (Temporal)   Resp 18   Ht 1.676 m (5' 6\")   Wt 95 kg (209 lb 7 oz)   SpO2 93%   BMI 33.80 kg/m²     Family History   Problem Relation Age of Onset    Diabetes Mother     Cancer Mother         brain ca    Heart Disease Mother 45        cabg    Heart Disease Father         cabg and valve replacement    Cancer Sister         bone ca    Diabetes Sister     Diabetes Other         Constitutional:       Appearance: Normal appearance.   HENT:      Head: Atraumatic.      Nose: Nose normal.      Mouth/Throat:      Pharynx: Oropharynx is clear.   Eyes:      Extraocular Movements: Extraocular movements intact.   Cardiovascular:      Pulses: Normal pulses.   Pulmonary:      Effort: Pulmonary effort is normal.   Abdominal:      General: Abdomen is flat.      Palpations: Abdomen is soft.   Genitourinary:     Deferred  Musculoskeletal:         General: Normal range of motion.      Cervical back: Normal range of motion.   Skin:     General: Skin is warm.   Neurological:      General: No focal deficit present.      Mental Status: He is alert.   Psychiatric:         Mood and Affect: Mood normal.     _______________________  Valentin Kilpatrick MD  Urologic Surgical Oncology  [facility]     ---  follow up 15, 05-  Follow up with Dr. Kilpatrick in 2 weeks to review findings- PER ARELI- AN 4/17/25  Performed by Valentin Kilpatrick MD, Urology, [phone]  Scribed by yandel  Reason for Visit  kidney cancer  Assessment & Plan  Visit complexity inherent to evaluation and management associated with medical care services that serve as the continuing focal point for all needed health care services and/or with medical care services that are part of ongoing care related to a patient's single, serious condition or a complex condition.    L5 follow-up visit based on time spent (40 minutes), " more than half of which was spent directly with the patient and additionally included non-direct time for chart review, documentation, and coordination of care.    renal cell carcinoma  70yo F with stage 1 Renal cell carcinoma. CT (03/2023) with heterogenous solid inferior pole R renal mass measuring 4.6x.48cm. S/p R partial nephrectomy 2023 at another facility, pathology returned as stage 1 renal cell carcinoma.    CT (03/2025) depicted a 5.2cm enhancing mass again seen in the inferior pole of the R kidney abutting the inferior collecting system with some interval growth in size consistent concerning for residual versus new cancer in the R kidney. Unchanged lucent lesions in the L2 and L3 vertebral bodies dating back to 2023.    Based on the imaging characteristics, its size 5.2cm and prior diagnosis of RCC, there is an approximately 90% probability that the mass harbors cancer. There is also questionable but stable lesions in 2 areas of the lumbar spine. To confirm cancer diagnosis, I recommended biopsy of the R renal mass and further characterization of the lumbar lesions with MRI.     CT chest w/wo on 04/24/2025 and MRI lumbar spine w/wo on 4/30/2025 both depict no evidence of metastatic disease. 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     - I reviewed the results of CT, MRI, and CT guided biopsy. Provided reassurance that biopsy did demonstrate clear cell renal cell carcinoma but images did not demonstrate evidence of metastasis.  - She is scheduled for a robotic assisted laparoscopic right radical nephrectomy on 5/7/2025 and given organ confined disease this will be highly curative.   - She elected to proceed as scheduled with surgery.   mixed urinary incontinence  70yo F with BRUNO. Symptoms include incontinence and urgency. Managed with oxybutynin 10mg and kegels.   Received urinalysis, dipstick results  History of Present Illness  71 yo F referred  for mixed incontinence. Initial symptoms include leakage and urgency, using 2pads/day. Managed with oxybutynin 10mg and kegels. History of stage 1 Renal cell carcinoma. CT (2023) with heterogenous solid inferior pole R renal mass measuring 4.6x.48cm worrisome for renal cell carcinoma. Now s/p R partial nephrectomy  at [facility] [place]. CT (2025) now depicts enhancing mass again seen in the inferior pole of the R kidney abutting the inferior collection system with some interval growth in size consistent concerning for residual disease in the R kidney. No lymphadenopathy.     Patient presents today (2025) to review imaging. CT chest w/wo on 2025 and MRI lumbar spine w/wo on 2025 both depict no evidence of metastatic disease. CT guided needle biopsy on 2025. Pathology demonstrated clear cell renal cell carcinoma, ISUP grade 2. She is scheduled for a robotic assisted laparoscopic right radical nephrectomy on 2025.     Labs   2025: Cr 1.35, BUN 25, eGFR 42, HGB: 10.5, HCT: 39.8, HbA1c 5.4   2025: Cr: 1.15, BUN: 25, eGFR: 51, HGB: 6.1, HCT: 25.6   2023: Cr: 1.43, BUN: 33, eGFR: 40, HGB: 8.5, HCT: 31.2     Imagin2025: CT chest w/wo: No evidence of metastatic disease.   2025: CT A/P w/: enhancing mass again seen in the inferior pole of the R kidney abutting the inferior collection system with some interval growth in size consistent concerning for residual disease in the R kidney. No lymphadenopathy.  2023: CT A/P w/o: heterogenous solid inferior pole R renal mass measuring 4.6x4.8 cm worrisome for renal cell carcinoma   Review of Systems  ROS as noted in the HPI  Screening  None recorded  Physical Exam  UN - Problem focused exam - FEMALE

## 2025-05-07 NOTE — ANESTHESIA PROCEDURE NOTES
Airway    Date/Time: 5/7/2025 7:40 AM    Performed by: Juan F Villarreal M.D.  Authorized by: Juan F Villarreal M.D.    Location:  OR  Urgency:  Elective  Difficult Airway: No    Indications for Airway Management:  Anesthesia      Spontaneous Ventilation: absent    Sedation Level:  Deep  Preoxygenated: Yes    Patient Position:  Sniffing  Mask Difficulty Assessment:  0 - not attempted  Final Airway Type:  Endotracheal airway  Final Endotracheal Airway:  ETT  Cuffed: Yes    Technique Used for Successful ETT Placement:  Direct laryngoscopy    Insertion Site:  Oral  Blade Type:  Glide  Laryngoscope Blade/Videolaryngoscope Blade Size:  3  ETT Size (mm):  7.0  Measured from:  Teeth  ETT to Teeth (cm):  21  Placement Verified by: auscultation and capnometry    Cormack-Lehane Classification:  Grade I - full view of glottis  Number of Attempts at Approach:  1  Number of Other Approaches Attempted:  0

## 2025-05-07 NOTE — ANESTHESIA PREPROCEDURE EVALUATION
Case: [id number] Date/Time: 05/07/25 0715    Procedure: ROBOTIC ASSISTED RADICAL RIGHT NEPHRECTOMY    Pre-op diagnosis: RENAL CELL CARCINOMA    Location: [facility] OR 18 / SURGERY [facility]    Surgeons: Valentin MALIN M.D.            Relevant Problems   ANESTHESIA   (positive) Obstructive sleep apnea      CARDIAC   (positive) Hypertension associated with type 2 diabetes mellitus (HCC)   (positive) Old myocardial infarction      GI   (positive) Gastro-esophageal reflux disease without esophagitis         (positive) Microalbuminuria due to type 2 diabetes mellitus (HCC)   (positive) Renal cell cancer (HCC)   (positive) Stage 3a chronic kidney disease      ENDO   (positive) Type 2 diabetes mellitus with other specified complication (HCC)   (positive) Type 2 diabetes mellitus without complications (HCC)       Physical Exam    Airway   Mallampati: III  TM distance: <3 FB  Neck ROM: full    Comments: Narrow palate, prognathic    Cardiovascular - normal exam  Rhythm: regular  Rate: normal     Dental - normal exam           Pulmonary - normal exam  Breath sounds clear to auscultation     Abdominal   (+) obese     Neurological - normal exam                   Anesthesia Plan    ASA 3   ASA physical status 3 criteria: hypertension - poorly controlled and diabetes - poorly controlled    Plan - general       Airway plan will be ETT          Induction: intravenous    Postoperative Plan: Postoperative administration of opioids is intended.    Pertinent diagnostic labs and testing reviewed    Informed Consent:    Anesthetic plan and risks discussed with patient.    Use of blood products discussed with: patient whom consented to blood products.

## 2025-05-07 NOTE — PROGRESS NOTES
"Patient admitted to room T404-2 via transport in [facility] from PACU at 1603.  Pt BP (!) 140/74   Pulse 97   Temp 36.4 °C (97.5 °F) (Temporal)   Resp 16   Ht 1.676 m (5' 6\")   Wt 95 kg (209 lb 7 oz)   SpO2 97%   BMI 33.80 kg/m²    Patient reports pain at 4 on a scale of 0-10. Educated patient regarding pharmacologic and non pharmacologic modalities for pain management. Oriented to room call light and smoking policy.  Reviewed plan of care (equipment, incentive spirometer, sequential compression devices, medications, activity, diet, fall precautions, skin care, and pain) with patient and family. Welcome packet given and reviewed with patient, all questions answered. Education provided on oral hygiene program.    A&Ox4. Denies CP/SOB.  See 2 RN skin note  Tolerating full liquid diet. Denies N/V.  Last BM PTA.  X6 lap sites to abdomen, CDI.   Void via brooks.  All needs met at this time. Call light within reach. Pt calls appropriately. Bed low and locked, non skid socks in place. Hourly rounding in place.  "

## 2025-05-07 NOTE — OP REPORT
Full Operative Note    Patient Name: Shahana Nj    MRN: [id number]    Date of Surgery: 05/07/25    Pre-operative diagnosis: Right renal cell carcinoma   Post-operative diagnosis: Same    Procedure:  Robotic assisted right radical nephrectomy    Surgeon: Valentin MALIN M.D.    Assistant: Anna Jones PA-C    Anesthesiologist: Juan F Villarreal M.D.        Anesthesia Type: General    Estimated Blood Loss: <50 mL    Fluids in: Crystalloid only, see anesthesia records    Specimen: Right kidney    Drains:  1.  16-Tamazight urethral Jose catheter    Indications for Prodecure: The patient is a 70 y.o. female with a right renal cell carcinoma . She presents today for a robotic laparoscopic right radical nephrectomy. The risks, benefits and alternatives were explained to the patient in detail and she agreed to proceed. Consent was obtained.     Procedure Findings: There was a right renal mass arising from the inferior aspect of the kidney.  There  were no  associated parasitic vessels. The right adrenal gland was not removed.  The regional lymph nodes were unremarkable. Vasculature: 2 right renal arteries, 1 right renal vein.    Description of Procedure: The patient was taken to the operating room, placed in supine position on the operating table. General anesthesia was achieved. The patient was given preoperative antibiotics consisting of Ancef.  A 16-Tamazight urethral Jose catheter was placed under sterile conditions. The patient was then placed in the lateral decubitis position with the right side up. All pressure points were padded. An axillary roll was placed relaxed dorsal supine position and prepped and draped in usual sterile fashion. A surgical time out was performed.    Pneumoperitoneum was achieved via Veress needle. A 6-port configuration consisting of 3 ports for the robotic arms, 1 port for the camera, 1 port for the liver retractor, and 1 port for the assistant was achieved. The robot was  then docked. Evaluation of the intraperitoneal space revealed no evidence of unexpected pathology.      The ascending colon was mobilized medially by incising along the white line of Toldt.  The duodenum was kocherized to expose the medial kidney and hilum.     At the lower pole of the right kidney, the gonadal vein and ureter were identified without injury.  We applied anterior retraction to the kidney, which facilitated exposure and dissection of the renal hilar anatomy. The renal hilar vessels were skeletonized, with findings as per above. The right renal arterial supply was ligated and divided using a white vascular load 45 mm laparoscopic IRVIN stapler. The right renal venous supply was then ligated and divided using a white vascular load 45 mm laparoscopic IRVIN stapler.      The Vessel Sealer device was then used to divide the tissue superiorly with care to avoid injury to the adjacent adrenal gland. Attention was then turned to the lower pole and the associated tissue was ligated and divided using the Vessel Sealer. The ureter was divided between Hem-o-raquel weck clips. Finally, the lateral attachments were divided in a similar fashion with the Vessel Sealer. The kidney was completely mobilized and placed within a 15 mm Endocatch bag and the string moved to the lowest port site. The robotic instruments were removed and the robot was undocked. The lowest port incision was extended to 7 cm for a Rendon extraction site, the fascia incised, and the specimen and bag removed. The specimen was sent for permanent pathologic evaluation. The fascia of the extraction incision was then closed with two opposite running fashion 0 PDS that were tied together in the middle of the incision.     The abdomen was then re-insufflated and there was no evidence of a defect at the extraction site and no evidence of bowel injury near the extraction site. There was no bleeding during the period of desufflation and there was no evidence of  bleeding at the renal hilum. We placed Vistaseal over the region of the right adrenal gland the renal hilum.    The abdomen was desufflated. The ports were removed under direct vision. All wounds were copiously irrigated followed by infiltration with 0.25% Marcaine with epinephrine. The skin was closed in running subcuticular fashion with Monocryl. Sterile dressings were applied.     The patient was then returned to supine position, awakened in the operating room and transported to recovery in stable condition.  All needle, sponge, and instrument counts were correct on 2 counts.    Attestation: I was the attending for this case. I was present and participated for all aspects of the operation including insertion and removal of all endoscopic equipment. Please note that Anna Jones PA-C, assisted me with this complex procedure, given there was no qualified physician to assist at the bedside.    Plan: CBC and BMP in PACU. Robotic radical nephrectomy pathway, SQH, brooks out POD1, anticipate discharge home POD1.    _______________________  Valentin Kilpatrick MD  Urologic Surgical Oncology  [facility] [place]

## 2025-05-07 NOTE — ANESTHESIA POSTPROCEDURE EVALUATION
Patient: Shahana Nj    Procedure Summary       Date: 05/07/25 Room / Location: [place] [address] / SURGERY [facility]    Anesthesia Start: 0730 Anesthesia Stop: 1038    Procedure: RIGHT, ROBOTIC ASSISTED RADICAL NEPHRECTOMY (Right: Flank) Diagnosis: (RIGHT KIDNEY CANCER)    Surgeons: Valentin MALIN M.D. Responsible Provider: Juan F Villarreal M.D.    Anesthesia Type: general ASA Status: 3            Final Anesthesia Type: general  Last vitals  BP   Blood Pressure : 104/51    Temp   36.2 °C (97.2 °F)    Pulse   76   Resp   15    SpO2   98 %      Anesthesia Post Evaluation    Patient location during evaluation: PACU  Patient participation: complete - patient participated  Level of consciousness: awake and alert  Pain score: 1    Airway patency: patent  Anesthetic complications: no  Cardiovascular status: hemodynamically stable  Respiratory status: acceptable  Hydration status: euvolemic    PONV: none          There were no known notable events for this encounter.     Nurse Pain Score: 0 (NPRS)

## 2025-05-07 NOTE — ANESTHESIA TIME REPORT
Anesthesia Start and Stop Event Times       Date Time Event    5/7/2025 0707 Ready for Procedure     0730 Anesthesia Start     1038 Anesthesia Stop          Responsible Staff  05/07/25      Name Role Begin End    Juan F Villarreal M.D. Anesth 0730 1038          Overtime Reason:  no overtime (within assigned shift)    Comments:

## 2025-05-08 LAB
ANION GAP SERPL CALC-SCNC: 9 MMOL/L (ref 7–16)
ANISOCYTOSIS BLD QL SMEAR: ABNORMAL
BUN SERPL-MCNC: 29 MG/DL (ref 8–22)
CALCIUM SERPL-MCNC: 10.6 MG/DL (ref 8.5–10.5)
CHLORIDE SERPL-SCNC: 105 MMOL/L (ref 96–112)
CO2 SERPL-SCNC: 23 MMOL/L (ref 20–33)
CREAT SERPL-MCNC: 1.76 MG/DL (ref 0.5–1.4)
GFR SERPLBLD CREATININE-BSD FMLA CKD-EPI: 31 ML/MIN/1.73 M 2
GLUCOSE BLD STRIP.AUTO-MCNC: 127 MG/DL (ref 65–99)
GLUCOSE BLD STRIP.AUTO-MCNC: 127 MG/DL (ref 65–99)
GLUCOSE BLD STRIP.AUTO-MCNC: 153 MG/DL (ref 65–99)
GLUCOSE BLD STRIP.AUTO-MCNC: 156 MG/DL (ref 65–99)
GLUCOSE SERPL-MCNC: 165 MG/DL (ref 65–99)
HCT VFR BLD AUTO: 42.6 % (ref 37–47)
HGB BLD-MCNC: 11.8 G/DL (ref 12–16)
HYPOCHROMIA BLD QL SMEAR: ABNORMAL
MACROCYTES BLD QL SMEAR: ABNORMAL
MCH RBC QN AUTO: 21.6 PG (ref 27–33)
MCHC RBC AUTO-ENTMCNC: 27.9 G/DL (ref 32.2–35.5)
MCV RBC AUTO: 77.3 FL (ref 81.4–97.8)
MICROCYTES BLD QL SMEAR: ABNORMAL
MORPHOLOGY BLD-IMP: NORMAL
OVALOCYTES BLD QL SMEAR: ABNORMAL
PLATELET # BLD AUTO: 274 K/UL (ref 164–446)
PLATELET BLD QL SMEAR: NORMAL
POIKILOCYTOSIS BLD QL SMEAR: ABNORMAL
POTASSIUM SERPL-SCNC: 4.8 MMOL/L (ref 3.6–5.5)
RBC # BLD AUTO: 5.51 M/UL (ref 4.2–5.4)
RBC BLD AUTO: PRESENT
SCHISTOCYTES BLD QL SMEAR: ABNORMAL
SODIUM SERPL-SCNC: 137 MMOL/L (ref 135–145)
WBC # BLD AUTO: 16.1 K/UL (ref 4.8–10.8)

## 2025-05-08 PROCEDURE — 700102 HCHG RX REV CODE 250 W/ 637 OVERRIDE(OP): Performed by: UROLOGY

## 2025-05-08 PROCEDURE — 700105 HCHG RX REV CODE 258: Performed by: UROLOGY

## 2025-05-08 PROCEDURE — 700111 HCHG RX REV CODE 636 W/ 250 OVERRIDE (IP): Mod: JZ | Performed by: UROLOGY

## 2025-05-08 PROCEDURE — 80048 BASIC METABOLIC PNL TOTAL CA: CPT

## 2025-05-08 PROCEDURE — 36415 COLL VENOUS BLD VENIPUNCTURE: CPT

## 2025-05-08 PROCEDURE — 85027 COMPLETE CBC AUTOMATED: CPT

## 2025-05-08 PROCEDURE — A9270 NON-COVERED ITEM OR SERVICE: HCPCS | Performed by: UROLOGY

## 2025-05-08 PROCEDURE — 770001 HCHG ROOM/CARE - MED/SURG/GYN PRIV*

## 2025-05-08 PROCEDURE — 82962 GLUCOSE BLOOD TEST: CPT | Mod: 91

## 2025-05-08 RX ADMIN — ACETAMINOPHEN 1000 MG: 500 TABLET ORAL at 20:46

## 2025-05-08 RX ADMIN — OXYCODONE 5 MG: 5 TABLET ORAL at 12:43

## 2025-05-08 RX ADMIN — INSULIN LISPRO 2 UNITS: 100 INJECTION, SOLUTION INTRAVENOUS; SUBCUTANEOUS at 11:28

## 2025-05-08 RX ADMIN — OXYBUTYNIN CHLORIDE 10 MG: 5 TABLET, EXTENDED RELEASE ORAL at 05:28

## 2025-05-08 RX ADMIN — HEPARIN SODIUM 5000 UNITS: 5000 INJECTION, SOLUTION INTRAVENOUS; SUBCUTANEOUS at 20:47

## 2025-05-08 RX ADMIN — OXYCODONE 5 MG: 5 TABLET ORAL at 05:28

## 2025-05-08 RX ADMIN — HEPARIN SODIUM 5000 UNITS: 5000 INJECTION, SOLUTION INTRAVENOUS; SUBCUTANEOUS at 14:43

## 2025-05-08 RX ADMIN — ACETAMINOPHEN 1000 MG: 500 TABLET ORAL at 14:43

## 2025-05-08 RX ADMIN — CEFAZOLIN SODIUM 1 G: 1 INJECTION, SOLUTION INTRAVENOUS at 05:24

## 2025-05-08 RX ADMIN — INSULIN LISPRO 2 UNITS: 100 INJECTION, SOLUTION INTRAVENOUS; SUBCUTANEOUS at 17:34

## 2025-05-08 RX ADMIN — ACETAMINOPHEN 1000 MG: 500 TABLET ORAL at 05:26

## 2025-05-08 RX ADMIN — OXYCODONE 5 MG: 5 TABLET ORAL at 20:46

## 2025-05-08 RX ADMIN — METOPROLOL SUCCINATE 50 MG: 50 TABLET, EXTENDED RELEASE ORAL at 05:29

## 2025-05-08 RX ADMIN — POLYETHYLENE GLYCOL 3350 1 PACKET: 17 POWDER, FOR SOLUTION ORAL at 06:00

## 2025-05-08 RX ADMIN — OMEPRAZOLE 20 MG: 20 CAPSULE, DELAYED RELEASE ORAL at 06:00

## 2025-05-08 RX ADMIN — SENNOSIDES AND DOCUSATE SODIUM 2 TABLET: 50; 8.6 TABLET ORAL at 17:29

## 2025-05-08 RX ADMIN — OMEPRAZOLE 20 MG: 20 CAPSULE, DELAYED RELEASE ORAL at 17:29

## 2025-05-08 RX ADMIN — SODIUM CHLORIDE: 9 INJECTION, SOLUTION INTRAVENOUS at 05:23

## 2025-05-08 RX ADMIN — IRBESARTAN 300 MG: 150 TABLET ORAL at 17:29

## 2025-05-08 RX ADMIN — HEPARIN SODIUM 5000 UNITS: 5000 INJECTION, SOLUTION INTRAVENOUS; SUBCUTANEOUS at 05:29

## 2025-05-08 ASSESSMENT — PAIN DESCRIPTION - PAIN TYPE
TYPE: ACUTE PAIN

## 2025-05-08 NOTE — CARE PLAN
The patient is Stable - Low risk of patient condition declining or worsening    Shift Goals  Clinical Goals: pain control, IV ABX  Patient Goals: pain control, rest  Family Goals: POC    Progress made toward(s) clinical / shift goals:  IV ABX administered per MAR. Pain managed with prescribed medications.      Problem: Knowledge Deficit - Standard  Goal: Patient and family/care givers will demonstrate understanding of plan of care, disease process/condition, diagnostic tests and medications  Description: Target End Date:  1-3 days or as soon as patient condition allowsDocument in Patient Education1.  Patient and family/caregiver oriented to unit, equipment, visitation policy and means for communicating concern2.  Complete/review Learning Assessment3.  Assess knowledge level of disease process/condition, treatment plan, diagnostic tests and medications4.  Explain disease process/condition, treatment plan, diagnostic tests and medications  Outcome: Progressing     Problem: Pain - Standard  Goal: Alleviation of pain or a reduction in pain to the patient’s comfort goal  Description: Target End Date:  Prior to discharge or change in level of careDocument on Vitals flowsheet1.  Document pain using the appropriate pain scale per order or unit policy2.  Educate and implement non-pharmacologic comfort measures (i.e. relaxation, distraction, massage, cold/heat therapy, etc.)3.  Pain management medications as ordered4.  Reassess pain after pain med administration per policy5.  If opiods administered assess patient's response to pain medication is appropriate per POSS sedation scale6.  Follow pain management plan developed in collaboration with patient and interdisciplinary team (including palliative care or pain specialists if applicable)  Outcome: Progressing

## 2025-05-08 NOTE — CARE PLAN
The patient is Stable - Low risk of patient condition declining or worsening    Shift Goals  Clinical Goals: pain control, oob activity, patient will sit up in chair for all meals  Patient Goals: pain control, rest  Family Goals: POC    Progress made toward(s) clinical / shift goals:  Pain managed per MAR. Patient sat in chair for all meals.       Problem: Knowledge Deficit - Standard  Goal: Patient and family/care givers will demonstrate understanding of plan of care, disease process/condition, diagnostic tests and medications  Description: Target End Date:  1-3 days or as soon as patient condition allowsDocument in Patient Education1.  Patient and family/caregiver oriented to unit, equipment, visitation policy and means for communicating concern2.  Complete/review Learning Assessment3.  Assess knowledge level of disease process/condition, treatment plan, diagnostic tests and medications4.  Explain disease process/condition, treatment plan, diagnostic tests and medications  Outcome: Progressing     Problem: Pain - Standard  Goal: Alleviation of pain or a reduction in pain to the patient’s comfort goal  Description: Target End Date:  Prior to discharge or change in level of careDocument on Vitals flowsheet1.  Document pain using the appropriate pain scale per order or unit policy2.  Educate and implement non-pharmacologic comfort measures (i.e. relaxation, distraction, massage, cold/heat therapy, etc.)3.  Pain management medications as ordered4.  Reassess pain after pain med administration per policy5.  If opiods administered assess patient's response to pain medication is appropriate per POSS sedation scale6.  Follow pain management plan developed in collaboration with patient and interdisciplinary team (including palliative care or pain specialists if applicable)  Outcome: Progressing

## 2025-05-08 NOTE — PROGRESS NOTES
Received report from previous shift RN  Assessment complete.  A&O x 4. Patient calls appropriately.  Patient ambulates with SB assist.   Patient has 1/10 pain. Pain managed with prescribed medications.  Denies N&V. Tolerating diet.  X6 abd lap sites CDI   + void urine noted in brooks, Last BM PTA  Patient denies SOB.  Patient sitting up in bed.  Review plan with of care with patient. Call light and personal belongings with in reach. Hourly rounding in place. All needs met at this time.

## 2025-05-08 NOTE — PROGRESS NOTES
Progress Note    Reason for Admission  Right renal cell carcinoma     Admission Date  5/7/2025    CODE STATUS  Full Code    HPI & HOSPITAL COURSE  This is a 70 y.o. female here with history of right renal cell carcinoma who is now POD#1 s/p Robotic assisted right radical nephrectomy on 05/07/2025 by Dr. Kilpatrick.     Today, patient is sitting comfortably up to chair in NAD. She reports her pain is controlled, mild incisional pain. Tolerating a regular diet without N/V. She is working on ambulation with RN assistance. +flatus, -BM.     Patient c/o fatigue following surgery. Reviewed labs, increased WBC likely reactive. Creat 1.76, up from baseline 1.34, expected following surgery. AFVSS.     On exam, patient's abdomen is soft with mild bloat, appropriately TTP at incisions. Incisions are C/D/I.     Urine draining yellow, discussed with RN the plan for TOV today.     Dr. Kilpatrick was updated on patient's condition.     Plan to repeat labs in the AM and reassess for discharge tomorrow.     Georgina Traore PA-C  [facility] [place]

## 2025-05-08 NOTE — DISCHARGE PLANNING
Post Acute Navigator Team    Per chart review, patient has been identified as a candidate for a goals of care discussion from following data:     End of Life Care Index score 27  High LACE + score 49  6 click Mobility score 22   6 click ADL score 22   Readmission: No      Code Status:Full    Advanced Directive present in EMR: NA   POLST present in EMR: NA      Per chart review, patient is not a candidate for home-based post acute programs at this time.     Please reach out to me directly should care team feel patient will benefit from a discharge goals of care conversation regarding outpatient/home palliative care or hospice.

## 2025-05-08 NOTE — PROGRESS NOTES
Virtual Nurse rounding complete. Patient comfortably sitting up in chair.     Round Needs: No needs at this time.

## 2025-05-08 NOTE — PROGRESS NOTES
Report received from previous shift RN.  Assessment complete.  Patient resting in bed comfortably, even and unlabored breathing present.  Surgical incision to abdomen, closed with dermabond, CDI.   All needs met at this time. Call light within reach. Hourly rounding in place.

## 2025-05-09 LAB
ANION GAP SERPL CALC-SCNC: 12 MMOL/L (ref 7–16)
BUN SERPL-MCNC: 26 MG/DL (ref 8–22)
CALCIUM SERPL-MCNC: 10.6 MG/DL (ref 8.5–10.5)
CHLORIDE SERPL-SCNC: 104 MMOL/L (ref 96–112)
CO2 SERPL-SCNC: 20 MMOL/L (ref 20–33)
CREAT SERPL-MCNC: 1.71 MG/DL (ref 0.5–1.4)
GFR SERPLBLD CREATININE-BSD FMLA CKD-EPI: 32 ML/MIN/1.73 M 2
GLUCOSE BLD STRIP.AUTO-MCNC: 142 MG/DL (ref 65–99)
GLUCOSE BLD STRIP.AUTO-MCNC: 151 MG/DL (ref 65–99)
GLUCOSE BLD STRIP.AUTO-MCNC: 167 MG/DL (ref 65–99)
GLUCOSE BLD STRIP.AUTO-MCNC: 188 MG/DL (ref 65–99)
GLUCOSE SERPL-MCNC: 159 MG/DL (ref 65–99)
HCT VFR BLD AUTO: 40.4 % (ref 37–47)
HGB BLD-MCNC: 11.3 G/DL (ref 12–16)
MCH RBC QN AUTO: 21.4 PG (ref 27–33)
MCHC RBC AUTO-ENTMCNC: 28 G/DL (ref 32.2–35.5)
MCV RBC AUTO: 76.5 FL (ref 81.4–97.8)
PLATELET # BLD AUTO: 252 K/UL (ref 164–446)
POTASSIUM SERPL-SCNC: 4.8 MMOL/L (ref 3.6–5.5)
RBC # BLD AUTO: 5.28 M/UL (ref 4.2–5.4)
SODIUM SERPL-SCNC: 136 MMOL/L (ref 135–145)
WBC # BLD AUTO: 9.8 K/UL (ref 4.8–10.8)

## 2025-05-09 PROCEDURE — 700111 HCHG RX REV CODE 636 W/ 250 OVERRIDE (IP): Performed by: UROLOGY

## 2025-05-09 PROCEDURE — 80048 BASIC METABOLIC PNL TOTAL CA: CPT

## 2025-05-09 PROCEDURE — 700111 HCHG RX REV CODE 636 W/ 250 OVERRIDE (IP): Mod: JZ | Performed by: UROLOGY

## 2025-05-09 PROCEDURE — 85027 COMPLETE CBC AUTOMATED: CPT

## 2025-05-09 PROCEDURE — 770001 HCHG ROOM/CARE - MED/SURG/GYN PRIV*

## 2025-05-09 PROCEDURE — 700102 HCHG RX REV CODE 250 W/ 637 OVERRIDE(OP): Performed by: UROLOGY

## 2025-05-09 PROCEDURE — 82962 GLUCOSE BLOOD TEST: CPT | Mod: 91

## 2025-05-09 PROCEDURE — A9270 NON-COVERED ITEM OR SERVICE: HCPCS | Performed by: UROLOGY

## 2025-05-09 RX ORDER — HYDRALAZINE HYDROCHLORIDE 20 MG/ML
10 INJECTION INTRAMUSCULAR; INTRAVENOUS EVERY 6 HOURS PRN
Status: DISCONTINUED | OUTPATIENT
Start: 2025-05-09 | End: 2025-05-10 | Stop reason: HOSPADM

## 2025-05-09 RX ORDER — METOPROLOL SUCCINATE 50 MG/1
50 TABLET, EXTENDED RELEASE ORAL
Status: DISCONTINUED | OUTPATIENT
Start: 2025-05-09 | End: 2025-05-10 | Stop reason: HOSPADM

## 2025-05-09 RX ORDER — METOPROLOL TARTRATE 1 MG/ML
5 INJECTION, SOLUTION INTRAVENOUS EVERY 6 HOURS PRN
Status: DISCONTINUED | OUTPATIENT
Start: 2025-05-09 | End: 2025-05-09

## 2025-05-09 RX ADMIN — INSULIN LISPRO 2 UNITS: 100 INJECTION, SOLUTION INTRAVENOUS; SUBCUTANEOUS at 21:15

## 2025-05-09 RX ADMIN — INSULIN LISPRO 2 UNITS: 100 INJECTION, SOLUTION INTRAVENOUS; SUBCUTANEOUS at 18:48

## 2025-05-09 RX ADMIN — OXYCODONE 5 MG: 5 TABLET ORAL at 02:03

## 2025-05-09 RX ADMIN — HEPARIN SODIUM 5000 UNITS: 5000 INJECTION, SOLUTION INTRAVENOUS; SUBCUTANEOUS at 04:11

## 2025-05-09 RX ADMIN — SENNOSIDES AND DOCUSATE SODIUM 2 TABLET: 50; 8.6 TABLET ORAL at 18:45

## 2025-05-09 RX ADMIN — OXYBUTYNIN CHLORIDE 10 MG: 5 TABLET, EXTENDED RELEASE ORAL at 04:11

## 2025-05-09 RX ADMIN — POLYETHYLENE GLYCOL 3350 1 PACKET: 17 POWDER, FOR SOLUTION ORAL at 04:11

## 2025-05-09 RX ADMIN — HYDRALAZINE HYDROCHLORIDE 10 MG: 20 INJECTION, SOLUTION INTRAMUSCULAR; INTRAVENOUS at 13:13

## 2025-05-09 RX ADMIN — HYDRALAZINE HYDROCHLORIDE 10 MG: 20 INJECTION, SOLUTION INTRAMUSCULAR; INTRAVENOUS at 06:21

## 2025-05-09 RX ADMIN — HEPARIN SODIUM 5000 UNITS: 5000 INJECTION, SOLUTION INTRAVENOUS; SUBCUTANEOUS at 13:13

## 2025-05-09 RX ADMIN — INSULIN LISPRO 2 UNITS: 100 INJECTION, SOLUTION INTRAVENOUS; SUBCUTANEOUS at 11:00

## 2025-05-09 RX ADMIN — OXYCODONE 5 MG: 5 TABLET ORAL at 13:14

## 2025-05-09 RX ADMIN — METOPROLOL SUCCINATE 50 MG: 50 TABLET, EXTENDED RELEASE ORAL at 04:12

## 2025-05-09 RX ADMIN — HEPARIN SODIUM 5000 UNITS: 5000 INJECTION, SOLUTION INTRAVENOUS; SUBCUTANEOUS at 21:13

## 2025-05-09 RX ADMIN — OMEPRAZOLE 20 MG: 20 CAPSULE, DELAYED RELEASE ORAL at 04:11

## 2025-05-09 RX ADMIN — OMEPRAZOLE 20 MG: 20 CAPSULE, DELAYED RELEASE ORAL at 18:44

## 2025-05-09 RX ADMIN — IRBESARTAN 300 MG: 150 TABLET ORAL at 18:44

## 2025-05-09 RX ADMIN — ACETAMINOPHEN 1000 MG: 500 TABLET ORAL at 04:11

## 2025-05-09 ASSESSMENT — PAIN DESCRIPTION - PAIN TYPE
TYPE: ACUTE PAIN

## 2025-05-09 NOTE — PROGRESS NOTES
Progress Note    Reason for Admission  Right renal cell carcinoma     Admission Date  5/7/2025    CODE STATUS  Full Code    HPI & HOSPITAL COURSE  This is a 70 y.o. female here with history of right renal cell carcinoma who is now POD#2 s/p Robotic assisted right radical nephrectomy on 05/07/2025 by Dr. Kilpatrick.     Today, patient is again sitting comfortably up to chair in NAD. She reports her pain is controlled, mild incisional pain. Tolerating a regular diet without N/V. She is working on ambulation with RN assistance. I have consulted PT to assist, as patient is anxious regarding 5 stairs at the entrance to her house. +flatus, -BM.     Patient c/o fatigue following surgery, improving today. Reviewed labs, WBC and creatinine improved. AFVSS, has been hypertensive, received a dose of metoprolol this AM and has hydralazine PRN on order. RN has been monitoring blood pressures.    On exam, patient's abdomen is soft, appropriately TTP at incisions. Incisions are C/D/I.     She has been spontaneously voiding since her catheter removal yesterday with no issues.    Dr. Kilpatrick was updated on patient's condition.     Plan to repeat labs in the AM, await PT consult for ambulation, and reassess for discharge tomorrow.     Georgina Traore PA-C  [facility] [place]

## 2025-05-09 NOTE — PROGRESS NOTES
Assumed care of patient at 0645. Bedside report received from Thais GOMEZ. Assessment complete.  AA&Ox4. Denies CP/SOB.  Reporting 5/10 pain. Declined intervention at this time.  Educated patient regarding pharmacologic and non pharmacologic modalities for pain management.  Skin per flowsheets  Tolerating CHO diet. Denies N/V.  + void. Last BM 5/6 (PTA)  Pt ambulates with SBA and FWW  All needs met at this time. Call light within reach. Pt calls appropriately. Bed low and locked, non skid socks in place. Hourly rounding in place.

## 2025-05-09 NOTE — PROGRESS NOTES
Received report from previous shift RN  Assessment complete.  A&O x 4. Patient calls appropriately.  Patient ambulates with SB assist.   Patient has 7/10 pain. Pain managed with prescribed medications.  Denies N&V. Tolerating diet.  X6 abd lap sites CDI   + void, Last BM 5/6 PTA  Patient denies SOB.  Patient sitting up in bed.  Review plan with of care with patient. Call light and personal belongings with in reach. Hourly rounding in place. All needs met at this time.

## 2025-05-09 NOTE — CARE PLAN
The patient is Stable - Low risk of patient condition declining or worsening    Shift Goals: Blood Pressure, Discharge  Clinical Goals: Blood Pressure, Discharge  Patient Goals: Discharge  Family Goals: Discharge    Progress made toward(s) clinical / shift goals:  Pt resting in room, pain managed with current medication.  This RN discussed POC and BTD with Pt and , who verbalized understanding.

## 2025-05-09 NOTE — PROGRESS NOTES
On call provider paged about patient condition. Provider Eren Nolen notified of systolic blood pressures in the 190s-200s. Neuro assessment intact. New orders received  .

## 2025-05-09 NOTE — CARE PLAN
The patient is Stable - Low risk of patient condition declining or worsening    Shift Goals  Clinical Goals: pain control, ambulation, IS use  Patient Goals: pain control, rest  Family Goals: franklin    Progress made toward(s) clinical / shift goals:  IS use demonstrated. Patient ambulated in room. Pain managed with prescribed medications,.      Problem: Knowledge Deficit - Standard  Goal: Patient and family/care givers will demonstrate understanding of plan of care, disease process/condition, diagnostic tests and medications  Description: Target End Date:  1-3 days or as soon as patient condition allowsDocument in Patient Education1.  Patient and family/caregiver oriented to unit, equipment, visitation policy and means for communicating concern2.  Complete/review Learning Assessment3.  Assess knowledge level of disease process/condition, treatment plan, diagnostic tests and medications4.  Explain disease process/condition, treatment plan, diagnostic tests and medications  Outcome: Progressing     Problem: Pain - Standard  Goal: Alleviation of pain or a reduction in pain to the patient’s comfort goal  Description: Target End Date:  Prior to discharge or change in level of careDocument on Vitals flowsheet1.  Document pain using the appropriate pain scale per order or unit policy2.  Educate and implement non-pharmacologic comfort measures (i.e. relaxation, distraction, massage, cold/heat therapy, etc.)3.  Pain management medications as ordered4.  Reassess pain after pain med administration per policy5.  If opiods administered assess patient's response to pain medication is appropriate per POSS sedation scale6.  Follow pain management plan developed in collaboration with patient and interdisciplinary team (including palliative care or pain specialists if applicable)  Outcome: Progressing

## 2025-05-10 VITALS
WEIGHT: 209.44 LBS | BODY MASS INDEX: 33.66 KG/M2 | TEMPERATURE: 98.6 F | SYSTOLIC BLOOD PRESSURE: 101 MMHG | OXYGEN SATURATION: 96 % | HEART RATE: 117 BPM | HEIGHT: 66 IN | DIASTOLIC BLOOD PRESSURE: 60 MMHG | RESPIRATION RATE: 17 BRPM

## 2025-05-10 LAB
ANION GAP SERPL CALC-SCNC: 14 MMOL/L (ref 7–16)
BUN SERPL-MCNC: 25 MG/DL (ref 8–22)
CALCIUM SERPL-MCNC: 10.8 MG/DL (ref 8.5–10.5)
CHLORIDE SERPL-SCNC: 104 MMOL/L (ref 96–112)
CO2 SERPL-SCNC: 17 MMOL/L (ref 20–33)
CREAT SERPL-MCNC: 1.59 MG/DL (ref 0.5–1.4)
GFR SERPLBLD CREATININE-BSD FMLA CKD-EPI: 35 ML/MIN/1.73 M 2
GLUCOSE BLD STRIP.AUTO-MCNC: 148 MG/DL (ref 65–99)
GLUCOSE SERPL-MCNC: 130 MG/DL (ref 65–99)
HCT VFR BLD AUTO: 40.6 % (ref 37–47)
HGB BLD-MCNC: 11.3 G/DL (ref 12–16)
MCH RBC QN AUTO: 21.4 PG (ref 27–33)
MCHC RBC AUTO-ENTMCNC: 27.8 G/DL (ref 32.2–35.5)
MCV RBC AUTO: 77 FL (ref 81.4–97.8)
PLATELET # BLD AUTO: 240 K/UL (ref 164–446)
PLATELETS.RETICULATED NFR BLD AUTO: 2.3 % (ref 0.6–13.1)
POTASSIUM SERPL-SCNC: 5 MMOL/L (ref 3.6–5.5)
RBC # BLD AUTO: 5.27 M/UL (ref 4.2–5.4)
SODIUM SERPL-SCNC: 135 MMOL/L (ref 135–145)
WBC # BLD AUTO: 7.4 K/UL (ref 4.8–10.8)

## 2025-05-10 PROCEDURE — 700102 HCHG RX REV CODE 250 W/ 637 OVERRIDE(OP): Performed by: UROLOGY

## 2025-05-10 PROCEDURE — 82962 GLUCOSE BLOOD TEST: CPT

## 2025-05-10 PROCEDURE — 700111 HCHG RX REV CODE 636 W/ 250 OVERRIDE (IP): Performed by: UROLOGY

## 2025-05-10 PROCEDURE — A9270 NON-COVERED ITEM OR SERVICE: HCPCS | Performed by: UROLOGY

## 2025-05-10 PROCEDURE — 700111 HCHG RX REV CODE 636 W/ 250 OVERRIDE (IP): Mod: JZ | Performed by: UROLOGY

## 2025-05-10 PROCEDURE — 85027 COMPLETE CBC AUTOMATED: CPT

## 2025-05-10 PROCEDURE — 97163 PT EVAL HIGH COMPLEX 45 MIN: CPT

## 2025-05-10 PROCEDURE — 85055 RETICULATED PLATELET ASSAY: CPT

## 2025-05-10 PROCEDURE — 80048 BASIC METABOLIC PNL TOTAL CA: CPT

## 2025-05-10 RX ADMIN — POLYETHYLENE GLYCOL 3350 1 PACKET: 17 POWDER, FOR SOLUTION ORAL at 05:03

## 2025-05-10 RX ADMIN — HYDRALAZINE HYDROCHLORIDE 10 MG: 20 INJECTION, SOLUTION INTRAMUSCULAR; INTRAVENOUS at 05:51

## 2025-05-10 RX ADMIN — OMEPRAZOLE 20 MG: 20 CAPSULE, DELAYED RELEASE ORAL at 05:03

## 2025-05-10 RX ADMIN — METOPROLOL SUCCINATE 50 MG: 50 TABLET, EXTENDED RELEASE ORAL at 05:03

## 2025-05-10 RX ADMIN — OXYBUTYNIN CHLORIDE 10 MG: 5 TABLET, EXTENDED RELEASE ORAL at 05:03

## 2025-05-10 RX ADMIN — HEPARIN SODIUM 5000 UNITS: 5000 INJECTION, SOLUTION INTRAVENOUS; SUBCUTANEOUS at 05:03

## 2025-05-10 ASSESSMENT — GAIT ASSESSMENTS
DEVIATION: BRADYKINETIC;DECREASED BASE OF SUPPORT
DISTANCE (FEET): 200
GAIT LEVEL OF ASSIST: SUPERVISED
ASSISTIVE DEVICE: FRONT WHEEL WALKER

## 2025-05-10 ASSESSMENT — PAIN DESCRIPTION - PAIN TYPE
TYPE: ACUTE PAIN
TYPE: ACUTE PAIN

## 2025-05-10 ASSESSMENT — COGNITIVE AND FUNCTIONAL STATUS - GENERAL
MOBILITY SCORE: 24
SUGGESTED CMS G CODE MODIFIER MOBILITY: CH

## 2025-05-10 NOTE — DISCHARGE INSTRUCTIONS
Our office will call to confirm post-operative appointment for pathology review with Dr. Kilpatrick.

## 2025-05-10 NOTE — DISCHARGE SUMMARY
Discharge Summary    Reason for Admission  Right renal cell carcinoma      Admission Date  5/7/2025     CODE STATUS  Full Code     HPI & HOSPITAL COURSE  This is a 70 y.o. female here with history of right renal cell carcinoma who is now POD#3 s/p Robotic assisted right radical nephrectomy on 05/07/2025 by Dr. Kilpatrick.     Patient required additional night stays for blood pressure monitoring, PT consultation, and repeat labs.      Today, patient is again sitting comfortably up to chair in NAD. She reports her pain is controlled. Tolerating a regular diet without N/V. She ambulated with PT this morning and is feeling more confident with the stairs she has at home. +flatus, -BM.     Reviewed labs, WBC and creatinine improved. Hgb stable. AFVSS. Blood pressures normal today. Discussed with Dr. Kilpatrick.     On exam, patient's abdomen is soft, appropriately TTP at incisions. Incisions are C/D/I.      She has been spontaneously voiding since her catheter removal with no issues.    Dr. Kilpatrick was updated on patient's condition. I discussed POC with PATRICIA Matta.      Therefore, plan for discharge today if patient remains in good and stable condition, to home with close outpatient follow-up.    Discharge Date  05/10/2025    FOLLOW UP ITEMS POST DISCHARGE  Our office will call to confirm post-operative appointment for pathology review with Dr. Kilpatrick.    DISCHARGE DIAGNOSES  Same as above    FOLLOW UP  Future Appointments   Date Time Provider Department Center   5/29/2025  9:20 AM [facility]   6/4/2025 11:00 AM Tara Glover D.O. [facility] None   6/13/2025  9:40 [facility]   7/18/2025 10:40 AM Mahogany Bailey P.A.-C. [facility] None       MEDICATIONS ON DISCHARGE     Medication List        CONTINUE taking these medications        Instructions   benzonatate 100 MG Caps  Commonly known as: Tessalon   Take 2 Capsules by mouth 2 times a day as needed for Cough.  Dose: 200 mg      Empagliflozin 10 MG Tabs tablet  Commonly known as: Jardiance   Take 1 Tablet by mouth every day.  Dose: 10 mg     irbesartan 300 MG Tabs  Commonly known as: Avapro   TAKE 1 TABLET BY MOUTH IN THE EVENING FOR BLOOD PRESSURE     metoprolol SR 50 MG Tb24  Commonly known as: Toprol XL   Take 1 Tablet by mouth every day.  Dose: 50 mg     omeprazole 20 MG delayed-release capsule  Commonly known as: PriLOSEC   Take 1 capsule by mouth twice daily  Dose: 20 mg     oxybutynin SR 10 MG CR tablet  Commonly known as: Ditropan-XL   Take 10 mg by mouth every day.  Dose: 10 mg     rosuvastatin 20 MG Tabs  Commonly known as: Crestor   Take 1 Tablet by mouth every evening.  Dose: 20 mg     vitamin D 1000 UNIT Tabs  Commonly known as: Cholecalciferol   Take 1,000 Units by mouth every day.  Dose: 1,000 Units     ZINC 15 PO   Take 15 mg by mouth every day.  Dose: 15 mg              Allergies  Allergies   Allergen Reactions    Pcn [Penicillins] Swelling     All over swelling like a balloon when she was a child    Tape Rash     Plastic and cloth tape is OK  **NO PAPER TAPE**       DIET  Orders Placed This Encounter   Procedures    Diet Order Diet: Consistent CHO (Diabetic)     Standing Status:   Standing     Number of Occurrences:   1     Diet::   Consistent CHO (Diabetic) [4]       ACTIVITY  As tolerated.  10-lb lifting restriction for 4-6 weeks or until cleared by Dr. Kilpatrick  May resume regular ADLs  May resume a regular diet    CONSULTATIONS  Physical therapy team    PROCEDURES  Robotic assisted right radical nephrectomy     LABORATORY  Lab Results   Component Value Date    SODIUM 135 05/10/2025    POTASSIUM 5.0 05/10/2025    CHLORIDE 104 05/10/2025    CO2 17 (L) 05/10/2025    GLUCOSE 130 (H) 05/10/2025    BUN 25 (H) 05/10/2025    CREATININE 1.59 (H) 05/10/2025    GLOMRATE 48 (L) 03/19/2025        Lab Results   Component Value Date    WBC 7.4 05/10/2025    HEMOGLOBIN 11.3 (L) 05/10/2025    HEMATOCRIT 40.6 05/10/2025    PLATELETCT  240 05/10/2025      Georgina Traore PA-C  Total time of the discharge process exceeds 30 minutes.

## 2025-05-10 NOTE — THERAPY
Physical Therapy   Initial Evaluation     Patient Name: Shahana Nj  Age:  70 y.o., Sex:  female  Medical Record #: [id number]  Today's Date: 5/10/2025     Precautions  Precautions: Fall Risk    Assessment  Pt presents with impaired activity tolerance, dynamic balance, posture and gait mechanics s/p robotic assisted right radical nephrectomy in setting of DMII, HTN, renal cell cancer; pt is most limited by posture, forehead/rounded shoulders making her center of mass slightly forward; functionally, she is stand by assist with FWW including stairs; reports her  can assist with bed mobility and IADLs as needed; she does not want to have any home health services; discussed outpatient PT follow up should she not return to her baseline; recommend dc home when medically appropriate to do so, will follow.     Plan    Physical Therapy Initial Treatment Plan   Treatment Plan : Bed Mobility, Equipment, Gait Training, Family / Caregiver Training, Neuro Re-Education / Balance, Self Care / Home Evaluation, Therapeutic Activities, Stair Training, Therapeutic Exercise  Treatment Frequency: 3 Times per Week  Duration: Until Therapy Goals Met    DC Equipment Recommendations: None  Discharge Recommendations: Recommend outpatient physical therapy services to address higher level deficits (should she not return to baseline endurance)        Abridged Subjective/Objective     05/10/25 2879   Prior Living Situation   Prior Services Home-Independent   Housing / Facility 1 Story House   Steps Into Home 5   Rail Right Rail (Steps into Home)   Equipment Owned Front-Wheel Walker;4-Wheel Walker   Lives with - Patient's Self Care Capacity Spouse   Comments denies   Prior Level of Functional Mobility   Bed Mobility Independent   Transfer Status Independent   Ambulation Independent   Ambulation Distance to tolerance   Assistive Devices Used Front-Wheel Walker   Cognition    Cognition / Consciousness WDL   Level of Consciousness Alert    Comments pleasant and cooeprative;   Passive ROM Lower Body   Passive ROM Lower Body WDL   Strength Lower Body   Lower Body Strength  WDL   Sensation Lower Body   Lower Extremity Sensation   WDL   Balance Assessment   Sitting Balance (Static) Good   Sitting Balance (Dynamic) Good   Standing Balance (Static) Good   Standing Balance (Dynamic) Fair   Weight Shift Sitting Good   Weight Shift Standing Good   Comments no UE support in sitting; FWW in standing;   Bed Mobility    Supine to Sit Contact Guard Assist  (pt pulling on therapist; long sitting insteady of cues for log rolling)   Comments reports her  can provide assist with bed mobility   Gait Analysis   Gait Level Of Assist Supervised   Assistive Device Front Wheel Walker   Distance (Feet) 200   # of Times Distance was Traveled 1   Deviation Bradykinetic;Decreased Base Of Support   # of Stairs Climbed 5   Level of Assist with Stairs Standby Assist  (Vc for sequencing; going down she goes side ways)   Weight Bearing Status full   Vision Deficits Impacting Mobility denies   Comments distancel imited by pt, needs cues to keep moving forward;   Functional Mobility   Sit to Stand Supervised   Bed, Chair, Wheelchair Transfer Supervised   Edema / Skin Assessment   Edema / Skin  X   Comments abd incision glued, bruising noted; passing gas but no bowel movement   Patient / Family Goals    Patient / Family Goal #1 to improve activity tolerance   Short Term Goals    Short Term Goal # 1 Pt will demonstrate independence with HEP aimed at aerobic endurance wihtin 6 visits.   Short Term Goal # 2 Pt will demonstrate log roll from flat HOB wiht supervision within 6 visits to ensure independent mobility at home.   Education Group   Role of Physical Therapist Patient Response Patient;Acceptance;Explanation;Demonstration;Verbal Demonstration;Action Demonstration   Additional Comments protein/water in diet;

## 2025-05-10 NOTE — CARE PLAN
The patient is Stable - Low risk of patient condition declining or worsening    Shift Goals  Clinical Goals: pain control, monitor BP, I+O  Patient Goals: pain control, rest  Family Goals: franklin    Progress made toward(s) clinical / shift goals:  Pain managed with prescribed medications. I+O monitored. BP monitored.

## 2025-05-10 NOTE — THERAPY
Physical Therapy Contact Note    Patient Name: Shahana Nj  Age:  70 y.o., Sex:  female  Medical Record #: [id number]  Today's Date: 5/9/2025     PT consult received, RN reached out for evaluation, not pending dc today but likely tomorrow; will address as able as per department policy response time is 24 to 48 hours from consultation farzad;department tries to accommodate same day dc's as able; will follow when able;     Renetta ASHBY, PT,  [phone]

## 2025-05-10 NOTE — PROGRESS NOTES
Patient  transferring to discharge lounge at this time. Discharge RN  to provide discharge education, follow-up information and remove PIV.  Patient transporting via wheelchair. Belongings with patient. Meds to beds pending delivery. Discharge RN updated.

## 2025-05-10 NOTE — PROGRESS NOTES
Assumed care of patient at 0645. Bedside report received from Thais GOMEZ. Assessment complete.  AA&Ox4. Denies CP/SOB.  Reporting 2/10 pain. Declined intervention at this time.  Educated patient regarding pharmacologic and non pharmacologic modalities for pain management.  Skin per flowsheets  Tolerating CHO diet. Denies N/V.  + void. + Flatus. Last BM 5/6 (PTA)  Pt ambulates with SBA and FWW  All needs met at this time. Call light within reach. Pt calls appropriately. Bed low and locked, non skid socks in place. Hourly rounding in place.

## 2025-05-10 NOTE — PROGRESS NOTES
Discharge orders received.  Patient arrived to the discharge lounge.  PIV removed by discharge RN. RN. Meds to beds medications not ordered.  Instructions given, medications reviewed and general discharge education provided to patient.  Follow up appointments discussed.  Patient verbalized understanding of dc instructions and prescriptions.  Patient signed discharge instructions.  Patient verbalized she had all belongings with her,Denied having any home medications locked in our inpatient pharmacy that  she needs back. Patient wheeled from discharge lounge to private vehicle. Patient left via car with spouse to home in stable condition.

## 2025-05-10 NOTE — PROGRESS NOTES
Received report from previous shift RN  Assessment complete.  A&O x 4. Patient calls appropriately.  Patient ambulates with SBA and FWW  Patient has 0/10 pain. Pain managed with prescribed medications.  Denies N&V. Tolerating diet.  X6 abd lap sites CDI   + void, Last BM 5/6 PTA  Patient denies SOB.  Patient sitting up in bed.  Review plan with of care with patient. Call light and personal belongings with in reach. Hourly rounding in place. All needs met at this time

## 2025-05-12 ENCOUNTER — TELEPHONE (OUTPATIENT)
Dept: HEALTH INFORMATION MANAGEMENT | Facility: OTHER | Age: 70
End: 2025-05-12
Payer: MEDICARE

## 2025-05-28 ENCOUNTER — APPOINTMENT (OUTPATIENT)
Dept: MEDICAL GROUP | Facility: LAB | Age: 70
End: 2025-05-28
Payer: MEDICARE

## 2025-05-29 ENCOUNTER — OFFICE VISIT (OUTPATIENT)
Dept: ENDOCRINOLOGY | Facility: MEDICAL CENTER | Age: 70
End: 2025-05-29
Attending: STUDENT IN AN ORGANIZED HEALTH CARE EDUCATION/TRAINING PROGRAM
Payer: MEDICARE

## 2025-05-29 VITALS
HEART RATE: 81 BPM | HEIGHT: 66 IN | BODY MASS INDEX: 33.23 KG/M2 | SYSTOLIC BLOOD PRESSURE: 134 MMHG | DIASTOLIC BLOOD PRESSURE: 80 MMHG | OXYGEN SATURATION: 95 % | WEIGHT: 206.8 LBS

## 2025-05-29 DIAGNOSIS — E83.52 HYPERCALCEMIA: Primary | ICD-10-CM

## 2025-05-29 PROCEDURE — 99211 OFF/OP EST MAY X REQ PHY/QHP: CPT | Performed by: STUDENT IN AN ORGANIZED HEALTH CARE EDUCATION/TRAINING PROGRAM

## 2025-05-29 RX ORDER — OXYCODONE HYDROCHLORIDE 5 MG/1
TABLET ORAL
COMMUNITY
Start: 2025-05-08

## 2025-05-29 RX ORDER — POLYETHYLENE GLYCOL 3350, SODIUM SULFATE ANHYDROUS, SODIUM BICARBONATE, SODIUM CHLORIDE, POTASSIUM CHLORIDE 236; 22.74; 6.74; 5.86; 2.97 G/4L; G/4L; G/4L; G/4L; G/4L
POWDER, FOR SOLUTION ORAL
COMMUNITY
Start: 2025-04-28

## 2025-05-29 RX ORDER — SULFAMETHOXAZOLE AND TRIMETHOPRIM 800; 160 MG/1; MG/1
TABLET ORAL
COMMUNITY
Start: 2025-05-23

## 2025-05-29 ASSESSMENT — FIBROSIS 4 INDEX: FIB4 SCORE: 1.23

## 2025-05-29 NOTE — PROGRESS NOTES
New Patient Consult Note for Endocrinology  Referred by: Gricelda Traore D.O.    Reason for consult:   Hyperparathyroidism  Hypercalcemia    HPI:  Shahana Nj is a 70 y.o. female who was referred to endocrinology service for evaluation and management of hypercalcemia.     Hypercalcemia:  - first was noted in 2018 (no prior labs are available), since then Ca was intermittently elevated, Ca levels were ranging - 10.6 - 11.4  - it seems she also was on long-term therapy with HCTZ at least since 2018 - medication was just recently stopped  - PTH is elevated, ranging 119 - 122  - vit D, phosphorus level wnl  - urine Ca is low but urine was under collected  - fractures, kidney stones - denies  - DEXA scan - no evidence of osteopenia/ osteoporosis  - FHx: not aware of any of hypercalcemia in any of her family member  - sestamibi scan is negative for parathyroid adenoma in 2021, as well as thyroid US in 2025    Past Medical History:  Patient Active Problem List    Diagnosis Date Noted    Primary hyperparathyroidism (HCC) 02/27/2025    Hypercalcemia 02/18/2025    Iron deficiency anemia 01/15/2025    Chronic cough 01/15/2025    Urinary incontinence 01/15/2025    Renal cell cancer (HCC) 06/16/2023    Class 2 severe obesity due to excess calories with serious comorbidity and body mass index (BMI) of 38.0 to 38.9 in adult ([facility]) 02/28/2023    Gastro-esophageal reflux disease without esophagitis 01/26/2023    Hypertension associated with type 2 diabetes mellitus ([facility]) 01/26/2023    Old myocardial infarction 01/26/2023    Obstructive sleep apnea 01/26/2023    Hyperlipidemia associated with type 2 diabetes mellitus ([facility]) 01/26/2023    Obesity 01/26/2023    Type 2 diabetes mellitus without complications ([facility]) 01/26/2023    Microalbuminuria due to type 2 diabetes mellitus ([facility]) 06/06/2022    Morbid obesity with BMI of 40.0-44.9, adult ([facility]) 02/28/2022    Need for prophylactic antibiotic 02/01/2022    Stage 3a chronic kidney  disease 12/13/2018    Type 2 diabetes mellitus with other specified complication (HCC) 06/04/2018    Bilateral hearing loss 04/30/2018     Past Surgical History:  Past Surgical History[1]    Allergies:  Pcn [penicillins] and Tape    Social History:  Social History     Socioeconomic History    Marital status: Single     Spouse name: Not on file    Number of children: Not on file    Years of education: Not on file    Highest education level: Not on file   Occupational History     Comment: retired , [employer]   Tobacco Use    Smoking status: Never    Smokeless tobacco: Never   Vaping Use    Vaping status: Never Used   Substance and Sexual Activity    Alcohol use: Yes     Comment: 6 times per year    Drug use: No     Comment: tried marijuana at age 17    Sexual activity: Yes     Partners: Male   Other Topics Concern    Not on file   Social History Narrative    Not on file     Social Drivers of Health     Financial Resource Strain: Not on file   Food Insecurity: No Food Insecurity (5/7/2025)    Hunger Vital Sign     Worried About Running Out of Food in the Last Year: Never true     Ran Out of Food in the Last Year: Never true   Transportation Needs: No Transportation Needs (5/7/2025)    PRAPARE - Transportation     Lack of Transportation (Medical): No     Lack of Transportation (Non-Medical): No   Physical Activity: Not on file   Stress: Not on file   Social Connections: Not on file   Intimate Partner Violence: Not At Risk (5/7/2025)    Humiliation, Afraid, Rape, and Kick questionnaire     Fear of Current or Ex-Partner: No     Emotionally Abused: No     Physically Abused: No     Sexually Abused: No   Housing Stability: Low Risk  (5/7/2025)    Housing Stability Vital Sign     Unable to Pay for Housing in the Last Year: No     Number of Times Moved in the Last Year: 0     Homeless in the Last Year: No     Family History:  Family History   Problem Relation Age of Onset    Diabetes Mother     Cancer Mother         brain  "ca    Heart Disease Mother 45        cabg    Heart Disease Father         cabg and valve replacement    Cancer Sister         bone ca    Diabetes Sister     Diabetes Other        Medications:  Current Medications[2]    Physical Examination:   Vital signs: /80   Pulse 81   Ht 1.676 m (5' 6\") Comment: pt stated  Wt 93.8 kg (206 lb 12.8 oz)   SpO2 95%   BMI 33.38 kg/m²   General: No distress, cooperative, well dressed and well nourished.   Eyes: No scleral icterus or discharge  ENMT: Normal on external inspection of nose, lips, No nasal drainage   Neck: No abnormal masses on inspection  Resp: Normal effort  CVS: Regular rate and rhythm  Extremities: No edema bilateral extremities  Neuro: Alert and oriented  Skin: No rash, No Ulcers  Psych: Normal mood and affect    Labs:  - reviewed   Reference Range  07/30/21  09/22/21  10/28/21  01/20/22  01/06/23  01/07/23  01/08/23 01/09/23  01/14/23  02/01/23  02/07/23  03/16/23  03/21/23  04/11/23  01/16/25  03/06/25  03/19/25  05/01/25  05/07/25  05/08/25  05/09/25  05/10/25    Creatinine 0.50 - 1.40 mg/dL 1.26 1.05 1.25 1.21 2.97 (H)  2.99 (H) 2.65 (H) 2.45 (H)  1.68 (H) 1.27 1.42 (H) 1.40 1.44 (H) 1.43 (H) 1.15 1.26 1.12 (H)  1.35 1.34 1.76 (H) 1.71 (H) 1.59 (H)   GFR (CKD-EPI) >60 mL/min/1.73 m 2          46 ! 40 ! 41 ! 39 ! 40 ! 51 ! 46 !  42 ! 42 ! 31 ! 32 ! 35 !   Calcium 8.5 - 10.5 mg/dL 11.3 (H) 11.4 (H) 11.1 (H) 11.2 (H) 10.2 (E) 10.1 (E) 10.0 (E) 9.6 (E) 10.7 (H) (E) 11.0 (H) 11.4 (H)  10.4 10.9 (H) 10.7 (H) 11.1 (H)  11.2 (H) 10.3 10.6 (H) 10.6 (H) 10.8 (H)   Correct Calcium 8.5 - 10.5 mg/dL          10.8 (H) 11.0 (H)   10.7 (H) 10.6 (H) 10.9 (H)         ALP 30 - 99 U/L 91    69 (E)   67 (E)  101 (H) 96    101 (H) 111 (H)         Albumin 3.2 - 4.9 g/dL 4.4         4.3 4.5   4.3 4.1 4.2         Phosphorus 2.5 - 4.5 mg/dL      3.5 (E) 2.7 (E)    3.0   3.3 3.1          Magnesium 1.5 - 2.5 mg/dL           2.1              Vit D 30 - 100 ng/mL           66    33 "          TSH 0.380 - 5.330 uIU/mL               1.770          PTH, Intact 14.0 - 72.0 pg/mL  56.7         122.0 (H)    119.0 (H)            24 hr urine:   Reference Range  03/26/25    Total Volume mL 1500   Creatinine 800 - 1800 mg/24 Hr 563 (L)   Calcium Urine 100 - 250 mg/d 42 (L)   Calcium Creat Ratio   20 - 300 mg/g 76     Imaging:  - reviewed  DEXA scan:  Date Machine L1- L4  BMD/ T-score LFN  BMD/ T-score FRAX Rx    8/26/2021   GE Prodigy unit          4/22/2025   GE Prodigy unit   1.475 g/cm2  / 2.5  1.051 g/cm2 / 0.3  7.0% / 0.4%      Thyroid US on 3/22/2025:  HISTORY/REASON FOR EXAM:  Thyroid nodule seen on chest CT scan  COMPARISON:  None  FINDINGS:  The thyroid gland is heterogeneous.  Vascularity is normal.  The right lobe of the thyroid gland measures 2.07 cm x 4.63 cm x 1.78 cm. The contour and echogenicity are normal. No focal mass lesions are identified.  The left lobe of the thyroid gland measures 2.38 cm x 4.07 cm x 1.44 cm. The contour and echogenicity are normal. No focal mass lesions are identified.  The isthmus measures 1.01 cm.  There are no significant thyroid nodules seen. There is a left lower pole nodule measuring 9 mm in maximum dimension. The nodule does not meet size criteria for further evaluation using TIRADS criteria.  IMPRESSION:  Heterogeneous thyroid gland with a subcentimeter nodules seen within the lower lobe. This does not meet criteria for further evaluation using TIRADS criteria.    Sestamibi scan on 3/29/23:   EXAM: Parathyroid scan and SPECT.   INDICATION: Hyperparathyroidism. Hypercalcemia.   COMPARISON: None available.   FINDINGS: Physiologic salivary and thyroid gland uptake is identified. There is   no evidence of abnormal uptake in the region of the parathyroid glands.   IMPRESSION:  No significant focal uptake identified, negative for parathyroid   adenoma.     Assessment and Plan:  # PTH-dependent hypercalcemia  - first was noted in 2018 (no prior labs are  available), since then Ca was intermittently elevated, Ca levels were ranging - 10.6 - 11.4, lately at mid 10s  - differential diagnosis between FHH (familial hypocalciuric hypercalcemia) vs primary hyperparathyroidism vs effect of HCTZ  - it seems HCTZ was stopped but I will try to clarify when as HCTZ can cause hypercalcemia, PTH raise and low Ca in the urine  - interestingly, patient does not have history of bone fractures, her BMD is very good, no hypercalcemia  - parathyroid scan and thyroid US didn't show parathyroid adenoma, even it does not rule out presence of primary hyperparathyroidism, I'm still questioning this diagnosis at this point  - urine Ca is low but can not be completely trusted as urine creatinine was low -> likely underestimated  - again, low urine Ca would point either towards FHH or HCTZ use  Plan:  Obtain following labs:  - PTH INTACT; Future  - Comp Metabolic Panel; Future  - PHOSPHORUS; Future  - VITAMIN D,25 HYDROXY (DEFICIENCY); Future  - URINE CREATININE 24 HR; Future  - Urine Calcium 24 HR or Random; Future  - URINE SODIUM, 24 HR; Future  - PTH RELATED PEPTIDE; Future    Provide adequate hydration.   Avoid HCTZ or other thiazide diuretics.     RTC: 2 mo    Total time (face-to-face and non-face-to face time): 60  min - extensive discussion of diagnoses, treatment, prognosis, medical charts, lab, imaging review, documentation.  Plan reviewed with the patient and agreed with plan.  All questions answered to patient's satisfaction.  Thank you kindly for allowing me to participate in the care plan for this patient.    Joseline Melchor MD    CC:   Gricelda Traore D.O.           [1]   Past Surgical History:  Procedure Laterality Date    NEPHRECTOMY PARTIAL ROBOTIC XI Right 5/7/2025    Procedure: RIGHT, ROBOTIC ASSISTED RADICAL NEPHRECTOMY;  Surgeon: Valentin MALIN M.D.;  Location: SURGERY [facility];  Service: Uro Robotic    PB TOTAL KNEE ARTHROPLASTY Left 12/3/2019    Procedure:  ARTHROPLASTY, KNEE, TOTAL;  Surgeon: Ryan Keen M.D.;  Location: SURGERY [facility];  Service: Orthopedics    COLONOSCOPY  09/2019    ABDOMINAL HYSTERECTOMY TOTAL  2004    KNEE ARTHROSCOPY Right 1974    RHINOPLASTY  1970    BUNIONECTOMY Left early 2000's    DENTAL EXTRACTION(S)  1960's    wisdom teeth    MYRINGOTOMY Bilateral Infant to 2001    13 ear surgeries    OTHER SURGICAL PROCEDURE Right 1980's    fluid drained off of bone air pockets behind ear bone    TONSILLECTOMY AND ADENOIDECTOMY  as child   [2]   Current Outpatient Medications:     sulfamethoxazole-trimethoprim (BACTRIM DS) 800-160 MG tablet, TAKE 1 TABLET BY MOUTH EVERY 12 HOURS AS DIRECTED FOR 5 DAYS, Disp: , Rfl:     PEG 3350-KCl-NaBcb-NaCl-NaSulf (PEG-3350/ELECTROLYTES) 236 g Recon Soln, FOLLOW GI CONSULTANTS INSTRUCTION HANDOUT, Disp: , Rfl:     oxyCODONE immediate-release (ROXICODONE) 5 MG Tab, TAKE 1 TABLET BY MOUTH EVERY 6 HOURS FOR 5 DAYS FOR MODERATE TO SEVERE POST OPERATIVE PAIN, Disp: , Rfl:     oxybutynin SR (DITROPAN-XL) 10 MG CR tablet, Take 10 mg by mouth every day., Disp: , Rfl:     irbesartan (AVAPRO) 300 MG Tab, TAKE 1 TABLET BY MOUTH IN THE EVENING FOR BLOOD PRESSURE, Disp: 100 Tablet, Rfl: 1    Zinc Sulfate (ZINC 15 PO), Take 15 mg by mouth every day., Disp: , Rfl:     benzonatate (TESSALON) 100 MG Cap, Take 2 Capsules by mouth 2 times a day as needed for Cough., Disp: 360 Capsule, Rfl: 0    rosuvastatin (CRESTOR) 20 MG Tab, Take 1 Tablet by mouth every evening., Disp: 100 Tablet, Rfl: 3    metoprolol SR (TOPROL XL) 50 MG TABLET SR 24 HR, Take 1 Tablet by mouth every day., Disp: 90 Tablet, Rfl: 3    Empagliflozin (JARDIANCE) 10 MG Tab tablet, Take 1 Tablet by mouth every day., Disp: 90 Tablet, Rfl: 3    omeprazole (PRILOSEC) 20 MG delayed-release capsule, Take 1 capsule by mouth twice daily, Disp: 60 Capsule, Rfl: 3    vitamin D (VITAMIND D3) 1000 UNIT Tab, Take 1,000 Units by mouth every day., Disp: , Rfl:

## 2025-06-04 ENCOUNTER — APPOINTMENT (OUTPATIENT)
Dept: HEMATOLOGY ONCOLOGY | Facility: MEDICAL CENTER | Age: 70
End: 2025-06-04
Attending: STUDENT IN AN ORGANIZED HEALTH CARE EDUCATION/TRAINING PROGRAM
Payer: MEDICARE

## 2025-06-13 ENCOUNTER — APPOINTMENT (OUTPATIENT)
Dept: MEDICAL GROUP | Facility: LAB | Age: 70
End: 2025-06-13
Payer: MEDICARE

## 2025-06-13 VITALS
DIASTOLIC BLOOD PRESSURE: 78 MMHG | WEIGHT: 201.7 LBS | HEIGHT: 66 IN | SYSTOLIC BLOOD PRESSURE: 116 MMHG | OXYGEN SATURATION: 97 % | HEART RATE: 70 BPM | RESPIRATION RATE: 16 BRPM | BODY MASS INDEX: 32.42 KG/M2 | TEMPERATURE: 97.3 F

## 2025-06-13 DIAGNOSIS — E21.0 PRIMARY HYPERPARATHYROIDISM (HCC): ICD-10-CM

## 2025-06-13 DIAGNOSIS — L30.9 DERMATITIS: Primary | ICD-10-CM

## 2025-06-13 DIAGNOSIS — N18.32 STAGE 3B CHRONIC KIDNEY DISEASE: ICD-10-CM

## 2025-06-13 DIAGNOSIS — D50.9 IRON DEFICIENCY ANEMIA, UNSPECIFIED IRON DEFICIENCY ANEMIA TYPE: ICD-10-CM

## 2025-06-13 DIAGNOSIS — E83.52 HYPERCALCEMIA: ICD-10-CM

## 2025-06-13 DIAGNOSIS — R05.3 CHRONIC COUGH: ICD-10-CM

## 2025-06-13 PROBLEM — K92.1 MELENA: Status: RESOLVED | Noted: 2025-06-13 | Resolved: 2025-06-13

## 2025-06-13 PROBLEM — K74.60 CIRRHOSIS OF LIVER (HCC): Status: ACTIVE | Noted: 2025-06-13

## 2025-06-13 PROBLEM — N39.46 MIXED STRESS AND URGE URINARY INCONTINENCE: Status: ACTIVE | Noted: 2025-02-25

## 2025-06-13 PROBLEM — R10.13 EPIGASTRIC PAIN: Status: ACTIVE | Noted: 2025-06-13

## 2025-06-13 PROBLEM — K92.1 MELENA: Status: ACTIVE | Noted: 2025-06-13

## 2025-06-13 PROCEDURE — 3078F DIAST BP <80 MM HG: CPT | Performed by: STUDENT IN AN ORGANIZED HEALTH CARE EDUCATION/TRAINING PROGRAM

## 2025-06-13 PROCEDURE — 3074F SYST BP LT 130 MM HG: CPT | Performed by: STUDENT IN AN ORGANIZED HEALTH CARE EDUCATION/TRAINING PROGRAM

## 2025-06-13 PROCEDURE — 99214 OFFICE O/P EST MOD 30 MIN: CPT | Performed by: STUDENT IN AN ORGANIZED HEALTH CARE EDUCATION/TRAINING PROGRAM

## 2025-06-13 RX ORDER — BENZONATATE 100 MG/1
200 CAPSULE ORAL 2 TIMES DAILY PRN
Qty: 360 CAPSULE | Refills: 0 | Status: SHIPPED | OUTPATIENT
Start: 2025-06-13

## 2025-06-13 RX ORDER — MUPIROCIN 2 %
1 OINTMENT (GRAM) TOPICAL 2 TIMES DAILY
COMMUNITY
Start: 2025-06-11

## 2025-06-13 RX ORDER — TRIAMCINOLONE ACETONIDE 0.25 MG/G
1 CREAM TOPICAL 2 TIMES DAILY
Qty: 15 G | Refills: 0 | Status: SHIPPED | OUTPATIENT
Start: 2025-06-13

## 2025-06-13 RX ORDER — BENZONATATE 100 MG/1
200 CAPSULE ORAL 2 TIMES DAILY PRN
Qty: 360 CAPSULE | Refills: 0 | Status: CANCELLED | OUTPATIENT
Start: 2025-06-13

## 2025-06-13 ASSESSMENT — ENCOUNTER SYMPTOMS
SHORTNESS OF BREATH: 0
CHILLS: 0
WEIGHT LOSS: 0
COUGH: 0
HEARTBURN: 0
NAUSEA: 0
FEVER: 0
DIARRHEA: 0
VOMITING: 0
CONSTIPATION: 0
ABDOMINAL PAIN: 0
BLOOD IN STOOL: 0

## 2025-06-13 ASSESSMENT — FIBROSIS 4 INDEX: FIB4 SCORE: 1.23

## 2025-06-13 NOTE — PATIENT INSTRUCTIONS
Tylenol (generic name: acetaminophen) is SAFE for the kidneys. Maximum dose of tylenol is 3000mg a day. Please avoid other NSAIDs (Non-Steroid Anti-Inflammatory Drugs), such as ibuprofen (brand names: Motrin, Advil), naproxen (brand name: Aleve), celecoxib (brand name: Celebrex), or even prescription NSAIDs such as meloxicam (brand name: Mobic), diclofenac (brand name: Voltaren), ketorolac (brand name: Toradol), or indomethacin (brand name: Indocin), as they can be bad for your kidneys.      Pulmonary function test (schedule [phone]), for chronic cough    Flonase (fluticasone) 1-2 sprays each nostril daily   If nasal dryness/nose bleeds develop:  Consider in room humidifier, AYR gel/spray, Vasoline, or Ponaris Nasal Emollient, and/or Flonase reduce down to 1 spray a day    Mt Med Saline Rinse (black top bottle)-can be used prior to nasal steroid spray as well to improve efficacy     24 hour antihistamines: Claritin/loratadine or Zyrtec/cetirizine (most sedating)     Allegra/fexofenadine (avoid if kidney disease)  Avoid oral decongestants (can rise blood pressure/heart rate)-no Claritin D    Ideal blood pressure <130/80.  If <100 for the top number we may be over treating.  Severe range >180/120, with symptoms=ER  Bring in machine to cross check.

## 2025-06-13 NOTE — PROGRESS NOTES
Verbal consent was acquired by the patient to use plista ambient listening note generation during this visit Yes.    Subjective:     Chief Complaint   Patient presents with    Medicare Annual Wellness     Rt kidney removed (cancer 5/8/2025)       HPI:     History of Present Illness  The patient is a 70-year-old female who presents for her annual exam, hospital follow up conducted instead.    She underwent a right nephrectomy on 05/07/2025. She reports intermittent itching at the surgical site, occurring approximately six times daily, but no signs of infection. She was prescribed a topical antibiotic by her physician's assistant during a recent visit but hasn't started it yet. She completed a course of bactrim. She continues to take oxycodone for pain management and is seeking a refill. She reports no other abdominal pain, hematochezia, or constipation. She maintains adequate hydration and normal bowel movements.    She has received two blood transfusions and an iron infusion on 04/23/2025. She has a history of heartburn, for which she takes omeprazole twice daily, and reports no current symptoms. She has a scheduled appointment with her gastroenterologist in 11/2025 and is due for a colonoscopy and endoscopy on 07/07/2025.    She reports an improvement in her cough, which is more pronounced in the evenings. She has one remaining tablet of her cough medication. She reports good respiratory function but persistent nasal congestion. She has not previously used Flonase. She also reports xerostomia, which she attributes to chronic mouth breathing.    Has experienced a weight loss of 58 pounds, which she attributes to dietary modifications.    Review of Systems   Constitutional:  Negative for chills, fever, malaise/fatigue and weight loss.   Respiratory:  Negative for cough and shortness of breath.    Cardiovascular:  Negative for chest pain.   Gastrointestinal:  Negative for abdominal pain, blood in stool,  "constipation, diarrhea, heartburn, nausea and vomiting.   Skin:  Negative for rash.       Objective:     Exam:  /78 (BP Location: Left arm, Patient Position: Sitting, BP Cuff Size: Large adult)   Pulse 70   Temp 36.3 °C (97.3 °F) (Temporal)   Resp 16   Ht 1.676 m (5' 6\")   Wt 91.5 kg (201 lb 11.2 oz)   SpO2 97%   BMI 32.56 kg/m²  Body mass index is 32.56 kg/m².    Physical Exam  Vitals reviewed.   Constitutional:       General: She is not in acute distress.     Appearance: Normal appearance. She is obese. She is not ill-appearing.   HENT:      Head: Normocephalic and atraumatic.      Nose: Nose normal.      Mouth/Throat:      Mouth: Mucous membranes are moist.   Eyes:      General: No scleral icterus.     Conjunctiva/sclera: Conjunctivae normal.   Cardiovascular:      Rate and Rhythm: Normal rate and regular rhythm.      Heart sounds: Normal heart sounds. No murmur heard.  Pulmonary:      Effort: Pulmonary effort is normal. No respiratory distress.      Breath sounds: Normal breath sounds. No stridor. No wheezing, rhonchi or rales.   Abdominal:      General: Abdomen is flat. Bowel sounds are normal. There is no distension.      Palpations: Abdomen is soft. There is no mass.      Tenderness: There is no abdominal tenderness. There is no guarding or rebound.   Musculoskeletal:      Cervical back: Normal range of motion and neck supple. No rigidity or tenderness.      Right lower leg: No edema.      Left lower leg: No edema.   Skin:     General: Skin is warm and dry.      Coloration: Skin is not jaundiced or pale.      Findings: Rash (Scattered pink papules/small plaques without scale or discharge surrounding larger inferior surgical wound which is scabed without evidence of infection) present.   Neurological:      General: No focal deficit present.      Mental Status: She is alert and oriented to person, place, and time.   Psychiatric:         Mood and Affect: Mood normal.         Behavior: Behavior " normal.         Thought Content: Thought content normal.       Reviewed most recent/relevant labs/imaging.    Assessment & Plan:     70 y.o. female with the following -     1. Chronic cough  Chronic, improved. Suspect GERD plays a role, continue with omeprazole 20mg BID with improvement and scheduled for endoscopy in early July. No related findings on CT chest 4/2025. Advised to schedule PFT. Advised against claritin D, instead can use regular claritin and trial flonase 1-2 sprays daily. Can continue medication prn below.  - benzonatate (TESSALON) 100 MG Cap; Take 2 Capsules by mouth 2 times a day as needed for Cough.  Dispense: 360 Capsule; Refill: 0    2. Dermatitis  Completed course of bactrim, no evidence of infection at this time but was recently prescribed Bactroban by urology provider. No significant pain, advised if needed use of tylenol prn this far out post op opiates not indicated. Provided with steroid cream as below to use for pruritus and rash surrounding well healing wound.  - triamcinolone acetonide (KENALOG) 0.025 % Cream; Apply 1 Application topically 2 times a day. Limit to <10 days  Dispense: 15 g; Refill: 0    3. Iron deficiency anemia, unspecified iron deficiency anemia type  Chronic, followed by GI with plan for endoscopy/colonoscopy early July. Followed by hematology and has completed blood and iron transfusions-has existing lab orders to trend and follow up as directed.     4. Stage 3b chronic kidney disease  Chronic condition, stable. BP well controlled, continue ARB. DM controlled with jardiance, continue 10mg daily. Avoid nephrotoxic medications. Has labs to trend lipids/CMP after starting statin-due now. Plan to trend GFR Q3-6m. Consider nephrology referral if needed.    5. Hypercalcemia  6. Primary hyperparathyroidism (HCC)  Chronic, stable. Asymptomatic. DEXA was normal. CKD as above. Has existing labs to complete for endocrinology/surgery. Continue care with specialists.     I spent a  total of 30 minutes with record review, exam, communication with the patient, and documentation of this encounter.    Return in about 4 months (around 10/2/2025) for Annual Medicare Visit.    Please note that this dictation was created using voice recognition software. I have made every reasonable attempt to correct obvious errors, but I expect that there are errors of grammar and possibly content that I did not discover before finalizing the note.

## 2025-07-18 ENCOUNTER — OFFICE VISIT (OUTPATIENT)
Dept: SLEEP MEDICINE | Facility: MEDICAL CENTER | Age: 70
End: 2025-07-18
Attending: PHYSICIAN ASSISTANT
Payer: MEDICARE

## 2025-07-18 VITALS
SYSTOLIC BLOOD PRESSURE: 132 MMHG | OXYGEN SATURATION: 95 % | DIASTOLIC BLOOD PRESSURE: 84 MMHG | HEART RATE: 72 BPM | BODY MASS INDEX: 32.78 KG/M2 | WEIGHT: 204 LBS | HEIGHT: 66 IN | RESPIRATION RATE: 16 BRPM

## 2025-07-18 DIAGNOSIS — G47.33 OBSTRUCTIVE SLEEP APNEA: Primary | ICD-10-CM

## 2025-07-18 PROCEDURE — 3075F SYST BP GE 130 - 139MM HG: CPT | Performed by: PHYSICIAN ASSISTANT

## 2025-07-18 PROCEDURE — 99213 OFFICE O/P EST LOW 20 MIN: CPT | Performed by: PHYSICIAN ASSISTANT

## 2025-07-18 PROCEDURE — 99215 OFFICE O/P EST HI 40 MIN: CPT | Performed by: PHYSICIAN ASSISTANT

## 2025-07-18 PROCEDURE — 3079F DIAST BP 80-89 MM HG: CPT | Performed by: PHYSICIAN ASSISTANT

## 2025-07-18 ASSESSMENT — ENCOUNTER SYMPTOMS
SINUS PAIN: 0
CHILLS: 0
PALPITATIONS: 0
WEIGHT LOSS: 1
COUGH: 1
TREMORS: 1
HEADACHES: 1
WHEEZING: 0
HEARTBURN: 1
DIZZINESS: 0
ORTHOPNEA: 0
SHORTNESS OF BREATH: 0
SORE THROAT: 0
INSOMNIA: 0
SPUTUM PRODUCTION: 1
FEVER: 0

## 2025-07-18 ASSESSMENT — FIBROSIS 4 INDEX: FIB4 SCORE: 1.23

## 2025-07-18 NOTE — PATIENT INSTRUCTIONS
1-reviewed compliance, demonstrated consistent use  2-minor pressure adjustment at last visit with increased apneas  3-persistent mask leak noted but patient prefers nasal mask, may need chin strap  4-return to prior setting with elevated apneas but less than current  5-50 lb weight loss noted  6-obtain updated titration study for best settings or determination of need for smarter device  7-day of study avoid caffeine, drugs/alcohol, no naps, okay to hold device usage night before testing   8-updated mask and supply order to [facility]  9-short term follow up

## 2025-07-18 NOTE — PROGRESS NOTES
"Chief Complaint   Patient presents with    Apnea     Last Office Visit 4/18/25 with Mahogany Bailey P.A.-C.    Preferred change FV    PAP/O2/OAT: Auto CPAP 15-20    Set up: 2/17/2022       HPI:  Shahana Nj is a 70 y.o. year old female here today for follow-up on obstructive sleep apnea.  Last seen in clinic 4/18/2025 by JENN Flor.  Previously evaluated by Dr. Yenni Aguilar 4/1/2022.      Past Medical History: AALIYAH, type 2 diabetes, stage IIIa chronic kidney disease, obesity, GERD, hypertension, renal cell cancer, iron deficiency anemia, primary hyperparathyroidism, MI.     Vitals:  /84 (BP Location: Left arm, Patient Position: Sitting, BP Cuff Size: Adult)   Pulse 72   Resp 16   Ht 1.676 m (5' 6\")   Wt 92.5 kg (204 lb)   SpO2 95% BMI of 32.93 kg/m².  Patient does report 50 pound weight loss.    Recent Imaging: CT chest obtained 2/21/2025 demonstrated basilar atelectasis, scattered coronary artery calcifications, possible 1.3 cm left lobe of the thyroid gland nodule.  Low-attenuation liver consistent with fatty changes, calcified gallstone.     Transesophageal echo obtained 1/7/2023 demonstrated a LVEF estimated at 65-70% with normal right ventricular size and function.  Trace mitral regurgitation, trace tricuspid regurgitation.  Estimated RVSP of 45-50 mmHg.    Currently using  Resmed auto CPAP @ 15-20 cm H20 pressure; compliance reviewed for 6/18/2025 through 7/17/2025, days used 30/30, average daily usage 5 hours 38 minutes, 87% of days greater than or equal to 4 hours, mask leak at 37 LPM at 95th percentile, AHI 39.1 per hour.  Uncontrolled apneas, updated titration study recommended.    Device obtained 2/17/2022  DME provider Preferred  Mask interface nasal mask    Initial diagnostic sleep study obtained in [facility] approximately 20-25 years ago.  Second sleep study was done here in [place] at [facility] approximately 10 years ago by Dr. Armstrong this facility is now " closed.       Overnight oximetry obtained on auto CPAP of 13-17 cm H2O pressure on 3/21/2022 demonstrated low O2 sat of 83% with sats less than 88% for 8.5 minutes of total sleep time.  Findings consistent with mild nocturnal hypoxia.  Pressure changes were made with increased to 13.6-19 cm H2O pressure.    Sleep schedule goes to bed 1230-1 AM, wakens 6 AM , and gets up during the night bathroom x 1   Symptoms denies day time somnolence, denies morning headache, and fatigue    [place] Sleepiness Scale reported as 4/24 on 4/18/2025      Review of Systems   Constitutional:  Positive for malaise/fatigue and weight loss (calorie reduction). Negative for chills and fever.   HENT:  Positive for hearing loss (hearing aid left), nosebleeds (recently x 4) and tinnitus (all the time). Negative for congestion, sinus pain and sore throat.    Eyes:         Presc glasses    Respiratory:  Positive for cough (tessalon) and sputum production (clear to dark yellow occasionally). Negative for shortness of breath and wheezing.         Follows with pulmonary    Cardiovascular:  Positive for leg swelling. Negative for chest pain, palpitations and orthopnea.   Gastrointestinal:  Positive for heartburn (controlled on meds).        No dentures, missing 8 molars, no swallowing issues    Neurological:  Positive for tremors (occasional) and headaches. Negative for dizziness.   Psychiatric/Behavioral:  The patient does not have insomnia.        Past Medical History[1]    Past Surgical History[2]    Family History   Problem Relation Age of Onset    Diabetes Mother     Cancer Mother         brain ca    Heart Disease Mother 45        cabg    Heart Disease Father         cabg and valve replacement    Cancer Sister         bone ca    Diabetes Sister     Diabetes Other        Social History     Socioeconomic History    Marital status: Single     Spouse name: Not on file    Number of children: Not on file    Years of education: Not on file    Highest  education level: Not on file   Occupational History     Comment: retired , [employer]   Tobacco Use    Smoking status: Never    Smokeless tobacco: Never   Vaping Use    Vaping status: Never Used   Substance and Sexual Activity    Alcohol use: Yes     Comment: 6 times per year    Drug use: No     Comment: tried marijuana at age 17    Sexual activity: Yes     Partners: Male   Other Topics Concern    Not on file   Social History Narrative    Not on file     Social Drivers of Health     Financial Resource Strain: Not on file   Food Insecurity: No Food Insecurity (5/7/2025)    Hunger Vital Sign     Worried About Running Out of Food in the Last Year: Never true     Ran Out of Food in the Last Year: Never true   Transportation Needs: No Transportation Needs (5/7/2025)    PRAPARE - Transportation     Lack of Transportation (Medical): No     Lack of Transportation (Non-Medical): No   Physical Activity: Not on file   Stress: Not on file   Social Connections: Not on file   Intimate Partner Violence: Not At Risk (5/7/2025)    Humiliation, Afraid, Rape, and Kick questionnaire     Fear of Current or Ex-Partner: No     Emotionally Abused: No     Physically Abused: No     Sexually Abused: No   Housing Stability: Low Risk  (5/7/2025)    Housing Stability Vital Sign     Unable to Pay for Housing in the Last Year: No     Number of Times Moved in the Last Year: 0     Homeless in the Last Year: No       Allergies as of 07/18/2025 - Reviewed 07/18/2025   Allergen Reaction Noted    Pcn [penicillins] Swelling 03/29/2016    Tape Rash 03/29/2016          Current medications as of today Current Medications[3]      Physical Exam:   Gen:           Alert and oriented, No apparent distress. Mood and affect appropriate, normal interaction with examiner.   Hearing:     Grossly intact.  Nose:          Normal, no lesions or deformities.  Dentition:    poor dentition.   Oropharynx:   Tongue normal, posterior pharynx without erythema or  exudate.  Mallampati Classification: III  Neck:        Supple, trachea midline, no masses.  Respiratory Effort: No intercostal retractions or use of accessory muscles.   Gait and Station: Grossly normal.  Digits and Nails: No clubbing, cyanosis, petechiae, or nodes.   Skin:        No rashes, lesions or ulcers noted.               Ext:           No cyanosis or edema.      Immunizations:  Flu: 8/24/2024  Pneumovax 23: 9/28/2023, 2/8/2017  PCV 20: 2/28/2023  SARS CoV2 Vaccine: 10/5/2022, 7/18/2022, 12/21/2021, 3/30/2021, 3/18/2021     Assessment / Plan:  1. Obstructive sleep apnea  - Polysomnography Titration  - DME Other  - DME Mask and Supplies    Reviewed compliance, demonstrating consistent use.  Minor pressure adjustments was made at last visit with further increase in apneas.  Persistent mask leak is noted but patient prefers nasal mask, may need chinstrap.  Return to prior settings with elevated apnea but less than the current time.  Patient has had a 50 pound weight loss, will order updated titration study to determine best settings or need for smarter device.  On day of study patient is advised to avoid caffeine, drugs or alcohol, napping.  Will send updated order for mask and supplies to [facility] in [place].  Short-term follow-up to review results.      Follow-up:   Return in about 3 months (around 10/18/2025) for Return with Mahogany Bailey PA-C.    Please note that this dictation was created using voice recognition software. I have made every reasonable attempt to correct obvious errors, but it is possible there are errors of grammar and possibly content that I did not discover before finalizing the note.         [1]   Past Medical History:  Diagnosis Date    Arthritis     Osteo knees    Bedbug bite 02/08/2022    Ongoing encounter with eye physician.  She reports that she was scheduled for surgery on her eye after both cataract however she has a rash secondary to that bedbug infestation.  She reports that  "she is currently undergoing the process of clearing it from her house and the final days on Thursday 2-.  She has several bites both forearms that have decreased in intensity over the past week.  T    Cancer (HCC) 05/01/2025    renal    Diverticulitis 01/26/2023    Dyslipidemia     Esophageal dysphagia 01/26/2023    Essential hypertension     GERD (gastroesophageal reflux disease)     High cholesterol     Left knee pain 11/19/2019    Melena 06/13/2025    Myocardial infarct (HCC)     Hx of 2 and states \"very Mild\". Last was approx 2014. No longer follows with cardiologist.    AALIYAH (obstructive sleep apnea)     CPAP    Renal disorder     Cancer    Renal mass, right 01/26/2023    Snoring     Type 2 diabetes mellitus (HCC)     11/19/19-Avg AM glucose=100.   [2]   Past Surgical History:  Procedure Laterality Date    NEPHRECTOMY PARTIAL ROBOTIC XI Right 5/7/2025    Procedure: RIGHT, ROBOTIC ASSISTED RADICAL NEPHRECTOMY;  Surgeon: Valentin MALIN M.D.;  Location: SURGERY [facility];  Service: Uro Robotic    PB TOTAL KNEE ARTHROPLASTY Left 12/3/2019    Procedure: ARTHROPLASTY, KNEE, TOTAL;  Surgeon: Ryan Keen M.D.;  Location: SURGERY [facility];  Service: Orthopedics    COLONOSCOPY  09/2019    ABDOMINAL HYSTERECTOMY TOTAL  2004    KNEE ARTHROSCOPY Right 1974    RHINOPLASTY  1970    BUNIONECTOMY Left early 2000's    DENTAL EXTRACTION(S)  1960's    wisdom teeth    MYRINGOTOMY Bilateral Infant to 2001    13 ear surgeries    OTHER SURGICAL PROCEDURE Right 1980's    fluid drained off of bone air pockets behind ear bone    TONSILLECTOMY AND ADENOIDECTOMY  as child   [3]   Current Outpatient Medications   Medication Sig Dispense Refill    benzonatate (TESSALON) 100 MG Cap Take 2 Capsules by mouth 2 times a day as needed for Cough. 360 Capsule 0    mupirocin (BACTROBAN) 2 % Ointment Apply 1 Application topically 2 times a day.      triamcinolone acetonide (KENALOG) 0.025 % Cream Apply 1 Application topically 2 " times a day. Limit to <10 days 15 g 0    PEG 3350-KCl-NaBcb-NaCl-NaSulf (PEG-3350/ELECTROLYTES) 236 g Recon Soln FOLLOW [facility] INSTRUCTION HANDOUT      oxyCODONE immediate-release (ROXICODONE) 5 MG Tab TAKE 1 TABLET BY MOUTH EVERY 6 HOURS FOR 5 DAYS FOR MODERATE TO SEVERE POST OPERATIVE PAIN      oxybutynin SR (DITROPAN-XL) 10 MG CR tablet Take 10 mg by mouth every day.      irbesartan (AVAPRO) 300 MG Tab TAKE 1 TABLET BY MOUTH IN THE EVENING FOR BLOOD PRESSURE 100 Tablet 1    Zinc Sulfate (ZINC 15 PO) Take 15 mg by mouth every day.      rosuvastatin (CRESTOR) 20 MG Tab Take 1 Tablet by mouth every evening. 100 Tablet 3    metoprolol SR (TOPROL XL) 50 MG TABLET SR 24 HR Take 1 Tablet by mouth every day. 90 Tablet 3    Empagliflozin (JARDIANCE) 10 MG Tab tablet Take 1 Tablet by mouth every day. 90 Tablet 3    omeprazole (PRILOSEC) 20 MG delayed-release capsule Take 1 capsule by mouth twice daily 60 Capsule 3    vitamin D (VITAMIND D3) 1000 UNIT Tab Take 1,000 Units by mouth every day.       No current facility-administered medications for this visit.

## 2025-07-21 ENCOUNTER — NON-PROVIDER VISIT (OUTPATIENT)
Dept: SLEEP MEDICINE | Facility: MEDICAL CENTER | Age: 70
End: 2025-07-21
Attending: STUDENT IN AN ORGANIZED HEALTH CARE EDUCATION/TRAINING PROGRAM
Payer: MEDICARE

## 2025-07-21 DIAGNOSIS — R05.3 CHRONIC COUGH: ICD-10-CM

## 2025-07-21 PROCEDURE — 94726 PLETHYSMOGRAPHY LUNG VOLUMES: CPT | Performed by: PHYSICIAN ASSISTANT

## 2025-07-21 PROCEDURE — 94726 PLETHYSMOGRAPHY LUNG VOLUMES: CPT | Mod: 26 | Performed by: INTERNAL MEDICINE

## 2025-07-21 PROCEDURE — 94060 EVALUATION OF WHEEZING: CPT | Mod: 26 | Performed by: INTERNAL MEDICINE

## 2025-07-21 PROCEDURE — 94729 DIFFUSING CAPACITY: CPT | Mod: 26 | Performed by: INTERNAL MEDICINE

## 2025-07-21 PROCEDURE — 94060 EVALUATION OF WHEEZING: CPT | Performed by: PHYSICIAN ASSISTANT

## 2025-07-21 PROCEDURE — 94729 DIFFUSING CAPACITY: CPT | Performed by: PHYSICIAN ASSISTANT

## 2025-07-21 NOTE — PROCEDURES
Felicitas Polk notes; Good patient effort and cooperation.  Two puffs of albuterol were given via spacer.  DLCO performed during dilation    Interpretation:  Baseline spirometry shows airflow obstruction with an FEV1/FVC ratio of 64 with a Z-score of -1.73.  FEV1 is reduced at 1.46 L with a Z-score of -2.08.  No significant bronchodilator response.  Total lung capacity is reduced at 3.89 L with a Z-score of -1.93.  Diffusion capacity is low normal.  Pulmonary function testing shows a mixed restrictive obstructive pattern with airflow obstruction but reduction in total lung capacity.  Diffusion capacity is preserved.  This may be obstructive lung disease in the setting of obesity.  correlate clinically.

## 2025-07-22 ENCOUNTER — OFFICE VISIT (OUTPATIENT)
Dept: MEDICAL GROUP | Facility: LAB | Age: 70
End: 2025-07-22
Payer: MEDICARE

## 2025-07-22 ENCOUNTER — HOSPITAL ENCOUNTER (OUTPATIENT)
Dept: LAB | Facility: MEDICAL CENTER | Age: 70
End: 2025-07-22
Attending: STUDENT IN AN ORGANIZED HEALTH CARE EDUCATION/TRAINING PROGRAM
Payer: MEDICARE

## 2025-07-22 VITALS
HEIGHT: 66 IN | OXYGEN SATURATION: 97 % | TEMPERATURE: 97.5 F | BODY MASS INDEX: 32.59 KG/M2 | HEART RATE: 63 BPM | SYSTOLIC BLOOD PRESSURE: 132 MMHG | WEIGHT: 202.8 LBS | RESPIRATION RATE: 16 BRPM | DIASTOLIC BLOOD PRESSURE: 76 MMHG

## 2025-07-22 DIAGNOSIS — N18.32 STAGE 3B CHRONIC KIDNEY DISEASE: ICD-10-CM

## 2025-07-22 DIAGNOSIS — D50.9 IRON DEFICIENCY ANEMIA, UNSPECIFIED IRON DEFICIENCY ANEMIA TYPE: ICD-10-CM

## 2025-07-22 DIAGNOSIS — G47.19 EXCESSIVE DAYTIME SLEEPINESS: ICD-10-CM

## 2025-07-22 DIAGNOSIS — G47.33 OBSTRUCTIVE SLEEP APNEA: ICD-10-CM

## 2025-07-22 DIAGNOSIS — R05.3 CHRONIC COUGH: ICD-10-CM

## 2025-07-22 DIAGNOSIS — E83.52 HYPERCALCEMIA: ICD-10-CM

## 2025-07-22 LAB
25(OH)D3 SERPL-MCNC: 40 NG/ML (ref 30–100)
ALBUMIN SERPL BCP-MCNC: 4.4 G/DL (ref 3.2–4.9)
ALBUMIN/GLOB SERPL: 1.6 G/DL
ALP SERPL-CCNC: 88 U/L (ref 30–99)
ALT SERPL-CCNC: 18 U/L (ref 2–50)
ANION GAP SERPL CALC-SCNC: 12 MMOL/L (ref 7–16)
AST SERPL-CCNC: 18 U/L (ref 12–45)
BILIRUB SERPL-MCNC: 0.3 MG/DL (ref 0.1–1.5)
BUN SERPL-MCNC: 51 MG/DL (ref 8–22)
CALCIUM ALBUM COR SERPL-MCNC: 10.4 MG/DL (ref 8.5–10.5)
CALCIUM SERPL-MCNC: 10.7 MG/DL (ref 8.5–10.5)
CHLORIDE SERPL-SCNC: 107 MMOL/L (ref 96–112)
CO2 SERPL-SCNC: 20 MMOL/L (ref 20–33)
CREAT SERPL-MCNC: 1.76 MG/DL (ref 0.5–1.4)
GFR SERPLBLD CREATININE-BSD FMLA CKD-EPI: 31 ML/MIN/1.73 M 2
GLOBULIN SER CALC-MCNC: 2.7 G/DL (ref 1.9–3.5)
GLUCOSE SERPL-MCNC: 108 MG/DL (ref 65–99)
PHOSPHATE SERPL-MCNC: 3.5 MG/DL (ref 2.5–4.5)
POTASSIUM SERPL-SCNC: 4.8 MMOL/L (ref 3.6–5.5)
PROT SERPL-MCNC: 7.1 G/DL (ref 6–8.2)
PTH-INTACT SERPL-MCNC: 193 PG/ML (ref 14–72)
SODIUM SERPL-SCNC: 139 MMOL/L (ref 135–145)

## 2025-07-22 PROCEDURE — 84100 ASSAY OF PHOSPHORUS: CPT

## 2025-07-22 PROCEDURE — 80053 COMPREHEN METABOLIC PANEL: CPT

## 2025-07-22 PROCEDURE — 83970 ASSAY OF PARATHORMONE: CPT

## 2025-07-22 PROCEDURE — 3078F DIAST BP <80 MM HG: CPT | Performed by: STUDENT IN AN ORGANIZED HEALTH CARE EDUCATION/TRAINING PROGRAM

## 2025-07-22 PROCEDURE — 36415 COLL VENOUS BLD VENIPUNCTURE: CPT

## 2025-07-22 PROCEDURE — 82306 VITAMIN D 25 HYDROXY: CPT

## 2025-07-22 PROCEDURE — 99214 OFFICE O/P EST MOD 30 MIN: CPT | Performed by: STUDENT IN AN ORGANIZED HEALTH CARE EDUCATION/TRAINING PROGRAM

## 2025-07-22 PROCEDURE — 82542 COL CHROMOTOGRAPHY QUAL/QUAN: CPT

## 2025-07-22 PROCEDURE — 3075F SYST BP GE 130 - 139MM HG: CPT | Performed by: STUDENT IN AN ORGANIZED HEALTH CARE EDUCATION/TRAINING PROGRAM

## 2025-07-22 ASSESSMENT — ENCOUNTER SYMPTOMS
ABDOMINAL PAIN: 0
NAUSEA: 0
WEIGHT LOSS: 0
COUGH: 1
FEVER: 0
CHILLS: 0
WHEEZING: 0
VOMITING: 0
DIARRHEA: 0
SPUTUM PRODUCTION: 0
SHORTNESS OF BREATH: 0

## 2025-07-22 ASSESSMENT — FIBROSIS 4 INDEX: FIB4 SCORE: 1.23

## 2025-07-22 NOTE — PROGRESS NOTES
Verbal consent was acquired by the patient to use [employer] ambient listening note generation during this visit Yes.    Subjective:     Chief Complaint   Patient presents with    Follow-Up     Concern regarding Narcolepsy        HPI:     History of Present Illness  The patient is a 70-year-old female who presents to discuss concerns regarding narcolepsy, anemia, cough, and health maintenance.    She reports experiencing sudden sleep episodes during various activities such as watching TV or attending her 's doctor's appointments. Her  and his therapist suspect she may have narcolepsy. She attributes these episodes to inadequate sleep at night due to her caregiving responsibilities for her , who tends to stay up late. She has not experienced any instances of falling asleep while driving. She recently had an appointment with sleep medicine on 07/18/2025 regarding her obstructive sleep apnea. She underwent a breathing test yesterday and has a sleep study scheduled for the end of the month to check the settings of her CPAP machine, which she uses nightly.    She has not yet undergone endoscopy and colonoscopy. She is under the care of a hematologist for anemia and had an iron infusion in 04/2025, followed by two blood transfusions. She reports no presence of blood in her stool. She has been fasting today.    Her cough remains unchanged, but she reports no difficulty in breathing. She breathes through her mouth and would like to be able to breathe through her nose.    She reports no abdominal pain, constipation, diarrhea, or heartburn.    Social History:  Sleep: Reports inadequate sleep at night due to caregiving responsibilities for her .    PAST SURGICAL HISTORY:  Iron infusion in 04/2025 followed by two blood transfusions.    Review of Systems   Constitutional:  Positive for malaise/fatigue. Negative for chills, fever and weight loss.   Respiratory:  Positive for cough. Negative for sputum  "production, shortness of breath and wheezing.    Cardiovascular:  Negative for chest pain.   Gastrointestinal:  Negative for abdominal pain, diarrhea, nausea and vomiting.   Skin:  Negative for rash.       Objective:     Exam:  /76 (BP Location: Right arm, Patient Position: Sitting, BP Cuff Size: Adult)   Pulse 63   Temp 36.4 °C (97.5 °F) (Temporal)   Resp 16   Ht 1.676 m (5' 6\")   Wt 92 kg (202 lb 12.8 oz)   SpO2 97%   BMI 32.73 kg/m²  Body mass index is 32.73 kg/m².    Physical Exam  Vitals reviewed.   Constitutional:       General: She is not in acute distress.     Appearance: Normal appearance. She is obese. She is not ill-appearing.   HENT:      Head: Normocephalic and atraumatic.      Nose: Nose normal.      Mouth/Throat:      Mouth: Mucous membranes are moist.   Eyes:      General: No scleral icterus.     Conjunctiva/sclera: Conjunctivae normal.   Cardiovascular:      Rate and Rhythm: Normal rate and regular rhythm.      Heart sounds: Normal heart sounds. No murmur heard.  Pulmonary:      Effort: Pulmonary effort is normal. No respiratory distress.      Breath sounds: Normal breath sounds. No stridor. No wheezing, rhonchi or rales.   Abdominal:      General: Abdomen is flat. Bowel sounds are normal. There is no distension.      Palpations: Abdomen is soft. There is no mass.      Tenderness: There is no abdominal tenderness. There is no guarding or rebound.   Musculoskeletal:      Cervical back: Normal range of motion and neck supple. No rigidity or tenderness.      Right lower leg: No edema.      Left lower leg: No edema.   Skin:     General: Skin is warm and dry.      Coloration: Skin is not jaundiced.   Neurological:      General: No focal deficit present.      Mental Status: She is alert and oriented to person, place, and time.   Psychiatric:         Mood and Affect: Mood normal.         Behavior: Behavior normal.         Thought Content: Thought content normal.       Reviewed most " recent/relevant labs/imaging.    Assessment & Plan:     70 y.o. female with the following -     1. Excessive daytime sleepiness  Acute on chronic, reviewed most recent sleep medicine encounter which shows she is having multiple apneas on her current settings and has a CPAP titration study scheduled for the end of the month.  Discussed that there may be other contributing factors such as progression of her CKD or iron deficiency, see below.  Encouraged her to complete fasting labs from myself, hematology/oncology and endocrinology today after her appointment.  Plan pending results.  Continue care with sleep medicine.    2. Obstructive sleep apnea  Chronic, currently poorly controlled and has a CPAP titration study scheduled for the end of the month.  Plan pending results.  Continue care with sleep medicine.    3. Iron deficiency anemia, unspecified iron deficiency anemia type  Chronic, encouraged patient to reschedule her colonoscopy/endoscopy that she had to cancel due to scheduling conflict with her 's appointment.  Denies any current GI concerns.  Continue care with hematology and gastroenterology, has labs to complete for hematology but I encouraged her to complete today.    4. Stage 3b chronic kidney disease  Chronic, followed by endocrinology who has lab orders for her to complete as she does have hypercalcemia due to hyperparathyroidism.  DM controlled, continue jardiance 10mg daily. Plan pending results, monitor GFR at least every 3 3 to 6 months or as needed.    5. Chronic cough  Chronic, stable.  Potentially element of GERD.  Just completed PFTs yesterday but results not available.  Had a CT in April.  Plan pending results of PFT.    Return in about 2 months (around 10/2/2025), or if symptoms worsen or fail to improve, for Annual Medicare Visit.    Please note that this dictation was created using voice recognition software. I have made every reasonable attempt to correct obvious errors, but I expect  that there are errors of grammar and possibly content that I did not discover before finalizing the note.

## 2025-07-22 NOTE — PATIENT INSTRUCTIONS
fasting labs due today (all)    [facility] (schedule endoscopy/colonoscopy)  [address] [facility] [address]  Phone: [phone]

## 2025-07-28 ENCOUNTER — HOSPITAL ENCOUNTER (OUTPATIENT)
Facility: MEDICAL CENTER | Age: 70
End: 2025-07-28
Attending: STUDENT IN AN ORGANIZED HEALTH CARE EDUCATION/TRAINING PROGRAM
Payer: MEDICARE

## 2025-07-28 DIAGNOSIS — E83.52 HYPERCALCEMIA: ICD-10-CM

## 2025-07-28 LAB
CREAT 24H UR-MSRATE: 709 MG/24 HR (ref 800–1800)
CREAT UR-MCNC: 39.4 MG/DL
PTH RELATED PROT SERPL-SCNC: 4 PMOL/L (ref 0–3.4)
SPECIMEN VOL UR: 1800 ML

## 2025-07-28 PROCEDURE — 82570 ASSAY OF URINE CREATININE: CPT

## 2025-07-28 PROCEDURE — 84300 ASSAY OF URINE SODIUM: CPT

## 2025-07-28 PROCEDURE — 81050 URINALYSIS VOLUME MEASURE: CPT

## 2025-07-28 PROCEDURE — 82340 ASSAY OF CALCIUM IN URINE: CPT

## 2025-07-29 ENCOUNTER — SLEEP STUDY (OUTPATIENT)
Dept: SLEEP MEDICINE | Facility: MEDICAL CENTER | Age: 70
End: 2025-07-29
Attending: PHYSICIAN ASSISTANT
Payer: MEDICARE

## 2025-07-30 LAB
CALCIUM 24H UR-MCNC: 4.9 MG/DL
CALCIUM 24H UR-MRATE: 88 MG/D (ref 100–250)
CALCIUM/CREAT 24H UR: 122 MG/G (ref 20–300)
COLLECT DURATION TIME SPEC: 24 HR
CREAT 24H UR-MCNC: 40 MG/DL
SODIUM 24H UR-SCNC: 60 MMOL/L
SODIUM 24H UR-SRATE: 108 MMOL/D (ref 51–286)
SPECIMEN VOL ?TM UR: 1800 ML

## 2025-07-30 NOTE — PROCEDURES
Patient: KOMAL WINCHESTER  ID: [id number] Date: 7/29/2025 Exam No.:   MONTAGE: Standard  STUDY TYPE: Treatment  RECORDING TECHNIQUE:   After the scalp was prepared, gold plated electrodes were applied to the scalp according to the International 10-20 System. EEG (electroencephalogram) was continuously monitored from the O1-M2, O2-M1, C3-M2, C4-M1, F3-M2, and F4-M1. EOGs (electrooculograms) were monitored by electrodes placed at the left and right outer canthi. Chin EMG (electromyogram) was monitored by electrodes placed on the mentalis and sub-mentalis muscles. Nasal and oral airflow were monitored using a triple port thermocouple as well as oronasal pressure transducer. Respiratory effort was measured by inductive plethysmography technology employing abdominal and thoracic belts. Blood oxygen saturation and pulse were monitored by pulse oximetry. Heart rhythm was monitored by surface electrocardiogram. Leg EMG was studied using surface electrodes placed on left and right anterior tibialis. A microphone was used to monitor tracheal sounds and snoring. Body position was monitored and documented by technician observation.   SCORING CRITERIA:   A modification of the AASM manual for scoring of sleep and associated events was used. Obstructive apneas were scored by cessation of airflow for at least 10 seconds with continuing respiratory effort. Central apneas were scored by cessation of airflow for at least 10 seconds with no respiratory effort. Hypopneas were scored by a 30% or more reduction in airflow for at least 10 seconds accompanied by arterial oxygen desaturation of 3% or an arousal. For CMS (Medicare) patients, per AASM rule 1B, hypopneas are scored by 30% with mild reduction in airflow for at least 10 seconds accompanied by arterial saturation decreased at 4%.  Study start time was 09:29:25 PM. Diagnostic recording time was 9h 1.0m with a total sleep time of 8h 31.0m resulting in a sleep efficiency of 94.45%%. Sleep  latency from the start of the study was 18 minutes and the latency from sleep to REM was 37 minutes. In total,59 arousals were scored for an arousal index of 6.9.  Respiratory:  There were a total of 2 apneas consisting of 2 obstructive apneas, 0 mixed apneas, and 0 central apneas. A total of 23 hypopneas were scored. The apnea index was 0.23 per hour and the hypopnea index was 2.70 per hour resulting in an overall AHI of 2.94. AHI during REM was 9.0 and AHI while supine was 2.94.  Oximetry:  There was a mean oxygen saturation of 94.0%. The minimum oxygen saturation in NREM was 88.0 % and in REM was 81.0%. The patient spent 6.5 minutes of TST with SaO2 <88%.  Cardiac:  The highest heart rate seen while awake was 88 BPM while the highest heart rate during sleep was 88 BPM with an average sleeping heart rate of 67 BPM.  Limb Movements:  There were a total of 0 PLMs during sleep which resulted in a PLMS index of 0.0. Of these, 10 were associated with arousals which resulted in a PLMS arousal index of 1.2.  Titration:   CPAP was tried from 10 to 15. BiPAP was tried at 16/12  This was a fully attended sleep study. This test was technically adequate. This patient was titrated on CPAP starting at *** cm of water pressure. Patient was titrated up to *** cm of water pressure. Patient did best at *** cm of water pressure. Patient spent *** minutes at that pressure and the AHI was *** which is considered *** obstructive sleep apnea.   Assessment:   ***Obstructive Sleep Apnea Hypopnea - AHI*** ***Nocturnal desaturation - kanchan saturation ***% - saturations <88% below for *** minutes of TST.  Recommendation:    download to assess the efficacy to the recommended pressure, measure leak, apnea hypopnea index and compliance for further outpatient monitoring and management of PAP therapy. In some cases alternative treatment options may be proven effective in resolving sleep apnea. These options include upper airway surgery, the use of a dental orthotic, weight loss, or positional therapy. Clinical correlation is required. In general patients with sleep apnea are advised to avoid alcohol, sedatives and not to operate a motor vehicle while drowsy.  Untreated sleep apnea increases the risk for cardiovascular and neurovascular disease.

## 2025-08-06 ENCOUNTER — OFFICE VISIT (OUTPATIENT)
Dept: MEDICAL GROUP | Facility: LAB | Age: 70
End: 2025-08-06
Payer: MEDICARE

## 2025-08-06 VITALS
DIASTOLIC BLOOD PRESSURE: 68 MMHG | OXYGEN SATURATION: 96 % | WEIGHT: 204.6 LBS | BODY MASS INDEX: 32.88 KG/M2 | HEIGHT: 66 IN | HEART RATE: 68 BPM | SYSTOLIC BLOOD PRESSURE: 134 MMHG | TEMPERATURE: 98.3 F | RESPIRATION RATE: 16 BRPM

## 2025-08-06 DIAGNOSIS — Z98.890 HISTORY OF EAR SURGERY: ICD-10-CM

## 2025-08-06 DIAGNOSIS — H90.3 SENSORINEURAL HEARING LOSS (SNHL) OF BOTH EARS: ICD-10-CM

## 2025-08-06 DIAGNOSIS — R05.3 CHRONIC COUGH: ICD-10-CM

## 2025-08-06 DIAGNOSIS — J98.4 MIXED RESTRICTIVE AND OBSTRUCTIVE LUNG DISEASE (HCC): Primary | ICD-10-CM

## 2025-08-06 DIAGNOSIS — J44.9 MIXED RESTRICTIVE AND OBSTRUCTIVE LUNG DISEASE (HCC): Primary | ICD-10-CM

## 2025-08-06 PROCEDURE — 99214 OFFICE O/P EST MOD 30 MIN: CPT | Performed by: STUDENT IN AN ORGANIZED HEALTH CARE EDUCATION/TRAINING PROGRAM

## 2025-08-06 PROCEDURE — 3078F DIAST BP <80 MM HG: CPT | Performed by: STUDENT IN AN ORGANIZED HEALTH CARE EDUCATION/TRAINING PROGRAM

## 2025-08-06 PROCEDURE — 3075F SYST BP GE 130 - 139MM HG: CPT | Performed by: STUDENT IN AN ORGANIZED HEALTH CARE EDUCATION/TRAINING PROGRAM

## 2025-08-06 RX ORDER — TIOTROPIUM BROMIDE INHALATION SPRAY 3.12 UG/1
5 SPRAY, METERED RESPIRATORY (INHALATION) DAILY
Qty: 12 G | Refills: 3 | Status: SHIPPED | OUTPATIENT
Start: 2025-08-06

## 2025-08-06 ASSESSMENT — ENCOUNTER SYMPTOMS
CHILLS: 0
WEIGHT LOSS: 0
FEVER: 0
ABDOMINAL PAIN: 0
NAUSEA: 0
SHORTNESS OF BREATH: 0
COUGH: 1
VOMITING: 0
DIARRHEA: 0

## 2025-08-06 ASSESSMENT — FIBROSIS 4 INDEX: FIB4 SCORE: 1.26

## 2025-08-22 ENCOUNTER — OFFICE VISIT (OUTPATIENT)
Dept: ENDOCRINOLOGY | Facility: MEDICAL CENTER | Age: 70
End: 2025-08-22
Attending: STUDENT IN AN ORGANIZED HEALTH CARE EDUCATION/TRAINING PROGRAM
Payer: MEDICARE

## 2025-08-22 VITALS
OXYGEN SATURATION: 95 % | SYSTOLIC BLOOD PRESSURE: 124 MMHG | HEIGHT: 66 IN | HEART RATE: 68 BPM | BODY MASS INDEX: 32.96 KG/M2 | WEIGHT: 205.1 LBS | DIASTOLIC BLOOD PRESSURE: 60 MMHG

## 2025-08-22 DIAGNOSIS — E83.52 HYPERCALCEMIA: Primary | ICD-10-CM

## 2025-08-22 PROCEDURE — 99213 OFFICE O/P EST LOW 20 MIN: CPT | Performed by: STUDENT IN AN ORGANIZED HEALTH CARE EDUCATION/TRAINING PROGRAM

## 2025-08-22 ASSESSMENT — FIBROSIS 4 INDEX: FIB4 SCORE: 1.26

## 2025-08-28 ENCOUNTER — HOSPITAL ENCOUNTER (OUTPATIENT)
Dept: LAB | Facility: MEDICAL CENTER | Age: 70
End: 2025-08-28
Attending: INTERNAL MEDICINE
Payer: MEDICARE

## 2025-08-28 ENCOUNTER — HOSPITAL ENCOUNTER (OUTPATIENT)
Dept: LAB | Facility: MEDICAL CENTER | Age: 70
End: 2025-08-28
Attending: STUDENT IN AN ORGANIZED HEALTH CARE EDUCATION/TRAINING PROGRAM
Payer: MEDICARE

## 2025-08-28 DIAGNOSIS — E83.52 HYPERCALCEMIA: ICD-10-CM

## 2025-08-28 LAB
25(OH)D3 SERPL-MCNC: 37 NG/ML (ref 30–100)
ALBUMIN SERPL BCP-MCNC: 4.3 G/DL (ref 3.2–4.9)
ALBUMIN/GLOB SERPL: 1.6 G/DL
ALP SERPL-CCNC: 103 U/L (ref 30–99)
ALT SERPL-CCNC: 278 U/L (ref 2–50)
ANION GAP SERPL CALC-SCNC: 11 MMOL/L (ref 7–16)
AST SERPL-CCNC: 132 U/L (ref 12–45)
BILIRUB SERPL-MCNC: 0.4 MG/DL (ref 0.1–1.5)
BUN SERPL-MCNC: 44 MG/DL (ref 8–22)
CALCIUM ALBUM COR SERPL-MCNC: 10.4 MG/DL (ref 8.5–10.5)
CALCIUM SERPL-MCNC: 10.6 MG/DL (ref 8.5–10.5)
CHLORIDE SERPL-SCNC: 104 MMOL/L (ref 96–112)
CO2 SERPL-SCNC: 21 MMOL/L (ref 20–33)
CREAT SERPL-MCNC: 1.83 MG/DL (ref 0.5–1.4)
ERYTHROCYTE [DISTWIDTH] IN BLOOD BY AUTOMATED COUNT: 43.5 FL (ref 35.9–50)
FERRITIN SERPL-MCNC: 16.5 NG/ML (ref 10–291)
GFR SERPLBLD CREATININE-BSD FMLA CKD-EPI: 29 ML/MIN/1.73 M 2
GLOBULIN SER CALC-MCNC: 2.7 G/DL (ref 1.9–3.5)
GLUCOSE SERPL-MCNC: 118 MG/DL (ref 65–99)
HCT VFR BLD AUTO: 39 % (ref 37–47)
HGB BLD-MCNC: 11.5 G/DL (ref 12–16)
IRON SATN MFR SERPL: 7 % (ref 15–55)
IRON SERPL-MCNC: 28 UG/DL (ref 40–170)
MCH RBC QN AUTO: 21.9 PG (ref 27–33)
MCHC RBC AUTO-ENTMCNC: 29.5 G/DL (ref 32.2–35.5)
MCV RBC AUTO: 74.1 FL (ref 81.4–97.8)
PHOSPHATE SERPL-MCNC: 3.8 MG/DL (ref 2.5–4.5)
PLATELET # BLD AUTO: 322 K/UL (ref 164–446)
PMV BLD AUTO: 10.4 FL (ref 9–12.9)
POTASSIUM SERPL-SCNC: 5.2 MMOL/L (ref 3.6–5.5)
PROT SERPL-MCNC: 7 G/DL (ref 6–8.2)
PTH-INTACT SERPL-MCNC: 220 PG/ML (ref 14–72)
RBC # BLD AUTO: 5.26 M/UL (ref 4.2–5.4)
SODIUM SERPL-SCNC: 136 MMOL/L (ref 135–145)
TIBC SERPL-MCNC: 396 UG/DL (ref 250–450)
UIBC SERPL-MCNC: 368 UG/DL (ref 110–370)
WBC # BLD AUTO: 7.3 K/UL (ref 4.8–10.8)

## 2025-08-28 PROCEDURE — 85027 COMPLETE CBC AUTOMATED: CPT

## 2025-08-28 PROCEDURE — 83550 IRON BINDING TEST: CPT

## 2025-08-28 PROCEDURE — 83970 ASSAY OF PARATHORMONE: CPT

## 2025-08-28 PROCEDURE — 84100 ASSAY OF PHOSPHORUS: CPT

## 2025-08-28 PROCEDURE — 83540 ASSAY OF IRON: CPT

## 2025-08-28 PROCEDURE — 36415 COLL VENOUS BLD VENIPUNCTURE: CPT

## 2025-08-28 PROCEDURE — 82728 ASSAY OF FERRITIN: CPT

## 2025-08-28 PROCEDURE — 82306 VITAMIN D 25 HYDROXY: CPT

## 2025-08-28 PROCEDURE — 80053 COMPREHEN METABOLIC PANEL: CPT

## 2025-08-29 ENCOUNTER — OFFICE VISIT (OUTPATIENT)
Dept: SLEEP MEDICINE | Facility: MEDICAL CENTER | Age: 70
End: 2025-08-29
Attending: PHYSICIAN ASSISTANT
Payer: MEDICARE

## 2025-08-29 VITALS
WEIGHT: 207 LBS | SYSTOLIC BLOOD PRESSURE: 132 MMHG | DIASTOLIC BLOOD PRESSURE: 76 MMHG | BODY MASS INDEX: 32.49 KG/M2 | HEIGHT: 67 IN | OXYGEN SATURATION: 95 % | HEART RATE: 65 BPM

## 2025-08-29 DIAGNOSIS — G47.33 OBSTRUCTIVE SLEEP APNEA: Primary | ICD-10-CM

## 2025-08-29 ASSESSMENT — ENCOUNTER SYMPTOMS
ORTHOPNEA: 0
SORE THROAT: 0
PALPITATIONS: 0
HEADACHES: 1
SHORTNESS OF BREATH: 0
HEARTBURN: 1
FEVER: 0
WHEEZING: 0
SINUS PAIN: 0
DIZZINESS: 0
WEIGHT LOSS: 0
COUGH: 1
SPUTUM PRODUCTION: 0
TREMORS: 0
CHILLS: 0
INSOMNIA: 0

## 2025-08-29 ASSESSMENT — LIFESTYLE VARIABLES
AUDIT-C TOTAL SCORE: 1
HOW MANY STANDARD DRINKS CONTAINING ALCOHOL DO YOU HAVE ON A TYPICAL DAY: 1 OR 2
SKIP TO QUESTIONS 9-10: 1
HOW OFTEN DO YOU HAVE SIX OR MORE DRINKS ON ONE OCCASION: NEVER
HOW OFTEN DO YOU HAVE A DRINK CONTAINING ALCOHOL: MONTHLY OR LESS

## 2025-08-29 ASSESSMENT — FIBROSIS 4 INDEX: FIB4 SCORE: 1.75

## (undated) DEVICE — STAPLE 45MM WHTE 2.5MM - (12/BX)

## (undated) DEVICE — GLOVE SZ 7.5 BIOGEL PI MICRO - PF LF (50PR/BX)

## (undated) DEVICE — DERMABOND ADVANCED - (12EA/BX)

## (undated) DEVICE — WATER IRRIGATION STERILE 1000ML (12EA/CA)

## (undated) DEVICE — GLOVE LITE HANDLE DISP. - (1/PK 80PK/CS)

## (undated) DEVICE — SHEARS MONOPOLAR CURVED DA VINCI 10X'S REUSABLE

## (undated) DEVICE — GLOVE BIOGEL SZ 6 PF LATEX - (50EA/BX 4BX/CA)

## (undated) DEVICE — COVER TIP ENDOWRIST HOT SHEAR - (10EA/BX) DA VINCI

## (undated) DEVICE — TUBING CLEARLINK DUO-VENT - C-FLO (48EA/CA)

## (undated) DEVICE — SODIUM CHL. IRRIGATION 0.9% 3000ML (4EA/CA 65CA/PF)

## (undated) DEVICE — ELECTRODE 850 FOAM ADHESIVE - HYDROGEL RADIOTRNSPRNT (50/PK)

## (undated) DEVICE — CHLORAPREP 26 ML APPLICATOR - ORANGE TINT(25/CA)

## (undated) DEVICE — TIP INTPLS HFLO ML ORFC BTRY - (12/CS)  FOR SURGILAV

## (undated) DEVICE — FORCEPS PROGRASP (18UN/EA)

## (undated) DEVICE — GOWN WARMING STANDARD FLEX - (30/CA)

## (undated) DEVICE — CANNULA W/SEAL12X100ZTHREAD - (12/BX)

## (undated) DEVICE — DRIVER LARGE NEEDLE (15UN/EA)

## (undated) DEVICE — HANDPIECE 10FT INTPLS SCT PLS IRRIGATION HAND CONTROL SET (6/PK)

## (undated) DEVICE — TRAY SURESTEP FOLEY TEMP SENSING 16FR SNAP SECURE(10EA/CA) ORDER #18764 FOR TEMP FOLEY ONLY

## (undated) DEVICE — BAG RETRIEVAL 12/15 MM INZII (5EA/CA) THIS WILL REPLACE ITEM 75018

## (undated) DEVICE — BLADE 90X18X1.27MM SAW SAGITTAL

## (undated) DEVICE — SUCTION INSTRUMENT YANKAUER BULBOUS TIP W/O VENT (50EA/CA)

## (undated) DEVICE — SUTURE GENERAL

## (undated) DEVICE — BLADE SAGITTAL SAW DUAL CUT 25.0 X 90.0 X 1.27MM (1/EA)

## (undated) DEVICE — SEALANT TISSUE CLOSURE KIT FIBIRIN VISTASEAL 10ML (1EA/BX)

## (undated) DEVICE — SUTURE 0 COATED VICRYL 6-18IN - (12PK/BX)

## (undated) DEVICE — GLOVE SZ 6 BIOGEL PI MICRO - PF LF (50PR/BX 4BX/CA)

## (undated) DEVICE — DRAPE COLUMN BOX OF 20

## (undated) DEVICE — GLOVE BIOGEL SZ 8 SURGICAL PF LTX - (50PR/BX 4BX/CA)

## (undated) DEVICE — SUTURE 0 VICRYL PLUS CT-1 - 36 INCH (36/BX)

## (undated) DEVICE — CLIP HEM-O-LOC GREEN - (14EA/BX)

## (undated) DEVICE — MASK ANESTHESIA ADULT  - (100/CA)

## (undated) DEVICE — SET SUCTION/IRRIGATION WITH DISPOSABLE TIP (6/CA )PART #0250-070-520 IS A SUB

## (undated) DEVICE — GLOVE BIOGEL PI INDICATOR SZ 8.0 SURGICAL PF LF -(50/BX 4BX/CA)

## (undated) DEVICE — DRAPE LARGE 3 QUARTER - (20/CA)

## (undated) DEVICE — LACTATED RINGERS INJ 1000 ML - (14EA/CA 60CA/PF)

## (undated) DEVICE — NEEDLE INSFL 120MM 14GA VRRS - (20/BX)

## (undated) DEVICE — STOCKINETTE IMPERVIOUS 12X48 - STERILELF (10/CA)"

## (undated) DEVICE — PACK DAVINCI GENERAL (3EA/CA)

## (undated) DEVICE — CANISTER SUCTION 3000ML MECHANICAL FILTER AUTO SHUTOFF MEDI-VAC NONSTERILE LF DISP  (40EA/CA)

## (undated) DEVICE — SET LEADWIRE 5 LEAD BEDSIDE DISPOSABLE ECG (1SET OF 5/EA)

## (undated) DEVICE — CLIP HEMOLOCK PURPLE - (14/BX)

## (undated) DEVICE — AIRLESS LAP SPRAY TIP VISTASEAL (3EA/BX)

## (undated) DEVICE — DRESSING AQUACEL AG ADVANTAGE 3.5 X 10" (10EA/BX)"

## (undated) DEVICE — TROCAR Z THREAD12MM OPTICAL - NON BLADED (6/BX)

## (undated) DEVICE — SUTURE 4-0 MONOCRYL PLUS PS-2 - 27 INCH (36/BX)

## (undated) DEVICE — GLOVE BIOGEL PI INDICATOR SZ 7.5 SURGICAL PF LF -(50/BX 4BX/CA)

## (undated) DEVICE — SENSOR OXIMETER ADULT SPO2 RD SET (20EA/BX)

## (undated) DEVICE — SUTURE 2-0 VICRYL PLUS SH - 27 INCH (36/BX)

## (undated) DEVICE — SYSTEM CLEARIFY VISUALIZATION (10EA/PK)

## (undated) DEVICE — BLOCK

## (undated) DEVICE — SODIUM CHL IRRIGATION 0.9% 1000ML (12EA/CA)

## (undated) DEVICE — PIN TROCAR GEN 1/8X3 (4EA/BX)

## (undated) DEVICE — SEAL UNIVERSAL 5MM-12MM (10EA/BX)

## (undated) DEVICE — CANISTER SUCTION 3000ML MECHANICAL FILTER AUTO SHUTOFF MEDI-VAC NONSTERILE LF DISP (40EA/CA)

## (undated) DEVICE — GLOVE BIOGEL PI ORTHO SZ 6 SURGICAL PF LF (40PR/BX)

## (undated) DEVICE — BANDAGE ELASTIC STERILE VELCRO 6 X 5 YDS (25EA/CA)

## (undated) DEVICE — SLEEVE VASO DVT COMPRESSION CALF MED - (10PR/CA)

## (undated) DEVICE — FORCEPS FENESTRATED BIPOLAR (14UN/EA)

## (undated) DEVICE — BLADE SURGICAL #15 - (50/BX 3BX/CA)

## (undated) DEVICE — BAG RETRIEVAL 10ML (10EA/BX)

## (undated) DEVICE — BLADE SAGITTAL 34MM

## (undated) DEVICE — SCISSORS 5MM CVD (6EA/BX)

## (undated) DEVICE — VESSELOOP MAXI BLUE STERILE- SURG-I-LOOP (10EA/BX)

## (undated) DEVICE — APPLICATOR SURGIFLO - (6EA/BX)

## (undated) DEVICE — GLOVE BIOGEL PI INDICATOR SZ 6.5 SURGICAL PF LF - (50/BX 4BX/CA)

## (undated) DEVICE — KIT ROOM DECONTAMINATION

## (undated) DEVICE — Device

## (undated) DEVICE — PACK TOTAL KNEE  (1/CA)

## (undated) DEVICE — STAPLER SKIN DISP - (6/BX 10BX/CA) VISISTAT

## (undated) DEVICE — ELECTRODE DUAL RETURN W/ CORD - (50/PK)

## (undated) DEVICE — GOWN SURGEONS X-LARGE - DISP. (30/CA)

## (undated) DEVICE — LENS/HOOD FOR SPACESUIT - (32/PK) PEEL AWAY FACE

## (undated) DEVICE — SET EXTENSION WITH 2 PORTS (48EA/CA) ***PART #2C8610 IS A SUBSTITUTE*****

## (undated) DEVICE — KIT ANESTHESIA W/CIRCUIT & 3/LT BAG W/FILTER (20EA/CA)

## (undated) DEVICE — OBTURATOR BLADELESS STANDARD 8MM (6EA/BX)

## (undated) DEVICE — PAD LAP STERILE 18 X 18 - (5/PK 40PK/CA)

## (undated) DEVICE — BLADE SURGICAL CLIPPER - (50EA/CA)

## (undated) DEVICE — STAPLER ECHELON 3000 45MM STANDARD (3EA/BX)

## (undated) DEVICE — TROCAR 5X100 NON BLADED Z-TH - READ KII (6/BX)

## (undated) DEVICE — SUTURE 3-0 MONOCRYL PLUS PS-1 - 27 INCH (36/BX)

## (undated) DEVICE — DRAPE ARM BOX OF 20

## (undated) DEVICE — TOWELS CLOTH SURGICAL - (4/PK 20PK/CA)

## (undated) DEVICE — SUTURE 2-0 VICRYL PLUS CT-1 - 8 X 18 INCH(12/BX)

## (undated) DEVICE — NEPTUNE 4 PORT MANIFOLD - (20/PK)

## (undated) DEVICE — SEALER VESSEL EXTEND FROM DAVINCI ENERGY (6EA/BX)

## (undated) DEVICE — HEAD HOLDER JUNIOR/ADULT

## (undated) DEVICE — GLOVE BIOGEL PI ORTHO SZ 7.5 PF LF (40PR/BX)

## (undated) DEVICE — MIXER BONE CEMENT REVOLUTION - W/FEMORAL PRESSURIZER (6/CA)